# Patient Record
Sex: MALE | Race: WHITE | NOT HISPANIC OR LATINO | Employment: OTHER | URBAN - METROPOLITAN AREA
[De-identification: names, ages, dates, MRNs, and addresses within clinical notes are randomized per-mention and may not be internally consistent; named-entity substitution may affect disease eponyms.]

---

## 2017-03-13 ENCOUNTER — OFFICE VISIT - RIVER FALLS (OUTPATIENT)
Dept: FAMILY MEDICINE | Facility: CLINIC | Age: 67
End: 2017-03-13
Payer: COMMERCIAL

## 2017-03-13 ASSESSMENT — MIFFLIN-ST. JEOR: SCORE: 1628.51

## 2017-03-15 ENCOUNTER — OFFICE VISIT - RIVER FALLS (OUTPATIENT)
Dept: FAMILY MEDICINE | Facility: CLINIC | Age: 67
End: 2017-03-15
Payer: COMMERCIAL

## 2017-04-21 ENCOUNTER — OFFICE VISIT - RIVER FALLS (OUTPATIENT)
Dept: FAMILY MEDICINE | Facility: CLINIC | Age: 67
End: 2017-04-21
Payer: COMMERCIAL

## 2017-04-21 ASSESSMENT — MIFFLIN-ST. JEOR: SCORE: 1613.09

## 2017-04-27 ENCOUNTER — OFFICE VISIT - RIVER FALLS (OUTPATIENT)
Dept: FAMILY MEDICINE | Facility: CLINIC | Age: 67
End: 2017-04-27
Payer: COMMERCIAL

## 2017-04-27 ASSESSMENT — MIFFLIN-ST. JEOR: SCORE: 1627.6

## 2017-05-25 ENCOUNTER — COMMUNICATION - RIVER FALLS (OUTPATIENT)
Dept: FAMILY MEDICINE | Facility: CLINIC | Age: 67
End: 2017-05-25
Payer: COMMERCIAL

## 2017-05-25 ENCOUNTER — OFFICE VISIT - RIVER FALLS (OUTPATIENT)
Dept: FAMILY MEDICINE | Facility: CLINIC | Age: 67
End: 2017-05-25
Payer: COMMERCIAL

## 2017-05-26 LAB — PSA SERPL-MCNC: 2 NG/ML

## 2017-06-19 ENCOUNTER — OFFICE VISIT - RIVER FALLS (OUTPATIENT)
Dept: FAMILY MEDICINE | Facility: CLINIC | Age: 67
End: 2017-06-19
Payer: COMMERCIAL

## 2017-11-01 ENCOUNTER — OFFICE VISIT - RIVER FALLS (OUTPATIENT)
Dept: FAMILY MEDICINE | Facility: CLINIC | Age: 67
End: 2017-11-01
Payer: COMMERCIAL

## 2017-11-01 ASSESSMENT — MIFFLIN-ST. JEOR: SCORE: 1610.36

## 2017-11-06 ENCOUNTER — OFFICE VISIT - RIVER FALLS (OUTPATIENT)
Dept: FAMILY MEDICINE | Facility: CLINIC | Age: 67
End: 2017-11-06
Payer: COMMERCIAL

## 2017-12-22 ENCOUNTER — OFFICE VISIT - RIVER FALLS (OUTPATIENT)
Dept: FAMILY MEDICINE | Facility: CLINIC | Age: 67
End: 2017-12-22
Payer: COMMERCIAL

## 2017-12-22 ASSESSMENT — MIFFLIN-ST. JEOR: SCORE: 1626.69

## 2018-01-04 ENCOUNTER — OFFICE VISIT - RIVER FALLS (OUTPATIENT)
Dept: FAMILY MEDICINE | Facility: CLINIC | Age: 68
End: 2018-01-04
Payer: COMMERCIAL

## 2018-01-04 ASSESSMENT — MIFFLIN-ST. JEOR: SCORE: 1616.71

## 2018-02-05 ENCOUNTER — OFFICE VISIT - RIVER FALLS (OUTPATIENT)
Dept: FAMILY MEDICINE | Facility: CLINIC | Age: 68
End: 2018-02-05
Payer: COMMERCIAL

## 2018-02-05 ASSESSMENT — MIFFLIN-ST. JEOR: SCORE: 1640.3

## 2018-02-28 ENCOUNTER — OFFICE VISIT - RIVER FALLS (OUTPATIENT)
Dept: FAMILY MEDICINE | Facility: CLINIC | Age: 68
End: 2018-02-28
Payer: COMMERCIAL

## 2018-02-28 ASSESSMENT — MIFFLIN-ST. JEOR: SCORE: 1645.75

## 2018-03-13 ENCOUNTER — OFFICE VISIT - RIVER FALLS (OUTPATIENT)
Dept: FAMILY MEDICINE | Facility: CLINIC | Age: 68
End: 2018-03-13
Payer: COMMERCIAL

## 2018-03-13 ASSESSMENT — MIFFLIN-ST. JEOR: SCORE: 1642.12

## 2018-03-14 ENCOUNTER — OFFICE VISIT - RIVER FALLS (OUTPATIENT)
Dept: FAMILY MEDICINE | Facility: CLINIC | Age: 68
End: 2018-03-14
Payer: COMMERCIAL

## 2018-03-22 ENCOUNTER — AMBULATORY - RIVER FALLS (OUTPATIENT)
Dept: FAMILY MEDICINE | Facility: CLINIC | Age: 68
End: 2018-03-22
Payer: COMMERCIAL

## 2018-03-23 LAB
CHOLEST SERPL-MCNC: 132 MG/DL
CHOLEST/HDLC SERPL: 3.1 {RATIO}
CREAT SERPL-MCNC: 1.42 MG/DL (ref 0.7–1.25)
GLUCOSE BLD-MCNC: 83 MG/DL (ref 65–99)
HDLC SERPL-MCNC: 42 MG/DL
LDLC SERPL CALC-MCNC: 74 MG/DL
NONHDLC SERPL-MCNC: 90 MG/DL
TRIGL SERPL-MCNC: 84 MG/DL

## 2018-03-29 ENCOUNTER — OFFICE VISIT - RIVER FALLS (OUTPATIENT)
Dept: FAMILY MEDICINE | Facility: CLINIC | Age: 68
End: 2018-03-29
Payer: COMMERCIAL

## 2018-03-29 ASSESSMENT — MIFFLIN-ST. JEOR: SCORE: 1646.65

## 2018-04-30 ENCOUNTER — OFFICE VISIT - RIVER FALLS (OUTPATIENT)
Dept: FAMILY MEDICINE | Facility: CLINIC | Age: 68
End: 2018-04-30
Payer: COMMERCIAL

## 2018-05-01 ENCOUNTER — OFFICE VISIT - RIVER FALLS (OUTPATIENT)
Dept: FAMILY MEDICINE | Facility: CLINIC | Age: 68
End: 2018-05-01
Payer: COMMERCIAL

## 2018-05-01 ASSESSMENT — MIFFLIN-ST. JEOR: SCORE: 1667.52

## 2018-05-02 ENCOUNTER — OFFICE VISIT - RIVER FALLS (OUTPATIENT)
Dept: FAMILY MEDICINE | Facility: CLINIC | Age: 68
End: 2018-05-02
Payer: COMMERCIAL

## 2018-05-07 ENCOUNTER — COMMUNICATION - RIVER FALLS (OUTPATIENT)
Dept: FAMILY MEDICINE | Facility: CLINIC | Age: 68
End: 2018-05-07
Payer: COMMERCIAL

## 2018-05-07 ENCOUNTER — OFFICE VISIT - RIVER FALLS (OUTPATIENT)
Dept: FAMILY MEDICINE | Facility: CLINIC | Age: 68
End: 2018-05-07
Payer: COMMERCIAL

## 2018-05-15 ENCOUNTER — OFFICE VISIT - RIVER FALLS (OUTPATIENT)
Dept: FAMILY MEDICINE | Facility: CLINIC | Age: 68
End: 2018-05-15
Payer: COMMERCIAL

## 2018-05-15 ASSESSMENT — MIFFLIN-ST. JEOR: SCORE: 1636.67

## 2018-05-30 ENCOUNTER — OFFICE VISIT - RIVER FALLS (OUTPATIENT)
Dept: FAMILY MEDICINE | Facility: CLINIC | Age: 68
End: 2018-05-30
Payer: COMMERCIAL

## 2018-06-11 LAB
CREAT SERPL-MCNC: 1.82 MG/DL
GLUCOSE BLD-MCNC: 85 MG/DL

## 2018-07-05 ENCOUNTER — OFFICE VISIT - RIVER FALLS (OUTPATIENT)
Dept: FAMILY MEDICINE | Facility: CLINIC | Age: 68
End: 2018-07-05
Payer: COMMERCIAL

## 2018-07-05 ASSESSMENT — MIFFLIN-ST. JEOR: SCORE: 1597.66

## 2018-07-11 ENCOUNTER — OFFICE VISIT - RIVER FALLS (OUTPATIENT)
Dept: FAMILY MEDICINE | Facility: CLINIC | Age: 68
End: 2018-07-11
Payer: COMMERCIAL

## 2018-07-24 ENCOUNTER — OFFICE VISIT - RIVER FALLS (OUTPATIENT)
Dept: FAMILY MEDICINE | Facility: CLINIC | Age: 68
End: 2018-07-24
Payer: COMMERCIAL

## 2018-08-02 ENCOUNTER — OFFICE VISIT - RIVER FALLS (OUTPATIENT)
Dept: FAMILY MEDICINE | Facility: CLINIC | Age: 68
End: 2018-08-02
Payer: COMMERCIAL

## 2018-08-02 ASSESSMENT — MIFFLIN-ST. JEOR: SCORE: 1608.55

## 2018-08-14 ENCOUNTER — OFFICE VISIT - RIVER FALLS (OUTPATIENT)
Dept: FAMILY MEDICINE | Facility: CLINIC | Age: 68
End: 2018-08-14
Payer: COMMERCIAL

## 2018-10-03 ENCOUNTER — OFFICE VISIT - RIVER FALLS (OUTPATIENT)
Dept: FAMILY MEDICINE | Facility: CLINIC | Age: 68
End: 2018-10-03
Payer: COMMERCIAL

## 2019-01-11 ENCOUNTER — OFFICE VISIT - RIVER FALLS (OUTPATIENT)
Dept: FAMILY MEDICINE | Facility: CLINIC | Age: 69
End: 2019-01-11
Payer: COMMERCIAL

## 2019-01-11 ASSESSMENT — MIFFLIN-ST. JEOR: SCORE: 1640.3

## 2019-04-02 ENCOUNTER — OFFICE VISIT - RIVER FALLS (OUTPATIENT)
Dept: FAMILY MEDICINE | Facility: CLINIC | Age: 69
End: 2019-04-02
Payer: COMMERCIAL

## 2019-04-02 ASSESSMENT — MIFFLIN-ST. JEOR: SCORE: 1635.77

## 2019-04-03 ENCOUNTER — OFFICE VISIT - RIVER FALLS (OUTPATIENT)
Dept: FAMILY MEDICINE | Facility: CLINIC | Age: 69
End: 2019-04-03
Payer: COMMERCIAL

## 2019-04-03 LAB
BUN SERPL-MCNC: 30 MG/DL (ref 7–25)
BUN/CREAT RATIO - HISTORICAL: 16 (ref 6–22)
CALCIUM SERPL-MCNC: 9.5 MG/DL (ref 8.6–10.3)
CHLORIDE BLD-SCNC: 104 MMOL/L (ref 98–110)
CHOLEST SERPL-MCNC: 159 MG/DL
CHOLEST/HDLC SERPL: 4.3 {RATIO}
CO2 SERPL-SCNC: 26 MMOL/L (ref 20–32)
CREAT SERPL-MCNC: 1.92 MG/DL (ref 0.7–1.18)
EGFRCR SERPLBLD CKD-EPI 2021: 35 ML/MIN/1.73M2
ERYTHROCYTE [DISTWIDTH] IN BLOOD BY AUTOMATED COUNT: 13.3 % (ref 11–15)
GLUCOSE BLD-MCNC: 87 MG/DL (ref 65–99)
HCT VFR BLD AUTO: 36.5 % (ref 38.5–50)
HDLC SERPL-MCNC: 37 MG/DL
HGB BLD-MCNC: 12.8 GM/DL (ref 13.2–17.1)
LDLC SERPL CALC-MCNC: 93 MG/DL
MCH RBC QN AUTO: 33 PG (ref 27–33)
MCHC RBC AUTO-ENTMCNC: 35.1 GM/DL (ref 32–36)
MCV RBC AUTO: 94.1 FL (ref 80–100)
NONHDLC SERPL-MCNC: 122 MG/DL
PLATELET # BLD AUTO: 312 10*3/UL (ref 140–400)
PMV BLD: 10.9 FL (ref 7.5–12.5)
POTASSIUM BLD-SCNC: 4 MMOL/L (ref 3.5–5.3)
PSA SERPL-MCNC: 2.6 NG/ML
RBC # BLD AUTO: 3.88 10*6/UL (ref 4.2–5.8)
SODIUM SERPL-SCNC: 139 MMOL/L (ref 135–146)
TRIGL SERPL-MCNC: 193 MG/DL
WBC # BLD AUTO: 6 10*3/UL (ref 3.8–10.8)

## 2020-01-28 ENCOUNTER — OFFICE VISIT - RIVER FALLS (OUTPATIENT)
Dept: FAMILY MEDICINE | Facility: CLINIC | Age: 70
End: 2020-01-28
Payer: COMMERCIAL

## 2020-01-28 ASSESSMENT — MIFFLIN-ST. JEOR: SCORE: 1644.84

## 2020-02-03 ENCOUNTER — OFFICE VISIT - RIVER FALLS (OUTPATIENT)
Dept: FAMILY MEDICINE | Facility: CLINIC | Age: 70
End: 2020-02-03
Payer: COMMERCIAL

## 2020-02-03 ASSESSMENT — MIFFLIN-ST. JEOR: SCORE: 1653.91

## 2020-03-31 ENCOUNTER — COMMUNICATION - RIVER FALLS (OUTPATIENT)
Dept: FAMILY MEDICINE | Facility: CLINIC | Age: 70
End: 2020-03-31
Payer: COMMERCIAL

## 2020-05-11 ENCOUNTER — OFFICE VISIT - RIVER FALLS (OUTPATIENT)
Dept: FAMILY MEDICINE | Facility: CLINIC | Age: 70
End: 2020-05-11
Payer: COMMERCIAL

## 2020-05-18 ENCOUNTER — OFFICE VISIT - RIVER FALLS (OUTPATIENT)
Dept: FAMILY MEDICINE | Facility: CLINIC | Age: 70
End: 2020-05-18
Payer: COMMERCIAL

## 2020-06-04 ENCOUNTER — OFFICE VISIT - RIVER FALLS (OUTPATIENT)
Dept: FAMILY MEDICINE | Facility: CLINIC | Age: 70
End: 2020-06-04
Payer: COMMERCIAL

## 2020-06-04 ASSESSMENT — MIFFLIN-ST. JEOR: SCORE: 1631.23

## 2020-07-06 ENCOUNTER — AMBULATORY - RIVER FALLS (OUTPATIENT)
Dept: FAMILY MEDICINE | Facility: CLINIC | Age: 70
End: 2020-07-06
Payer: COMMERCIAL

## 2020-07-07 ENCOUNTER — COMMUNICATION - RIVER FALLS (OUTPATIENT)
Dept: FAMILY MEDICINE | Facility: CLINIC | Age: 70
End: 2020-07-07
Payer: COMMERCIAL

## 2020-07-07 LAB
BUN SERPL-MCNC: 28 MG/DL (ref 7–25)
BUN/CREAT RATIO - HISTORICAL: 16 (ref 6–22)
CALCIUM SERPL-MCNC: 9.2 MG/DL (ref 8.6–10.3)
CHLORIDE BLD-SCNC: 109 MMOL/L (ref 98–110)
CHOLEST SERPL-MCNC: 153 MG/DL
CHOLEST/HDLC SERPL: 3.8 {RATIO}
CO2 SERPL-SCNC: 25 MMOL/L (ref 20–32)
CREAT SERPL-MCNC: 1.78 MG/DL (ref 0.7–1.18)
EGFRCR SERPLBLD CKD-EPI 2021: 38 ML/MIN/1.73M2
GLUCOSE BLD-MCNC: 96 MG/DL (ref 65–99)
HDLC SERPL-MCNC: 40 MG/DL
LDLC SERPL CALC-MCNC: 92 MG/DL
NONHDLC SERPL-MCNC: 113 MG/DL
POTASSIUM BLD-SCNC: 4.1 MMOL/L (ref 3.5–5.3)
PSA SERPL-MCNC: 1.9 NG/ML
SODIUM SERPL-SCNC: 139 MMOL/L (ref 135–146)
TRIGL SERPL-MCNC: 118 MG/DL

## 2020-07-27 ENCOUNTER — TRANSFERRED RECORDS (OUTPATIENT)
Dept: MULTI SPECIALTY CLINIC | Facility: CLINIC | Age: 70
End: 2020-07-27
Payer: COMMERCIAL

## 2020-07-27 ENCOUNTER — OFFICE VISIT - RIVER FALLS (OUTPATIENT)
Dept: FAMILY MEDICINE | Facility: CLINIC | Age: 70
End: 2020-07-27
Payer: COMMERCIAL

## 2020-07-27 ASSESSMENT — MIFFLIN-ST. JEOR: SCORE: 1653

## 2020-12-10 ENCOUNTER — OFFICE VISIT - RIVER FALLS (OUTPATIENT)
Dept: FAMILY MEDICINE | Facility: CLINIC | Age: 70
End: 2020-12-10
Payer: COMMERCIAL

## 2020-12-10 ASSESSMENT — MIFFLIN-ST. JEOR: SCORE: 1656.63

## 2021-07-05 ENCOUNTER — COMMUNICATION - RIVER FALLS (OUTPATIENT)
Dept: FAMILY MEDICINE | Facility: CLINIC | Age: 71
End: 2021-07-05
Payer: COMMERCIAL

## 2021-07-08 ENCOUNTER — OFFICE VISIT - RIVER FALLS (OUTPATIENT)
Dept: FAMILY MEDICINE | Facility: CLINIC | Age: 71
End: 2021-07-08
Payer: COMMERCIAL

## 2021-07-08 ASSESSMENT — MIFFLIN-ST. JEOR: SCORE: 1640.3

## 2021-07-09 ENCOUNTER — COMMUNICATION - RIVER FALLS (OUTPATIENT)
Dept: FAMILY MEDICINE | Facility: CLINIC | Age: 71
End: 2021-07-09
Payer: COMMERCIAL

## 2021-07-09 LAB
BUN SERPL-MCNC: 22 MG/DL (ref 7–25)
BUN/CREAT RATIO - HISTORICAL: 12 (ref 6–22)
CALCIUM SERPL-MCNC: 9.4 MG/DL (ref 8.6–10.3)
CHLORIDE BLD-SCNC: 106 MMOL/L (ref 98–110)
CHOLEST SERPL-MCNC: 148 MG/DL
CHOLEST/HDLC SERPL: 3.7 {RATIO}
CO2 SERPL-SCNC: 24 MMOL/L (ref 20–32)
CREAT SERPL-MCNC: 1.78 MG/DL (ref 0.7–1.18)
EGFRCR SERPLBLD CKD-EPI 2021: 37 ML/MIN/1.73M2
GLUCOSE BLD-MCNC: 86 MG/DL (ref 65–99)
HDLC SERPL-MCNC: 40 MG/DL
LDLC SERPL CALC-MCNC: 86 MG/DL
NONHDLC SERPL-MCNC: 108 MG/DL
POTASSIUM BLD-SCNC: 4.4 MMOL/L (ref 3.5–5.3)
PSA SERPL-MCNC: 2.1 NG/ML
SODIUM SERPL-SCNC: 138 MMOL/L (ref 135–146)
TRIGL SERPL-MCNC: 119 MG/DL

## 2021-07-15 ENCOUNTER — AMBULATORY - RIVER FALLS (OUTPATIENT)
Dept: FAMILY MEDICINE | Facility: CLINIC | Age: 71
End: 2021-07-15
Payer: COMMERCIAL

## 2021-07-22 ENCOUNTER — OFFICE VISIT - RIVER FALLS (OUTPATIENT)
Dept: FAMILY MEDICINE | Facility: CLINIC | Age: 71
End: 2021-07-22
Payer: COMMERCIAL

## 2021-07-22 ASSESSMENT — MIFFLIN-ST. JEOR: SCORE: 1644.84

## 2021-12-19 ENCOUNTER — NURSE TRIAGE (OUTPATIENT)
Dept: NURSING | Facility: CLINIC | Age: 71
End: 2021-12-19
Payer: COMMERCIAL

## 2021-12-19 NOTE — TELEPHONE ENCOUNTER
Pt tested positive for COVID 12/18/21 with home test.  Pt states 99, congestion, pt feels a little sick.  Symptoms began on 12/15/21.  Pt wanting to know what he should do.  Care Advice given, pt verbalizes understanding and will follow Care Advice.  Sneha Carballo RN, [facility] Nurse Advisor      Reason for Disposition    [1] COVID-19 diagnosed by positive lab test AND [2] mild symptoms (e.g., cough, fever, others) AND [3] no complications or SOB    Additional Information    Negative: SEVERE difficulty breathing (e.g., struggling for each breath, speaks in single words)    Negative: Difficult to awaken or acting confused (e.g., disoriented, slurred speech)    Negative: Bluish (or gray) lips or face now    Negative: Shock suspected (e.g., cold/pale/clammy skin, too weak to stand, low BP, rapid pulse)    Negative: Sounds like a life-threatening emergency to the triager    Negative: [1] COVID-19 exposure AND [2] no symptoms    Negative: COVID-19 vaccine reaction suspected (e.g., fever, headache, muscle aches) occurring 1 to 3 days after getting vaccine    Negative: COVID-19 vaccine, questions about    Negative: [1] Lives with someone known to have influenza (flu test positive) AND [2] flu-like symptoms (e.g., cough, runny nose, sore throat, SOB; with or without fever)    Negative: [1] Adult with possible COVID-19 symptoms AND [2] triager concerned about severity of symptoms or other causes    Negative: COVID-19 and breastfeeding, questions about    Negative: SEVERE or constant chest pain or pressure (Exception: mild central chest pain, present only when coughing)    Negative: MODERATE difficulty breathing (e.g., speaks in phrases, SOB even at rest, pulse 100-120)    Negative: [1] Headache AND [2] stiff neck (can't touch chin to chest)    Negative: MILD difficulty breathing (e.g., minimal/no SOB at rest, SOB with walking, pulse <100)    Negative: Chest pain or pressure    Negative: Patient sounds very sick or weak  to the triager    Negative: Fever > 103 F (39.4 C)    Negative: [1] Fever > 101 F (38.3 C) AND [2] age > 60 years    Negative: [1] Fever > 100.0 F (37.8 C) AND [2] bedridden (e.g., nursing home patient, CVA, chronic illness, recovering from surgery)    Negative: HIGH RISK for severe COVID complications (e.g., age > 64 years, obesity with BMI > 25, pregnant, chronic lung disease or other chronic medical condition)  (Exception: Already seen by PCP and no new or worsening symptoms.)    Negative: [1] HIGH RISK patient AND [2] influenza is widespread in the community AND [3] ONE OR MORE respiratory symptoms: cough, sore throat, runny or stuffy nose    Negative: [1] HIGH RISK patient AND [2] influenza exposure within the last 7 days AND [3] ONE OR MORE respiratory symptoms: cough, sore throat, runny or stuffy nose    Negative: [1] COVID-19 infection suspected by caller or triager AND [2] mild symptoms (cough, fever, or others) AND [3] negative COVID-19 rapid test    Negative: Fever present > 3 days (72 hours)    Negative: [1] Fever returns after gone for over 24 hours AND [2] symptoms worse or not improved    Negative: [1] Continuous (nonstop) coughing interferes with work or school AND [2] no improvement using cough treatment per protocol    Negative: Cough present > 3 weeks    Negative: [1] COVID-19 diagnosed by positive lab test AND [2] NO symptoms (e.g., cough, fever, others)    [facility] Specific Disposition - [facility] Specific Patient Instructions  COVID 19 Nurse Triage Plan/Patient Instructions    Please be aware that novel coronavirus (COVID-19) may be circulating in the community. If you develop symptoms such as fever, cough, or SOB or if you have concerns about the presence of another infection including coronavirus (COVID-19), please contact your health care provider or visit [email or web address]      Home care recommended. Follow home care protocol based instructions.    Thank you for  taking steps to prevent the spread of this virus.  Limit your contact with others.  Wear a simple mask to cover your cough.  Wash your hands well and often.    Resources  [facility]: About COVID-19: [email or web address]  CDC: What to Do If You're Sick: www.cdc.gov/coronavirus/2019-ncov/about/steps-when-sick.html  CDC: Ending Home Isolation: www.cdc.gov/coronavirus/2019-ncov/hcp/disposition-in-home-patients.html   CDC: Caring for Someone: www.cdc.gov/coronavirus/2019-ncov/if-you-are-sick/care-for-someone.html   [facility]: Interim Guidance for Hospital Discharge to Home: www.[facility].mn./diseases/coronavirus/hcp/hospdischarge.pdf  [facility] clinical trials (COVID-19 research studies): clinicalaffairs.[facility]-clinical-trials   Below are the COVID-19 hotlines at the [place] Department [facility] ([facility]). Interpreters are available.   For health questions: Call 421-473-8702 or 1-145.337.3757 (7 a.m. to 7 p.m.)  For questions about schools and childcare: Call 366-753-1489 or 1-141.590.4504 (7 a.m. to 7 p.m.)    Protocols used: CORONAVIRUS (COVID-19) DIAGNOSED OR [id number] 8.25.2021

## 2022-01-10 ENCOUNTER — OFFICE VISIT - RIVER FALLS (OUTPATIENT)
Dept: FAMILY MEDICINE | Facility: CLINIC | Age: 72
End: 2022-01-10
Payer: COMMERCIAL

## 2022-01-11 ENCOUNTER — COMMUNICATION - RIVER FALLS (OUTPATIENT)
Dept: FAMILY MEDICINE | Facility: CLINIC | Age: 72
End: 2022-01-11
Payer: COMMERCIAL

## 2022-01-27 ENCOUNTER — OFFICE VISIT - RIVER FALLS (OUTPATIENT)
Dept: FAMILY MEDICINE | Facility: CLINIC | Age: 72
End: 2022-01-27
Payer: COMMERCIAL

## 2022-02-11 VITALS
DIASTOLIC BLOOD PRESSURE: 64 MMHG | HEIGHT: 68 IN | HEART RATE: 62 BPM | TEMPERATURE: 96.9 F | WEIGHT: 206.6 LBS | SYSTOLIC BLOOD PRESSURE: 122 MMHG | BODY MASS INDEX: 31.31 KG/M2

## 2022-02-11 VITALS
OXYGEN SATURATION: 97 % | DIASTOLIC BLOOD PRESSURE: 70 MMHG | HEART RATE: 64 BPM | TEMPERATURE: 97.2 F | SYSTOLIC BLOOD PRESSURE: 110 MMHG | SYSTOLIC BLOOD PRESSURE: 110 MMHG | HEIGHT: 68 IN | SYSTOLIC BLOOD PRESSURE: 122 MMHG | BODY MASS INDEX: 30.95 KG/M2 | SYSTOLIC BLOOD PRESSURE: 120 MMHG | DIASTOLIC BLOOD PRESSURE: 72 MMHG | BODY MASS INDEX: 29.98 KG/M2 | WEIGHT: 203 LBS | HEIGHT: 68 IN | WEIGHT: 200 LBS | WEIGHT: 204.2 LBS | HEIGHT: 68 IN | OXYGEN SATURATION: 96 % | DIASTOLIC BLOOD PRESSURE: 78 MMHG | BODY MASS INDEX: 30.31 KG/M2 | TEMPERATURE: 97.8 F | WEIGHT: 197.8 LBS | RESPIRATION RATE: 16 BRPM | HEART RATE: 80 BPM | HEART RATE: 64 BPM | HEIGHT: 68 IN | HEART RATE: 94 BPM | DIASTOLIC BLOOD PRESSURE: 70 MMHG | BODY MASS INDEX: 30.77 KG/M2

## 2022-02-11 VITALS
WEIGHT: 196.4 LBS | HEART RATE: 69 BPM | SYSTOLIC BLOOD PRESSURE: 112 MMHG | DIASTOLIC BLOOD PRESSURE: 72 MMHG | HEIGHT: 68 IN | BODY MASS INDEX: 29.77 KG/M2 | OXYGEN SATURATION: 97 %

## 2022-02-11 VITALS
DIASTOLIC BLOOD PRESSURE: 68 MMHG | DIASTOLIC BLOOD PRESSURE: 82 MMHG | OXYGEN SATURATION: 98 % | SYSTOLIC BLOOD PRESSURE: 132 MMHG | HEIGHT: 68 IN | WEIGHT: 200.4 LBS | BODY MASS INDEX: 29.86 KG/M2 | SYSTOLIC BLOOD PRESSURE: 110 MMHG | HEART RATE: 85 BPM | HEART RATE: 74 BPM | HEIGHT: 68 IN | HEIGHT: 68 IN | SYSTOLIC BLOOD PRESSURE: 122 MMHG | WEIGHT: 197 LBS | WEIGHT: 200.2 LBS | TEMPERATURE: 97.4 F | BODY MASS INDEX: 30.37 KG/M2 | DIASTOLIC BLOOD PRESSURE: 78 MMHG | OXYGEN SATURATION: 98 % | BODY MASS INDEX: 30.34 KG/M2 | HEART RATE: 78 BPM

## 2022-02-11 VITALS
BODY MASS INDEX: 31.19 KG/M2 | TEMPERATURE: 97.6 F | WEIGHT: 201 LBS | BODY MASS INDEX: 30.46 KG/M2 | HEART RATE: 64 BPM | WEIGHT: 205.8 LBS | SYSTOLIC BLOOD PRESSURE: 122 MMHG | HEIGHT: 68 IN | DIASTOLIC BLOOD PRESSURE: 78 MMHG | HEIGHT: 68 IN | DIASTOLIC BLOOD PRESSURE: 62 MMHG | HEART RATE: 72 BPM | SYSTOLIC BLOOD PRESSURE: 128 MMHG

## 2022-02-11 VITALS
HEART RATE: 78 BPM | HEART RATE: 64 BPM | BODY MASS INDEX: 31.67 KG/M2 | HEIGHT: 68 IN | SYSTOLIC BLOOD PRESSURE: 132 MMHG | OXYGEN SATURATION: 99 % | DIASTOLIC BLOOD PRESSURE: 74 MMHG | WEIGHT: 202.2 LBS | HEIGHT: 68 IN | SYSTOLIC BLOOD PRESSURE: 122 MMHG | SYSTOLIC BLOOD PRESSURE: 124 MMHG | HEART RATE: 103 BPM | WEIGHT: 209 LBS | SYSTOLIC BLOOD PRESSURE: 112 MMHG | HEIGHT: 68 IN | BODY MASS INDEX: 30.65 KG/M2 | DIASTOLIC BLOOD PRESSURE: 62 MMHG | DIASTOLIC BLOOD PRESSURE: 70 MMHG | BODY MASS INDEX: 30.98 KG/M2 | WEIGHT: 204.4 LBS | HEART RATE: 74 BPM | DIASTOLIC BLOOD PRESSURE: 72 MMHG | TEMPERATURE: 98.8 F | HEIGHT: 68 IN | BODY MASS INDEX: 30.83 KG/M2 | WEIGHT: 203.4 LBS

## 2022-02-11 VITALS
OXYGEN SATURATION: 96 % | RESPIRATION RATE: 16 BRPM | HEIGHT: 68 IN | DIASTOLIC BLOOD PRESSURE: 76 MMHG | WEIGHT: 203 LBS | TEMPERATURE: 97.5 F | SYSTOLIC BLOOD PRESSURE: 110 MMHG | BODY MASS INDEX: 30.77 KG/M2 | HEART RATE: 80 BPM

## 2022-02-11 VITALS
BODY MASS INDEX: 30.92 KG/M2 | BODY MASS INDEX: 31.22 KG/M2 | OXYGEN SATURATION: 97 % | DIASTOLIC BLOOD PRESSURE: 70 MMHG | HEART RATE: 64 BPM | SYSTOLIC BLOOD PRESSURE: 122 MMHG | HEIGHT: 68 IN | HEIGHT: 68 IN | TEMPERATURE: 97.1 F | WEIGHT: 204 LBS | HEART RATE: 75 BPM | SYSTOLIC BLOOD PRESSURE: 110 MMHG | DIASTOLIC BLOOD PRESSURE: 82 MMHG | TEMPERATURE: 97.9 F | WEIGHT: 206 LBS

## 2022-02-11 VITALS — HEIGHT: 68 IN | BODY MASS INDEX: 31.32 KG/M2 | HEIGHT: 68 IN | BODY MASS INDEX: 31.32 KG/M2

## 2022-02-11 VITALS
HEART RATE: 80 BPM | OXYGEN SATURATION: 96 % | HEART RATE: 81 BPM | BODY MASS INDEX: 29.34 KG/M2 | DIASTOLIC BLOOD PRESSURE: 80 MMHG | SYSTOLIC BLOOD PRESSURE: 122 MMHG | HEART RATE: 64 BPM | HEIGHT: 68 IN | BODY MASS INDEX: 29.95 KG/M2 | SYSTOLIC BLOOD PRESSURE: 122 MMHG | WEIGHT: 197 LBS | DIASTOLIC BLOOD PRESSURE: 68 MMHG | SYSTOLIC BLOOD PRESSURE: 142 MMHG | BODY MASS INDEX: 29.7 KG/M2 | TEMPERATURE: 98.8 F | WEIGHT: 196 LBS | DIASTOLIC BLOOD PRESSURE: 78 MMHG | SYSTOLIC BLOOD PRESSURE: 120 MMHG | DIASTOLIC BLOOD PRESSURE: 70 MMHG | HEIGHT: 68 IN | WEIGHT: 193.6 LBS | HEART RATE: 64 BPM | TEMPERATURE: 98 F

## 2022-02-11 VITALS
WEIGHT: 202 LBS | DIASTOLIC BLOOD PRESSURE: 80 MMHG | OXYGEN SATURATION: 94 % | HEART RATE: 66 BPM | SYSTOLIC BLOOD PRESSURE: 120 MMHG | BODY MASS INDEX: 30.62 KG/M2 | HEIGHT: 68 IN

## 2022-02-11 VITALS
WEIGHT: 204 LBS | BODY MASS INDEX: 30.77 KG/M2 | OXYGEN SATURATION: 98 % | DIASTOLIC BLOOD PRESSURE: 82 MMHG | HEIGHT: 68 IN | SYSTOLIC BLOOD PRESSURE: 125 MMHG | HEIGHT: 68 IN | HEART RATE: 64 BPM | TEMPERATURE: 96.8 F | WEIGHT: 203 LBS | RESPIRATION RATE: 20 BRPM | TEMPERATURE: 97.7 F | SYSTOLIC BLOOD PRESSURE: 114 MMHG | DIASTOLIC BLOOD PRESSURE: 78 MMHG | BODY MASS INDEX: 30.92 KG/M2 | HEART RATE: 68 BPM

## 2022-02-16 NOTE — TELEPHONE ENCOUNTER
Entered by Darrius Kirby on March 30, 2020 3:45:36 PM CDT  Med Refill    Gabapentin     Date of last office visit and reason:  02/03/2020; asthma flare up                                   Date of last Med Check / Px:   04/02/2019  Date of last labs pertaining to med:  04/02/2019    RTC order in chart:  Yes, due April 2020 for annual px/labs    For Protocol refill, has patient been contacted:  No, due to COVID-19 sent additional 3 month supply. Will need appt for further refills. Note sent to pharmacy.           ** Submitted: **  Order:gabapentin (gabapentin 300 mg oral capsule)  See Instructions  TAKE ONE CAPSULE BY MOUTH EVERY DAY  Qty:  90 cap(s)        Refills:  0          Substitutions Allowed     Route To Pharmacy - [facility]    Signed by Darrius Kirby  3/30/2020 8:43:00 PM    ** Submitted: **  Complete:gabapentin (gabapentin 300 mg oral capsule)   Signed by Darrius Kirby  3/30/2020 8:44:00 PM    ** Not Approved:  **  gabapentin (GABAPENTIN 300MG CAPS)  TAKE ONE CAPSULE BY MOUTH EVERY DAY  Qty:  90 unknown unit        Days Supply:  90        Refills:  3          Substitutions Allowed     Route To Pharmacy - [facility]   Signed by Darrius Kirby            ------------------------------------------  From: [facility]  To: Mark Chris MD  Sent: March 29, 2020 11:28:55 AM CDT  Subject: Medication Management  Due: March 30, 2020 11:28:55 AM CDT    ** On Hold Pending Signature **  Drug: gabapentin (gabapentin 300 mg oral capsule)  TAKE ONE CAPSULE BY MOUTH EVERY DAY  Quantity: 90 unknown unit  Days Supply: 90  Refills: 3  Substitutions Allowed  Notes from Pharmacy:     Dispensed Drug: gabapentin (gabapentin 300 mg oral capsule)  TAKE ONE CAPSULE BY MOUTH EVERY DAY  Quantity: 90 unknown unit  Days Supply: 90  Refills: 3  Substitutions Allowed  Notes from Pharmacy:     ** On Hold Pending Signature **  Drug: zolpidem  (zolpidem 10 mg oral tablet)  TAKE ONE TABLET BY MOUTH AT BEDTIME AS NEEDED FOR SLEEP  Quantity: 30 unknown unit  Days Supply: 30  Refills: 5  Substitutions Allowed  Notes from Pharmacy:     Dispensed Drug: zolpidem (zolpidem 10 mg oral tablet)  TAKE ONE TABLET BY MOUTH AT BEDTIME AS NEEDED FOR SLEEP  Quantity: 30 unknown unit  Days Supply: 30  Refills: 5  Substitutions Allowed  Notes from Pharmacy:   ---------------------------------------------------------------  From: Darrius Kirby (eRx Pool ([id number]_[facility]))   To: [facility] Message Pool ([id number]_[place]);     Sent: 3/30/2020 3:47:14 PM CDT  Subject: FW: Medication Management   Due Date/Time: 3/30/2020 11:28:00 AM CDT     Medication Refill needing approval    PCP:   SOPHIE    Medication:   Zolpidem  Last Filled:  10/03/2019              Quantity:  30                  Refills:  5  CSA on file?   n/a     Date of last office visit and reason:   02/03/2020; asthma flare  Date of last labs pertaining to condition:  n/a     Note:  Please advise on refill.    Return to Clinic order placed?  Yes, due April 2020    Resource:   eRx  Phone:   n/a---------------------  From: Norma Patel CMA ([email or web address] Message Pool ([id number]_[facility]))   To: Mark Chris MD;     Sent: 3/31/2020 8:03:23 AM CDT  Subject: FW: Medication Management   Due Date/Time: 3/30/2020 11:28:00 AM CDT---------------------  From: Mark Chris MD   To: [id number]    Sent: 3/31/2020 8:15:37 AM CDT  Subject: FW: Medication Management     ** Submitted: **  Complete:zolpidem (zolpidem 10 mg oral tablet)   Signed by Mark Chris MD  3/31/2020 1:15:00 PM    ** Approved **  zolpidem (ZOLPIDEM TARTRATE 10MG TABS)  TAKE ONE TABLET BY MOUTH AT BEDTIME AS NEEDED FOR SLEEP  Qty:  30 unknown unit        Days Supply:  30        Refills:  5          Substitutions Allowed     Route To Pharmacy - [id number]

## 2022-02-16 NOTE — NURSING NOTE
Asthma Control Test (ACT) Total Entered On:  4/2/2019 1:41 PM CDT    Performed On:  4/2/2019 1:41 PM CDT by Lesli Villegas MA               Asthma Control Test (ACT) Total   Asthma Control Test Total (Adult) :   24    Lesli Villegas MA - 4/2/2019 1:41 PM CDT

## 2022-02-16 NOTE — TELEPHONE ENCOUNTER
Entered by Mariam Johnson CMA on March 23, 2020 7:32:40 AM CDT  ---------------------  From: Mariam Johnson CMA   To: [place] [address]    Sent: 3/23/2020 7:32:40 AM CDT  Subject: Medication Management     ** Submitted: **  Order:atorvastatin (atorvastatin 20 mg oral tablet)  1 tab(s)  Oral  daily  Qty:  90 tab(s)        Refills:  0          Substitutions Allowed     Route To Pharmacy [place] [address]    Signed by Mariam Johnson CMA  3/23/2020 12:32:00 PM    ** Submitted: **  Complete:atorvastatin (atorvastatin 20 mg oral tablet)   Signed by Mariam Johnson CMA  3/23/2020 12:32:00 PM    ** Not Approved:  **  atorvastatin (ATORVASTATIN CALCIUM 20MG TABS)  TAKE ONE TABLET BY MOUTH EVERY DAY  Qty:  90 unknown unit        Days Supply:  90        Refills:  3          Substitutions Allowed     Route To [place] [address]   Signed by Mariam Johnson CMA            ------------------------------------------  From: [place] [address]  To: Mark Chris MD  Sent: March 22, 2020 8:17:21 PM CDT  Subject: Medication Management  Due: March 23, 2020 8:17:21 PM CDT    ** On Hold Pending Signature **  Drug: atorvastatin (atorvastatin 20 mg oral tablet)  TAKE ONE TABLET BY MOUTH EVERY DAY  Quantity: 90 unknown unit  Days Supply: 90  Refills: 3  Substitutions Allowed  Notes from Pharmacy:     Dispensed Drug: atorvastatin (atorvastatin 20 mg oral tablet)  TAKE ONE TABLET BY MOUTH EVERY DAY  Quantity: 90 unknown unit  Days Supply: 90  Refills: 3  Substitutions Allowed  Notes from Pharmacy:   ------------------------------------------Med Refill      Date of last office visit and reason:  2/3/20; asthma flare      Date of last Med Check / Px:   4/2/19; AWV  Date of last labs pertaining to med:  4/2/19    RTC order in chart:  yes; April    For Protocol refill, has patient been contacted:  90 day supply sent due to COVID-19

## 2022-02-16 NOTE — TELEPHONE ENCOUNTER
Order, demographics, and notes faxed to [facility] per request, they will contact patient to schedule.[facility] did not receive above order and it is re faxed.

## 2022-02-16 NOTE — TELEPHONE ENCOUNTER
Entered by Norma Patel CMA on June 09, 2020 3:54:48 PM CDT  ---------------------  From: Norma Patel CMA   To: [facility]    Sent: 6/9/2020 3:54:48 PM CDT  Subject: Medication Management     ** Submitted: **  Order:fluticasone-salmeterol (fluticasone-salmeterol 250 mcg-50 mcg inhalation powder)  1 puff(s)  NEB  bid  Qty:  60 unknown unit        Days Supply:  30        Refills:  0          Substitutions Allowed     Route To Pharmacy - [place] [address]    Signed by Norma Patel CMA  6/9/2020 8:54:00 PM    ** Submitted: **  Complete:fluticasone-salmeterol (fluticasone-salmeterol 250 mcg-50 mcg inhalation powder)   Signed by Norma Patel CMA  6/9/2020 8:54:00 PM    ** Not Approved:  **  fluticasone-salmeterol (FLUTICASONE-SALMETEROL 250-50 AEPB)  INHALE ONE PUFF BY MOUTH TWICE A DAY  Qty:  60 unknown unit        Refills:  1          Substitutions Allowed     Details:  60 unknown unit, INHALE ONE PUFF BY MOUTH TWICE A DAY, Route to Pharmacy Electronically [place] [address], 6/8/2020, 5/6/2020, 30, Mark Chris MD      Route To Pharmacy - [place] [address]   Signed by Nomra Patel CMA            ------------------------------------------  From: [place] [address]  To: Mark Chris MD  Sent: June 8, 2020 6:40:45 PM CDT  Subject: Medication Management  Due: June 5, 2020 4:01:42 PM CDT     ** On Hold Pending Signature **     Drug: fluticasone-salmeterol (fluticasone-salmeterol 250 mcg-50 mcg inhalation powder), INHALE ONE PUFF BY MOUTH TWICE A DAY  Quantity: 60 unknown unit  Days Supply: 30  Refills: 1  Substitutions Allowed  Notes from Pharmacy:     Dispensed Drug: fluticasone-salmeterol (fluticasone-salmeterol 250 mcg-50 mcg inhalation powder), INHALE ONE PUFF BY MOUTH TWICE A DAY  Quantity: 60 unknown unit  Days Supply: 30  Refills: 1  Substitutions Allowed  Notes from Pharmacy:  ------------------------------------------

## 2022-02-16 NOTE — TELEPHONE ENCOUNTER
---------------------  From: Kylie Goel CMA   To: Mark Chris MD;     Sent: 12/28/2020 10:40:32 AM CST  Subject: zolpidem refill     PCP:   sophie    Medication:   zolpidem  Last Filled:  11/5/20     Quantity:  30  Refills:  3      Date of last office visit and reason:   12/10 rash SOPHIE.....AWV 7/27/20 RTC 1 year    Note:  received electronic request for refill    Pharmacy: [facility]    Resource:   [employer]  Phone:   _---------------------  From: Mark Chris MD   To: [facility] Message Pool ([id number]_[place]);     Sent: 12/28/2020 11:00:59 AM CST  Subject: RE: zolpidem refill     sent in [facility] 11- and should be ok for 2 more monthsMedication sent to Sophie to sign off on.

## 2022-02-16 NOTE — TELEPHONE ENCOUNTER
Entered by Nicole Umanzor CMA on June 16, 2020 11:37:54 AM CDT  ---------------------  From: Nicole Umanzor CMA   To: [facility]    Sent: 6/16/2020 11:37:54 AM CDT  Subject: Medication Management     ** Submitted: **  Order:silodosin (silodosin 8 mg oral capsule)  1 cap(s)  Oral  daily  Qty:  30 cap(s)        Refills:  0          Substitutions Allowed     Route To Pharmacy - [place] [address]    Signed by Nicole Umanzor CMA  6/16/2020 4:36:00 PM    ** Submitted: **  Complete:Return to Clinic (Request)  Details:           Signed by Nicole Umanzor CMA  6/16/2020 4:36:00 PM    ** Submitted: **  Complete:silodosin (silodosin 8 mg oral capsule)   Signed by Nicole Umanzor CMA  6/16/2020 4:37:00 PM    ** Not Approved:  **  silodosin (SILODOSIN 8MG CAPS)  TAKE ONE CAPSULE BY MOUTH EVERY DAY  Qty:  90 unknown unit        Refills:  0          Substitutions Allowed     Details:  90 unknown unit, TAKE ONE CAPSULE BY MOUTH EVERY DAY, Route to Pharmacy Electronically [place] [address], 6/16/2020, 3/23/2020, 90, Mark Chris MD      Route To Pharmacy - [place] [address]   Signed by Nicole Umanzor CMA            ------------------------------------------  From: [place] [address]  To: Mark Chris MD  Sent: June 16, 2020 8:26:35 AM CDT  Subject: Medication Management  Due: June 17, 2020 2:39:37 AM CDT     ** On Hold Pending Signature **     Drug: silodosin (silodosin 8 mg oral capsule), TAKE ONE CAPSULE BY MOUTH EVERY DAY  Quantity: 90 unknown unit  Days Supply: 90  Refills: 0  Substitutions Allowed  Notes from Pharmacy:     Dispensed Drug: silodosin (silodosin 8 mg oral capsule), TAKE ONE CAPSULE BY MOUTH EVERY DAY  Quantity: 90 unknown unit  Days Supply: 90  Refills: 0  Substitutions Allowed  Notes from Pharmacy:  ------------------------------------------Med Refill      Date of last office visit and reason:  6/4/20 HTN      Date of last  Med Check / Px:   6/4/20 HTN  Date of last labs pertaining to med:  _    RTC order in chart:  yes. was due AWV in July. Is scheduled lab work on 7/6 and AW V on 7/3. refilled per protocol    For Protocol refill, has patient been contacted:  pam

## 2022-02-16 NOTE — PROGRESS NOTES
Patient:   MELISSA ROSAS            MRN: [id number]            FIN: [id number]               Age:   69 years     Sex:  Male     :  1950   Associated Diagnoses:   Asthma flare   Author:   Mark Chris MD      Visit Information      Date of Service: 2020 10:54 am  Performing Location: [facility]  Encounter#: [id number]      Primary Care Provider (PCP):  Mark Chris MD    NPI# [id number]      Referring Provider:  Mark Chris MD    NPI# [id number]      Chief Complaint   2020 11:10 AM CST   c/o low grade fever, lung irritation, coughing at night. Started a month ago and got better until the weather got colder        History of Present Illness   see chief complaint as noted above and confirmed with the patient  presently coughing and feels a little SOB  no fevers now  yellow sputum      Review of Systems   Constitutional:  Negative except as documented in history of present illness.    Respiratory:  Negative except as documented in history of present illness.    Gastrointestinal:  No nausea, No vomiting.    Genitourinary:  No dysuria.    Musculoskeletal:  No muscle pain.    Integumentary:  No rash.       Health Status   Allergies:    Allergic Reactions (Selected)  Severity Not Documented  Aspirin (Nasal polyps)  Doxycycline (Sun rash)  Shailesh (Hives)  Zymetrin shampo (Rash)   Medications:  (Selected)   Prescriptions  Prescribed  Advair Diskus 250 mcg-50 mcg inhalation powder: 1 puff(s), inh, bid, # 60 EA, 11 Refill(s), Type: Maintenance, Pharmacy: [facility], 1 puff(s) Inhale bid  Azithromycin 5 Day Dose Pack 250 mg oral tablet: See Instructions, Instructions: 2 tabs day 1 and then 1 tab days 2-5, # 6 tab(s), 0 Refill(s), Type: Acute, Pharmacy: [facility], 2 tabs day 1 and then 1 tab days 2-5  Patanol 0.1% ophthalmic solution: 2 gtts, Both eyes, BID, # 5 mL, 3 Refill(s), Type: Maintenance, Pharmacy: [facility], 2 gtts  both eyes bid  Rapaflo 8 mg oral capsule: = 1 cap(s) ( 8 mg ), Oral, daily, Instructions: Patient requests name brand Rapaflo, # 90 cap(s), 3 Refill(s), GREGORIO, Type: Maintenance, Pharmacy: [place] [address], 1 cap(s) Oral daily,x90 day(s),Instr:Patient requests name brand Rapaflo  Ventolin HFA 90 mcg/inh inhalation aerosol: 2 puff(s), inh, q4 hrs, PRN: for cough, # 1 EA, 11 Refill(s), Type: Maintenance, Pharmacy: [place] [address], 2 puff(s) Inhale q4 hrs,PRN:for cough  albuterol 2.5 mg/3 mL (0.083%) inhalation solution: = 3 mL ( 2.5 mg ), neb, q4-6 hrs, PRN: as needed for cough, # 1 box(es), 1 Refill(s), Type: Maintenance, Pharmacy: [place] [address], 3 mL NEB q4-6 hrs,PRN:as needed for cough  amLODIPine 10 mg oral tablet: = 1 tab(s) ( 10 mg ), po, daily, # 90 tab(s), 3 Refill(s), Type: Maintenance, Pharmacy: [place] [address], 1 tab(s) Oral daily,x90 day(s)  atorvastatin 20 mg oral tablet: = 1 tab(s) ( 20 mg ), po, daily, # 90 tab(s), 3 Refill(s), Type: Maintenance, Pharmacy: [place] [address], 1 tab(s) Oral daily,x90 day(s)  clopidogrel 75 mg oral tablet: = 1 tab(s) ( 75 mg ), po, daily, # 90 tab(s), 3 Refill(s), Type: Maintenance, Pharmacy: [place] [address], 1 tab(s) Oral daily,x90 day(s)  finasteride 5 mg oral tablet: = 1 tab(s), Oral, daily, # 90 tab(s), 3 Refill(s), Type: Maintenance, Pharmacy: [place] [address], 1 tab(s) Oral daily,x90 day(s)  gabapentin 300 mg oral capsule: = 1 cap(s) ( 300 mg ), po, daily, # 90 cap(s), 3 Refill(s), Type: Maintenance, Pharmacy: [facility], 1 cap(s) Oral daily,x90 day(s)  hydroCHLOROthiazide 25 mg oral tablet: = 1 tab(s), Oral, daily, # 90 tab(s), 3 Refill(s), Type: Maintenance, Pharmacy: [facility], 1 tab(s) Oral daily,x90 day(s)  losartan 100 mg oral tablet: 1 tab(s) ( 100 mg ), Oral, daily, # 90 tab(s), 0 Refill(s), Type: Maintenance, Pharmacy: [facility], alternative  for valsartan, 1 tab(s) Oral daily  losartan 100 mg oral tablet: = 1 tab(s) ( 100 mg ), Oral, daily, # 90 tab(s), 3 Refill(s), Type: Soft Stop, Pharmacy: [place] [address], 1 tab(s) Oral daily,x90 day(s)  nitroglycerin 0.4 mg sublingual tablet: See Instructions, Instructions: PLACE 1 TABLET UNDER TONGUE FOR CHEST PAIN TO A MAX OF 3 TABS-ONE EVERY 5 MINUTES - IF NO RELIEF CALL 911, # 25 tab(s), 3 Refill(s), Type: Maintenance, Pharmacy: [place] [address]  pantoprazole 40 mg oral delayed release tablet: = 1 tab(s) ( 40 mg ), po, daily, # 90 tab(s), 3 Refill(s), Type: Maintenance, Pharmacy: [place] [address], 1 tab(s) Oral daily,x90 day(s)  predniSONE 20 mg oral tablet: = 1 tab(s) ( 20 mg ), PO, Daily, # 5 tab(s), 0 Refill(s), Type: Maintenance, Pharmacy: [place] [address], 1 tab(s) Oral daily,x5 day(s)  triamcinolone 0.1% topical ointment: 1 ree, TOP, TID, # 240 gm, 1 Refill(s), Type: Maintenance, Pharmacy: [facility], 1 ree top tid  zolpidem 10 mg oral tablet: 1 tab(s), Oral, qhs, PRN: AS NEEDED FOR SLEEP, # 30 unknown unit, 5 Refill(s), Type: Soft Stop, Pharmacy: [place] [address]  Documented Medications  Documented  Vitamin D3: 0 Refill(s), Type: Maintenance  Zyrtec 10 mg oral tablet: 1 tab(s) ( 10 mg ), PO, Daily, 0   Problem list:    All Problems (Selected)  Eczematous dermatitis / 41992178 / Confirmed  DJD of shoulder / 421880084 / Confirmed  Asthma / 748823864 / Confirmed  Insomnia / 135346017 / Confirmed  CAD (coronary artery disease) / 3782045242 / Confirmed  dec 2009 angiogram with 50% blockage,, treated with meds  Urinary retention / 391374636 / Confirmed  Post Prostatic Surgery  Nasal polyps / 36821215 / Confirmed  Allergic rhinitis / 344730476 / Confirmed  Vocal cord dysfunction / 574270158 / Confirmed  Bronchiectasis / 81516014 / Confirmed  Obese / 5986381111 / Probable  AK (actinic keratosis) / 4884162713 / Confirmed  HLD (hyperlipidemia) /  20180233 / Confirmed  Obstructive sleep apnea syndrome in adult / 8984248413 / Confirmed  Tubulovillous adenoma of colon / 303809855 / Confirmed  Hip arthritis / 047208784 / Confirmed  Diverticulitis / 483115490 / Confirmed  Hx of malignant skin melanoma / 3411228259 / Confirmed  GERD (gastroesophageal reflux disease) / 424691096 / Confirmed  Prosthetic hip infection / 167998795 / Confirmed  Right hip  Benign prostatic hyperplasia / 079859866 / Confirmed  HTN, goal below 140/90 / 2925291179 / Confirmed      Histories   Past Medical History:    Active  Prosthetic hip infection (280387685): Onset on 4/22/2018 at 67 years.  Comments:  4/30/2018 CDT 7:20 AM CDT - Alisha George  Right hip  Tubulovillous adenoma of colon (699014358): Onset on 9/20/2012 at 61 years.  AK (actinic keratosis) (1499784441): Onset on 8/16/2011 at 60 years.  CAD (coronary artery disease) (0262437842): Onset in the month of 12/2009 at 58 years  Comments:  9/28/2011 CDT 2:57 PM CDT - Mark Chris MD  dec 2009 angiogram with 50% blockage,, treated with meds  Eczematous dermatitis (34198761)  DJD of shoulder (418817366)  Asthma (836170465)  Insomnia (216284506)  Urinary retention (587288601)  Comments:  1/25/2010 CST 6:12 PM CST - Berna Huynh CMA  Post Prostatic Surgery  Nasal polyps (76387393)  Allergic rhinitis (659977866)  Vocal cord dysfunction (561955305)  Bronchiectasis (70987792)  Obese (9437926611)  HLD (hyperlipidemia) (00244962)  Obstructive sleep apnea syndrome in adult (5632220218)  Hip arthritis (731899094)  GERD (gastroesophageal reflux disease) (717816135)  Benign prostatic hyperplasia (192332021)  Resolved  Inpatient stay (709595703): Onset on 4/22/2018 at 67 years.  Resolved on 4/26/2018 at 67 years.  Comments:  4/30/2018 CDT 7:17 AM CDT - Alisha George  @[facility], [place] - 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  Cellulitis of the right thigh.  Inpatient stay (160164196): Onset on 10/11/2011 at 60 years.   Resolved.  Comments:  2018 CDT 7:16 AM CDT - Comfort Georgeri  @[facility] [place], [place] - Chest pain, unspecified  Cancer of skin (1023867119):  Resolved.  Otitis media (092611056):  Resolved on 10/8/2010 at 59 years.  Otitis externa (9971058):  Resolved on 10/29/2010 at 59 years.   Family History:    Heart disease  Mother (Meron, )  High blood pressure  Mother (Meron, )  Arthritis  Father (Phuc, )  CHF - Congestive heart failure  Father (Phuc, )  Alzheimer's disease  Father (Phuc, )     Procedure history:    Revision of total hip replacement (SNOMED CT 953581501) on 2018 at 67 Years.  Comments:  2018 7:26 AM CDT - Alisha George  Right hip revision total hip arthroplasty and extensive irrigation and debridement of the right thigh including muscle, fascia, bone and exchange of the femoral head and polyethylene acetabular liner.  THR - Total hip replacement (SNOMED CT 595849403) on 2018 at 67 Years.  Comments:  2018 7:21 AM CDT - Alisha George  Right hip  Colonoscopy (SNOMED CT 159844399) performed by Herson Ocampo MD on 2016 at 65 Years.  Comments:  2016 11:48 AM CDT - Jeremiah CAREY, Lizbeth  Tubular adenoma x 3 no high grade dysplasia    2016 9:11 AM CDT - Kate Valdez  Indication: History of adenomatous polyps on last colonoscopy.  Sedation:  MAC.  Recommendation: Repeat in 5 years.  TURP - Transurethral resection of prostate (SNOMED CT 598948022) performed by Vinod Lipscomb MD on 10/3/2011 at 60 Years.  Colonoscopy (SNOMED CT 500291240) performed by Herson Ocampo MD on 2010 at 59 Years.  Comments:  2010 3:33 PM CST - Karlene Sylvester  4 mm polyp noted at 90 cm in the ascending colon; appearance adenomatous, removed   Recommend colonoscopic follow up in 5 years.   Conscious sedation/ MAC if his medical conditions dictate ASA III for level of sedation for future procedures  Coronary angiogram (SNOMED CT  14188597) in 2009 at 59 Years.  Green Light Laser - Prostate in the month of 3/2009 at 58 Years.  BL nasal Turbinoplasty in the month of 1/2008 at 57 Years.  Prostate TUNA surgery in the month of 2/2007 at 56 Years.  Right shoulder arthroscopy on 10/16/2006 at 55 Years.  Flexible sigmoidoscopy (SNOMED CT 92520087) on 7/8/2005 at 54 Years.  left shoulder arthroscopy in the month of 7/2005 at 54 Years.  excisional cervical schwannoma on 11/30/2001 at 50 Years.  Cholecystectomy (SNOMED CT 73631592) in the month of 2/1982 at 31 Years.  Vasectomy (SNOMED CT 38358483).   Social History:        Alcohol Assessment            Current, Beer (12 oz), 3-4 times per year                     Comments:                      09/26/2011 - Alisha George                     1/month.      Tobacco Assessment: Past            Cigarettes                     Comments:                      04/02/2019 - Charmaine Bernardo                     2-3 per day.  Quit 1980s.      Substance Abuse Assessment            Never      Employment and Education Assessment            Retired, Work/School description: farmer.                     Comments:                      09/26/2011 - Alisha George                     2005.      Home and Environment Assessment            Marital status: .  1 children.                     Comments:                      09/26/2011 - Alisha George                     1970. Wife - Pauline.      Exercise and Physical Activity Assessment: Regular exercise            Exercise frequency: 4-5 x per week..  Exercise type: Weight lifting, Cardio.                     Comments:                      09/26/2011 Alisha Pacheco                     2-3 times per week.      Sexual Assessment            Sexually active: Yes.  Sexual orientation: Heterosexual.        Physical Examination   Vital Signs   1/28/2020 11:10 AM CST Temperature Tympanic 97.1 DegF  LOW    Peripheral Pulse Rate 64 bpm    Systolic Blood Pressure 122 mmHg    Diastolic  Blood Pressure 70 mmHg    Mean Arterial Pressure 87 mmHg      Measurements from flowsheet : Measurements   1/28/2020 11:10 AM CST Height Measured - Standard 68 in    Weight Measured - Standard 204 lb    BSA 2.1 m2    Body Mass Index 31.01 kg/m2  HI      General:  Alert and oriented, No acute distress.    Eye:  Pupils are equal, round and reactive to light, Normal conjunctiva.    Neck:  Supple.    Respiratory:  Respirations are non-labored, occasional wheezes.    Integumentary:  Warm.    Psychiatric:  Cooperative, Appropriate mood & affect.       Impression and Plan   Diagnosis     Asthma flare ([id number] J45.901).     Course:  mild flare.  unsure if triggerred by infection or weather.    Orders     Orders (Selected)   Prescriptions  Prescribed  Azithromycin 5 Day Dose Pack 250 mg oral tablet: See Instructions, Instructions: 2 tabs day 1 and then 1 tab days 2-5, # 6 tab(s), 0 Refill(s), Type: Acute, Pharmacy: [facility], 2 tabs day 1 and then 1 tab days 2-5  predniSONE 20 mg oral tablet: = 1 tab(s) ( 20 mg ), PO, Daily, # 5 tab(s), 0 Refill(s), Type: Maintenance, Pharmacy: [facility], 1 tab(s) Oral daily,x5 day(s).

## 2022-02-16 NOTE — TELEPHONE ENCOUNTER
---------------------  From: Jeannette Yao RN (Phone Messages Pool ([id number]_[facility]))   To: Apoorva Figueroa CMA;     Sent: 3/31/2020 11:42:20 AM CDT  Subject: General Message     Pt returned call and states his asthma is doing well - no current concerns. He was unclear what he was supposed to do online.  He is home now for call back to discuss ACT.see other message

## 2022-02-16 NOTE — NURSING NOTE
Comprehensive Intake Entered On:  1/11/2019 10:10 AM CST    Performed On:  1/11/2019 10:03 AM CST by Jesus Ocampo CMA               Summary   Chief Complaint :   Pt here for right side rib pain from fall he had 6 days ago.   Advance Directive :   Yes   Weight Measured :   203 lb(Converted to: 203 lb 0 oz, 92.08 kg)    Height Measured :   68 in(Converted to: 5 ft 8 in, 172.72 cm)    Body Mass Index :   30.86 kg/m2 (HI)    Body Surface Area :   2.1 m2   Systolic Blood Pressure :   110 mmHg   Diastolic Blood Pressure :   76 mmHg   Mean Arterial Pressure :   87 mmHg   Peripheral Pulse Rate :   80 bpm   BP Site :   Right arm   Pulse Site :   Radial artery   Temperature Tympanic :   97.5 DegF(Converted to: 36.4 DegC)  (LOW)    Respiratory Rate :   16 br/min   Oxygen Saturation :   96 %   Race :      Languages :   English   Ethnicity :   Not  or    Jesus Ocampo CMA - 1/11/2019 10:03 AM CST   Health Status   Allergies Verified? :   Yes   Medication History Verified? :   Yes   Immunizations Current :   Yes   Medical History Verified? :   Yes   Pre-Visit Planning Status :   Not completed   Tobacco Use? :   Former smoker   Jesus Ocampo CMA - 1/11/2019 10:03 AM CST   Meds / Allergies   (As Of: 1/11/2019 10:10:05 AM CST)   Allergies (Active)   aspirin  Estimated Onset Date:   Unspecified ; Reactions:   Nasal Polyps ; Created By:   Vinod Santana MD; Reaction Status:   Active ; Category:   Drug ; Substance:   aspirin ; Type:   Allergy ; Updated By:   Vinod Santana MD; Reviewed Date:   1/11/2019 10:08 AM CST      doxycycline  Estimated Onset Date:   Unspecified ; Reactions:   sun rash ; Created By:   Berna Huynh; Reaction Status:   Active ; Category:   Drug ; Substance:   doxycycline ; Type:   Allergy ; Updated By:   Berna Huynh; Source:   Paper Chart ; Reviewed Date:   1/11/2019 10:08 AM CST      Shailesh  Estimated Onset Date:   Unspecified ; Reactions:   Hives ; Created By:   Olaf DOWELL  Bree ISSA; Reaction Status:   Active ; Category:   Drug ; Substance:   Shailesh ; Type:   Allergy ; Updated By:   Bree Babin CMA; Reviewed Date:   1/11/2019 10:08 AM CST      zymetrin shampo  Estimated Onset Date:   Unspecified ; Reactions:   Rash ; Created By:   Bree Babin CMA; Reaction Status:   Active ; Category:   Drug ; Substance:   zymetrin shampo ; Type:   Allergy ; Updated By:   Bree Babin CMA; Reviewed Date:   1/11/2019 10:08 AM [facility]        Medication List   (As Of: 1/11/2019 10:10:05 AM [facility])   Prescription/Discharge Order    losartan  :   losartan ; Status:   Prescribed ; Ordered As Mnemonic:   losartan 100 mg oral tablet ; Simple Display Line:   100 mg, 1 tab(s), Oral, daily, 90 tab(s), 0 Refill(s) ; Ordering Provider:   Mark Chris MD; Catalog Code:   losartan ; Order Dt/Tm:   7/17/2018 11:25:35 AM          zolpidem  :   zolpidem ; Status:   Prescribed ; Ordered As Mnemonic:   zolpidem 10 mg oral tablet ; Simple Display Line:   1 tab(s), PO, Once a day (at bedtime), PRN: for sleep, 30 tab(s), 5 Refill(s) ; Ordering Provider:   Mark Chris MD; Catalog Code:   zolpidem ; Order Dt/Tm:   10/9/2018 3:12:56 PM          losartan 100 mg oral tablet  :   losartan 100 mg oral tablet ; Status:   Prescribed ; Ordered As Mnemonic:   losartan 100 mg oral tablet ; Simple Display Line:   See Instructions, TAKE ONE TABLET BY MOUTH EVERY DAY [ALTERNATIVE FOR VALSARTAN], 90 unknown unit, 1 Refill(s) ; Ordering Provider:   Mark Chris MD; Catalog Code:   losartan ; Order Dt/Tm:   10/9/2018 2:32:36 PM          hydroCHLOROthiazide  :   hydroCHLOROthiazide ; Status:   Prescribed ; Ordered As Mnemonic:   hydroCHLOROthiazide 25 mg oral tablet ; Simple Display Line:   25 mg, 1 tab(s), PO, Daily, 90 tab(s), 1 Refill(s) ; Ordering Provider:   Mark Chris MD; Catalog Code:   hydroCHLOROthiazide ; Order Dt/Tm:   8/14/2018 12:34:30 PM          Nitrostat 0.4 mg sublingual tablet  :   Nitrostat  0.4 mg sublingual tablet ; Status:   Prescribed ; Ordered As Mnemonic:   Nitrostat 0.4 mg sublingual tablet ; Simple Display Line:   See Instructions, PLACE 1 TABLET UNDER TONGUE FOR CHEST PAIN TO A MAX OF 3 TABS-ONE EVERY 5 MINUTES - IF NO RELIEF CALL 911, 25 unknown unit, 3 Refill(s) ; Ordering Provider:   Mark Chris MD; Catalog Code:   nitroglycerin ; Order Dt/Tm:   6/5/2018 11:49:56 AM          finasteride  :   finasteride ; Status:   Prescribed ; Ordered As Mnemonic:   finasteride 5 mg oral tablet ; Simple Display Line:   5 mg, 1 tab(s), po, daily, for 90 day(s), 90 tab(s), 3 Refill(s) ; Ordering Provider:   Mark Chris MD; Catalog Code:   finasteride ; Order Dt/Tm:   3/29/2018 1:29:32 PM          amLODIPine  :   amLODIPine ; Status:   Prescribed ; Ordered As Mnemonic:   amLODIPine 10 mg oral tablet ; Simple Display Line:   10 mg, 1 tab(s), po, daily, for 90 day(s), 90 tab(s), 3 Refill(s) ; Ordering Provider:   Mark Chris MD; Catalog Code:   amLODIPine ; Order Dt/Tm:   3/13/2018 12:59:52 PM          atorvastatin  :   atorvastatin ; Status:   Prescribed ; Ordered As Mnemonic:   atorvastatin 20 mg oral tablet ; Simple Display Line:   20 mg, 1 tab(s), po, daily, for 90 day(s), 90 tab(s), 3 Refill(s) ; Ordering Provider:   Mark Chris MD; Catalog Code:   atorvastatin ; Order Dt/Tm:   3/13/2018 12:59:39 PM          clopidogrel  :   clopidogrel ; Status:   Prescribed ; Ordered As Mnemonic:   clopidogrel 75 mg oral tablet ; Simple Display Line:   75 mg, 1 tab(s), po, daily, for 90 day(s), 90 tab(s), 3 Refill(s) ; Ordering Provider:   Mark Chris MD; Catalog Code:   clopidogrel ; Order Dt/Tm:   3/13/2018 12:58:56 PM          fluticasone-salmeterol  :   fluticasone-salmeterol ; Status:   Prescribed ; Ordered As Mnemonic:   Advair Diskus 250 mcg-50 mcg inhalation powder ; Simple Display Line:   1 puff(s), inh, bid, 60 EA, 11 Refill(s) ; Ordering Provider:   Mark Chris MD; Catalog  Code:   fluticasone-salmeterol ; Order Dt/Tm:   3/13/2018 12:58:11 PM          pantoprazole  :   pantoprazole ; Status:   Prescribed ; Ordered As Mnemonic:   pantoprazole 40 mg oral delayed release tablet ; Simple Display Line:   40 mg, 1 tab(s), po, daily, for 90 day(s), 90 tab(s), 3 Refill(s) ; Ordering Provider:   Mark Chris MD; Catalog Code:   pantoprazole ; Order Dt/Tm:   3/13/2018 12:57:56 PM          gabapentin  :   gabapentin ; Status:   Prescribed ; Ordered As Mnemonic:   gabapentin 300 mg oral capsule ; Simple Display Line:   300 mg, 1 cap(s), po, daily, for 90 day(s), 90 cap(s), 3 Refill(s) ; Ordering Provider:   Mark Chris MD; Catalog Code:   gabapentin ; Order Dt/Tm:   3/13/2018 12:56:57 PM          albuterol  :   albuterol ; Status:   Prescribed ; Ordered As Mnemonic:   albuterol 2.5 mg/3 mL (0.083%) inhalation solution ; Simple Display Line:   2.5 mg, 3 mL, neb, q4-6 hrs, PRN: as needed for cough, 1 box(es), 1 Refill(s) ; Ordering Provider:   Mark Chris MD; Catalog Code:   albuterol ; Order Dt/Tm:   11/3/2017 2:27:38 PM          albuterol  :   albuterol ; Status:   Prescribed ; Ordered As Mnemonic:   Ventolin HFA 90 mcg/inh inhalation aerosol ; Simple Display Line:   2 puff(s), inh, q4 hrs, PRN: for cough, 1 EA, 11 Refill(s) ; Ordering Provider:   Mark Chris MD; Catalog Code:   albuterol ; Order Dt/Tm:   11/1/2017 1:47:47 PM          triamcinolone topical  :   triamcinolone topical ; Status:   Prescribed ; Ordered As Mnemonic:   triamcinolone 0.1% topical ointment ; Simple Display Line:   1 ree, TOP, TID, 240 gm ; Ordering Provider:   Mark Chris MD; Catalog Code:   triamcinolone topical ; Order Dt/Tm:   3/11/2015 2:29:32 PM          olopatadine ophthalmic  :   olopatadine ophthalmic ; Status:   Prescribed ; Ordered As Mnemonic:   Patanol 0.1% ophthalmic solution ; Simple Display Line:   2 gtts, Both eyes, BID, 5 mL ; Ordering Provider:   Mark Chris MD; Catalog  Code:   olopatadine ophthalmic ; Order Dt/Tm:   3/11/2015 2:28:49 PM            Home Meds    levoFLOXacin  :   levoFLOXacin ; Status:   Processing ; Ordered As Mnemonic:   Levaquin ; Action Display:   Complete ; Catalog Code:   levoFLOXacin ; Order Dt/Tm:   1/11/2019 10:07:58 AM          rifAMPin  :   rifAMPin ; Status:   Documented ; Ordered As Mnemonic:   rifAMPin 300 mg oral capsule ; Simple Display Line:   300 mg, 1 cap(s), po, bid, per infectious disease doctor, take 1 tab BID for the next 3 months, 0 Refill(s) ; Catalog Code:   rifAMPin ; Order Dt/Tm:   6/12/2018 1:47:56 PM          doxepin  :   doxepin ; Status:   Documented ; Ordered As Mnemonic:   doxepin 10 mg oral capsule ; Simple Display Line:   10 mg, 1 cap(s), po, hs, Hospital discharge, 0 Refill(s) ; Catalog Code:   doxepin ; Order Dt/Tm:   4/27/2018 4:30:52 PM          senna  :   senna ; Status:   Documented ; Ordered As Mnemonic:   senna 8.6 mg oral tablet ; Simple Display Line:   17.2 mg, 2 tab(s), PO, Once a day (at bedtime), PRN: for constipation, 20 tab(s), 0 Refill(s) ; Catalog Code:   senna ; Order Dt/Tm:   4/27/2018 4:29:51 PM          silodosin  :   silodosin ; Status:   Documented ; Ordered As Mnemonic:   Rapaflo 8 mg oral capsule ; Simple Display Line:   8 mg, 1 cap(s), po, daily, 0 Refill(s) ; Catalog Code:   silodosin ; Order Dt/Tm:   4/27/2018 4:29:30 PM          cholecalciferol  :   cholecalciferol ; Status:   Documented ; Ordered As Mnemonic:   Vitamin D3 ; Simple Display Line:   0 Refill(s) ; Catalog Code:   cholecalciferol ; Order Dt/Tm:   11/1/2017 1:13:39 PM          cetirizine  :   cetirizine ; Status:   Documented ; Ordered As Mnemonic:   Zyrtec 10 mg oral tablet ; Simple Display Line:   10 mg, 1 tab(s), PO, Daily ; Catalog Code:   cetirizine ; Order Dt/Tm:   1/25/2010 6:14:51 PM            Social History   Social History   (As Of: 1/11/2019 10:10:05 AM CST)   Alcohol:        Current, Beer (12 oz), 3-4 times per year   Comments:   9/26/2011 7:44 PM - Alisha George: 1/month.   (Last Updated: 3/17/2015 10:41:06 AM CDT by Apoorva Figueroa CMA)          Tobacco:        Past   (Last Updated: 3/17/2015 10:40:49 AM CDT by Apoorva Figueroa CMA)          Substance Abuse:        Never   (Last Updated: 3/17/2015 10:41:13 AM CDT by Apoorva Figueroa CMA)          Employment and Education:        Retired   Comments:  9/26/2011 7:43 PM - Alisha George: 2005.   (Last Updated: 9/26/2011 7:43:15 PM CDT by Alisha George)          Home and Environment:        Marital status: .  1 children.   Comments:  9/26/2011 7:42 PM Alisha Pacheco: 1970. Wife - Pauline.   (Last Updated: 3/17/2015 10:41:37 AM CDT by Apoorva Figueroa CMA)          Exercise and Physical Activity:  Regular exercise      Exercise frequency: 3-4 times/week.  Exercise type: Weight lifting, Cardio.   Comments:  9/26/2011 7:45 PM Alisha Pacheco: 2-3 times per week.   (Last Updated: 3/17/2015 10:41:31 AM CDT by Apoorva Figeuroa CMA)          Sexual:        Sexually active: Yes.  Sexual orientation: Heterosexual.   (Last Updated: 3/17/2015 10:40:27 AM CDT by Apoorva Figueroa CMA)

## 2022-02-16 NOTE — TELEPHONE ENCOUNTER
---------------------  From: Lizbeth Daniels RN (Phone Messages Pool ([id number]))   To: [facility] Message Pool ([address] [place] [address]);     Sent: 7/5/2021 10:07:30 AM CDT  Subject: Colonoscopy scheduled.     Time of Call:  0931  Return call at:_     Person Calling:  patient  Phone number:  [phone]    Note:   He has a colonoscopy scheduled for 7/12/21. He asks if he should be off his blood thinner prior to his colonoscopy. He is on Clopidogrel.     Last office visit and reason:  12/20 Rash---------------------  From: Nicole Umanzor CMA ([facility] Message Pool ([address] [place] [address]))   To: Mark Chris MD;     Sent: 7/5/2021 10:21:34 AM CDT  Subject: FW: Colonoscopy scheduled.Pt calling at 1:15pm stating he has not heard back. Pt thought he should have heard back within 1 hour. Told  patient that messages are answered by the end of the day and SOPHIE has been seeing patients and checking messages when he is able.    Pt asked that message be left on his voicemail if he does not answer.---------------------  From: Mark Chris MD   To: [facility] Message Pool ([id number]);     Sent: 7/5/2021 2:09:57 PM CDT  Subject: RE: Colonoscopy scheduled.     stop plavix 5 days priorpt called and informed at 2:48pm

## 2022-02-16 NOTE — PROGRESS NOTES
Patient:   MELISSA ROSAS            MRN: [id number]            FIN: [id number]               Age:   67 years     Sex:  Male     :  1950   Associated Diagnoses:   CAD (coronary artery disease); Status post hip replacement; Insomnia   Author:   Mark Chris MD      Chief Complaint   2018 12:47 PM CDT    f/u Hip replacement. Wondering why he is on plavix. Currently taking asprin. look at taking a new sleep aid.      History of Present Illness   see chief complaint as noted above and confirmed with the patient   67 year old male here to follow up His recent hip replacement. He had the replacement and it became infected and he needed to redue the surgery. He is currently doing IV therapy for Vancomycin and ceftriaxone. He does have a small blood clot that has not been growing.     His surgeon took him off of plavix and restarted him on asprin.Pt has a allergy to asprin. Pt was started on plavix by cardiology due to his allergy.  He was recommended to start doxepin to help him sleep at night because his current sleep medication caused him to fall this first night home from surgery.      Review of Systems   Constitutional:  No fever, No chills.    Ear/Nose/Mouth/Throat:  Negative.    Respiratory:  No shortness of breath.    Cardiovascular:  No chest pain.    Gastrointestinal:  No nausea, No vomiting.    Musculoskeletal:  Right Hip Pain.    Integumentary:  No rash.    Neurologic:  No headache.    Psychiatric:  Negative.              Health Status   Allergies:    Allergic Reactions (Selected)  Severity Not Documented  Aspirin (Nasal polyps)  Doxycycline (Sun rash)  Shailesh (Hives)  Zymetrin shampo (Rash)   Medications:  (Selected)   Prescriptions  Prescribed  Advair Diskus 250 mcg-50 mcg inhalation powder: 1 puff(s), inh, bid, # 60 EA, 11 Refill(s), Type: Maintenance, Pharmacy: [facility] [place], 1 puff(s) inh bid  Nitrostat 0.4 mg sublingual tablet: See Instructions, Instructions: 1 TABLET  UNDER TONGUE FOR CHEST PAIN TO A MAX OF 3 TABS-ONE EVERY 5 MINUTES - IF NO RELIEF CALL 911, # 25 unknown unit, 3 Refill(s), Type: Soft Stop, Pharmacy: [facility], 1 TABLET UNDER TONGUE FOR C...  Patanol 0.1% ophthalmic solution: 2 gtts, Both eyes, BID, # 5 mL, 3 Refill(s), Type: Maintenance, [id number], 2 gtts both eyes bid  Ventolin HFA 90 mcg/inh inhalation aerosol: 2 puff(s), inh, q4 hrs, PRN: for cough, # 1 EA, 11 Refill(s), Type: Maintenance, Pharmacy: [facility], 2 puff(s) inh q4 hrs,PRN:for cough  albuterol 2.5 mg/3 mL (0.083%) inhalation solution: 3 mL ( 2.5 mg ), neb, q4-6 hrs, PRN: as needed for cough, # 1 box(es), 1 Refill(s), Type: Maintenance, Pharmacy: [facility], 3 mL neb q4-6 hrs,PRN:as needed for cough  amLODIPine 10 mg oral tablet: 1 tab(s) ( 10 mg ), po, daily, # 90 tab(s), 3 Refill(s), Type: Maintenance, Pharmacy: [facility], 1 tab(s) po daily,x90 day(s)  atorvastatin 20 mg oral tablet: 1 tab(s) ( 20 mg ), po, daily, # 90 tab(s), 3 Refill(s), Type: Maintenance, Pharmacy: [facility], 1 tab(s) po daily,x90 day(s)  clopidogrel 75 mg oral tablet: 1 tab(s) ( 75 mg ), po, daily, # 90 tab(s), 3 Refill(s), Type: Maintenance, Pharmacy: [facility], 1 tab(s) po daily,x90 day(s)  finasteride 5 mg oral tablet: 1 tab(s) ( 5 mg ), po, daily, # 90 tab(s), 3 Refill(s), Type: Maintenance, Pharmacy: [facility], 1 tab(s) po daily,x90 day(s)  gabapentin 300 mg oral capsule: 1 cap(s) ( 300 mg ), po, daily, # 90 cap(s), 3 Refill(s), Type: Maintenance, Pharmacy: [facility], Pt is now due for annual med check per [facility] for further refills., 1 cap(s) po daily,x90 day(s)  pantoprazole 40 mg oral delayed release tablet: 1 tab(s) ( 40 mg ), po, daily, # 90 tab(s), 3 Refill(s), Type: Maintenance, Pharmacy: [facility], 1  tab(s) po daily,x90 day(s)  triamcinolone 0.1% topical ointment: 1 ree, TOP, TID, # 240 gm, 1 Refill(s), Type: Maintenance, [id number], 1 ree top tid  valsartan 320 mg oral tablet: 1 tab(s) ( 320 mg ), po, daily, # 90 tab(s), 3 Refill(s), Type: Maintenance, Pharmacy: [facility] 1 tab(s) po daily,x90 day(s)  Documented Medications  Documented  Rapaflo 8 mg oral capsule: 1 cap(s) ( 8 mg ), po, daily, 0 Refill(s), Type: Maintenance  Vitamin D3: 0 Refill(s), Type: Maintenance  Zyrtec 10 mg oral tablet: 1 tab(s) ( 10 mg ), PO, Daily, 0  acetaminophen-hydrocodone 325 mg-5 mg oral tablet: See Instructions, Instructions: 1-2  tab(s) po q4 hrs  Hospital discharge, 0 Refill(s), Type: Maintenance  aspirin: ( 325 mg ), bid, 0 Refill(s), Type: Maintenance  cefTRIAXone 2 g intravenous injection: ( 2 gm ), iv, daily, 0 Refill(s), Type: Maintenance  doxepin 10 mg oral capsule: 1 cap(s) ( 10 mg ), po, hs, Instructions: Hospital discharge, 0 Refill(s), Type: Maintenance  senna 8.6 mg oral tablet: 2 tab(s) ( 17.2 mg ), PO, Once a day (at bedtime), PRN: for constipation, # 20 tab(s), 0 Refill(s), Type: Maintenance  vancomycin 1 g intravenous injection: See Instructions, Instructions: iv q12 hrs Hospital discharge, 0 Refill(s), Type: Maintenance   Problem list:    All Problems  Vocal cord dysfunction / SNOMED CT 938216398 / Confirmed  Urinary retention / SNOMED CT 847914512 / Confirmed  Prosthetic hip infection / SNOMED CT 872426194 / Confirmed  Tubulovillous adenoma of colon / SNOMED CT 712807552 / Confirmed  Obstructive sleep apnea syndrome in adult / SNOMED CT 6556887063 / Confirmed  Obese / SNOMED CT 1322252948 / Probable  Nasal polyps / SNOMED CT 03398955 / Confirmed  Insomnia / SNOMED CT 469627740 / Confirmed  Hypertension / SNOMED CT 3875050288 / Confirmed  HLD (hyperlipidemia) / SNOMED CT 74058199 / Confirmed  Hx of malignant skin melanoma / SNOMED CT 0354706662 / Confirmed  GERD  (gastroesophageal reflux disease) / SNOMED CT 108743950 / Confirmed  Eczematous dermatitis / SNOMED CT 49719718 / Confirmed  Diverticulitis / SNOMED CT 716953518 / Confirmed  DJD of shoulder / SNOMED CT 841973698 / Confirmed  CAD (coronary artery disease) / SNOMED CT 5919298911 / Confirmed  Bronchiectasis / SNOMED CT 89992029 / Confirmed  Benign prostatic hyperplasia / SNOMED CT 218867896 / Confirmed  Asthma / SNOMED CT 747539236 / Confirmed  Hip arthritis / SNOMED CT 367492538 / Confirmed  Allergic rhinitis / SNOMED CT 801490705 / Confirmed  AK (actinic keratosis) / SNOMED CT 4843252319 / Confirmed  Inactive: Tobacco abuse / ICD-9-.1  Resolved: Otitis media / SNOMED CT 101817044  Resolved: Otitis externa / SNOMED CT 9256317  Resolved: Cancer of skin / SNOMED CT 2726197450  Resolved: Inpatient stay / SNOMED CT 461905319  Resolved: Inpatient stay / SNOMED CT 086277567      Histories   Past Medical History:    Active  Prosthetic hip infection (202448522): Onset on 4/22/2018 at 67 years.  Comments:  4/30/2018 CDT 7:20 AM CDT - Alisha George  Right hip  Tubulovillous adenoma of colon (261761475): Onset on 9/20/2012 at 61 years.  AK (actinic keratosis) (3586398054): Onset on 8/16/2011 at 60 years.  CAD (coronary artery disease) (8831199110): Onset in the month of 12/2009 at 58 years  Comments:  9/28/2011 CDT 2:57 PM CDT - Mark Chris MD  dec 2009 angiogram with 50% blockage,, treated with meds  Eczematous dermatitis (86198165)  DJD of shoulder (334681562)  Hypertension (9697543382)  Asthma (340544271)  Insomnia (136184207)  Urinary retention (878987798)  Comments:  1/25/2010 CST 6:12 PM CST - Berna Huynh CMA  Post Prostatic Surgery  Nasal polyps (53809962)  Allergic rhinitis (040256598)  Vocal cord dysfunction (423959380)  Bronchiectasis (66007398)  Obese (2384809770)  HLD (hyperlipidemia) (85581056)  Obstructive sleep apnea syndrome in adult (7728949287)  Hip arthritis (160684252)  GERD (gastroesophageal  reflux disease) (069916157)  Benign prostatic hyperplasia (078594657)  Resolved  Inpatient stay (996246997): Onset on 2018 at 67 years.  Resolved on 2018 at 67 years.  Comments:  2018 CDT 7:17 AM CDT - Ariel Comfortri  @[facility], [place] - 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  Cellulitis of the right thigh.  Inpatient stay (697741309): Onset on 10/11/2011 at 60 years.  Resolved.  Comments:  2018 CDT 7:16 AM CDT - Alisha George  @[facility], [place] - Chest pain, unspecified  Cancer of skin (4113907458):  Resolved.  Otitis media (879059450):  Resolved on 10/8/2010 at 59 years.  Otitis externa (4128917):  Resolved on 10/29/2010 at 59 years.   Family History:    CHF - Congestive heart failure  Father (Phuc, )  Alzheimer's disease  Father (Phuc, )     Procedure history:    Revision of total hip replacement (958168966) on 2018 at 67 Years.  Comments:  2018 7:26 AM - Alisha George  Right hip revision total hip arthroplasty and extensive irrigation and debridement of the right thigh including muscle, fascia, bone and exchange of the femoral head and polyethylene acetabular liner.  THR - Total hip replacement (117694629) on 2018 at 67 Years.  Comments:  2018 7:21 AM - Alisha George  Right hip  Colonoscopy (241582021) on 2016 at 65 Years.  Comments:  2016 11:48 AM - Lizbeth Daniels RN  Tubular adenoma x 3 no high grade dysplasia    2016 9:11 AM - Kate Valdez  Indication: History of adenomatous polyps on last colonoscopy.  Sedation:  MAC.  Recommendation: Repeat in 5 years.  TURP - Transurethral resection of prostate (873613953) on 10/3/2011 at 60 Years.  Colonoscopy (366815416) on 2010 at 59 Years.  Comments:  2010 3:33 PM - Karlene Sylvester  4 mm polyp noted at 90 cm in the ascending colon; appearance adenomatous, removed   Recommend colonoscopic follow up in 5 years.   Conscious sedation/ MAC if his  medical conditions dictate ASA III for level of sedation for future procedures  Coronary angiogram (10220976) in 2009 at 59 Years.  Green Light Laser - Prostate in the month of 3/2009 at 58 Years.  BL nasal Turbinoplasty in the month of 1/2008 at 57 Years.  Prostate TUNA surgery in the month of 2/2007 at 56 Years.  Right shoulder arthroscopy on 10/16/2006 at 55 Years.  Flexible sigmoidoscopy (51883112) on 7/8/2005 at 54 Years.  left shoulder arthroscopy in the month of 7/2005 at 54 Years.  excisional cervical schwannoma on 11/30/2001 at 50 Years.  Cholecystectomy (99453216) in the month of 2/1982 at 31 Years.  Vasectomy (54770732).   Social History:        Alcohol Assessment            Current, Beer (12 oz), 3-4 times per year                     Comments:                      09/26/2011 Alisha Pacheco                     1/month.      Tobacco Assessment            Past      Substance Abuse Assessment            Never      Employment and Education Assessment            Retired                     Comments:                      09/26/2011 - Alisha George                     2005.      Home and Environment Assessment            Marital status: .  1 children.                     Comments:                      09/26/2011 Alisha Pacheco                     1970. Wife - Pauline.      Exercise and Physical Activity Assessment: Regular exercise            Exercise frequency: 3-4 times/week.  Exercise type: Weight lifting, Cardio.                     Comments:                      09/26/2011 Alisha Pacheco                     2-3 times per week.      Sexual Assessment            Sexually active: Yes.  Sexual orientation: Heterosexual.        Physical Examination   Vital Signs   5/1/2018 12:47 PM CDT Peripheral Pulse Rate 78 bpm    Systolic Blood Pressure 124 mmHg    Diastolic Blood Pressure 72 mmHg    Mean Arterial Pressure 89 mmHg      Measurements from flowsheet : Measurements   5/1/2018 12:47 PM CDT Height  Measured - Standard 68 in    Weight Measured - Standard 209.0 lb    BSA 2.13 m2    Body Mass Index 31.77 kg/m2  HI      General:  Alert and oriented, No acute distress.    Eye:  Pupils are equal, round and reactive to light, Normal conjunctiva.    HENT:  Oral mucosa is moist.    Neck:  Supple.    Respiratory:  Respirations are non-labored.    Cardiovascular:  Normal rate, Regular rhythm, No edema.    Gastrointestinal:  Non-distended.    Musculoskeletal:  Normal gait.    Integumentary:  Warm, No rash.    Psychiatric:  Cooperative, Appropriate mood & affect, Normal judgment.       Review / Management   Results review      Impression and Plan   Diagnosis     CAD (coronary artery disease) (JHF60-ZB I25.10).     Status post hip replacement (UPF19-PS Z96.649).     Insomnia (KDE71-PV G47.00).     Course:  he has allergy to aspirin and in the past sufferred significant sinus disease and congestion  plavix has been used for ongoing anticoagulation beyond just his stent palcement.    Plan:  Will have him stop asprin and restart plavix. Discussed not having him take a sleep medication right now. He will follow up with surgeon tomorrow and ask about doing PT. Reviewed expected course, what to watch for, and when to return.   I, Makenzie Umanzor [facility], acted solely as a scribe for, and in the presence of Dr. Mark Chris who performed the service..

## 2022-02-16 NOTE — NURSING NOTE
Asthma Assessment Entered On:  4/2/2019 1:41 PM CDT    Performed On:  4/2/2019 1:41 PM CDT by Lesli Villegas MA               History   ED Visits Past Month Asthma :   0   ED Visits Past Year Asthma :   0   Hospitalizations Past Year Asthma :   0   Lesli Villegas MA - 4/2/2019 1:41 PM CDT

## 2022-02-16 NOTE — PROGRESS NOTES
Patient:   MELISSA ROSAS            MRN: [id number]            FIN: [id number]               Age:   67 years     Sex:  Male     :  1950   Associated Diagnoses:   Lipoma; HTN, goal below 140/90; Obese   Author:   Mark Chirs MD      Chief Complaint   2018 3:48 PM CDT     c/o high blood pressure since stopping the valsartin      History of Present Illness   see chief complaint as noted above and confirmed with the patient   67 year old male presenting with high blood pressure readings. His Valsartan was recalled and he had to stop taking it 10 days . He has been taking his blood pressure at home on a automatic machine. He's not sure if it's been right because his machine is 10 years old. He has a lump on his right arm . He noticed it a couple weeks ago. Denies any pain or irritiation.      Review of Systems   Constitutional:  No fever, No chills, No fatigue.    Respiratory:  No shortness of breath, No cough.    Cardiovascular:  No chest pain.    Gastrointestinal:  No nausea, No vomiting.    Musculoskeletal:  No back pain.    Integumentary:  Skin lesion, No rash.    Neurologic:  No headache.              Health Status   Allergies:    Allergic Reactions (Selected)  Severity Not Documented  Aspirin (Nasal polyps)  Doxycycline (Sun rash)  Shailesh (Hives)  Zymetrin shampo (Rash)   Medications:  (Selected)   Prescriptions  Prescribed  Advair Diskus 250 mcg-50 mcg inhalation powder: 1 puff(s), inh, bid, # 60 EA, 11 Refill(s), Type: Maintenance, Pharmacy: [facility] [place], 1 puff(s) inh bid  Nitrostat 0.4 mg sublingual tablet: See Instructions, Instructions: PLACE 1 TABLET UNDER TONGUE FOR CHEST PAIN TO A MAX OF 3 TABS-ONE EVERY 5 MINUTES - IF NO RELIEF CALL 911, # 25 unknown unit, 3 Refill(s), Type: Soft Stop, Pharmacy: [facility] [place]  Patanol 0.1% ophthalmic solution: 2 gtts, Both eyes, BID, # 5 mL, 3 Refill(s), Type: Maintenance, [facility], 2  gtts both eyes bid  Ventolin HFA 90 mcg/inh inhalation aerosol: 2 puff(s), inh, q4 hrs, PRN: for cough, # 1 EA, 11 Refill(s), Type: Maintenance, Pharmacy: [facility], 2 puff(s) inh q4 hrs,PRN:for cough  albuterol 2.5 mg/3 mL (0.083%) inhalation solution: 3 mL ( 2.5 mg ), neb, q4-6 hrs, PRN: as needed for cough, # 1 box(es), 1 Refill(s), Type: Maintenance, Pharmacy: [facility], 3 mL neb q4-6 hrs,PRN:as needed for cough  amLODIPine 10 mg oral tablet: 1 tab(s) ( 10 mg ), po, daily, # 90 tab(s), 3 Refill(s), Type: Maintenance, Pharmacy: [facility], 1 tab(s) po daily,x90 day(s)  atorvastatin 20 mg oral tablet: 1 tab(s) ( 20 mg ), po, daily, # 90 tab(s), 3 Refill(s), Type: Maintenance, Pharmacy: [facility], 1 tab(s) po daily,x90 day(s)  clopidogrel 75 mg oral tablet: 1 tab(s) ( 75 mg ), po, daily, # 90 tab(s), 3 Refill(s), Type: Maintenance, Pharmacy: [facility], 1 tab(s) po daily,x90 day(s)  eszopiclone 2 mg oral tablet: 1 tab(s) ( 2 mg ), po, hs, # 30 tab(s), 2 Refill(s), Type: Maintenance, Pharmacy: [facility], 1 tab(s) po hs  finasteride 5 mg oral tablet: 1 tab(s) ( 5 mg ), po, daily, # 90 tab(s), 3 Refill(s), Type: Maintenance, Pharmacy: [facility], 1 tab(s) po daily,x90 day(s)  gabapentin 300 mg oral capsule: 1 cap(s) ( 300 mg ), po, daily, # 90 cap(s), 3 Refill(s), Type: Maintenance, Pharmacy: [facility], Pt is now due for annual med check per [facility] for further refills., 1 cap(s) po daily,x90 day(s)  losartan 100 mg oral tablet: 1 tab(s) ( 100 mg ), Oral, daily, # 90 tab(s), 0 Refill(s), Type: Maintenance, Pharmacy: [facility], alternative for valsartan, 1 tab(s) Oral daily  pantoprazole 40 mg oral delayed release tablet: 1 tab(s) ( 40 mg ), po, daily, # 90 tab(s), 3 Refill(s), Type: Maintenance, Pharmacy: [facility], 1  tab(s) po daily,x90 day(s)  triamcinolone 0.1% topical ointment: 1 ree, TOP, TID, # 240 gm, 1 Refill(s), Type: Maintenance, [facility], 1 ree top tid  Suspended  valsartan 320 mg oral tablet: 1 tab(s) ( 320 mg ), po, daily, # 90 tab(s), 3 Refill(s), Type: Maintenance, Pharmacy: [facility] 1 tab(s) po daily,x90 day(s)  Documented Medications  Documented  Levaquin: q 24 hrs, 0 Refill(s), Type: Maintenance  Rapaflo 8 mg oral capsule: 1 cap(s) ( 8 mg ), po, daily, 0 Refill(s), Type: Maintenance  Vitamin D3: 0 Refill(s), Type: Maintenance  Zyrtec 10 mg oral tablet: 1 tab(s) ( 10 mg ), PO, Daily, 0  acetaminophen-hydrocodone 325 mg-5 mg oral tablet: See Instructions, Instructions: 1-2  tab(s) po q4 hrs  Hospital discharge, 0 Refill(s), Type: Maintenance  aspirin: ( 325 mg ), bid, 0 Refill(s), Type: Maintenance  doxepin 10 mg oral capsule: 1 cap(s) ( 10 mg ), po, hs, Instructions: Hospital discharge, 0 Refill(s), Type: Maintenance  levoFLOXacin 750 mg oral tablet: 1 tab(s) ( 750 mg ), po, daily, Instructions: per infectious disease dr, 1 tab daily for 3 months, 0 Refill(s), Type: Maintenance  rifAMPin 300 mg oral capsule: 1 cap(s) ( 300 mg ), po, bid, Instructions: per infectious disease doctor, take 1 tab BID for the next 3 months, 0 Refill(s), Type: Maintenance  senna 8.6 mg oral tablet: 2 tab(s) ( 17.2 mg ), PO, Once a day (at bedtime), PRN: for constipation, # 20 tab(s), 0 Refill(s), Type: Maintenance   Problem list:    All Problems  Vocal cord dysfunction / SNOMED CT 734389211 / Confirmed  Urinary retention / SNOMED CT 456975091 / Confirmed  Prosthetic hip infection / SNOMED CT 174451725 / Confirmed  Tubulovillous adenoma of colon / SNOMED CT 177464419 / Confirmed  Obstructive sleep apnea syndrome in adult / SNOMED CT 6433615721 / Confirmed  Obese / SNOMED CT 5606385438 / Probable  Nasal polyps / SNOMED CT 70876034 / Confirmed  Insomnia / SNOMED CT 341870884 /  Confirmed  HTN, goal below 140/90 / SNOMED CT 6135141884 / Confirmed  HLD (hyperlipidemia) / SNOMED CT 87273198 / Confirmed  Hx of malignant skin melanoma / SNOMED CT 0234739540 / Confirmed  GERD (gastroesophageal reflux disease) / SNOMED CT 494406339 / Confirmed  Eczematous dermatitis / SNOMED CT 49205776 / Confirmed  Diverticulitis / SNOMED CT 236162420 / Confirmed  DJD of shoulder / SNOMED CT 500795039 / Confirmed  CAD (coronary artery disease) / SNOMED CT 7421394348 / Confirmed  Bronchiectasis / SNOMED CT 98574413 / Confirmed  Benign prostatic hyperplasia / SNOMED CT 802946505 / Confirmed  Asthma / SNOMED CT 892765202 / Confirmed  Hip arthritis / SNOMED CT 991677878 / Confirmed  Allergic rhinitis / SNOMED CT 423078068 / Confirmed  AK (actinic keratosis) / SNOMED CT 7312873684 / Confirmed  Inactive: Tobacco abuse / ICD-9-.1  Resolved: Otitis media / SNOMED CT 301725302  Resolved: Otitis externa / SNOMED CT 2227207  Resolved: Cancer of skin / SNOMED CT 5888979465  Resolved: Inpatient stay / SNOMED CT 638908867  Resolved: Inpatient stay / SNOMED CT 916405762  Canceled: Hypertension / SNOMED CT 8191477601      Histories   Past Medical History:    Active  Prosthetic hip infection (927736542): Onset on 4/22/2018 at 67 years.  Comments:  4/30/2018 CDT 7:20 AM CDT - Alisha George  Right hip  Tubulovillous adenoma of colon (077589327): Onset on 9/20/2012 at 61 years.  AK (actinic keratosis) (8411251738): Onset on 8/16/2011 at 60 years.  CAD (coronary artery disease) (0168523609): Onset in the month of 12/2009 at 58 years  Comments:  9/28/2011 CDT 2:57 PM CDT - Mark Chris MD  dec 2009 angiogram with 50% blockage,, treated with meds  Eczematous dermatitis (96876907)  DJD of shoulder (237248379)  Asthma (631514586)  Insomnia (147905370)  Urinary retention (042278608)  Comments:  1/25/2010 CST 6:12 PM CST - Berna Huynh CMA  Post Prostatic Surgery  Nasal polyps (58094347)  Allergic rhinitis (769977820)  Vocal  cord dysfunction (764635081)  Bronchiectasis (34432882)  Obese (0435863229)  HLD (hyperlipidemia) (55071225)  Obstructive sleep apnea syndrome in adult (0756514958)  Hip arthritis (554232552)  GERD (gastroesophageal reflux disease) (202388985)  Benign prostatic hyperplasia (843362714)  Resolved  Inpatient stay (199602658): Onset on 2018 at 67 years.  Resolved on 2018 at 67 years.  Comments:  2018 CDT 7:17 AM CDT - Alisha George  @[facility], [place] - 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  Cellulitis of the right thigh.  Inpatient stay (138132520): Onset on 10/11/2011 at 60 years.  Resolved.  Comments:  2018 CDT 7:16 AM CDT - Alisha George  @[facility], [place] - Chest pain, unspecified  Cancer of skin (6485433418):  Resolved.  Otitis media (718217845):  Resolved on 10/8/2010 at 59 years.  Otitis externa (0983741):  Resolved on 10/29/2010 at 59 years.   Family History:    CHF - Congestive heart failure  Father (Phuc, )  Alzheimer's disease  Father (Phuc, )     Procedure history:    Revision of total hip replacement (259745987) on 2018 at 67 Years.  Comments:  2018 7:26 AM - Alisha George  Right hip revision total hip arthroplasty and extensive irrigation and debridement of the right thigh including muscle, fascia, bone and exchange of the femoral head and polyethylene acetabular liner.  THR - Total hip replacement (959241078) on 2018 at 67 Years.  Comments:  2018 7:21 AM - Alisha George  Right hip  Colonoscopy (138492188) on 2016 at 65 Years.  Comments:  2016 11:48 AM - Lizbeth Daniels RN  Tubular adenoma x 3 no high grade dysplasia    2016 9:11 AM - Kate Valdez  Indication: History of adenomatous polyps on last colonoscopy.  Sedation:  MAC.  Recommendation: Repeat in 5 years.  TURP - Transurethral resection of prostate (080391620) on 10/3/2011 at 60 Years.  Colonoscopy (485708242) on 2010 at 59  Years.  Comments:  12/20/2010 3:33 PM - Karlene Sylvester  4 mm polyp noted at 90 cm in the ascending colon; appearance adenomatous, removed   Recommend colonoscopic follow up in 5 years.   Conscious sedation/ MAC if his medical conditions dictate ASA III for level of sedation for future procedures  Coronary angiogram (00667058) in 2009 at 59 Years.  Green Light Laser - Prostate in the month of 3/2009 at 58 Years.  BL nasal Turbinoplasty in the month of 1/2008 at 57 Years.  Prostate TUNA surgery in the month of 2/2007 at 56 Years.  Right shoulder arthroscopy on 10/16/2006 at 55 Years.  Flexible sigmoidoscopy (79016248) on 7/8/2005 at 54 Years.  left shoulder arthroscopy in the month of 7/2005 at 54 Years.  excisional cervical schwannoma on 11/30/2001 at 50 Years.  Cholecystectomy (40242126) in the month of 2/1982 at 31 Years.  Vasectomy (85527833).   Social History:        Alcohol Assessment            Current, Beer (12 oz), 3-4 times per year                     Comments:                      09/26/2011 - Alisha George                     1/month.      Tobacco Assessment            Past      Substance Abuse Assessment            Never      Employment and Education Assessment            Retired                     Comments:                      09/26/2011 - Alisha George                     2005.      Home and Environment Assessment            Marital status: .  1 children.                     Comments:                      09/26/2011 - Alisha George                     1970. Wife - Pauline.      Exercise and Physical Activity Assessment: Regular exercise            Exercise frequency: 3-4 times/week.  Exercise type: Weight lifting, Cardio.                     Comments:                      09/26/2011 - Alisha George                     2-3 times per week.      Sexual Assessment            Sexually active: Yes.  Sexual orientation: Heterosexual.        Physical Examination   Vital Signs   8/2/2018 3:48 PM CDT  Peripheral Pulse Rate 64 bpm    Systolic Blood Pressure 124 mmHg    Diastolic Blood Pressure 62 mmHg    Mean Arterial Pressure 83 mmHg      Measurements from flowsheet : Measurements   8/2/2018 3:48 PM CDT Height Measured - Standard 68 in    Weight Measured - Standard 196.0 lb    BSA 2.06 m2    Body Mass Index 29.8 kg/m2  HI      General:  Alert and oriented, No acute distress.    Eye:  Pupils are equal, round and reactive to light, Normal conjunctiva.    HENT:  Oral mucosa is moist.    Neck:  Supple.    Respiratory:  Respirations are non-labored.    Cardiovascular:  Normal rate, Regular rhythm, No edema.    Gastrointestinal:  Non-distended.    Musculoskeletal:  Normal gait.    Integumentary:  Warm, No rash.    Psychiatric:  Cooperative, Appropriate mood & affect, Normal judgment.       Review / Management   Results review      Impression and Plan   Diagnosis     Lipoma (NHD68-KT D17.9).     HTN, goal below 140/90 (KOL12-DE I10).     Obese (JZC48-VH E66.9).     Plan:  Discussed that he should get a new blood pressure machine and have him check his blood pressures at home over the next week to see where they run. If they continue to be high we will have him start irbesartan 150mg daily. Reviewed expected course, what to watch for, and when to return.   I, Makenzie Umanzor [facility], acted solely as a scribe for, and in the presence of Dr. Mark Chris who performed the service..

## 2022-02-16 NOTE — PROGRESS NOTES
Patient:   MELISSA ROSAS            MRN: [id number]            FIN: [id number]               Age:   67 years     Sex:  Male     :  1950   Associated Diagnoses:   Right groin pain; Right hip pain   Author:   Mark Chris MD      Chief Complaint   2018 9:41 AM CST    c/o right hip pain      History of Present Illness   see chief complaint as noted above and confirmed with the patient   67 year old male with right groin and hip pain. Pain is worse if he turns his knee out to the side. He is limping when he walks and he is now needing to use a cane at times to walk.  He denes any lumps but the groin arer is swollen and tender to the touch.  Symptoms started 3-4 weeks ago after he fell.       Review of Systems   Constitutional:  No fever, No chills.    Ear/Nose/Mouth/Throat:  Negative.    Respiratory:  Negative.    Cardiovascular:  No chest pain.    Gastrointestinal:  No nausea, No vomiting.    Musculoskeletal:  right hip pain, right groin pain.    Integumentary:  right groin swelling., No rash.    Neurologic:  No numbness, No tingling.    Psychiatric:  Negative.              Health Status   Allergies:    Allergic Reactions (Selected)  Severity Not Documented  Aspirin (Nasal polyps)  Doxycycline (Sun rash)  Shailesh (Hives)  Zymetrin shampo (Rash)   Medications:  (Selected)   Prescriptions  Prescribed  Advair Diskus 250 mcg-50 mcg inhalation powder: 1 puff(s), inh, bid, # 60 EA, 11 Refill(s), Type: Maintenance, Pharmacy: [facility] [place], 1 puff(s) inh bid  Nitrostat 0.4 mg sublingual tablet: See Instructions, Instructions: 1 TABLET UNDER TONGUE FOR CHEST PAIN TO A MAX OF 3 TABS-ONE EVERY 5 MINUTES - IF NO RELIEF CALL 911, # 25 unknown unit, 3 Refill(s), Type: Soft Stop, Pharmacy: [facility] [place], 1 TABLET UNDER TONGUE FOR C...  Patanol 0.1% ophthalmic solution: 2 gtts, Both eyes, BID, # 5 mL, 3 Refill(s), Type: Maintenance, Pharmacy: [facility], 2 gtts  both eyes bid  Rapaflo 8 mg oral capsule: 1 cap(s) ( 8 mg ), po, daily, # 90 cap(s), 3 Refill(s), Type: Maintenance, Pharmacy: [facility], 1 cap(s) po daily,x90 day(s)  Ventolin HFA 90 mcg/inh inhalation aerosol: 2 puff(s), inh, q4 hrs, PRN: for cough, # 1 EA, 11 Refill(s), Type: Maintenance, Pharmacy: [facility], 2 puff(s) inh q4 hrs,PRN:for cough  albuterol 2.5 mg/3 mL (0.083%) inhalation solution: 3 mL ( 2.5 mg ), neb, q4-6 hrs, PRN: as needed for cough, # 1 box(es), 1 Refill(s), Type: Maintenance, Pharmacy: [facility], 3 mL neb q4-6 hrs,PRN:as needed for cough  amLODIPine 10 mg oral tablet: 1 tab(s) ( 10 mg ), po, daily, # 90 tab(s), 3 Refill(s), Type: Maintenance, Pharmacy: [facility], 1 tab(s) po daily,x90 day(s)  atorvastatin 20 mg oral tablet: 1 tab(s) ( 20 mg ), po, daily, # 90 tab(s), 3 Refill(s), Type: Maintenance, Pharmacy: [facility], 1 tab(s) po daily,x90 day(s)  clopidogrel 75 mg oral tablet: 1 tab(s) ( 75 mg ), po, daily, # 90 tab(s), 3 Refill(s), Type: Maintenance, Pharmacy: [facility], 1 tab(s) po daily,x90 day(s)  finasteride 5 mg oral tablet: 1 tab(s) ( 5 mg ), po, daily, # 90 tab(s), 3 Refill(s), Type: Maintenance, Pharmacy: [facility], 1 tab(s) po daily,x90 day(s)  gabapentin 300 mg oral capsule: 1 cap(s) ( 300 mg ), po, daily, # 90 cap(s), 3 Refill(s), Type: Maintenance, Pharmacy: [facility] [place], 1 cap(s) po daily,x90 day(s)  pantoprazole 40 mg oral delayed release tablet: 1 tab(s) ( 40 mg ), po, daily, # 90 tab(s), 3 Refill(s), Type: Maintenance, Pharmacy: [facility], 1 tab(s) po daily,x90 day(s)  triamcinolone 0.1% topical ointment: 1 ree, TOP, TID, # 240 gm, 1 Refill(s), Type: Maintenance, Pharmacy: [facility] [address], 1 ree top tid  valsartan 320 mg oral tablet: 1 tab(s) ( 320 mg ), po, daily, # 90 tab(s),  3 Refill(s), Type: Maintenance, Pharmacy: [facility] [place], 1 tab(s) po daily,x90 day(s)  zolpidem 10 mg oral tablet: 1 tab(s), PO, Once a day (at bedtime), PRN: for sleep, # 30 tab(s), 5 Refill(s), Type: Maintenance, Pharmacy: [facility] [place], 1 tab(s) po hs,PRN:for sleep  Documented Medications  Documented  Vitamin D3: 0 Refill(s), Type: Maintenance  Zyrtec 10 mg oral tablet: 1 tab(s) ( 10 mg ), PO, Daily, 0   Problem list:    All Problems  Vocal cord dysfunction / SNOMED CT 693674148 / Confirmed  Urinary retention / SNOMED CT 395450822 / Confirmed  Tubulovillous adenoma of colon / SNOMED CT 682255077 / Confirmed  Obstructive sleep apnea syndrome in adult / SNOMED CT 4896181118 / Confirmed  Obese / SNOMED CT 3019410201 / Probable  Nasal polyps / SNOMED CT 12683714 / Confirmed  Cancer of skin / SNOMED CT 3052037784 / Confirmed  Insomnia / SNOMED CT 231427898 / Confirmed  Hypertension / SNOMED CT 5377147158 / Confirmed  HLD (hyperlipidemia) / SNOMED CT 24034323 / Confirmed  Eczematous dermatitis / SNOMED CT 37936410 / Confirmed  Diverticulitis / SNOMED CT 567790603 / Confirmed  DJD of shoulder / SNOMED CT 606151939 / Confirmed  CAD (coronary artery disease) / SNOMED CT 1554640619 / Confirmed  Bronchiectasis / SNOMED CT 13817061 / Confirmed  Asthma / SNOMED CT 728727699 / Confirmed  Hip arthritis / SNOMED CT 422420631 / Confirmed  Allergic rhinitis / SNOMED CT 746781603 / Confirmed  AK (actinic keratosis) / SNOMED CT 8830330117 / Confirmed  Inactive: Tobacco abuse / ICD-9-.1  Resolved: Otitis media / SNOMED CT 116156117  Resolved: Otitis externa / SNOMED CT 4267219  Resolved: *[email or web address] - Chest pain, unspecified      Histories   Past Medical History:    Active  Tubulovillous adenoma of colon (278414508): Onset on 9/20/2012 at 61 years.  AK (actinic keratosis) (9657535805): Onset on 8/16/2011 at 60 years.  CAD (coronary artery disease) (3804805560): Onset in the month of  2009 at 58 years  Comments:  2011 CDT 2:57 PM CDT - Mark Chris MD  dec 2009 angiogram with 50% blockage,, treated with meds  Eczematous dermatitis (66945760)  DJD of shoulder (736786993)  Hypertension (2299685628)  Asthma (402960222)  Insomnia (808565044)  Urinary retention (827329387)  Comments:  2010 CST 6:12 PM CST - Berna Huynh CMA  Post Prostatic Surgery  Nasal polyps (66945352)  Allergic rhinitis (179684333)  Vocal cord dysfunction (891369923)  Bronchiectasis (98360652)  Cancer of skin (1036856398)  Obese (7819862541)  HLD (hyperlipidemia) (80979402)  Obstructive sleep apnea syndrome in adult (9202280026)  Hip arthritis (717720926)  Resolved  *[email or web address] - Chest pain, unspecified: Onset on 10/11/2011 at 60 years.  Resolved.  Otitis media (839047529):  Resolved on 10/8/2010 at 59 years.  Otitis externa (2298510):  Resolved on 10/29/2010 at 59 years.   Family History:    CHF - Congestive heart failure  Father (Phuc, )  Alzheimer's disease  Father (Phuc, )     Procedure history:    Colonoscopy (534424182) on 2016 at 65 Years.  Comments:  2016 11:48 AM - Jeremiah CAREY, Lizbeth  Tubular adenoma x 3 no high grade dysplasia    2016 9:11 AM - Kate Valdez  Indication: History of adenomatous polyps on last colonoscopy.  Sedation:  MAC.  Recommendation: Repeat in 5 years.  TURP - Transurethral resection of prostate (432657829) on 10/3/2011 at 60 Years.  Colonoscopy (015871335) on 2010 at 59 Years.  Comments:  2010 3:33 PM - Karlene Sylvester  4 mm polyp noted at 90 cm in the ascending colon; appearance adenomatous, removed   Recommend colonoscopic follow up in 5 years.   Conscious sedation/ MAC if his medical conditions dictate ASA III for level of sedation for future procedures  Coronary angiogram (27006138) in  at 59 Years.  Green Light Laser - Prostate in the month of 3/2009 at 58 Years.  BL nasal Turbinoplasty in the month of 2008 at  57 Years.  Prostate TUNA surgery in the month of 2/2007 at 56 Years.  Right shoulder arthroscopy on 10/16/2006 at 55 Years.  Flexible sigmoidoscopy (89090118) on 7/8/2005 at 54 Years.  left shoulder arthroscopy in the month of 7/2005 at 54 Years.  excisional cervical schwannoma on 11/30/2001 at 50 Years.  Cholecystectomy (10026046) in the month of 2/1982 at 31 Years.  Vasectomy (86331632).   Social History:        Alcohol Assessment            Current, Beer (12 oz), 3-4 times per year                     Comments:                      09/26/2011 Alisha Pacheco                     1/month.      Tobacco Assessment            Past      Substance Abuse Assessment            Never      Employment and Education Assessment            Retired                     Comments:                      09/26/2011 Alisha Pacheco                     2005.      Home and Environment Assessment            Marital status: .  1 children.                     Comments:                      09/26/2011 Alisha Pacheco                     1970. Wife - Pauline.      Exercise and Physical Activity Assessment: Regular exercise            Exercise frequency: 3-4 times/week.  Exercise type: Weight lifting, Cardio.                     Comments:                      09/26/2011 Alisha Pacheco                     2-3 times per week.      Sexual Assessment            Sexually active: Yes.  Sexual orientation: Heterosexual.        Physical Examination   Vital Signs   2/28/2018 9:41 AM CST Peripheral Pulse Rate 64 bpm    Systolic Blood Pressure 122 mmHg    Diastolic Blood Pressure 70 mmHg    Mean Arterial Pressure 87 mmHg      Measurements from flowsheet : Measurements   2/28/2018 9:41 AM CST Height Measured - Standard 68 in    Weight Measured - Standard 204.2 lb    BSA 2.11 m2    Body Mass Index 31.05 kg/m2  HI      General:  Alert and oriented, No acute distress.    Eye:  Pupils are equal, round and reactive to light, Normal conjunctiva.     HENT:  Oral mucosa is moist.    Neck:  Supple.    Respiratory:  Respirations are non-labored.    Cardiovascular:  Normal rate, Regular rhythm, No edema.    Gastrointestinal:  Non-distended.    Musculoskeletal:  Normal gait.    Integumentary:  Warm, No rash.    Psychiatric:  Cooperative, Appropriate mood & affect, Normal judgment.       Review / Management   Results review      Impression and Plan   Diagnosis     Right groin pain (IAD46-HR R10.31).     Right hip pain (AJS50-PH M25.551).     Plan:  Discussed symptoms are consistant with a pulled groin and can take 6-8 weeks to heal. He is conerned about how much pain he is in and it getting worse. Will do a MRI of his pelvis, thsi was ordered and he was brought to referrals to schedule.  Will have him follow up 2-3 days after the scan is done.   IMakenzie [facility], acted solely as a scribe for, and in the presence of Dr. Mark Chris who performed the service..
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
## 2022-02-16 NOTE — NURSING NOTE
Pt was scheduled for med check today. He is scheduled for AWV at the end of July but states he will run out of medication before that appt. He denies any other concerns.  I informed him that I sent in refills for all his meds and we canceled his appt for today.    Gee DOWELL

## 2022-02-16 NOTE — PROGRESS NOTES
Patient:   MELISSA ROSAS            MRN: [id number]            FIN: [id number]               Age:   67 years     Sex:  Male     :  1950   Associated Diagnoses:   Fever; Status post hip replacement   Author:   Mark Chris MD      Chief Complaint   5/15/2018 10:41 AM CDT   pt here for concerns with infection of incisions on right leg, he had hip replaced , has had fevers, had infusion this am      History of Present Illness   see chief complaint as noted above and confirmed with the patient   67 year old male presenting with infection concerns. He has been running a 100.5-100.8 for the last two days. HIp was replaced on , his incision was warm, and red last night. It looks better this morning. His Vancomycin dose was decreased and he hasn't been feeling very good since the dose change. He has been having a hard time sleeping. He was on Zolpidem but had some issues with falling on it.  hw ia not having hip pain,  tmax 100.8 last night      Review of Systems   Constitutional:  Fever, Fatigue, No chills, No sweats.    Respiratory:  No shortness of breath.    Cardiovascular:  No chest pain.    Gastrointestinal:  No nausea, No vomiting.    Musculoskeletal:  No joint pain.    Integumentary:  No rash.    Psychiatric:  Sleeping problems.              Health Status   Allergies:    Allergic Reactions (Selected)  Severity Not Documented  Aspirin (Nasal polyps)  Doxycycline (Sun rash)  Shailesh (Hives)  Zymetrin shampo (Rash)   Medications:  (Selected)   Prescriptions  Prescribed  Advair Diskus 250 mcg-50 mcg inhalation powder: 1 puff(s), inh, bid, # 60 EA, 11 Refill(s), Type: Maintenance, Pharmacy: [facility] [place], 1 puff(s) inh bid  Nitrostat 0.4 mg sublingual tablet: See Instructions, Instructions: 1 TABLET UNDER TONGUE FOR CHEST PAIN TO A MAX OF 3 TABS-ONE EVERY 5 MINUTES - IF NO RELIEF CALL 911, # 25 unknown unit, 3 Refill(s), Type: Soft Stop, Pharmacy: [facility] [place], 1  TABLET UNDER TONGUE FOR C...  Patanol 0.1% ophthalmic solution: 2 gtts, Both eyes, BID, # 5 mL, 3 Refill(s), Type: Maintenance, [id number], 2 gtts both eyes bid  Ventolin HFA 90 mcg/inh inhalation aerosol: 2 puff(s), inh, q4 hrs, PRN: for cough, # 1 EA, 11 Refill(s), Type: Maintenance, Pharmacy: [facility], 2 puff(s) inh q4 hrs,PRN:for cough  albuterol 2.5 mg/3 mL (0.083%) inhalation solution: 3 mL ( 2.5 mg ), neb, q4-6 hrs, PRN: as needed for cough, # 1 box(es), 1 Refill(s), Type: Maintenance, Pharmacy: [facility], 3 mL neb q4-6 hrs,PRN:as needed for cough  amLODIPine 10 mg oral tablet: 1 tab(s) ( 10 mg ), po, daily, # 90 tab(s), 3 Refill(s), Type: Maintenance, Pharmacy: [facility], 1 tab(s) po daily,x90 day(s)  atorvastatin 20 mg oral tablet: 1 tab(s) ( 20 mg ), po, daily, # 90 tab(s), 3 Refill(s), Type: Maintenance, Pharmacy: [facility], 1 tab(s) po daily,x90 day(s)  clopidogrel 75 mg oral tablet: 1 tab(s) ( 75 mg ), po, daily, # 90 tab(s), 3 Refill(s), Type: Maintenance, Pharmacy: [facility], 1 tab(s) po daily,x90 day(s)  finasteride 5 mg oral tablet: 1 tab(s) ( 5 mg ), po, daily, # 90 tab(s), 3 Refill(s), Type: Maintenance, Pharmacy: [facility], 1 tab(s) po daily,x90 day(s)  gabapentin 300 mg oral capsule: 1 cap(s) ( 300 mg ), po, daily, # 90 cap(s), 3 Refill(s), Type: Maintenance, Pharmacy: [facility], Pt is now due for annual med check per [facility] for further refills., 1 cap(s) po daily,x90 day(s)  pantoprazole 40 mg oral delayed release tablet: 1 tab(s) ( 40 mg ), po, daily, # 90 tab(s), 3 Refill(s), Type: Maintenance, Pharmacy: [facility], 1 tab(s) po daily,x90 day(s)  triamcinolone 0.1% topical ointment: 1 ree, TOP, TID, # 240 gm, 1 Refill(s), Type: Maintenance, [id number], 1 ree top  tid  valsartan 320 mg oral tablet: 1 tab(s) ( 320 mg ), po, daily, # 90 tab(s), 3 Refill(s), Type: Maintenance, Pharmacy: [facility] [place], 1 tab(s) po daily,x90 day(s)  Documented Medications  Documented  Rapaflo 8 mg oral capsule: 1 cap(s) ( 8 mg ), po, daily, 0 Refill(s), Type: Maintenance  Vitamin D3: 0 Refill(s), Type: Maintenance  Zyrtec 10 mg oral tablet: 1 tab(s) ( 10 mg ), PO, Daily, 0  acetaminophen-hydrocodone 325 mg-5 mg oral tablet: See Instructions, Instructions: 1-2  tab(s) po q4 hrs  Hospital discharge, 0 Refill(s), Type: Maintenance  aspirin: ( 325 mg ), bid, 0 Refill(s), Type: Maintenance  cefTRIAXone 2 g intravenous injection: ( 2 gm ), iv, daily, 0 Refill(s), Type: Maintenance  doxepin 10 mg oral capsule: 1 cap(s) ( 10 mg ), po, hs, Instructions: Hospital discharge, 0 Refill(s), Type: Maintenance  senna 8.6 mg oral tablet: 2 tab(s) ( 17.2 mg ), PO, Once a day (at bedtime), PRN: for constipation, # 20 tab(s), 0 Refill(s), Type: Maintenance  vancomycin 1 g intravenous injection: See Instructions, Instructions: iv q12 hrs Hospital discharge, 0 Refill(s), Type: Maintenance   Problem list:    All Problems  Vocal cord dysfunction / SNOMED CT 205972916 / Confirmed  Urinary retention / SNOMED CT 988385220 / Confirmed  Prosthetic hip infection / SNOMED CT 677780803 / Confirmed  Tubulovillous adenoma of colon / SNOMED CT 556421223 / Confirmed  Obstructive sleep apnea syndrome in adult / SNOMED CT 0549715279 / Confirmed  Obese / SNOMED CT 9031921541 / Probable  Nasal polyps / SNOMED CT 50378930 / Confirmed  Insomnia / SNOMED CT 146431748 / Confirmed  HTN, goal below 140/90 / SNOMED CT 5127802522 / Confirmed  HLD (hyperlipidemia) / SNOMED CT 35228994 / Confirmed  Hx of malignant skin melanoma / SNOMED CT 4956569545 / Confirmed  GERD (gastroesophageal reflux disease) / SNOMED CT 817767457 / Confirmed  Eczematous dermatitis / SNOMED CT 13833659 / Confirmed  Diverticulitis / SNOMED CT 388879706 /  Confirmed  DJD of shoulder / SNOMED CT 236544746 / Confirmed  CAD (coronary artery disease) / SNOMED CT 3333901752 / Confirmed  Bronchiectasis / SNOMED CT 56007961 / Confirmed  Benign prostatic hyperplasia / SNOMED CT 318112050 / Confirmed  Asthma / SNOMED CT 109230525 / Confirmed  Hip arthritis / SNOMED CT 481365678 / Confirmed  Allergic rhinitis / SNOMED CT 972082153 / Confirmed  AK (actinic keratosis) / SNOMED CT 8504196571 / Confirmed  Inactive: Tobacco abuse / ICD-9-.1  Resolved: Otitis media / SNOMED CT 652785487  Resolved: Otitis externa / SNOMED CT 3086901  Resolved: Cancer of skin / SNOMED CT 8566440862  Resolved: Inpatient stay / SNOMED CT 805043888  Resolved: Inpatient stay / SNOMED CT 010729919  Canceled: Hypertension / SNOMED CT 2624180881      Histories   Past Medical History:    Active  Prosthetic hip infection (975309996): Onset on 4/22/2018 at 67 years.  Comments:  4/30/2018 CDT 7:20 AM CDT - Alisha George  Right hip  Tubulovillous adenoma of colon (568813747): Onset on 9/20/2012 at 61 years.  AK (actinic keratosis) (8916117997): Onset on 8/16/2011 at 60 years.  CAD (coronary artery disease) (8996167140): Onset in the month of 12/2009 at 58 years  Comments:  9/28/2011 CDT 2:57 PM CDT - Mark Chris MD  dec 2009 angiogram with 50% blockage,, treated with meds  Eczematous dermatitis (46696252)  DJD of shoulder (202629506)  Asthma (011643005)  Insomnia (819039670)  Urinary retention (685561808)  Comments:  1/25/2010 CST 6:12 PM CST - Berna Huynh CMA  Post Prostatic Surgery  Nasal polyps (03972286)  Allergic rhinitis (788702198)  Vocal cord dysfunction (221167418)  Bronchiectasis (48400094)  Obese (0513045231)  HLD (hyperlipidemia) (68241873)  Obstructive sleep apnea syndrome in adult (2266559770)  Hip arthritis (233089444)  GERD (gastroesophageal reflux disease) (731347453)  Benign prostatic hyperplasia (352660426)  Resolved  Inpatient stay (385393936): Onset on 4/22/2018 at 67 years.   Resolved on 2018 at 67 years.  Comments:  2018 CDT 7:17 AM CDT - Ariel Comfortri  @[facility], [place] - 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  Cellulitis of the right thigh.  Inpatient stay (100880993): Onset on 10/11/2011 at 60 years.  Resolved.  Comments:  2018 CDT 7:16 AM CDT - Ariel  Alisha  @[facility], [place] - Chest pain, unspecified  Cancer of skin (0832273453):  Resolved.  Otitis media (616519018):  Resolved on 10/8/2010 at 59 years.  Otitis externa (2198912):  Resolved on 10/29/2010 at 59 years.   Family History:    CHF - Congestive heart failure  Father (Phuc, )  Alzheimer's disease  Father (Phuc, )     Procedure history:    Revision of total hip replacement (337833164) on 2018 at 67 Years.  Comments:  2018 7:26 AM - Alisha George  Right hip revision total hip arthroplasty and extensive irrigation and debridement of the right thigh including muscle, fascia, bone and exchange of the femoral head and polyethylene acetabular liner.  THR - Total hip replacement (139075782) on 2018 at 67 Years.  Comments:  2018 7:21 AM - Alisha George  Right hip  Colonoscopy (614261349) on 2016 at 65 Years.  Comments:  2016 11:48 AM - Lizbeth Daniels RN  Tubular adenoma x 3 no high grade dysplasia    2016 9:11 AM - Kate Valdez  Indication: History of adenomatous polyps on last colonoscopy.  Sedation:  MAC.  Recommendation: Repeat in 5 years.  TURP - Transurethral resection of prostate (103271029) on 10/3/2011 at 60 Years.  Colonoscopy (558176979) on 2010 at 59 Years.  Comments:  2010 3:33 PM - Karlene Sylvester  4 mm polyp noted at 90 cm in the ascending colon; appearance adenomatous, removed   Recommend colonoscopic follow up in 5 years.   Conscious sedation/ MAC if his medical conditions dictate ASA III for level of sedation for future procedures  Coronary angiogram (74297460) in  at 59 Years.  Green Light  Laser - Prostate in the month of 3/2009 at 58 Years.  BL nasal Turbinoplasty in the month of 1/2008 at 57 Years.  Prostate TUNA surgery in the month of 2/2007 at 56 Years.  Right shoulder arthroscopy on 10/16/2006 at 55 Years.  Flexible sigmoidoscopy (68053713) on 7/8/2005 at 54 Years.  left shoulder arthroscopy in the month of 7/2005 at 54 Years.  excisional cervical schwannoma on 11/30/2001 at 50 Years.  Cholecystectomy (79806532) in the month of 2/1982 at 31 Years.  Vasectomy (97316396).   Social History:        Alcohol Assessment            Current, Beer (12 oz), 3-4 times per year                     Comments:                      09/26/2011 - Alisha George                     1/month.      Tobacco Assessment            Past      Substance Abuse Assessment            Never      Employment and Education Assessment            Retired                     Comments:                      09/26/2011 - Alisha George                     2005.      Home and Environment Assessment            Marital status: .  1 children.                     Comments:                      09/26/2011 - Alisha George                     1970. Wife - Pauline.      Exercise and Physical Activity Assessment: Regular exercise            Exercise frequency: 3-4 times/week.  Exercise type: Weight lifting, Cardio.                     Comments:                      09/26/2011 - Alisha George                     2-3 times per week.      Sexual Assessment            Sexually active: Yes.  Sexual orientation: Heterosexual.        Physical Examination   Vital Signs   5/15/2018 10:41 AM CDT Temperature Tympanic 98.8 DegF    Peripheral Pulse Rate 103 bpm  HI    Pulse Site Radial artery    Systolic Blood Pressure 132 mmHg  HI    Diastolic Blood Pressure 70 mmHg    Mean Arterial Pressure 91 mmHg    BP Site Right arm    Oxygen Saturation 99 %      Measurements from flowsheet : Measurements   5/15/2018 10:41 AM CDT Height Measured - Standard 68 in     Weight Measured - Standard 202.2 lb    BSA 2.1 m2    Body Mass Index 30.74 kg/m2  HI      General:  Alert and oriented, No acute distress.    Eye:  Pupils are equal, round and reactive to light, Normal conjunctiva.    HENT:  Oral mucosa is moist.    Neck:  Supple.    Respiratory:  Respirations are non-labored.    Cardiovascular:  Normal rate, Regular rhythm, No edema.    Gastrointestinal:  Non-distended.    Musculoskeletal:  Normal gait.    Integumentary:  Warm, No rash, indurated pink skin around right hip incision,  wife confimrs looks better today.    Psychiatric:  Cooperative, Appropriate mood & affect, Normal judgment.       Review / Management   Results review:  Lab results   5/15/2018 11:22 AM CDT Sodium Level 138 mmol/L    Potassium Level 3.4 mmol/L    Chloride Level 106 mmol/L    CO2 Level 21 mmol/L    AGAP 11    Glucose Level 99 mg/dL    BUN 13 mg/dL    Creatinine Level 1.95 mg/dL    BUN/Creat Ratio 7    eGFR  42    eGFR Non-African American 34    Calcium Level 8.9 mg/dL    WBC 4.6    RBC 3.16    Hgb 10.1 g/dL    Hct 29.8 %    MCV 94 fL    MCH 32.0 pg    MCHC 33.9 g/dL    RDW 13.1 %    Platelet 256    MPV 10.2 fL    Sed Rate 65 mm/hr   .       Impression and Plan   Diagnosis     Fever (IUJ53-OD R50.9).     Status post hip replacement (STH01-TX Z96.649).     Course:  he was especially concerned about decreasing the dose,  i explained that it based on levels.  he is not having lower therapy  mild increase in ESR.  wbc reassuring, scheduled to see ortho tomorrow,  will return sooner if increasing pain or fever.    Plan:  Sent to lab to recheck labs. Reviewed expected course, what to watch for, and when to return.   I, Makenzie Umanzor [facility], acted solely as a scribe for, and in the presence of Dr. Mark Chris who performed the service..

## 2022-02-16 NOTE — TELEPHONE ENCOUNTER
Entered by Nicole Umanzor CMA on April 03, 2019 11:22:26 AM CDT  ---------------------  From: Nicole Umanzor CMA   To: [facility]    Sent: 4/3/2019 11:22:26 AM CDT  Subject: Medication Management     ** Not Approved: Patient has requested refill too soon, A prescription was sent in yesterday **  silodosin (SILODOSIN 8MG CAPS)  TAKE ONE CAPSULE BY MOUTH EVERY DAY  Qty:  90 unknown unit        Days Supply:  90        Refills:  3          Substitutions Allowed     Route To Pharmacy - [place] [address]   Signed by Nicole Umanzor CMA            ------------------------------------------  From: [place] [address]  To: Mark Chris MD  Sent: April 3, 2019 10:36:16 AM CDT  Subject: Medication Management  Due: April 4, 2019 10:36:16 AM CDT    ** On Hold Pending Signature **  Drug: silodosin (Rapaflo 8 mg oral capsule)  TAKE ONE CAPSULE BY MOUTH EVERY DAY  Quantity: 90 unknown unit  Days Supply: 90        Refills: 3  Substitutions Allowed  Notes from Pharmacy:     Dispensed Drug: silodosin (silodosin 8 mg oral capsule)  TAKE ONE CAPSULE BY MOUTH EVERY DAY  Quantity: 90 unknown unit  Days Supply: 90        Refills: 3  Substitutions Allowed  Notes from Pharmacy:   ------------------------------------------

## 2022-02-16 NOTE — TELEPHONE ENCOUNTER
Entered by Mariam Johnson CMA on October 20, 2020 7:02:05 AM CDT  ---------------------  From: Mariam Johnson CMA   To: [place] [address]    Sent: 10/20/2020 7:02:04 AM CDT  Subject: Medication Management     ** Submitted: **  Order:atorvastatin (atorvastatin 20 mg oral tablet)  1 tab(s)  Oral  daily  Qty:  90 tab(s)        Refills:  2          Substitutions Allowed     Route To [place] [address]    Signed by Mariam Johnson CMA  10/20/2020 12:01:00 PM [facility]    ** Submitted: **  Complete:atorvastatin (atorvastatin 20 mg oral tablet)   Signed by Mariam Johnson CMA  10/20/2020 12:02:00 PM [facility]    ** Not Approved:  **  atorvastatin (ATORVASTATIN CALCIUM 20MG TABS)  TAKE ONE TABLET BY MOUTH EVERY DAY  Qty:  90 unknown unit        Days Supply:  90        Refills:  0          Substitutions Allowed     Route To [place] [address]   Signed by Mariam Johnson CMA            ------------------------------------------  From: [place] [address]  To: Mark Chris MD  Sent: October 19, 2020 8:19:14 PM CDT  Subject: Medication Management  Due: October 8, 2020 2:04:31 PM CDT     ** On Hold Pending Signature **     Drug: atorvastatin (atorvastatin 20 mg oral tablet), TAKE ONE TABLET BY MOUTH EVERY DAY  Quantity: 90 unknown unit  Days Supply: 90  Refills: 0  Substitutions Allowed  Notes from Pharmacy:     Dispensed Drug: atorvastatin (atorvastatin 20 mg oral tablet), TAKE ONE TABLET BY MOUTH EVERY DAY  Quantity: 90 unknown unit  Days Supply: 90  Refills: 0  Substitutions Allowed  Notes from Pharmacy:  ------------------------------------------Med Refill      Date of last office visit and reason:  7/27/20;AWV      Date of last Med Check / Px:   7/27/20  Date of last labs pertaining to med:  7/6/20    RTC order in chart:  yes; July    For Protocol refill, has patient been contacted:  n/a

## 2022-02-16 NOTE — TELEPHONE ENCOUNTER
Entered by Mariam Johnson CMA on October 15, 2020 7:54:56 AM CDT  ---------------------  From: Mariam Johnson CMA   To: [place] [address]    Sent: 10/15/2020 7:54:56 AM CDT  Subject: Medication Management     ** Submitted: **  Order:finasteride (finasteride 5 mg oral tablet)  1 tab(s)  Oral  daily  Qty:  90 tab(s)        Refills:  2          Substitutions Allowed     Route To Pharmacy - [place] [address]    Signed by Mariam Johnson CMA  10/15/2020 12:54:00 PM [facility]    ** Submitted: **  Complete:finasteride (finasteride 5 mg oral tablet)   Signed by Mariam Johnson CMA  10/15/2020 12:54:00 PM [facility]    ** Not Approved:  **  finasteride (FINASTERIDE 5MG TABS)  TAKE ONE TABLET BY MOUTH EVERY DAY  Qty:  90 unknown unit        Days Supply:  90        Refills:  0          Substitutions Allowed     Route To Pharmacy - [place] [address]   Signed by Mariam Johnson CMA            ------------------------------------------  From: [place] [address]  To: Mark Chris MD  Sent: October 13, 2020 7:01:08 PM CDT  Subject: Medication Management  Due: October 8, 2020 2:04:31 PM CDT     ** On Hold Pending Signature **     Drug: finasteride (finasteride 5 mg oral tablet), TAKE ONE TABLET BY MOUTH EVERY DAY  Quantity: 90 unknown unit  Days Supply: 90  Refills: 0  Substitutions Allowed  Notes from Pharmacy:     Dispensed Drug: finasteride (finasteride 5 mg oral tablet), TAKE ONE TABLET BY MOUTH EVERY DAY  Quantity: 90 unknown unit  Days Supply: 90  Refills: 0  Substitutions Allowed  Notes from Pharmacy:  ------------------------------------------Med Refill      Date of last office visit and reason:  7/27/20; AWV      Date of last Med Check / Px:   7/27/20  Date of last labs pertaining to med:  7/6/20    RTC order in chart:  yes; July    For Protocol refill, has patient been contacted:  n/a

## 2022-02-16 NOTE — RESULTS
Patient:   MELISSA ROSAS            MRN: [id number]            FIN: [id number]               Age:   70 years     Sex:  Male     :  1950   Associated Diagnoses:   None   Author:   Joshua ANN, Quintin      Procedure   EKG procedure   Date:  2021.     EKG findings   Rhythm: heart rate  56  beats/min, sinus bradycardia, heart block intraventricular conduction defect.     Axis: normal axis, normal configuration.     Intervals: normal.     P waves: normal.     QRS complex: normal.     ST-T-U complex: depressed ST segment lead anterior.     Interpretation: borderline.

## 2022-02-16 NOTE — NURSING NOTE
Comprehensive Intake Entered On:  5/11/2020 10:32 AM CDT    Performed On:  5/11/2020 10:31 AM CDT by Nicole Umanzor CMA               Summary   Chief Complaint :   c/o reaction to hydrochlorizide - notices he turns red after walking, started 3-4 weeks ago. Noticed a round crusty lesion on his back last week. thinks it may have come from tick bite. denies fevers, body aches, fatigue. verbal consent given for televisi   Advance Directive :   Yes   Height Measured :   68 in(Converted to: 5 ft 8 in, 172.72 cm)    Race :      Languages :   English   Ethnicity :   Not  or    Nicole Umanzor CMA - 5/11/2020 10:31 AM CDT   Health Status   Allergies Verified? :   Yes   Medication History Verified? :   Yes   Immunizations Current :   Yes   Pre-Visit Planning Status :   Completed   Tobacco Use? :   Former smoker   Nicole Umanzor CMA - 5/11/2020 10:31 AM CDT   Consents   Consent for Immunization Exchange :   Consent Granted   Consent for Immunizations to Providers :   Consent Granted   Nicole Umanzor CMA - 5/11/2020 10:31 AM CDT   Meds / Allergies   (As Of: 5/11/2020 10:32:51 AM CDT)   Allergies (Active)   aspirin  Estimated Onset Date:   Unspecified ; Reactions:   Nasal Polyps ; Created By:   Vinod Santana MD; Reaction Status:   Active ; Category:   Drug ; Substance:   aspirin ; Type:   Allergy ; Updated By:   Vinod Santana MD; Reviewed Date:   2/3/2020 11:55 AM CST      doxycycline  Estimated Onset Date:   Unspecified ; Reactions:   sun rash ; Created By:   Berna Huynh; Reaction Status:   Active ; Category:   Drug ; Substance:   doxycycline ; Type:   Allergy ; Updated By:   Berna Huynh; Source:   Paper Chart ; Reviewed Date:   2/3/2020 11:55 AM CST      Shailesh  Estimated Onset Date:   Unspecified ; Reactions:   Hives ; Created By:   Bree Babin CMA; Reaction Status:   Active ; Category:   Drug ; Substance:   Shailesh ; Type:   Allergy ; Updated By:   Olaf DOWELL  Bree ISSA; Reviewed Date:   2/3/2020 11:55 AM CST      zymetrin shampo  Estimated Onset Date:   Unspecified ; Reactions:   Rash ; Created By:   Bree Babin CMA; Reaction Status:   Active ; Category:   Drug ; Substance:   zymetrin shampo ; Type:   Allergy ; Updated By:   Bree Babin CMA; Reviewed Date:   2/3/2020 11:55 AM CST        Medication List   (As Of: 5/11/2020 10:32:51 AM CDT)   Prescription/Discharge Order    albuterol  :   albuterol ; Status:   Prescribed ; Ordered As Mnemonic:   albuterol 2.5 mg/3 mL (0.083%) inhalation solution ; Simple Display Line:   2.5 mg, 3 mL, neb, q4-6 hrs, PRN: as needed for cough, 1 box(es), 1 Refill(s) ; Ordering Provider:   Mark Chris MD; Catalog Code:   albuterol ; Order Dt/Tm:   4/2/2019 1:04:20 PM CDT          albuterol  :   albuterol ; Status:   Prescribed ; Ordered As Mnemonic:   Ventolin HFA 90 mcg/inh inhalation aerosol ; Simple Display Line:   2 puff(s), inh, q4 hrs, PRN: for cough, 1 EA, 11 Refill(s) ; Ordering Provider:   Mark Chris MD; Catalog Code:   albuterol ; Order Dt/Tm:   4/2/2019 1:04:40 PM CDT          amLODIPine  :   amLODIPine ; Status:   Prescribed ; Ordered As Mnemonic:   amLODIPine 10 mg oral tablet ; Simple Display Line:   1 tab(s), Oral, daily, 90 tab(s), 0 Refill(s) ; Ordering Provider:   Mark Chris MD; Catalog Code:   amLODIPine ; Order Dt/Tm:   4/29/2020 4:14:10 PM CDT          atorvastatin  :   atorvastatin ; Status:   Prescribed ; Ordered As Mnemonic:   atorvastatin 20 mg oral tablet ; Simple Display Line:   1 tab(s), Oral, daily, 90 tab(s), 0 Refill(s) ; Ordering Provider:   Mark Chris MD; Catalog Code:   atorvastatin ; Order Dt/Tm:   3/23/2020 7:32:27 AM CDT          clopidogrel  :   clopidogrel ; Status:   Prescribed ; Ordered As Mnemonic:   clopidogrel 75 mg oral tablet ; Simple Display Line:   1 tab(s), Oral, daily, 90 unknown unit, 0 Refill(s) ; Ordering Provider:   Mark Chris MD; Catalog Code:    clopidogrel ; Order Dt/Tm:   4/13/2020 10:55:04 AM CDT          finasteride  :   finasteride ; Status:   Prescribed ; Ordered As Mnemonic:   finasteride 5 mg oral tablet ; Simple Display Line:   1 tab(s), Oral, daily, 90 unknown unit, 0 Refill(s) ; Ordering Provider:   Mark Chris MD; Catalog Code:   finasteride ; Order Dt/Tm:   4/13/2020 10:55:26 AM CDT          fluticasone-salmeterol  :   fluticasone-salmeterol ; Status:   Prescribed ; Ordered As Mnemonic:   fluticasone-salmeterol 250 mcg-50 mcg inhalation powder ; Simple Display Line:   1 puff(s), NEB, bid, 60 unknown unit, 1 Refill(s) ; Ordering Provider:   Mark Chris MD; Catalog Code:   fluticasone-salmeterol ; Order Dt/Tm:   4/1/2020 8:12:08 AM CDT          gabapentin  :   gabapentin ; Status:   Prescribed ; Ordered As Mnemonic:   gabapentin 300 mg oral capsule ; Simple Display Line:   See Instructions, TAKE ONE CAPSULE BY MOUTH EVERY DAY, 90 cap(s), 0 Refill(s) ; Ordering Provider:   Mark Chris MD; Catalog Code:   gabapentin ; Order Dt/Tm:   3/30/2020 3:44:15 PM CDT          hydroCHLOROthiazide  :   hydroCHLOROthiazide ; Status:   Prescribed ; Ordered As Mnemonic:   hydroCHLOROthiazide 25 mg oral tablet ; Simple Display Line:   1 tab(s), Oral, daily, 90 unknown unit, 0 Refill(s) ; Ordering Provider:   Mark Chris MD; Catalog Code:   hydroCHLOROthiazide ; Order Dt/Tm:   4/17/2020 11:56:27 AM CDT          losartan  :   losartan ; Status:   Prescribed ; Ordered As Mnemonic:   losartan 100 mg oral tablet ; Simple Display Line:   See Instructions, TAKE ONE TABLET BY MOUTH EVERY DAY, 90 tab(s), 0 Refill(s) ; Ordering Provider:   Mark Chris MD; Catalog Code:   losartan ; Order Dt/Tm:   3/31/2020 10:10:51 AM CDT          nitroglycerin  :   nitroglycerin ; Status:   Prescribed ; Ordered As Mnemonic:   nitroglycerin 0.4 mg sublingual tablet ; Simple Display Line:   See Instructions, PLACE 1 TABLET UNDER TONGUE FOR CHEST PAIN TO A MAX  OF 3 TABS-ONE EVERY 5 MINUTES - IF NO RELIEF CALL 911, 25 tab(s), 3 Refill(s) ; Ordering Provider:   Mark Chris MD; Catalog Code:   nitroglycerin ; Order Dt/Tm:   7/29/2019 7:47:42 PM CDT          olopatadine ophthalmic  :   olopatadine ophthalmic ; Status:   Prescribed ; Ordered As Mnemonic:   Patanol 0.1% ophthalmic solution ; Simple Display Line:   2 gtts, Both eyes, BID, 5 mL ; Ordering Provider:   Mark Chris MD; Catalog Code:   olopatadine ophthalmic ; Order Dt/Tm:   3/11/2015 2:28:49 PM CDT          pantoprazole  :   pantoprazole ; Status:   Prescribed ; Ordered As Mnemonic:   pantoprazole 40 mg oral delayed release tablet ; Simple Display Line:   1 tab(s), Oral, daily, 90 unknown unit, 0 Refill(s) ; Ordering Provider:   Mark Chris MD; Catalog Code:   pantoprazole ; Order Dt/Tm:   4/17/2020 11:56:45 AM CDT          predniSONE  :   predniSONE ; Status:   Prescribed ; Ordered As Mnemonic:   predniSONE 10 mg oral tablet ; Simple Display Line:   See Instructions, 3 tabs daily for 4 days, 2 tabs daily for 4 days, 1 tab daily for 4 days, 24 EA, 0 Refill(s) ; Ordering Provider:   Brian Husain PA-C; Catalog Code:   predniSONE ; Order Dt/Tm:   2/3/2020 12:00:28 PM CST          silodosin  :   silodosin ; Status:   Prescribed ; Ordered As Mnemonic:   silodosin 8 mg oral capsule ; Simple Display Line:   8 mg, 1 cap(s), Oral, daily, 90 cap(s), 0 Refill(s) ; Ordering Provider:   Mark Chris MD; Catalog Code:   silodosin ; Order Dt/Tm:   3/19/2020 5:48:16 PM CDT          triamcinolone topical  :   triamcinolone topical ; Status:   Prescribed ; Ordered As Mnemonic:   triamcinolone 0.1% topical ointment ; Simple Display Line:   1 ree, TOP, TID, 240 gm ; Ordering Provider:   Mark Chris MD; Catalog Code:   triamcinolone topical ; Order Dt/Tm:   3/11/2015 2:29:32 PM CDT          zolpidem 10 mg oral tablet  :   zolpidem 10 mg oral tablet ; Status:   Prescribed ; Ordered As Mnemonic:   zolpidem  10 mg oral tablet ; Simple Display Line:   1 tab(s), Oral, qhs, PRN: AS NEEDED FOR SLEEP, 30 unknown unit, 5 Refill(s) ; Ordering Provider:   Mark Chris MD; Catalog Code:   zolpidem ; Order Dt/Tm:   3/31/2020 8:15:35 AM CDT            Home Meds    cetirizine  :   cetirizine ; Status:   Documented ; Ordered As Mnemonic:   Zyrtec 10 mg oral tablet ; Simple Display Line:   10 mg, 1 tab(s), PO, Daily ; Catalog Code:   cetirizine ; Order Dt/Tm:   1/25/2010 6:14:51 PM CST          cholecalciferol  :   cholecalciferol ; Status:   Documented ; Ordered As Mnemonic:   Vitamin D3 ; Simple Display Line:   0 Refill(s) ; Catalog Code:   cholecalciferol ; Order Dt/Tm:   11/1/2017 1:13:39 PM CDT            ID Risk Screen   Recent Travel History :   No recent travel   Family Member Travel History :   No recent travel   Other Exposure to Infectious Disease :   Unknown   Nicole Umanzor CMA - 5/11/2020 10:31 AM CDT

## 2022-02-16 NOTE — TELEPHONE ENCOUNTER
---------------------  From: Darrius Kirby (eRx Pool ([id number]))   To: [facility] Message Pool ([id number]_[place] [facility] [place]);     Sent: 10/3/2019 9:07:42 AM CDT  Subject: FW: Medication Management   Due Date/Time: 10/3/2019 10:24:00 AM CDT     Medication Refill needing approval    PCP:   [facility]    Medication:   Zolpidem  Last Filled:  04/02/2019                  Quantity:  30                   Refills:  5  CSA on file?   _     Date of last office visit and reason:   04/02/2019; annual px.   Date of last labs pertaining to condition:  n/a     Note:  Please advise on refill.     Return to Clinic order placed?  yes, due 04/2020 for annual px.              ------------------------------------------  From: [facility]  To: Mark Chris MD  Sent: October 2, 2019 10:24:34 AM CDT  Subject: Medication Management  Due: October 3, 2019 10:24:34 AM CDT    ** On Hold Pending Signature **  Drug: zolpidem (zolpidem 10 mg oral tablet)  TAKE ONE TABLET BY MOUTH AT BEDTIME AS NEEDED FOR SLEEP  Quantity: 30 unknown unit  Days Supply: 30        Refills: 5  Substitutions Allowed  Notes from Pharmacy:     Dispensed Drug: zolpidem (zolpidem 10 mg oral tablet)  TAKE ONE TABLET BY MOUTH AT BEDTIME AS NEEDED FOR SLEEP  Quantity: 30 unknown unit  Days Supply: 30        Refills: 5  Substitutions Allowed  Notes from Pharmacy:   ---------------------------------------------------------------  From: Apoorva Figueroa CMA ([facility] Message Pool ([id number]))   To: Mark Chris MD;     Sent: 10/3/2019 2:33:15 PM CDT  Subject: FW: Medication Management   Due Date/Time: 10/3/2019 10:24:00 AM CDT---------------------  From: Mark Chris MD   To: [facility]    Sent: 10/3/2019 2:49:38 PM CDT  Subject: FW: Medication Management     ** Submitted: **  Complete:zolpidem (zolpidem 10 mg oral tablet)   Signed by Mark Chris MD  10/3/2019 2:49:00 PM    ** Approved **  zolpidem (ZOLPIDEM  TARTRATE 10MG TABS)  TAKE ONE TABLET BY MOUTH AT BEDTIME AS NEEDED FOR SLEEP  Qty:  30 unknown unit        Days Supply:  30        Refills:  5          Substitutions Allowed     Route To Pharmacy - [id number]

## 2022-02-16 NOTE — PROGRESS NOTES
Patient:   MELISSA ROSAS            MRN: [id number]            FIN: [id number]               Age:   69 years     Sex:  Male     :  1950   Associated Diagnoses:   Dermatitis   Author:   Mark Chris MD      Visit Information      Date of Service: 12/10/2020 11:40 am  Performing Location: [facility]  Encounter#: [id number]      Primary Care Provider (PCP):  Mark Chris MD    NPI# [id number]      Referring Provider:  Mark Chris MD    NPI# [id number]      Subjective   Chief complaint 12/10/2020 11:50 AM CST  c/o painful rash on forehead x 1 month  .  Additional information on right and left forehead where his cap band rests  no drainage, no fever or chills.        Health Status   Allergies:    Allergic Reactions (Selected)  Severity Not Documented  Aspirin (Nasal polyps)  Doxycycline (Sun rash)  Shailesh (Hives)  Zymetrin shampo (Rash)   Medications:  (Selected)   Prescriptions  Prescribed  Metoprolol Succinate ER 25 mg oral tablet, extended release: = 1 tab(s), Oral, daily, # 90 tab(s), 2 Refill(s), Type: Maintenance, Pharmacy: [facility], 1 tab(s) Oral daily, 68, in, 20 10:57:00 CDT, Height Measured, 205.8, lb, 20 10:57:00 CDT, Weight Measured  Patanol 0.1% ophthalmic solution: 2 gtts, Both eyes, BID, # 5 mL, 3 Refill(s), Type: Maintenance, Pharmacy: [id number], 2 gtts both eyes bid  Ventolin HFA 90 mcg/inh inhalation aerosol: 2 puff(s), inh, q4 hrs, PRN: for cough, # 1 EA, 2 Refill(s), Type: Maintenance, Pharmacy: [facility], 2 puff(s) Inhale q4 hrs,PRN:for cough, 68, in, 20 10:03:00 CDT, Height Measured, 201, lb, 20 10:03:00 CDT, Weight Measured...  amLODIPine 10 mg oral tablet: = 1 tab(s), Oral, daily, # 90 tab(s), 2 Refill(s), Type: Maintenance, Pharmacy: [facility], 1 tab(s) Oral daily, 68, in, 20 10:57:00 CDT, Height Measured, 205.8, lb, 20 10:57:00 CDT, Weight  Measured  atorvastatin 20 mg oral tablet: = 1 tab(s), Oral, daily, # 90 tab(s), 2 Refill(s), Type: Maintenance, Pharmacy: [place] [address], 1 tab(s) Oral daily, 68, in, 07/27/20 10:57:00 CDT, Height Measured, 205.8, lb, 07/27/20 10:57:00 CDT, Weight Measured  betamethasone valerate 0.1% topical cream: 1 ree, Topical, bid, Instructions: to affected area apply thin layer, # 45 gm, 0 Refill(s), Type: Acute, Pharmacy: [place] [address], 1 ree Topical bid,Instr:to affected area apply thin layer, 68, in, 12/10/20 11:50:00 CST, Height Measured, 20...  clopidogrel 75 mg oral tablet: = 1 tab(s), Oral, daily, # 90 tab(s), 2 Refill(s), Type: Maintenance, Pharmacy: [place] [address], 1 tab(s) Oral daily, 68, in, 07/27/20 10:57:00 CDT, Height Measured, 205.8, lb, 07/27/20 10:57:00 CDT, Weight Measured  finasteride 5 mg oral tablet: = 1 tab(s), Oral, daily, # 90 tab(s), 2 Refill(s), Type: Maintenance, Pharmacy: [place] [address], 1 tab(s) Oral daily, 68, in, 07/27/20 10:57:00 CDT, Height Measured, 205.8, lb, 07/27/20 10:57:00 CDT, Weight Measured  fluticasone-salmeterol 250 mcg-50 mcg inhalation powder: See Instructions, Instructions: INHALE ONE PUFF BY MOUTH TWICE A DAY, # 180 EA, 2 Refill(s), Type: Maintenance, Pharmacy: [place] [address], INHALE ONE PUFF BY MOUTH TWICE A DAY, 68, in, 07/27/20 10:57:00 CDT, Height Measured, 205.8, lb, 07/27...  gabapentin 300 mg oral capsule: = 1 cap(s), Oral, daily, # 90 unknown unit, 0 Refill(s), Type: Maintenance, Pharmacy: [facility], TAKE ONE CAPSULE BY MOUTH EVERY DAY, 68, in, 07/27/20 10:57:00 CDT, Height Measured, 205.8, lb, 07/27/20 10:57:00 CDT, Weight Measured  losartan 100 mg oral tablet: = 1 tab(s), Oral, daily, # 90 unknown unit, 2 Refill(s), Type: Maintenance, Pharmacy: [facility], 1 tab(s) Oral daily, 68, in, 07/27/20 10:57:00 CDT, Height Measured, 205.8, lb, 07/27/20 10:57:00 CDT, Weight  Measured  nitroglycerin 0.4 mg sublingual tablet: See Instructions, Instructions: PLACE 1 TABLET UNDER TONGUE FOR CHEST PAIN TO A MAX OF 3 TABS-ONE EVERY 5 MINUTES - IF NO RELIEF CALL 911, # 25 tab(s), 3 Refill(s), Type: Maintenance, Pharmacy: [place] [address]  pantoprazole 40 mg oral delayed release tablet: = 1 tab(s), Oral, daily, # 90 tab(s), 2 Refill(s), Type: Maintenance, Pharmacy: [place] [address], 1 tab(s) Oral daily, 68, in, 07/27/20 10:57:00 CDT, Height Measured, 205.8, lb, 07/27/20 10:57:00 CDT, Weight Measured  sildenafil 100 mg oral tablet: = 1 tab(s) ( 100 mg ), Oral, daily, # 30 tab(s), 0 Refill(s), Type: Maintenance, Pharmacy: [place] [address], 1 tab(s) Oral daily, 68, in, 06/04/20 10:03:00 CDT, Height Measured, 201, lb, 06/04/20 10:03:00 CDT, Weight Measured  silodosin 8 mg oral capsule: = 1 cap(s), Oral, daily, # 90 cap(s), 3 Refill(s), Type: Maintenance, Pharmacy: [place] [address], 1 cap(s) Oral daily, 68, in, 07/27/20 10:57:00 CDT, Height Measured, Weight Measured  triamcinolone 0.1% topical ointment: 1 ree, TOP, TID, # 240 gm, 1 Refill(s), Type: Maintenance, [facility], 1 ree top tid  zolpidem 5 mg oral tablet: = 1 tab(s) ( 5 mg ), Oral, hs, PRN: for sleep, # 30 tab(s), 3 Refill(s), Type: Maintenance, Pharmacy: [place] [address], 1 tab(s) Oral hs,PRN:for sleep, 68, in, 07/27/20 10:57:00 CDT, Height Measured, 205.8, lb, 07/27/20 10:57:00 CDT, Weight M...  Documented Medications  Documented  Vitamin D3: 0 Refill(s), Type: Maintenance  Zyrtec 10 mg oral tablet: 1 tab(s) ( 10 mg ), PO, Daily, 0,    Medications          *denotes recorded medication          Ventolin HFA 90 mcg/inh inhalation aerosol: 2 puff(s), inh, q4 hrs, PRN: for cough, 1 EA, 2 Refill(s).          amLODIPine 10 mg oral tablet: 1 tab(s), Oral, daily, 90 tab(s), 2 Refill(s).          atorvastatin 20 mg oral tablet: 1 tab(s), Oral, daily, 90 tab(s), 2  Refill(s).          betamethasone valerate 0.1% topical cream: 1 ree, Topical, bid, to affected area apply thin layer, 45 gm, 0 Refill(s).          *Zyrtec 10 mg oral tablet: 10 mg, 1 tab(s), PO, Daily.          *Vitamin D3: 0 Refill(s).          clopidogrel 75 mg oral tablet: 1 tab(s), Oral, daily, 90 tab(s), 2 Refill(s).          finasteride 5 mg oral tablet: 1 tab(s), Oral, daily, 90 tab(s), 2 Refill(s).          fluticasone-salmeterol 250 mcg-50 mcg inhalation powder: See Instructions, INHALE ONE PUFF BY MOUTH TWICE A DAY, 180 EA, 2 Refill(s).          gabapentin 300 mg oral capsule: 1 cap(s), Oral, daily, 90 unknown unit, 0 Refill(s).          losartan 100 mg oral tablet: 1 tab(s), Oral, daily, 90 unknown unit, 2 Refill(s).          Metoprolol Succinate ER 25 mg oral tablet, extended release: 1 tab(s), Oral, daily, 90 tab(s), 2 Refill(s).          nitroglycerin 0.4 mg sublingual tablet: See Instructions, PLACE 1 TABLET UNDER TONGUE FOR CHEST PAIN TO A MAX OF 3 TABS-ONE EVERY 5 MINUTES - IF NO RELIEF CALL 911, 25 tab(s), 3 Refill(s).          Patanol 0.1% ophthalmic solution: 2 gtts, Both eyes, BID, 5 mL.          pantoprazole 40 mg oral delayed release tablet: 1 tab(s), Oral, daily, 90 tab(s), 2 Refill(s).          sildenafil 100 mg oral tablet: 100 mg, 1 tab(s), Oral, daily, 30 tab(s), 0 Refill(s).          silodosin 8 mg oral capsule: 1 cap(s), Oral, daily, 90 cap(s), 3 Refill(s).          triamcinolone 0.1% topical ointment: 1 ree, TOP, TID, 240 gm.          zolpidem 5 mg oral tablet: 5 mg, 1 tab(s), Oral, hs, PRN: for sleep, 30 tab(s), 3 Refill(s).       Problem list:    All Problems (Selected)  Eczematous dermatitis / 87393724 / Confirmed  DJD of shoulder / 109213269 / Confirmed  Asthma / 866756468 / Confirmed  Insomnia / 168022305 / Confirmed  Urinary retention / 175817489 / Confirmed  Post Prostatic Surgery  Nasal polyps / 41370072 / Confirmed  Allergic rhinitis / 061657198 / Confirmed  Vocal cord  dysfunction / 290954025 / Confirmed  Bronchiectasis / 00595710 / Confirmed  Obese / 4881349608 / Probable  HLD (hyperlipidemia) / 64805429 / Confirmed  Obstructive sleep apnea syndrome in adult / 0323620787 / Confirmed  Hip arthritis / 383756420 / Confirmed  Diverticulitis / 568400981 / Confirmed  Hx of malignant skin melanoma / 2682063930 / Confirmed  GERD (gastroesophageal reflux disease) / 421181190 / Confirmed  Benign prostatic hyperplasia / 010945923 / Confirmed  HTN, goal below 140/90 / 9097031051 / Confirmed  CAD (coronary artery disease) / 0962902311 / Confirmed  dec 2009 angiogram with 50% blockage,, treated with meds  AK (actinic keratosis) / 4944656958 / Confirmed  Tubulovillous adenoma of colon / 044201850 / Confirmed  Prosthetic hip infection / 772090087 / Confirmed  Right hip      Objective   Vital Signs   12/10/2020 11:50 AM CST Temperature Tympanic 96.9 DegF  LOW    Peripheral Pulse Rate 62 bpm    Systolic Blood Pressure 122 mmHg    Diastolic Blood Pressure 64 mmHg    Mean Arterial Pressure 83 mmHg      Measurements from flowsheet : Measurements   12/10/2020 11:50 AM CST Height Measured 68 in    Weight Measured 206.6 lb    BSA 2.12 m2    Body Mass Index 31.41 kg/m2  HI      General:  Alert and oriented, No acute distress.    Integumentary:  small circular lesion of redness slightly shiny about 2cm.    Psychiatric:  Cooperative, Appropriate mood & affect.       Impression and Plan   Assessment and Plan:          Diagnosis: Dermatitis (JRC43-AM L30.9).         Course: likely dermatitis from sweat and hat band.    Orders      (Selected)   Prescriptions  Prescribed  betamethasone valerate 0.1% topical cream: 1 ree, Topical, bid, Instructions: to affected area apply thin layer, # 45 gm, 0 Refill(s), Type: Acute, Pharmacy: [facility], 1 ree Topical bid,Instr:to affected area apply thin layer, 68, in, 12/10/20 11:50:00 CST, Height Measured, 20....     .

## 2022-02-16 NOTE — TELEPHONE ENCOUNTER
Entered by Mariam Johnson CMA on July 13, 2021 6:20:04 AM CDT  ---------------------  From: Mariam Johnson CMA   To: [place] [address]    Sent: 7/13/2021 6:20:03 AM CDT  Subject: Medication Management     ** Submitted: **  Order:pantoprazole (pantoprazole 40 mg oral delayed release tablet)  1 tab(s)  Oral  daily  Qty:  30 tab(s)        Refills:  0          Substitutions Allowed     Route To [place] [address]    Signed by Mariam Johnson CMA  7/13/2021 11:19:00 AM [facility]    ** Submitted: **  Complete:pantoprazole (pantoprazole 40 mg oral delayed release tablet)   Signed by Mariam Johnson CMA  7/13/2021 11:20:00 AM [facility]    ** Not Approved:  **  pantoprazole (PANTOPRAZOLE SODIUM 40MG TBEC)  TAKE ONE TABLET BY MOUTH EVERY DAY  Qty:  90 unknown unit        Days Supply:  90        Refills:  2          Substitutions Allowed     Route To [place] [address]   Signed by Mariam Johnson CMA            ** Submitted: **  Order:finasteride (finasteride 5 mg oral tablet)  1 tab(s)  Oral  daily  Qty:  30 tab(s)        Refills:  0          Substitutions Allowed     Route To [place] [address]    Signed by Mariam Johnson CMA  7/13/2021 11:19:00 AM [facility]    ** Submitted: **  Complete:finasteride (finasteride 5 mg oral tablet)   Signed by Mariam Johnson CMA  7/13/2021 11:19:00 AM [facility]    ** Not Approved:  **  finasteride (FINASTERIDE 5MG TABS)  TAKE ONE TABLET BY MOUTH EVERY DAY  Qty:  90 unknown unit        Days Supply:  90        Refills:  2          Substitutions Allowed     Route To [place] [address]   Signed by Mariam Johnson CMA            ------------------------------------------  From: [place] [address]  To: Mark Chris MD  Sent: July 10, 2021 8:15:19 PM CDT  Subject: Medication Management  Due: June 4, 2021 8:25:53 PM CDT     ** On Hold Pending Signature **     Drug: pantoprazole (pantoprazole 40 mg oral delayed release tablet), TAKE ONE TABLET BY MOUTH  EVERY DAY  Quantity: 90 unknown unit  Days Supply: 90  Refills: 1  Substitutions Allowed  Notes from Pharmacy:     Dispensed Drug: pantoprazole (pantoprazole 40 mg oral delayed release tablet), TAKE ONE TABLET BY MOUTH EVERY DAY  Quantity: 90 unknown unit  Days Supply: 90  Refills: 2  Substitutions Allowed  Notes from Pharmacy:     ** On Hold Pending Signature **     Drug: finasteride (finasteride 5 mg oral tablet), TAKE ONE TABLET BY MOUTH EVERY DAY  Quantity: 90 unknown unit  Days Supply: 90  Refills: 1  Substitutions Allowed  Notes from Pharmacy:     Dispensed Drug: finasteride (finasteride 5 mg oral tablet), TAKE ONE TABLET BY MOUTH EVERY DAY  Quantity: 90 unknown unit  Days Supply: 90  Refills: 2  Substitutions Allowed  Notes from Pharmacy:  ------------------------------------------AWV scheduled 7/22/21

## 2022-02-16 NOTE — NURSING NOTE
Tanner Fall Risk Scale Score Entered On:  7/27/2020 11:02 AM CDT    Performed On:  7/27/2020 11:02 AM CDT by Nicole Umanzor CMA Fall Risk   History of Fall in Last 3 Months [place]anner :   No   Nicole Umanzor CMA - 7/27/2020 11:02 AM CDT

## 2022-02-16 NOTE — NURSING NOTE
Comprehensive Intake Entered On:  6/4/2020 10:10 AM CDT    Performed On:  6/4/2020 10:03 AM CDT by Selin Moses LPN               Summary   Chief Complaint :   concerns with blood pressures. At home numbers have been in the 140's    Advance Directive :   Yes   Weight Measured :   201 lb(Converted to: 201 lb 0 oz, 91.17 kg)    Height Measured :   68 in(Converted to: 5 ft 8 in, 172.72 cm)    Body Mass Index :   30.56 kg/m2 (HI)    Body Surface Area :   2.09 m2   Systolic Blood Pressure :   122 mmHg   Diastolic Blood Pressure :   78 mmHg   Mean Arterial Pressure :   93 mmHg   Peripheral Pulse Rate :   64 bpm   BP Site :   Right arm   Pulse Site :   Radial artery   BP Method :   Manual   HR Method :   Manual   Temperature Tympanic :   97.6 DegF(Converted to: 36.4 DegC)  (LOW)    Race :      Languages :   English   Ethnicity :   Not  or    Selin Moses LPN - 6/4/2020 10:03 AM CDT   Health Status   Allergies Verified? :   Yes   Medication History Verified? :   Yes   Immunizations Current :   Yes   Pre-Visit Planning Status :   Completed   Selin Moses LPN - 6/4/2020 10:03 AM CDT   Consents   Consent for Immunization Exchange :   Consent Granted   Consent for Immunizations to Providers :   Consent Granted   Selin Moses LPN - 6/4/2020 10:03 AM CDT   Meds / Allergies   (As Of: 6/4/2020 10:10:50 AM CDT)   Allergies (Active)   aspirin  Estimated Onset Date:   Unspecified ; Reactions:   Nasal Polyps ; Created By:   Vinod Santana MD; Reaction Status:   Active ; Category:   Drug ; Substance:   aspirin ; Type:   Allergy ; Updated By:   Vinod Santana MD; Reviewed Date:   6/4/2020 10:09 AM CDT      doxycycline  Estimated Onset Date:   Unspecified ; Reactions:   sun rash ; Created By:   Berna Huynh; Reaction Status:   Active ; Category:   Drug ; Substance:   doxycycline ; Type:   Allergy ; Updated By:   Berna Huynh; Source:   Paper Chart ; Reviewed Date:   6/4/2020 10:09 AM  CDT      Shailesh  Estimated Onset Date:   Unspecified ; Reactions:   Hives ; Created By:   Bree Babin CMA; Reaction Status:   Active ; Category:   Drug ; Substance:   Shailesh ; Type:   Allergy ; Updated By:   Bree Babin CMA; Reviewed Date:   6/4/2020 10:09 AM CDT      zymetrin shampo  Estimated Onset Date:   Unspecified ; Reactions:   Rash ; Created By:   Bree Babin CMA; Reaction Status:   Active ; Category:   Drug ; Substance:   zymetrin shampo ; Type:   Allergy ; Updated By:   Bree Babin CMA; Reviewed Date:   6/4/2020 10:09 AM CDT        Medication List   (As Of: 6/4/2020 10:10:50 AM CDT)   Prescription/Discharge Order    predniSONE  :   predniSONE ; Status:   Completed ; Ordered As Mnemonic:   predniSONE 10 mg oral tablet ; Simple Display Line:   See Instructions, 3 tabs daily for 4 days, 2 tabs daily for 4 days, 1 tab daily for 4 days, 24 EA, 0 Refill(s) ; Ordering Provider:   Brian Husain PA-C; Catalog Code:   predniSONE ; Order Dt/Tm:   2/3/2020 12:00:28 PM CST          metoprolol  :   metoprolol ; Status:   Prescribed ; Ordered As Mnemonic:   metoprolol succinate 25 mg oral tablet, extended release ; Simple Display Line:   25 mg, 1 tab(s), Oral, daily, 90 tab(s), 1 Refill(s) ; Ordering Provider:   Mark Chris MD; Catalog Code:   metoprolol ; Order Dt/Tm:   5/18/2020 11:45:16 AM CDT          amLODIPine  :   amLODIPine ; Status:   Prescribed ; Ordered As Mnemonic:   amLODIPine 10 mg oral tablet ; Simple Display Line:   1 tab(s), Oral, daily, 90 tab(s), 0 Refill(s) ; Ordering Provider:   Mark Chris MD; Catalog Code:   amLODIPine ; Order Dt/Tm:   4/29/2020 4:14:10 PM CDT          pantoprazole  :   pantoprazole ; Status:   Prescribed ; Ordered As Mnemonic:   pantoprazole 40 mg oral delayed release tablet ; Simple Display Line:   1 tab(s), Oral, daily, 90 unknown unit, 0 Refill(s) ; Ordering Provider:   Mark Chris MD; Catalog Code:   pantoprazole ; Order Dt/Tm:    4/17/2020 11:56:45 AM CDT          finasteride  :   finasteride ; Status:   Prescribed ; Ordered As Mnemonic:   finasteride 5 mg oral tablet ; Simple Display Line:   1 tab(s), Oral, daily, 90 unknown unit, 0 Refill(s) ; Ordering Provider:   Mark Chris MD; Catalog Code:   finasteride ; Order Dt/Tm:   4/13/2020 10:55:26 AM CDT          clopidogrel  :   clopidogrel ; Status:   Prescribed ; Ordered As Mnemonic:   clopidogrel 75 mg oral tablet ; Simple Display Line:   1 tab(s), Oral, daily, 90 unknown unit, 0 Refill(s) ; Ordering Provider:   Mark Chris MD; Catalog Code:   clopidogrel ; Order Dt/Tm:   4/13/2020 10:55:04 AM CDT          fluticasone-salmeterol  :   fluticasone-salmeterol ; Status:   Prescribed ; Ordered As Mnemonic:   fluticasone-salmeterol 250 mcg-50 mcg inhalation powder ; Simple Display Line:   1 puff(s), NEB, bid, 60 unknown unit, 1 Refill(s) ; Ordering Provider:   Mark Chris MD; Catalog Code:   fluticasone-salmeterol ; Order Dt/Tm:   4/1/2020 8:12:08 AM CDT          losartan  :   losartan ; Status:   Prescribed ; Ordered As Mnemonic:   losartan 100 mg oral tablet ; Simple Display Line:   See Instructions, TAKE ONE TABLET BY MOUTH EVERY DAY, 90 tab(s), 0 Refill(s) ; Ordering Provider:   Mark Chris MD; Catalog Code:   losartan ; Order Dt/Tm:   3/31/2020 10:10:51 AM CDT          zolpidem 10 mg oral tablet  :   zolpidem 10 mg oral tablet ; Status:   Prescribed ; Ordered As Mnemonic:   zolpidem 10 mg oral tablet ; Simple Display Line:   1 tab(s), Oral, qhs, PRN: AS NEEDED FOR SLEEP, 30 unknown unit, 5 Refill(s) ; Ordering Provider:   Mark Chris MD; Catalog Code:   zolpidem ; Order Dt/Tm:   3/31/2020 8:15:35 AM CDT          gabapentin  :   gabapentin ; Status:   Prescribed ; Ordered As Mnemonic:   gabapentin 300 mg oral capsule ; Simple Display Line:   See Instructions, TAKE ONE CAPSULE BY MOUTH EVERY DAY, 90 cap(s), 0 Refill(s) ; Ordering Provider:   Mark Chris MD;  Catalog Code:   gabapentin ; Order Dt/Tm:   3/30/2020 3:44:15 PM CDT          atorvastatin  :   atorvastatin ; Status:   Prescribed ; Ordered As Mnemonic:   atorvastatin 20 mg oral tablet ; Simple Display Line:   1 tab(s), Oral, daily, 90 tab(s), 0 Refill(s) ; Ordering Provider:   Mark Chris MD; Catalog Code:   atorvastatin ; Order Dt/Tm:   3/23/2020 7:32:27 AM CDT          silodosin  :   silodosin ; Status:   Prescribed ; Ordered As Mnemonic:   silodosin 8 mg oral capsule ; Simple Display Line:   8 mg, 1 cap(s), Oral, daily, 90 cap(s), 0 Refill(s) ; Ordering Provider:   Mark Chris MD; Catalog Code:   silodosin ; Order Dt/Tm:   3/19/2020 5:48:16 PM CDT          nitroglycerin  :   nitroglycerin ; Status:   Prescribed ; Ordered As Mnemonic:   nitroglycerin 0.4 mg sublingual tablet ; Simple Display Line:   See Instructions, PLACE 1 TABLET UNDER TONGUE FOR CHEST PAIN TO A MAX OF 3 TABS-ONE EVERY 5 MINUTES - IF NO RELIEF CALL 911, 25 tab(s), 3 Refill(s) ; Ordering Provider:   Mark Chris MD; Catalog Code:   nitroglycerin ; Order Dt/Tm:   7/29/2019 7:47:42 PM CDT          albuterol  :   albuterol ; Status:   Prescribed ; Ordered As Mnemonic:   Ventolin HFA 90 mcg/inh inhalation aerosol ; Simple Display Line:   2 puff(s), inh, q4 hrs, PRN: for cough, 1 EA, 11 Refill(s) ; Ordering Provider:   Mark Chris MD; Catalog Code:   albuterol ; Order Dt/Tm:   4/2/2019 1:04:40 PM CDT          albuterol  :   albuterol ; Status:   Prescribed ; Ordered As Mnemonic:   albuterol 2.5 mg/3 mL (0.083%) inhalation solution ; Simple Display Line:   2.5 mg, 3 mL, neb, q4-6 hrs, PRN: as needed for cough, 1 box(es), 1 Refill(s) ; Ordering Provider:   Mark Chris MD; Catalog Code:   albuterol ; Order Dt/Tm:   4/2/2019 1:04:20 PM CDT          triamcinolone topical  :   triamcinolone topical ; Status:   Prescribed ; Ordered As Mnemonic:   triamcinolone 0.1% topical ointment ; Simple Display Line:   1 ree, TOP, TID,  240 gm ; Ordering Provider:   Mark Chris MD; Catalog Code:   triamcinolone topical ; Order Dt/Tm:   3/11/2015 2:29:32 PM CDT          olopatadine ophthalmic  :   olopatadine ophthalmic ; Status:   Prescribed ; Ordered As Mnemonic:   Patanol 0.1% ophthalmic solution ; Simple Display Line:   2 gtts, Both eyes, BID, 5 mL ; Ordering Provider:   Mark Chris MD; Catalog Code:   olopatadine ophthalmic ; Order Dt/Tm:   3/11/2015 2:28:49 PM CDT            Home Meds    cholecalciferol  :   cholecalciferol ; Status:   Documented ; Ordered As Mnemonic:   Vitamin D3 ; Simple Display Line:   0 Refill(s) ; Catalog Code:   cholecalciferol ; Order Dt/Tm:   11/1/2017 1:13:39 PM CDT          cetirizine  :   cetirizine ; Status:   Documented ; Ordered As Mnemonic:   Zyrtec 10 mg oral tablet ; Simple Display Line:   10 mg, 1 tab(s), PO, Daily ; Catalog Code:   cetirizine ; Order Dt/Tm:   1/25/2010 6:14:51 PM CST            ID Risk Screen   Recent Travel History :   No recent travel   Family Member Travel History :   No recent travel   Other Exposure to Infectious Disease :   Unknown   Selin Moses LPN - 6/4/2020 10:03 AM CDT

## 2022-02-16 NOTE — TELEPHONE ENCOUNTER
Entered by Kylie Goel CMA on December 28, 2020 10:40:55 AM CST  ---------------------  From: Kylie Goel CMA   To: [facility]    Sent: 12/28/2020 10:40:55 AM CST  Subject: Medication Management     ** Submitted: **  Order:gabapentin (gabapentin 300 mg oral capsule)  1 cap(s)  Oral  daily  Qty:  90 unknown unit        Days Supply:  90        Refills:  2          Substitutions Allowed     Route To Pharmacy [facility]    Signed by Kylie Goel CMA  12/28/2020 4:40:00 PM [facility]    ** Submitted: **  Complete:gabapentin (gabapentin 300 mg oral capsule)   Signed by Kylie Goel CMA  12/28/2020 4:40:00 PM [facility]    ** Not Approved:  **  gabapentin (GABAPENTIN 300MG CAPS)  TAKE ONE CAPSULE BY MOUTH EVERY DAY  Qty:  90 unknown unit        Days Supply:  90        Refills:  0          Substitutions Allowed     Route To Pharmacy [place] [id number]   Signed by Kylie Goel CMA            ------------------------------------------  From: [place] [id number]  To: Mark Chris MD  Sent: December 26, 2020 7:18:32 PM CST  Subject: Medication Management  Due: December 24, 2020 1:58:33 PM CST     ** On Hold Pending Signature **     Drug: gabapentin (gabapentin 300 mg oral capsule), TAKE ONE CAPSULE BY MOUTH EVERY DAY  Quantity: 90 unknown unit  Days Supply: 90  Refills: 0  Substitutions Allowed  Notes from Pharmacy:     Dispensed Drug: gabapentin (gabapentin 300 mg oral capsule), TAKE ONE CAPSULE BY MOUTH EVERY DAY  Quantity: 90 unknown unit  Days Supply: 90  Refills: 0  Substitutions Allowed  Notes from Pharmacy:  ------------------------------------------

## 2022-02-16 NOTE — NURSING NOTE
Comprehensive Intake Entered On:  1/28/2020 11:14 AM CST    Performed On:  1/28/2020 11:10 AM CST by Nicole Umanzor CMA               Summary   Chief Complaint :   c/o low grade fever, lung irritation, coughing at night. Started a month ago and got better until the weather got colder   Advance Directive :   Yes   Weight Measured :   204 lb(Converted to: 204 lb 0 oz, 92.53 kg)    Height Measured :   68 in(Converted to: 5 ft 8 in, 172.72 cm)    Body Mass Index :   31.01 kg/m2 (HI)    Body Surface Area :   2.1 m2   Systolic Blood Pressure :   122 mmHg   Diastolic Blood Pressure :   70 mmHg   Mean Arterial Pressure :   87 mmHg   Peripheral Pulse Rate :   64 bpm   Temperature Tympanic :   97.1 DegF(Converted to: 36.2 DegC)  (LOW)    Race :      Languages :   English   Ethnicity :   Not  or    Nicole Umanzor CMA - 1/28/2020 11:10 AM CST   Health Status   Allergies Verified? :   Yes   Medication History Verified? :   Yes   Immunizations Current :   Yes   Pre-Visit Planning Status :   Completed   Tobacco Use? :   Former smoker   Nicole Umanzor CMA - 1/28/2020 11:10 AM CST   Consents   Consent for Immunization Exchange :   Consent Granted   Consent for Immunizations to Providers :   Consent Granted   Nicole Umanzor CMA - 1/28/2020 11:10 AM CST   Meds / Allergies   (As Of: 1/28/2020 11:14:29 AM CST)   Allergies (Active)   aspirin  Estimated Onset Date:   Unspecified ; Reactions:   Nasal Polyps ; Created By:   Vinod Santana MD; Reaction Status:   Active ; Category:   Drug ; Substance:   aspirin ; Type:   Allergy ; Updated By:   Vinod Santana MD; Reviewed Date:   1/11/2019 10:14 AM CST      doxycycline  Estimated Onset Date:   Unspecified ; Reactions:   sun rash ; Created By:   Berna Huynh; Reaction Status:   Active ; Category:   Drug ; Substance:   doxycycline ; Type:   Allergy ; Updated By:   Berna Huynh; Source:   Paper Chart ; Reviewed Date:   1/11/2019 10:14 AM CST       Shailesh  Estimated Onset Date:   Unspecified ; Reactions:   Hives ; Created By:   Bree Babin CMA; Reaction Status:   Active ; Category:   Drug ; Substance:   Shailesh ; Type:   Allergy ; Updated By:   Bree Babin CMA; Reviewed Date:   1/11/2019 10:14 AM CST      zymetrin shampo  Estimated Onset Date:   Unspecified ; Reactions:   Rash ; Created By:   Bree Babin CMA; Reaction Status:   Active ; Category:   Drug ; Substance:   zymetrin shampo ; Type:   Allergy ; Updated By:   Bree Babin CMA; Reviewed Date:   1/11/2019 10:14 AM CST        Medication List   (As Of: 1/28/2020 11:14:29 AM CST)   Prescription/Discharge Order    albuterol  :   albuterol ; Status:   Prescribed ; Ordered As Mnemonic:   albuterol 2.5 mg/3 mL (0.083%) inhalation solution ; Simple Display Line:   2.5 mg, 3 mL, neb, q4-6 hrs, PRN: as needed for cough, 1 box(es), 1 Refill(s) ; Ordering Provider:   Mark Chris MD; Catalog Code:   albuterol ; Order Dt/Tm:   4/2/2019 1:04:20 PM CDT          albuterol  :   albuterol ; Status:   Prescribed ; Ordered As Mnemonic:   Ventolin HFA 90 mcg/inh inhalation aerosol ; Simple Display Line:   2 puff(s), inh, q4 hrs, PRN: for cough, 1 EA, 11 Refill(s) ; Ordering Provider:   Mark Chris MD; Catalog Code:   albuterol ; Order Dt/Tm:   4/2/2019 1:04:40 PM CDT          amLODIPine  :   amLODIPine ; Status:   Prescribed ; Ordered As Mnemonic:   amLODIPine 10 mg oral tablet ; Simple Display Line:   10 mg, 1 tab(s), po, daily, for 90 day(s), 90 tab(s), 3 Refill(s) ; Ordering Provider:   Mark Chris MD; Catalog Code:   amLODIPine ; Order Dt/Tm:   4/2/2019 12:56:24 PM CDT          atorvastatin  :   atorvastatin ; Status:   Prescribed ; Ordered As Mnemonic:   atorvastatin 20 mg oral tablet ; Simple Display Line:   20 mg, 1 tab(s), po, daily, for 90 day(s), 90 tab(s), 3 Refill(s) ; Ordering Provider:   Mark Chris MD; Catalog Code:   atorvastatin ; Order Dt/Tm:   4/2/2019  12:56:39 PM CDT          clopidogrel  :   clopidogrel ; Status:   Prescribed ; Ordered As Mnemonic:   clopidogrel 75 mg oral tablet ; Simple Display Line:   75 mg, 1 tab(s), po, daily, for 90 day(s), 90 tab(s), 3 Refill(s) ; Ordering Provider:   Mark Chris MD; Catalog Code:   clopidogrel ; Order Dt/Tm:   4/2/2019 12:56:53 PM CDT          finasteride  :   finasteride ; Status:   Prescribed ; Ordered As Mnemonic:   finasteride 5 mg oral tablet ; Simple Display Line:   1 tab(s), Oral, daily, for 90 day(s), 90 tab(s), 3 Refill(s) ; Ordering Provider:   Mark Chris MD; Catalog Code:   finasteride ; Order Dt/Tm:   4/2/2019 12:54:58 PM CDT          fluticasone-salmeterol  :   fluticasone-salmeterol ; Status:   Prescribed ; Ordered As Mnemonic:   Advair Diskus 250 mcg-50 mcg inhalation powder ; Simple Display Line:   1 puff(s), inh, bid, 60 EA, 11 Refill(s) ; Ordering Provider:   Mark Chris MD; Catalog Code:   fluticasone-salmeterol ; Order Dt/Tm:   4/2/2019 12:57:08 PM CDT          gabapentin  :   gabapentin ; Status:   Prescribed ; Ordered As Mnemonic:   gabapentin 300 mg oral capsule ; Simple Display Line:   300 mg, 1 cap(s), po, daily, for 90 day(s), 90 cap(s), 3 Refill(s) ; Ordering Provider:   Mark Chris MD; Catalog Code:   gabapentin ; Order Dt/Tm:   4/2/2019 1:03:54 PM CDT          hydroCHLOROthiazide  :   hydroCHLOROthiazide ; Status:   Prescribed ; Ordered As Mnemonic:   hydroCHLOROthiazide 25 mg oral tablet ; Simple Display Line:   1 tab(s), Oral, daily, for 90 day(s), 90 tab(s), 3 Refill(s) ; Ordering Provider:   Mark Chris MD; Catalog Code:   hydroCHLOROthiazide ; Order Dt/Tm:   4/2/2019 12:55:24 PM CDT          losartan  :   losartan ; Status:   Prescribed ; Ordered As Mnemonic:   losartan 100 mg oral tablet ; Simple Display Line:   100 mg, 1 tab(s), Oral, daily, for 90 day(s), 90 tab(s), 3 Refill(s) ; Ordering Provider:   Mark Chris MD; Catalog Code:   losartan ; Order  Dt/Tm:   4/2/2019 12:55:47 PM CDT          losartan  :   losartan ; Status:   Prescribed ; Ordered As Mnemonic:   losartan 100 mg oral tablet ; Simple Display Line:   100 mg, 1 tab(s), Oral, daily, 90 tab(s), 0 Refill(s) ; Ordering Provider:   Mark Chris MD; Catalog Code:   losartan ; Order Dt/Tm:   7/17/2018 11:25:35 AM CDT          nitroglycerin  :   nitroglycerin ; Status:   Prescribed ; Ordered As Mnemonic:   nitroglycerin 0.4 mg sublingual tablet ; Simple Display Line:   See Instructions, PLACE 1 TABLET UNDER TONGUE FOR CHEST PAIN TO A MAX OF 3 TABS-ONE EVERY 5 MINUTES - IF NO RELIEF CALL 911, 25 tab(s), 3 Refill(s) ; Ordering Provider:   Mark Chris MD; Catalog Code:   nitroglycerin ; Order Dt/Tm:   7/29/2019 7:47:42 PM CDT          olopatadine ophthalmic  :   olopatadine ophthalmic ; Status:   Prescribed ; Ordered As Mnemonic:   Patanol 0.1% ophthalmic solution ; Simple Display Line:   2 gtts, Both eyes, BID, 5 mL ; Ordering Provider:   Mark Chris MD; Catalog Code:   olopatadine ophthalmic ; Order Dt/Tm:   3/11/2015 2:28:49 PM CDT          pantoprazole  :   pantoprazole ; Status:   Prescribed ; Ordered As Mnemonic:   pantoprazole 40 mg oral delayed release tablet ; Simple Display Line:   40 mg, 1 tab(s), po, daily, for 90 day(s), 90 tab(s), 3 Refill(s) ; Ordering Provider:   Mark Chris MD; Catalog Code:   pantoprazole ; Order Dt/Tm:   4/2/2019 1:03:38 PM CDT          silodosin  :   silodosin ; Status:   Prescribed ; Ordered As Mnemonic:   Rapaflo 8 mg oral capsule ; Simple Display Line:   8 mg, 1 cap(s), Oral, daily, for 90 day(s), Patient requests name brand Rapaflo, 90 cap(s), 3 Refill(s) ; Ordering Provider:   Mark Chris MD; Catalog Code:   silodosin ; Order Dt/Tm:   4/2/2019 1:05:19 PM CDT          triamcinolone topical  :   triamcinolone topical ; Status:   Prescribed ; Ordered As Mnemonic:   triamcinolone 0.1% topical ointment ; Simple Display Line:   1 ree, TOP, TID,  240 gm ; Ordering Provider:   Mark Chrsi MD; Catalog Code:   triamcinolone topical ; Order Dt/Tm:   3/11/2015 2:29:32 PM CDT          zolpidem 10 mg oral tablet  :   zolpidem 10 mg oral tablet ; Status:   Prescribed ; Ordered As Mnemonic:   zolpidem 10 mg oral tablet ; Simple Display Line:   1 tab(s), Oral, qhs, PRN: AS NEEDED FOR SLEEP, 30 unknown unit, 5 Refill(s) ; Ordering Provider:   Mark Chris MD; Catalog Code:   zolpidem ; Order Dt/Tm:   10/3/2019 2:49:36 PM CDT            Home Meds    cetirizine  :   cetirizine ; Status:   Documented ; Ordered As Mnemonic:   Zyrtec 10 mg oral tablet ; Simple Display Line:   10 mg, 1 tab(s), PO, Daily ; Catalog Code:   cetirizine ; Order Dt/Tm:   1/25/2010 6:14:51 PM CST          cholecalciferol  :   cholecalciferol ; Status:   Documented ; Ordered As Mnemonic:   Vitamin D3 ; Simple Display Line:   0 Refill(s) ; Catalog Code:   cholecalciferol ; Order Dt/Tm:   11/1/2017 1:13:39 PM CDT

## 2022-02-16 NOTE — PROGRESS NOTES
Patient:   MELISSA ROSAS            MRN: [id number]            FIN: [id number]               Age:   66 years     Sex:  Male     :  1950   Associated Diagnoses:   Diverticulitis   Author:   Mark Chris MD      Subjective   Chief complaint 2017 1:22 PM CDT    pt here for fu with diverticulitus was put on cipro and flagyl, had diarrhea, was told to do clear liquids and soft food such as soups and mashed potatoes, he says he is  yet diarrhea has improved, had low grade fevers, these have improved  .     see chief complaint as noted above and confirmed with the patient      Health Status   Allergies:    Allergic Reactions (Selected)  Severity Not Documented  Aspirin (Nasal polyps)  Doxycycline (Sun rash)  Shailesh (Hives)  Zymetrin shampo (Rash)   Medications:  (Selected)   Prescriptions  Prescribed  Advair Diskus 250 mcg-50 mcg inhalation powder: 1 puff(s), inh, bid, # 60 EA, 11 Refill(s), Type: Maintenance, Pharmacy: [facility] [place], 1 puff(s) inh bid  Cipro 500 mg oral tablet: 1 tab(s) ( 500 mg ), PO, q12hr, # 20 tab(s), 0 Refill(s), Type: Maintenance, Pharmacy: [facility] [place], 1 tab(s) po q12 hrs,x10 day(s)  Flagyl 500 mg oral tablet: 1 tab(s) ( 0.5 gm ), PO, q8hr, # 30 tab(s), 0 Refill(s), Type: Maintenance, Pharmacy: [facility]  [place], 1 tab(s) po q8 hrs,x10 day(s)  Nitrostat 0.4 mg sublingual tablet: See Instructions, Instructions: 1 TABLET UNDER TONGUE FOR CHEST PAIN TO A MAX OF 3 TABS-ONE EVERY 5 MINUTES - IF NO RELIEF CALL 911, # 25 unknown unit, 3 Refill(s), Type: Soft Stop, Pharmacy: [facility] [place], 1 TABLET UNDER TONGUE FOR C...  Patanol 0.1% ophthalmic solution: 2 gtts, Both eyes, BID, # 5 mL, 3 Refill(s), Type: Maintenance, [id number], 2 gtts both eyes bid  Rapaflo 8 mg oral capsule: 1 cap(s) ( 8 mg ), po, daily, # 90 cap(s), 3 Refill(s), Type: Maintenance, [facility]  [place], 1 cap(s) po daily,x90 day(s)  Ventolin HFA 90 mcg/inh inhalation aerosol: 2 puff(s), inh, q4 hrs, PRN: for cough, # 1 EA, 11 Refill(s), Type: Maintenance, [facility], 2 puff(s) inh q4 hrs,PRN:for cough  amLODIPine 10 mg oral tablet: 1 tab(s) ( 10 mg ), po, daily, # 90 tab(s), 3 Refill(s), Type: Maintenance, [facility]: [facility], 1 tab(s) po daily,x90 day(s)  atorvastatin 20 mg oral tablet: 1 tab(s) ( 20 mg ), po, daily, # 90 tab(s), 3 Refill(s), Type: Maintenance, [facility]: [facility], 1 tab(s) po daily,x90 day(s)  clopidogrel 75 mg oral tablet: 1 tab(s) ( 75 mg ), po, daily, # 90 tab(s), 3 Refill(s), Type: Maintenance, [facility]: [facility], 1 tab(s) po daily,x90 day(s)  finasteride 5 mg oral tablet: 1 tab(s) ( 5 mg ), po, daily, # 90 tab(s), 3 Refill(s), Type: Maintenance, [facility]: [facility], 1 tab(s) po daily,x90 day(s)  gabapentin 300 mg oral capsule: 1 cap(s) ( 300 mg ), po, daily, # 90 cap(s), 3 Refill(s), Type: Maintenance, [facility]: [facility], 1 cap(s) po daily,x90 day(s)  pantoprazole 40 mg oral delayed release tablet: 1 tab(s) ( 40 mg ), po, daily, # 90 tab(s), 3 Refill(s), Type: Maintenance, [facility]: [facility], 1 tab(s) po daily,x90 day(s)  triamcinolone 0.1% topical ointment: 1 ree, TOP, TID, # 240 gm, 1 Refill(s), Type: Maintenance, Pharmacy: [id number], 1 ree top tid  valsartan 320 mg oral tablet: 1 tab(s) ( 320 mg ), po, daily, # 90 tab(s), 3 Refill(s), Type: Maintenance, Pharmacy: [facility]  [place], 1 tab(s) po daily,x90 day(s)  zolpidem 10 mg oral tablet: 1 tab(s), PO, Once a day (at bedtime), PRN: for sleep, # 30 tab(s), 5 Refill(s), Type: Maintenance, [facility], 1 tab(s) po hs,PRN:for sleep  Documented Medications  Documented  Zyrtec 10 mg oral tablet: 1 tab(s) ( 10 mg ),  PO, Daily, 0   Problem list:    All Problems (Selected)  Eczematous dermatitis / 10517922 / Confirmed  DJD of shoulder / 498516584 / Confirmed  Hypertension / 5275034201 / Confirmed  Asthma / 171813973 / Confirmed  Insomnia / 283058778 / Confirmed  CAD (coronary artery disease) / 9654814922 / Confirmed  dec 2009 angiogram with 50% blockage,, treated with meds  Urinary retention / 983628094 / Confirmed  Post Prostatic Surgery  Nasal polyps / 52297728 / Confirmed  Allergic rhinitis / 948057524 / Confirmed  Vocal cord dysfunction / 930357509 / Confirmed  Bronchiectasis / 96791492 / Confirmed  Cancer of skin / 6687636283 / Confirmed  Otitis media / 831195221 / Confirmed  Otitis externa / 8306154 / Confirmed  Obese / 6775421102 / Probable  AK (actinic keratosis) / 1460941441 / Confirmed  HLD (hyperlipidemia) / 04165782 / Confirmed  Obstructive sleep apnea syndrome in adult / 3674987854 / Confirmed  Tubulovillous adenoma of colon / 194228141 / Confirmed  Hip arthritis / 847446083 / Confirmed  Diverticulitis / 713037777 / Confirmed      Objective   Vital Signs   4/27/2017 1:22 PM CDT Peripheral Pulse Rate 85 bpm    Pulse Site Radial artery    Systolic Blood Pressure 132 mmHg    Diastolic Blood Pressure 82 mmHg    Mean Arterial Pressure 99 mmHg    BP Site Right arm    Oxygen Saturation 98 %      Measurements from flowsheet : Measurements   4/27/2017 1:22 PM CDT Height Measured - Standard 68 in    Weight Measured - Standard 200.2 lb    BSA 2.09 m2    Body Mass Index 30.44 kg/m2      General:  Alert and oriented, No acute distress.    Cardiovascular:  Normal rate, Regular rhythm.    Gastrointestinal:  Soft, Non-tender, Non-distended.    Psychiatric:  Cooperative, Appropriate mood & affect, Normal judgment.       Impression and Plan   Assessment and Plan:          Diagnosis: Diverticulitis (VOP53-EU K57.92).         Course: on schedule with steady improvement  expect resolution over next week  discussed further evaluation if  any worse or not resolved.

## 2022-02-16 NOTE — PROGRESS NOTES
Chief Complaint    f/u BP: BP's have been running 140 or more/ 80-85. Has been feeling fine. verbal  consent given for telemed. visit.  History of Present Illness      69-year-old with telemedicine visit due to viral pandemic.  Visit occurred from 1140 12:00.       Patient had developed photodermatitis and his hydrochlorothiazide side was stopped.  Off the hydrochlorothiazide he has noticed improvement in photodermatitis and his skin is back to normal.  However his blood pressures have been running high almost all over 140 with high being 172 systolic.  He feels well  Review of Systems      See HPI.  All other review of systems negative.  Physical Exam   Vitals & Measurements    HT: 68 in   Assessment/Plan       1. HTN, goal below 140/90 (I10)         His blood pressure is elevated off his diuretic.  However this for the dermatitis is better.  After discussion with him we decided he will continue on his losartan.  We will continue on amlodipine.  And we can add metoprolol succinate 25 mg a day with continued monitoring of his blood pressure         Ordered:          86113 physician telephone evaluation 11-20 min (Charge), Quantity: 1, HTN, goal below 140/90  Photodermatitis                2. Photodermatitis (L56.8)         Improved off of hydrochlorothiazide         Ordered:          27694 physician telephone evaluation 11-20 min (Charge), Quantity: 1, HTN, goal below 140/90  Photodermatitis                Orders:         hydroCHLOROthiazide, = 1 tab(s), Oral, daily, # 90 unknown unit, 0 Refill(s), Type: Maintenance, Pharmacy: [id number], (Completed)         metoprolol, = 1 tab(s) ( 25 mg ), Oral, daily, # 90 tab(s), 1 Refill(s), Type: Maintenance, Pharmacy: [id number], 1 tab(s) Oral daily, (Ordered)         Return to Clinic (Request), RFV: f/u BP med changes, Return in 2-3 weeks. Ok virtual visit         SARS-CoV-2 RNA (COVID-19), Qualitative NAAT* (Workface), Specimen Type:  Nasopharyngeal Swab, Collection Date: 05/18/20 11:52:00 CDT  Patient Information     Name:MELISSA ROSAS      Address:      [address] [place] [id number]     Sex:Male     YOB: 1950     [phone]     Emergency Contact:NASIM ROSAS     MRN:[id number]     FIN:[id number]     Location:[facility]     Date of Service:05/18/2020      Primary Care Physician:       Mark Chris MD, [phone]      Attending Physician:       Mark Chris MD, [phone]  Problem List/Past Medical History    Ongoing     AK (actinic keratosis)     Allergic rhinitis     Asthma     Benign prostatic hyperplasia     Bronchiectasis     CAD (coronary artery disease)       Comments: dec 2009 angiogram with 50% blockage,, treated with meds     Diverticulitis     DJD of shoulder     Eczematous dermatitis     GERD (gastroesophageal reflux disease)     Hip arthritis     HLD (hyperlipidemia)     HTN, goal below 140/90     Hx of malignant skin melanoma     Insomnia     Nasal polyps     Obese     Obstructive sleep apnea syndrome in adult     Prosthetic hip infection       Comments: Right hip     Tobacco abuse     Tubulovillous adenoma of colon     Urinary retention       Comments: Post Prostatic Surgery     Vocal cord dysfunction    Historical     Cancer of skin     Inpatient stay       Comments: @[facility], [place] - Chest pain, unspecified     Inpatient stay       Comments: @[facility], [place] - 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 Cellulitis of the right thigh.     Otitis externa     Otitis media  Procedure/Surgical History     Revision of total hip replacement (04/24/2018)      Comments: Right hip revision total hip arthroplasty and extensive irrigation and debridement of the right thigh including muscle, fascia, bone and exchange of the femoral head and polyethylene acetabular liner..     THR - Total hip replacement (04/16/2018)      Comments:  Right hip.     Colonoscopy (04/12/2016)      Comments: Tubular adenoma x 3 no high grade dysplasia. Indication: History of adenomatous polyps on last colonoscopy.      Sedation:  MAC.      Recommendation: Repeat in 5 years..     TURP - Transurethral resection of prostate (10/03/2011)     Colonoscopy (09/20/2010)      Comments: 4 mm polyp noted at 90 cm in the ascending colon; appearance adenomatous, removed      Recommend colonoscopic follow up in 5 years.      Conscious sedation/ MAC if his medical conditions dictate ASA III for level of sedation for future procedures.     Green Light Laser - Prostate (03.2009)     Coronary angiogram (2009)     BL nasal Turbinoplasty (01.2008)     Prostate TUNA surgery (02.2007)     Right shoulder arthroscopy (10/16/2006)     Flexible sigmoidoscopy (07/08/2005)     left shoulder arthroscopy (07.2005)     excisional cervical schwannoma (11/30/2001)     Cholecystectomy (02.1982)     Vasectomy  Medications    albuterol 2.5 mg/3 mL (0.083%) inhalation solution, 2.5 mg= 3 mL, NEB, q4-6 hrs, PRN, 1 refills    amLODIPine 10 mg oral tablet, 1 tab(s), Oral, daily    atorvastatin 20 mg oral tablet, 1 tab(s), Oral, daily    clopidogrel 75 mg oral tablet, 1 tab(s), Oral, daily    finasteride 5 mg oral tablet, 1 tab(s), Oral, daily    fluticasone-salmeterol 250 mcg-50 mcg inhalation powder, 1 puff(s), NEB, bid, 1 refills    gabapentin 300 mg oral capsule, See Instructions    losartan 100 mg oral tablet, See Instructions    metoprolol succinate 25 mg oral tablet, extended release, 25 mg= 1 tab(s), Oral, daily, 1 refills    nitroglycerin 0.4 mg sublingual tablet, See Instructions, 3 refills    pantoprazole 40 mg oral delayed release tablet, 1 tab(s), Oral, daily    Patanol 0.1% ophthalmic solution, 2 gtts, Eye-Both, bid, 3 refills    predniSONE 10 mg oral tablet, See Instructions    silodosin 8 mg oral capsule, 8 mg= 1 cap(s), Oral, daily    triamcinolone 0.1% topical ointment, 1 ree, Topical,  tid, 1 refills    Ventolin HFA 90 mcg/inh inhalation aerosol, 2 puff(s), Inhale, q4 hrs, PRN, 11 refills    Vitamin D3    zolpidem 10 mg oral tablet, 1 tab(s), Oral, qhs, PRN, 5 refills    Zyrtec 10 mg oral tablet, 10 mg= 1 tab(s), Oral, daily  Allergies    Shailesh (Hives)    aspirin (Nasal Polyps)    doxycycline (sun rash)    zymetrin shampo (Rash)  Social History    Smoking Status - 05/18/2020     Former smoker     Alcohol      Current, Beer (12 oz), 3-4 times per year, 03/17/2015     Employment/School      Retired, Work/School description: farmer., 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 Exercise type: Weight lifting, Cardio., 04/02/2019     Home/Environment      Marital status: . 1 children., 03/17/2015     Sexual      Sexually active: Yes. Sexual orientation: Heterosexual., 03/17/2015     Substance Abuse      Never, 03/17/2015     Tobacco - Past, 04/02/2019      Cigarettes, 04/02/2019  Family History    Alzheimer's disease: Father.    Arthritis: Father.    CHF - Congestive heart failure: Father.    Heart disease: Mother.    High blood pressure: Mother.    Sister: History is negative    Sister: History is negative    Sister: History is negative    Son: History is negative  Immunizations      Vaccine Date Status          influenza virus vaccine, inactivated 09/18/2018 Recorded          influenza virus vaccine, inactivated 09/20/2017 Recorded          influenza virus vaccine, inactivated 09/22/2016 Given          pneumococcal (PCV13) 03/17/2016 Given          influenza virus vaccine, inactivated 10/02/2015 Recorded              Comments : [10/9/2015] Received at [place], [place]          influenza virus vaccine, inactivated 08/27/2014 Given          ZOS, shingles 05/27/2014 Given          pneumococcal (PPSV23) 05/27/2014 Given          influenza virus vaccine, inactivated 09/30/2013 Recorded          influenza virus vaccine,  inactivated 09/23/2012 Recorded              Comments : [facility]          influenza, H1N1, inactivated 09/15/2011 Recorded          tetanus/diphth/pertuss (Tdap) adult/adol 09/08/2011 Given          influenza virus vaccine, inactivated 09/08/2011 Given          influenza 10/08/2010 Given          influenza 09/18/2009 Recorded          pneumococcal (PPSV23) 11/13/2002 Recorded          Td 08/07/2001 Recorded

## 2022-02-16 NOTE — NURSING NOTE
Medicare Visit Entered On:  4/2/2019 12:26 PM CDT    Performed On:  4/2/2019 12:17 PM CDT by Lesli Villegas MA               Summary   Chief Complaint :   Patient presents for an annual wellness visit-he was fasting so lab work has been drawn today,    Advance Directive :   Yes   Weight Measured :   202 lb(Converted to: 202 lb 0 oz, 91.63 kg)    Height Measured :   68 in(Converted to: 5 ft 8 in, 172.72 cm)    Body Mass Index :   30.71 kg/m2 (HI)    Body Surface Area :   2.09 m2   Systolic Blood Pressure :   120 mmHg   Diastolic Blood Pressure :   80 mmHg   Mean Arterial Pressure :   93 mmHg   Peripheral Pulse Rate :   66 bpm   BP Site :   Right arm   Pulse Site :   Radial artery   Oxygen Saturation :   94 %   Race :      Languages :   English   Ethnicity :   Not  or    Lesli Villegas MA - 4/2/2019 12:17 PM CDT   Health Status   Allergies Verified? :   Yes   Medication History Verified? :   Yes   Immunizations Current :   Yes   Medical History Verified? :   No   Pre-Visit Planning Status :   Completed   Tobacco Use? :   Former smoker   Lesli Villegas MA - 4/2/2019 12:17 PM CDT   Consents   Consent for Immunization Exchange :   Consent Granted   Consent for Immunizations to Providers :   Consent Granted   Lesli Villegas MA - 4/2/2019 12:17 PM CDT   Meds / Allergies   (As Of: 4/2/2019 12:26:35 PM CDT)   Allergies (Active)   aspirin  Estimated Onset Date:   Unspecified ; Reactions:   Nasal Polyps ; Created By:   Vinod Santana MD; Reaction Status:   Active ; Category:   Drug ; Substance:   aspirin ; Type:   Allergy ; Updated By:   Vinod Santana MD; Reviewed Date:   1/11/2019 10:14 AM CST      doxycycline  Estimated Onset Date:   Unspecified ; Reactions:   sun rash ; Created By:   Berna Huynh; Reaction Status:   Active ; Category:   Drug ; Substance:   doxycycline ; Type:   Allergy ; Updated By:   Berna Huynh; Source:   Paper Chart ; Reviewed Date:   1/11/2019 10:14  AM CST      Shailesh  Estimated Onset Date:   Unspecified ; Reactions:   Hives ; Created By:   Bree Babin CMA; Reaction Status:   Active ; Category:   Drug ; Substance:   Shailesh ; Type:   Allergy ; Updated By:   Bree Babin CMA; Reviewed Date:   1/11/2019 10:14 AM CST      zymetrin shampo  Estimated Onset Date:   Unspecified ; Reactions:   Rash ; Created By:   Bree Babin CMA; Reaction Status:   Active ; Category:   Drug ; Substance:   zymetrin shampo ; Type:   Allergy ; Updated By:   Bree Babin CMA; Reviewed Date:   1/11/2019 10:14 AM CST        Medication List   (As Of: 4/2/2019 12:26:35 PM CDT)   Prescription/Discharge Order    finasteride  :   finasteride ; Status:   Prescribed ; Ordered As Mnemonic:   finasteride 5 mg oral tablet ; Simple Display Line:   1 tab(s), Oral, daily, 30 tab(s), 0 Refill(s) ; Ordering Provider:   Mark Chris MD; Catalog Code:   finasteride ; Order Dt/Tm:   3/28/2019 8:54:49 AM          hydroCHLOROthiazide  :   hydroCHLOROthiazide ; Status:   Prescribed ; Ordered As Mnemonic:   hydroCHLOROthiazide 25 mg oral tablet ; Simple Display Line:   1 tab(s), Oral, daily, for 90 day(s), 90 tab(s), 0 Refill(s) ; Ordering Provider:   Mark Chris MD; Catalog Code:   hydroCHLOROthiazide ; Order Dt/Tm:   1/30/2019 11:30:39 AM          zolpidem  :   zolpidem ; Status:   Prescribed ; Ordered As Mnemonic:   zolpidem 10 mg oral tablet ; Simple Display Line:   1 tab(s), PO, Once a day (at bedtime), PRN: for sleep, 30 tab(s), 5 Refill(s) ; Ordering Provider:   Mark Chris MD; Catalog Code:   zolpidem ; Order Dt/Tm:   10/9/2018 3:12:56 PM          losartan 100 mg oral tablet  :   losartan 100 mg oral tablet ; Status:   Prescribed ; Ordered As Mnemonic:   losartan 100 mg oral tablet ; Simple Display Line:   See Instructions, TAKE ONE TABLET BY MOUTH EVERY DAY [ALTERNATIVE FOR VALSARTAN], 90 unknown unit, 1 Refill(s) ; Ordering Provider:   Mark Chris MD; Catalog  Code:   losartan ; Order Dt/Tm:   10/9/2018 2:32:36 PM          losartan  :   losartan ; Status:   Prescribed ; Ordered As Mnemonic:   losartan 100 mg oral tablet ; Simple Display Line:   100 mg, 1 tab(s), Oral, daily, 90 tab(s), 0 Refill(s) ; Ordering Provider:   Mark Chris MD; Catalog Code:   losartan ; Order Dt/Tm:   7/17/2018 11:25:35 AM          Nitrostat 0.4 mg sublingual tablet  :   Nitrostat 0.4 mg sublingual tablet ; Status:   Prescribed ; Ordered As Mnemonic:   Nitrostat 0.4 mg sublingual tablet ; Simple Display Line:   See Instructions, PLACE 1 TABLET UNDER TONGUE FOR CHEST PAIN TO A MAX OF 3 TABS-ONE EVERY 5 MINUTES - IF NO RELIEF CALL 911, 25 unknown unit, 3 Refill(s) ; Ordering Provider:   Mark Chris MD; Catalog Code:   nitroglycerin ; Order Dt/Tm:   6/5/2018 11:49:56 AM          amLODIPine  :   amLODIPine ; Status:   Prescribed ; Ordered As Mnemonic:   amLODIPine 10 mg oral tablet ; Simple Display Line:   10 mg, 1 tab(s), po, daily, for 90 day(s), 90 tab(s), 3 Refill(s) ; Ordering Provider:   Mark Chris MD; Catalog Code:   amLODIPine ; Order Dt/Tm:   3/13/2018 12:59:52 PM          atorvastatin  :   atorvastatin ; Status:   Prescribed ; Ordered As Mnemonic:   atorvastatin 20 mg oral tablet ; Simple Display Line:   20 mg, 1 tab(s), po, daily, for 90 day(s), 90 tab(s), 3 Refill(s) ; Ordering Provider:   Mark Chris MD; Catalog Code:   atorvastatin ; Order Dt/Tm:   3/13/2018 12:59:39 PM          clopidogrel  :   clopidogrel ; Status:   Prescribed ; Ordered As Mnemonic:   clopidogrel 75 mg oral tablet ; Simple Display Line:   75 mg, 1 tab(s), po, daily, for 90 day(s), 90 tab(s), 3 Refill(s) ; Ordering Provider:   Mark Chris MD; Catalog Code:   clopidogrel ; Order Dt/Tm:   3/13/2018 12:58:56 PM          fluticasone-salmeterol  :   fluticasone-salmeterol ; Status:   Prescribed ; Ordered As Mnemonic:   Advair Diskus 250 mcg-50 mcg inhalation powder ; Simple Display Line:   1  puff(s), inh, bid, 60 EA, 11 Refill(s) ; Ordering Provider:   Mark Chris MD; Catalog Code:   fluticasone-salmeterol ; Order Dt/Tm:   3/13/2018 12:58:11 PM          pantoprazole  :   pantoprazole ; Status:   Prescribed ; Ordered As Mnemonic:   pantoprazole 40 mg oral delayed release tablet ; Simple Display Line:   40 mg, 1 tab(s), po, daily, for 90 day(s), 90 tab(s), 3 Refill(s) ; Ordering Provider:   Mark Chris MD; Catalog Code:   pantoprazole ; Order Dt/Tm:   3/13/2018 12:57:56 PM          gabapentin  :   gabapentin ; Status:   Prescribed ; Ordered As Mnemonic:   gabapentin 300 mg oral capsule ; Simple Display Line:   300 mg, 1 cap(s), po, daily, for 90 day(s), 90 cap(s), 3 Refill(s) ; Ordering Provider:   Mark Chris MD; Catalog Code:   gabapentin ; Order Dt/Tm:   3/13/2018 12:56:57 PM          albuterol  :   albuterol ; Status:   Prescribed ; Ordered As Mnemonic:   albuterol 2.5 mg/3 mL (0.083%) inhalation solution ; Simple Display Line:   2.5 mg, 3 mL, neb, q4-6 hrs, PRN: as needed for cough, 1 box(es), 1 Refill(s) ; Ordering Provider:   Mark Chris MD; Catalog Code:   albuterol ; Order Dt/Tm:   11/3/2017 2:27:38 PM          albuterol  :   albuterol ; Status:   Prescribed ; Ordered As Mnemonic:   Ventolin HFA 90 mcg/inh inhalation aerosol ; Simple Display Line:   2 puff(s), inh, q4 hrs, PRN: for cough, 1 EA, 11 Refill(s) ; Ordering Provider:   Mark Chris MD; Catalog Code:   albuterol ; Order Dt/Tm:   11/1/2017 1:47:47 PM          triamcinolone topical  :   triamcinolone topical ; Status:   Prescribed ; Ordered As Mnemonic:   triamcinolone 0.1% topical ointment ; Simple Display Line:   1 ree, TOP, TID, 240 gm ; Ordering Provider:   Mark Chris MD; Catalog Code:   triamcinolone topical ; Order Dt/Tm:   3/11/2015 2:29:32 PM          olopatadine ophthalmic  :   olopatadine ophthalmic ; Status:   Prescribed ; Ordered As Mnemonic:   Patanol 0.1% ophthalmic solution ; Simple  Display Line:   2 gtts, Both eyes, BID, 5 mL ; Ordering Provider:   Mark Chris MD; Catalog Code:   olopatadine ophthalmic ; Order Dt/Tm:   3/11/2015 2:28:49 PM            Home Meds    rifAMPin  :   rifAMPin ; Status:   Processing ; Ordered As Mnemonic:   rifAMPin 300 mg oral capsule ; Action Display:   Complete ; Catalog Code:   rifAMPin ; Order Dt/Tm:   4/2/2019 12:22:58 PM          doxepin  :   doxepin ; Status:   Processing ; Ordered As Mnemonic:   doxepin 10 mg oral capsule ; Action Display:   Complete ; Catalog Code:   doxepin ; Order Dt/Tm:   4/2/2019 12:24:17 PM          senna  :   senna ; Status:   Processing ; Ordered As Mnemonic:   senna 8.6 mg oral tablet ; Action Display:   Complete ; Catalog Code:   senna ; Order Dt/Tm:   4/2/2019 12:23:36 PM          silodosin  :   silodosin ; Status:   Documented ; Ordered As Mnemonic:   Rapaflo 8 mg oral capsule ; Simple Display Line:   8 mg, 1 cap(s), po, daily, 0 Refill(s) ; Catalog Code:   silodosin ; Order Dt/Tm:   4/27/2018 4:29:30 PM          cholecalciferol  :   cholecalciferol ; Status:   Documented ; Ordered As Mnemonic:   Vitamin D3 ; Simple Display Line:   0 Refill(s) ; Catalog Code:   cholecalciferol ; Order Dt/Tm:   11/1/2017 1:13:39 PM          cetirizine  :   cetirizine ; Status:   Documented ; Ordered As Mnemonic:   Zyrtec 10 mg oral tablet ; Simple Display Line:   10 mg, 1 tab(s), PO, Daily ; Catalog Code:   cetirizine ; Order Dt/Tm:   1/25/2010 6:14:51 PM            Geriatric Depression Screening   Geriatric Depression Satisfied Life :   Yes   Geriatric Depression Dropped Activities :   No   Geriatric Depression Life Empty :   No   Geriatric Depression Bored :   No   Geriatric Depression Good Spirits :   Yes   Geriatric Depression Afraid Bad Things :   No   Geriatric Depression Feel Happy :   Yes   Geriatric Depression Feel Helpless :   No   Geriatric Depression Prefer to Stay Home :   No   Geriatric Depression Memory Problems :   No    Geriatric Depression Wonderful Be Alive :   Yes   Geriatric Depression Feel Worthless :   No   Geriatric Depression Situation Hopeless :   No   Geriatric Depression Others Better Off :   No   Geriatric Depression Full of Energy :   Yes   Geriatric Depression Total Score :   0    Lesli Villegas MA M - 4/2/2019 12:17 PM CDT   Hearing and Vision Screening   Audiogram Result Right Ear :   Fail   Audiogram Result Left Ear :   Fail   Hearing Screen Comments :   has bilateral hearing aids    Lesli Villegas MA - 4/2/2019 12:17 PM CDT   Home Safety Screen   Emergency Numbers Kept/Updated :   Yes   Aware of Smoking Dangers :   Yes   Smoke Alarms/Fire Extinguisher Available :   Yes   Household Members Fire Safety Knowledge :   Yes   Firearms Unloaded and Secure :   Yes   Floor Rugs Removed or Fastened :   Yes   Mats in Bathtub/Shower :   Yes   Stairway Rails or Banisters :   Yes   Outdoor Clutter Safety :   Yes   Indoor Clutter Safety :   Yes   Electrical Cord Safety :   Yes   Lesli Villegas MA - 4/2/2019 12:17 PM CDT   Hart Fall Risk   History of Fall in Last 3 Months Hart :   No   Lesli Villegas MA M - 4/2/2019 12:17 PM CDT   Functional Assessment   Focused Functional Assessment Grid   Bathing :   Independent (2)   Dressing :   Independent (2)   Toileting :   Independent (2)   Transferring Bed or Chair :   Independent (2)   Continence :   Independent (2)   Feeding :   Independent (2)   Lesli Villegas MA M - 4/2/2019 12:17 PM CDT   ADL Index Score :   12    Capable of Shopping :   Yes   Capable of Walking :   Yes   Capable of Housekeeping :   Yes   Capable of Managing Medications :   Yes   Capable of Handling Finances :   Yes   Lesli Villegas MA - 4/2/2019 12:17 PM CDT

## 2022-02-16 NOTE — TELEPHONE ENCOUNTER
Entered by Norma Patel CMA on July 01, 2020 3:07:32 PM CDT  ---------------------  From: Norma Patel CMA   To: [facility]    Sent: 7/1/2020 3:07:32 PM CDT  Subject: Medication Management     ** Not Approved: RESPONDED BY OTHER MEANS **  gabapentin (GABAPENTIN 300MG CAPS)  TAKE ONE CAPSULE BY MOUTH EVERY DAY  Qty:  90 unknown unit        Days Supply:  90        Refills:  0          Substitutions Allowed     Route To Pharmacy - [place] [address]   Signed by Norma Patel CMA            ** Not Approved: RESPONDED BY OTHER MEANS **  losartan (LOSARTAN POTASSIUM 100MG TABS)  TAKE ONE TABLET BY MOUTH EVERY DAY  Qty:  90 unknown unit        Days Supply:  90        Refills:  0          Substitutions Allowed     Route To Pharmacy [place] [id number]   Signed by Norma Patel CMA            ------------------------------------------  From: [place] [id number]  To: Mark Chris MD  Sent: June 29, 2020 6:34:13 PM CDT  Subject: Medication Management  Due: June 24, 2020 10:12:23 PM CDT     ** On Hold Pending Signature **     Drug: losartan (losartan 100 mg oral tablet), TAKE ONE TABLET BY MOUTH EVERY DAY  Quantity: 90 unknown unit  Days Supply: 90  Refills: 0  Substitutions Allowed  Notes from Pharmacy:     Dispensed Drug: losartan (losartan 100 mg oral tablet), TAKE ONE TABLET BY MOUTH EVERY DAY  Quantity: 90 unknown unit  Days Supply: 90  Refills: 0  Substitutions Allowed  Notes from Pharmacy:     ** On Hold Pending Signature **     Drug: gabapentin (gabapentin 300 mg oral capsule), TAKE ONE CAPSULE BY MOUTH EVERY DAY  Quantity: 90 unknown unit  Days Supply: 90  Refills: 0  Substitutions Allowed  Notes from Pharmacy:     Dispensed Drug: gabapentin (gabapentin 300 mg oral capsule), TAKE ONE CAPSULE BY MOUTH EVERY DAY  Quantity: 90 unknown unit  Days Supply: 90  Refills: 0  Substitutions Allowed  Notes from Pharmacy:  ------------------------------------------refills sent  6/29/2020

## 2022-02-16 NOTE — TELEPHONE ENCOUNTER
Entered by Chloe Walker CMA on March 19, 2020 1:22:11 PM CDT  PCP:   [facility]    Medication:   Rapaflo 8mg 1 cap po daily  Last Filled:  04/02/19     Quantity:  90  Refills:  3    Date of last office visit and reason:   02/03/20 asthma flare DWG.  Date of last labs pertaining to condition:  04/02/19 PSA 2.6* WNL.    Note:  Please advise, if protocol 3 month supply okay?    Return to Clinic order placed?  04/02/19 AWV in 1 year.    Resource:   eRx pool.      ------------------------------------------  From: [facility]  To: Mark Chris MD  Sent: March 18, 2020 8:54:45 PM CDT  Subject: Medication Management  Due: March 19, 2020 8:54:45 PM CDT    ** On Hold Pending Signature **  Drug: silodosin (Rapaflo 8 mg oral capsule)  TAKE ONE CAPSULE BY MOUTH EVERY DAY  Quantity: 90 unknown unit  Days Supply: 90  Refills: 3  [facility]  Notes from Pharmacy:     Dispensed Drug: silodosin (Rapaflo 8 mg oral capsule)  TAKE ONE CAPSULE BY MOUTH EVERY DAY  Quantity: 90 unknown unit  Days Supply: 90  Refills: 3  [facility]IO  Notes from Pharmacy:   ---------------------------------------------------------------  From: Chloe Walker CMA (eRx Pool ([id number] [facility]))   To: [facility] ([id number] [facility]);     Sent: 3/19/2020 1:22:25 PM CDT  Subject: FW: Medication Management   Due Date/Time: 3/19/2020 8:54:00 PM CDT---------------------  From: Nicole Umanzor CMA   To: [id number]    Sent: 3/19/2020 1:29:05 PM CDT  Subject: FW: Medication Management     ** Submitted: **  Order:silodosin (Rapaflo 8 mg oral capsule)  1 cap(s)  Oral  daily  Qty:  90 unknown unit        Days Supply:  90        Refills:  0          GREGORIO     Route To Pharmacy - [id number]    Signed by Nicole Umanzor CMA  3/19/2020 6:28:00 PM    ** Submitted: **  Complete:silodosin (Rapaflo 8 mg oral capsule)   Signed by Nicole Umanzor CMA  3/19/2020 6:28:00 PM    ** Not Approved:  **  silodosin (RAPAFLO 8MG  CAPS)  TAKE ONE CAPSULE BY MOUTH EVERY DAY  Qty:  90 unknown unit        Days Supply:  90        Refills:  3          GREGORIO     Route To Pharmacy - [id number]   Signed by Noé DOWELL, NicoleReceived fax from [facility] pharmacy.  Per pharmacy, Rapaflo too expensive and pt would like to try generic.  Generic sent, same sig.

## 2022-02-16 NOTE — LETTER
(Inserted Image. Unable to display)   February 14, 2019      MELISSA ROSAS  [address], [place] [id number]        Dear MELISSA,      Thank you for selecting [facility] ([facility]viously [facility]) for your healthcare needs.      Our records indicate you are due for the following services:     Hypertension check ~ please remember to bring your at-home blood pressure readings with you to your appointment.       To schedule an appointment or if you have further questions, please contact your primary clinic:   [facility]       [phone]   [facility]       [phone]              [facility]     [phone]      Powered by [employer]    Sincerely,    Mark Chris MD

## 2022-02-16 NOTE — TELEPHONE ENCOUNTER
---------------------  From: Yanci Patten RN (Phone Messages Pool ([id number]))   To: [facility] Message Pool ([id number]);     Sent: 5/11/2021 1:32:49 PM CDT  Subject: Colonoscopy     Phone message    PCP:   [facility]      Time of Call:  1329       Person Calling:  Pt  Phone number:  [phone], [place] to [facility]    Returned call at: _    Note:   Pt would like to have colonoscopy done before he sees [facility] in July 2021. He would prefer to have it done at [facility].    Last colonoscopy: 3/2016    Please advise.---------------------  From: Nicole Umanzor CMA ([facility] Message Pool ([id number]))   To: Mark Chris MD;     Sent: 5/12/2021 7:27:22 AM CDT  Subject: FW: Colonoscopy     are you ok with ordering?---------------------  From: Mark Chris MD   To: [facility] Message Pool ([id number]);     Sent: 5/12/2021 7:46:29 AM CDT  Subject: RE: Colonoscopy     okCalled pt and informed him that order was sent to referrals and he should expect a call from [facility] about scheduling.

## 2022-02-16 NOTE — PROGRESS NOTES
Patient:   SURENDRA ROSAS            MRN: [id number]            FIN: [id number]               Age:   70 years     Sex:  Male     :  1950   Associated Diagnoses:   HTN, goal below 140/90; HLD (hyperlipidemia); Asthma; CAD (coronary artery disease); Bronchiectasis; Insomnia; GERD (gastroesophageal reflux disease); Prosthetic hip infection; Obese; Hernia, umbilical; Medicare annual wellness visit, initial   Author:   Mark Chris MD      Visit Information      Care Providers  Not recorded for selected visit.  Ancillary Services  Not recorded for selected visit.          Current Visit Date:  2021  Last AWV Date:  2020          Chief Complaint      History of Present Illness             The patient presents for Medicare annual wellness exam.  The patient's general health status is described as unchanged and stable.  The patient's diet is described as balanced.  Exercise occasional.  Associated symptoms consist of denies shortness of breath and denies weight loss.       Surendra is a 70 year old male who presents today for an annual wellness visit, overall he is doing well.             Colon Cancer Screen: Last Colonoscopy  and polyps removed X 3    Vaccines: He has had both Pneumonia vaccines, last Tdap was in , and he had a Shingles shot in 2016,he has had covi    CAD/HTN: He is on Plavix 75mg daily, he has some mild bruising on his legs and forearms. He does not tolerate Aspirin or non steroidal medications, he has nasal polyps and since stopping the aspirin his Asthma has improved. He is on Amlodipine 10mg daily for treatment of Hypertension, he also is on Losartan 100mg tablet daily,. Blood pressure in clinic is very good, he checks his blood pressure at home and it is generally running 120's over high 70's/low 80's. He denies chest pains or pressures, denies nausea, denies vomiting, denies palpitations.     BPH: He follows urologist Dr. Garcia for this diagnosis, he has been  prescribed Rapaflo, the last time he got it filled he was given the generic brand, he has found his erections have decreased and he was not having issues with erections when he was on the name brand Rapaflo. He would like us to send in the name brand Rapaflo. He has had multiple TURP procedures in the past.       Hearing: He has bilateral hearing aids, he purchased them online, they are current and work well for him.     Asthma/ Bronchiectasis He has an Advair Diskus inhaler that he uses daily, he has Albuterol solution for a nebulizer along with an Albuterol inhaler.  this is requiring use 1 time per month. He has bronchiectasis due to multiple lung infections. he feels his asthma is well controlled with the Advair Diskus inhaler.     History of hip replacement with following hip infection: He had right hip replacement on 4-, following this he had an infection in his hip, he was on IV antibiotics and he saw an infectious disease doctor who placed him on oral antibiotics. He feels it took quite some time for his hip to improve, he does say he has no pain in his right hip, he is able to bend, squat, and climb without pain. He has been able to climb up a ladder and clean air vents without any issues. no present hip problems    Insomnia: He takes Ambien to help with sleep, it works well for him, and he denies concerns with the medication.      sees dermatology regularly.   will be seen today.  has been undergong UV therapy      Review of Systems   Constitutional:  No fever, No chills, No sweats, No weakness, No fatigue, No decreased activity.    Ear/Nose/Mouth/Throat:  Hearing is evaluated-patient has bilateral hearing aids. , No ear pain, No nasal congestion, No sore throat.    See completed Health History for Review of Systems   Respiratory:  No shortness of breath, No cough, No sputum production, No wheezing.    Cardiovascular:  No chest pain, No palpitations, No syncope.    Gastrointestinal:  No nausea, No  vomiting, No diarrhea, No abdominal pain.    Genitourinary:  No dysuria, No hematuria.    Hematology/Lymphatics:  Bruising tendency, No swollen lymph glands.    Musculoskeletal:  No back pain.    Integumentary:  No rash.    Neurologic:  Alert and oriented X4, No headache.    Psychiatric:  GDS noted.       Health Status   Allergies:    Allergic Reactions (Selected)  Severity Not Documented  Aspirin (Nasal polyps)  Doxycycline (Sun rash)  Shailesh (Hives)  Zymetrin shampo (Rash)   Medications:  (Selected)   Prescriptions  Prescribed  Metoprolol Succinate ER 25 mg oral tablet, extended release: = 1 tab(s), Oral, daily, # 90 tab(s), 3 Refill(s), Type: Maintenance, Pharmacy: [facility] [id number], 1 tab(s) Oral daily, 68, in, 07/22/21 13:22:00 CDT, Height Measured, 204, lb, 07/22/21 13:22:00 CDT, Weight Measured  Patanol 0.1% ophthalmic solution: 2 gtts, Both eyes, BID, # 5 mL, 3 Refill(s), Type: Maintenance, Pharmacy: [facility] [address], 2 gtts Eye-Both bid, 68, in, 07/22/21 13:22:00 CDT, Height Measured, 204, lb, 07/22/21 13:22:00 CDT, Weight Measured  Ventolin HFA 90 mcg/inh inhalation aerosol: 2 puff(s), inh, q4 hrs, PRN: for cough, # 1 EA, 2 Refill(s), Type: Maintenance, Pharmacy: [facility] [address], 2 puff(s) Inhale q4 hrs,PRN:for cough, 68, in, 07/22/21 13:22:00 CDT, Height Measured, 204, lb, 07/22/21 13:22:00 CDT, Weight Measured...  amLODIPine 10 mg oral tablet: = 1 tab(s), Oral, daily, # 90 tab(s), 3 Refill(s), Type: Maintenance, Pharmacy: [facility] [address], 1 tab(s) Oral daily, 68, in, 07/22/21 13:22:00 CDT, Height Measured, 204, lb, 07/22/21 13:22:00 CDT, Weight Measured  amoxicillin 500 mg oral tablet: = 4 tab(s) ( 2,000 mg ), Oral, once, # 4 tab(s), 3 Refill(s), Type: Soft Stop, Pharmacy: [id number], 4 tab(s) Oral once, 68, in, 12/10/20 11:50:00 CST, Height Measured, 206.6, lb, 12/10/20 11:50:00 CST, Weight Measured  atorvastatin 20 mg oral tablet: = 1 tab(s), Oral,  daily, # 90 tab(s), 3 Refill(s), Type: Maintenance, Pharmacy: [facility] [address], 1 tab(s) Oral daily, 68, in, 07/22/21 13:22:00 CDT, Height Measured, 204, lb, 07/22/21 13:22:00 CDT, Weight Measured  clopidogrel 75 mg oral tablet: = 1 tab(s), Oral, daily, # 90 tab(s), 3 Refill(s), Type: Maintenance, Pharmacy: [facility] [address], 1 tab(s) Oral daily, 68, in, 07/22/21 13:22:00 CDT, Height Measured, 204, lb, 07/22/21 13:22:00 CDT, Weight Measured  finasteride 5 mg oral tablet: = 1 tab(s), Oral, daily, # 90 tab(s), 3 Refill(s), Type: Maintenance, Pharmacy: [facility] [address], 1 tab(s) Oral daily,x90 day(s), 68, in, 07/22/21 13:22:00 CDT, Height Measured, 204, lb, 07/22/21 13:22:00 CDT, Weight Measured  fluticasone-salmeterol 250 mcg-50 mcg inhalation powder: See Instructions, Instructions: INHALE ONE PUFF BY MOUTH TWICE A DAY, # 180 unknown unit, 3 Refill(s), Type: Maintenance, Pharmacy: [facility] [address], due visit in Aug., INHALE ONE PUFF BY MOUTH TWICE A DAY, 68, in, 07/22/21 13:22:00 CDT, Luis...  gabapentin 300 mg oral capsule: = 1 cap(s), Oral, daily, # 90 unknown unit, 3 Refill(s), Type: Maintenance, Pharmacy: [facility] [address], 1 cap(s) Oral daily, 68, in, 07/22/21 13:22:00 CDT, Height Measured, 204, lb, 07/22/21 13:22:00 CDT, Weight Measured  losartan 100 mg oral tablet: = 1 tab(s), Oral, daily, # 90 tab(s), 3 Refill(s), Type: Maintenance, Pharmacy: [facility] [address], 1 tab(s) Oral daily, 68, in, 07/22/21 13:22:00 CDT, Height Measured, 204, lb, 07/22/21 13:22:00 CDT, Weight Measured  nitroglycerin 0.4 mg sublingual tablet: See Instructions, Instructions: PLACE 1 TABLET UNDER TONGUE FOR CHEST PAIN TO A MAX OF 3 TABS-ONE EVERY 5 MINUTES - IF NO RELIEF CALL 911, # 25 tab(s), 3 Refill(s), Type: Maintenance, Pharmacy: [facility], PLACE 1 TABLET UNDER TONGUE FO...  pantoprazole 40 mg oral delayed release tablet: = 1 tab(s), Oral, daily, # 90 tab(s), 3  Refill(s), Type: Maintenance, Pharmacy: [facility] [address], 1 tab(s) Oral daily,x90 day(s), 68, in, 07/22/21 13:22:00 CDT, Height Measured, 204, lb, 07/22/21 13:22:00 CDT, Weight Measured  sildenafil 100 mg oral tablet: = 1 tab(s) ( 100 mg ), Oral, daily, # 30 tab(s), 0 Refill(s), Type: Maintenance, Pharmacy: [facility] [address], 1 tab(s) Oral daily, 68, in, 06/04/20 10:03:00 CDT, Height Measured, 201, lb, 06/04/20 10:03:00 CDT, Weight Measured  silodosin 8 mg oral capsule: = 1 cap(s), Oral, daily, # 90 cap(s), 3 Refill(s), Type: Maintenance, Pharmacy: [facility] [address], 1 cap(s) Oral daily, 68, in, 07/22/21 13:22:00 CDT, Height Measured, 204, lb, 07/22/21 13:22:00 CDT, Weight Measured  triamcinolone 0.1% topical ointment: 1 ree, TOP, TID, # 240 gm, 1 Refill(s), Type: Maintenance, Pharmacy: [facility], 1 ree top tid  zolpidem 5 mg oral tablet: = 1 tab(s) ( 5 mg ), Oral, hs, PRN: for sleep, # 30 tab(s), 3 Refill(s), Type: Maintenance, Pharmacy: [facility] [address], 1 tab(s) Oral hs,PRN:for sleep, 68, in, 07/22/21 13:22:00 CDT, Height Measured, 204, lb, 07/22/21 13:22:00 CDT, Weight Farrah...  Documented Medications  Documented  Vitamin D3: 0 Refill(s), Type: Maintenance  Zyrtec 10 mg oral tablet: 1 tab(s) ( 10 mg ), PO, Daily, 0,    Medications          *denotes recorded medication          Ventolin HFA 90 mcg/inh inhalation aerosol: 2 puff(s), inh, q4 hrs, PRN: for cough, 1 EA, 2 Refill(s).          amLODIPine 10 mg oral tablet: 1 tab(s), Oral, daily, 90 tab(s), 3 Refill(s).          amoxicillin 500 mg oral tablet: 2,000 mg, 4 tab(s), Oral, once, 4 tab(s), 3 Refill(s).          atorvastatin 20 mg oral tablet: 1 tab(s), Oral, daily, 90 tab(s), 3 Refill(s).          *Zyrtec 10 mg oral tablet: 10 mg, 1 tab(s), PO, Daily.          *Vitamin D3: 0 Refill(s).          clopidogrel 75 mg oral tablet: 1 tab(s), Oral, daily, 90 tab(s), 3 Refill(s).          finasteride 5 mg oral  tablet: 1 tab(s), Oral, daily, for 90 day(s), 90 tab(s), 3 Refill(s).          fluticasone-salmeterol 250 mcg-50 mcg inhalation powder: See Instructions, INHALE ONE PUFF BY MOUTH TWICE A DAY, 180 unknown unit, 3 Refill(s).          gabapentin 300 mg oral capsule: 1 cap(s), Oral, daily, 90 unknown unit, 3 Refill(s).          losartan 100 mg oral tablet: 1 tab(s), Oral, daily, 90 tab(s), 3 Refill(s).          Metoprolol Succinate ER 25 mg oral tablet, extended release: 1 tab(s), Oral, daily, 90 tab(s), 3 Refill(s).          nitroglycerin 0.4 mg sublingual tablet: See Instructions, PLACE 1 TABLET UNDER TONGUE FOR CHEST PAIN TO A MAX OF 3 TABS-ONE EVERY 5 MINUTES - IF NO RELIEF CALL 911, 25 tab(s), 3 Refill(s).          Patanol 0.1% ophthalmic solution: 2 gtts, Both eyes, BID, 5 mL, 3 Refill(s).          pantoprazole 40 mg oral delayed release tablet: 1 tab(s), Oral, daily, for 90 day(s), 90 tab(s), 3 Refill(s).          sildenafil 100 mg oral tablet: 100 mg, 1 tab(s), Oral, daily, 30 tab(s), 0 Refill(s).          silodosin 8 mg oral capsule: 1 cap(s), Oral, daily, 90 cap(s), 3 Refill(s).          triamcinolone 0.1% topical ointment: 1 ree, TOP, TID, 240 gm.          zolpidem 5 mg oral tablet: 5 mg, 1 tab(s), Oral, hs, PRN: for sleep, 30 tab(s), 3 Refill(s).       Problem list:    All Problems  Eczematous dermatitis / 64040430 / Confirmed  DJD of shoulder / 125645458 / Confirmed  Asthma / 432054090 / Confirmed  Insomnia / 852264857 / Confirmed  Urinary retention / 132056636 / Confirmed  Post Prostatic Surgery  Nasal polyps / 16357042 / Confirmed  Allergic rhinitis / 574873947 / Confirmed  Vocal cord dysfunction / 766520971 / Confirmed  Bronchiectasis / 30518509 / Confirmed  Obese / 5557674982 / Probable  HLD (hyperlipidemia) / 28574314 / Confirmed  Hip arthritis / 659896580 / Confirmed  Diverticulitis / 208986620 / Confirmed  Hx of malignant skin melanoma / 0015948685 / Confirmed  GERD (gastroesophageal reflux disease)  / 893510003 / Confirmed  Benign prostatic hyperplasia / 916883898 / Confirmed  HTN, goal below 140/90 / 8403909396 / Confirmed  CAD (coronary artery disease) / 7866576286 / Confirmed  dec 2009 angiogram with 50% blockage,, treated with meds  AK (actinic keratosis) / 9319106625 / Confirmed  Tubulovillous adenoma of colon / 485630111 / Confirmed   Medicare Assessments      Hart Fall Risk  (07/22/2021 13:27 pm)       History of Fall in Last 3 Months Hart:  No     Functional Assessment  (07/22/2021 13:27 pm)       Bathing ADL Index:  Independent (2)       Dressing ADL Index:  Independent (2)       Toileting ADL Index:  Independent (2)       Transferring Bed or Chair ADL Index:  Independent (2)       Continence ADL Index:  Independent (2)       Feeding ADL Index:  Independent (2)       ADL Index Score:  12     Depression Screening  Not recorded for selected visit.    Detailed Depression Screening  Not recorded for selected visit.    Geriatric Depression Screen  (07/22/2021 13:27 pm)       Geriatric Depression Satisfied Life:  Yes       Geriatric Depression Dropped Activities:  No       Geriatric Depression Life Empty:  No       Geriatric Depression Bored:  No       Geriatric Depression Good Spirits:  Yes       Geriatric Depression Afraid Bad Things:  No       Geriatric Depression Feel Happy:  Yes       Geriatric Depression Feel Helpless:  No       Geriatric Depression Prefer to Stay Home:  No       Geriatric Depression Memory Problems:  No       Geriatric Depression Wonderful Be Alive:  Yes       Geriatric Depression Feel Worthless:  No       Geriatric Depression Situation Hopeless:  No       Geriatric Depression Others Better Off:  No       Geriatric Depression Full of Energy:  Yes       Geriatric Depression Total Score:  0     Hearing Screen  Not recorded for selected visit.     Home Safety Screen  (07/22/2021 13:31 pm)       Emergency Numbers Kept/Updated:  Yes       Aware of Smoking Dangers:  Yes       Smoke  Alarms/Fire Extinguisher Available:  Yes       Household Members Fire Safety Knowledge:  Yes       Firearms Unloaded and Secure:  Yes       Floor Rugs Removed or Fastened:  Yes       Mats in Bathtub/Shower:  Yes       Stairway Rails or Banisters:  Yes       Outdoor Clutter Safety:  Yes       Indoor Clutter Safety:  Yes       Electrical Cord Safety:  Yes     Vision Screen  Not recorded for selected visit.     ADL's and Safety reviewed with patient.      Histories   Past Medical History:    Active  Tubulovillous adenoma of colon (911996517): Onset on 9/20/2012 at 61 years.  AK (actinic keratosis) (5622044108): Onset on 8/16/2011 at 60 years.  CAD (coronary artery disease) (0007721440): Onset in the month of 12/2009 at 58 years  Comments:  9/28/2011 CDT 2:57 PM CDT - Mark Chris MD  dec 2009 angiogram with 50% blockage,, treated with meds  Eczematous dermatitis (51725714)  DJD of shoulder (732211051)  Asthma (985294033)  Insomnia (797009543)  Urinary retention (035310861)  Comments:  1/25/2010 CST 6:12 PM CST - Berna Huynh CMA  Post Prostatic Surgery  Nasal polyps (36102971)  Allergic rhinitis (108519108)  Vocal cord dysfunction (828304504)  Bronchiectasis (47915366)  Obese (8301652218)  HLD (hyperlipidemia) (32771060)  Hip arthritis (694630256)  GERD (gastroesophageal reflux disease) (097399299)  Benign prostatic hyperplasia (598061848)  Resolved  Inpatient stay (740866939): Onset on 4/22/2018 at 67 years.  Resolved on 4/26/2018 at 67 years.  Comments:  4/30/2018 CDT 7:17 AM CDT - Alisha George  @[facility], [place] - 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  Cellulitis of the right thigh.  Prosthetic hip infection (599134032): Onset on 4/22/2018 at 67 years.  Resolved.  Comments:  4/30/2018 CDT 7:20 AM CDT - Alisha George  Right hip  Inpatient stay (517665595): Onset on 10/11/2011 at 60 years.  Resolved.  Comments:  4/30/2018 CDT 7:16 AM CDT - Alisha George  @[facility], [place] -  Chest pain, unspecified  Cancer of skin (1616396120):  Resolved.  Otitis media (422976949):  Resolved on 10/8/2010 at 59 years.  Otitis externa (1449121):  Resolved on 10/29/2010 at 59 years.  Obstructive sleep apnea syndrome in adult (0584099386):  Resolved.   Family History:    Heart disease  Mother (Meron, )  High blood pressure  Mother (Meron, )  Arthritis  Father (Phuc, )  CHF - Congestive heart failure  Father (Phuc, )  Alzheimer's disease  Father (Phuc, )     Procedure history:    Colonoscopy (SNOMED CT 292918083) performed by Reynaldo Lamas MD on 2021 at 70 Years.  Comments:  2021 12:44 PM CDT - Kate Valdez  Indication: Surveillance.  Sedation: Propofol.  Impression: One 7 mm polyp in cecum.  One 4 mm polyp proximal transverse colon.  One 6 mm polyp proximal sigmoid.  Revision of total hip replacement (SNOMED CT 987385761) on 2018 at 67 Years.  Comments:  2018 7:26 AM CDT - Alisha George  Right hip revision total hip arthroplasty and extensive irrigation and debridement of the right thigh including muscle, fascia, bone and exchange of the femoral head and polyethylene acetabular liner.  THR - Total hip replacement (SNOMED CT 561821370) on 2018 at 67 Years.  Comments:  2018 7:21 AM CDT - Alisha George  Right hip  Colonoscopy (SNOMED CT 168664716) performed by Herson Ocampo MD on 2016 at 65 Years.  Comments:  2016 11:48 AM CDT - Lizbeth Daniels RN  Tubular adenoma x 3 no high grade dysplasia    2016 9:11 AM CDT - Kate Valdez  Indication: History of adenomatous polyps on last colonoscopy.  Sedation:  MAC.  Recommendation: Repeat in 5 years.  TURP - Transurethral resection of prostate (SNOMED CT 618021599) performed by Vinod Lipscomb MD on 10/3/2011 at 60 Years.  Colonoscopy (SNOMED CT 144931369) performed by Herson Ocampo MD on 2010 at 59 Years.  Comments:  2010 3:33 PM TILA - Nga  Kalrene  4 mm polyp noted at 90 cm in the ascending colon; appearance adenomatous, removed   Recommend colonoscopic follow up in 5 years.   Conscious sedation/ MAC if his medical conditions dictate ASA III for level of sedation for future procedures  Coronary angiogram (SNOMED CT 29840236) in 2009 at 59 Years.  Green Light Laser - Prostate in the month of 3/2009 at 58 Years.  BL nasal Turbinoplasty in the month of 1/2008 at 57 Years.  Prostate TUNA surgery in the month of 2/2007 at 56 Years.  Right shoulder arthroscopy on 10/16/2006 at 55 Years.  Flexible sigmoidoscopy (SNOMED CT 05492884) on 7/8/2005 at 54 Years.  left shoulder arthroscopy in the month of 7/2005 at 54 Years.  excisional cervical schwannoma on 11/30/2001 at 50 Years.  Cholecystectomy (SNOMED CT 94812166) in the month of 2/1982 at 31 Years.  Vasectomy (SNOMED CT 93104630).   Social History:        Electronic Cigarette/Vaping Assessment            Electronic Cigarette Use: Never.      Alcohol Assessment            Current, Beer (12 oz), 3-4 times per year                     Comments:                      09/26/2011 Alisha Pacheco                     1/month.      Tobacco Assessment: Past            Former smoker, quit more than 30 days ago      Substance Abuse Assessment            Never      Employment and Education Assessment            Retired, Work/School description: farmer.                     Comments:                      09/26/2011 Alisha Pacheco                     2005.      Home and Environment Assessment            Marital status: .  1 children.                     Comments:                      09/26/2011 Alisha Pacheco                     1970. Wife - Pauline.      Exercise and Physical Activity Assessment: Regular exercise            Exercise frequency: 4-5 x per week..  Exercise type: Weight lifting, Cardio.                     Comments:                      09/26/2011 Alisha Pacheco                     2-3 times per week.       Sexual Assessment            Sexually active: Yes.  Sexual orientation: Heterosexual.        Physical Examination   Vital Signs   7/22/2021 1:22 PM CDT Temperature Tympanic 97.7 DegF  LOW    Peripheral Pulse Rate 68 bpm    Pulse Site Radial artery    HR Method Electronic    Systolic Blood Pressure 125 mmHg    Diastolic Blood Pressure 82 mmHg  HI    Mean Arterial Pressure 96 mmHg    BP Site Right arm    BP Method Electronic    Oxygen Saturation 98 %      Measurements from flowsheet : Measurements   7/22/2021 1:22 PM CDT Height Measured 68 in    Weight Measured 204 lb    BSA 2.1 m2    Body Mass Index 31.01 kg/m2  HI      General:  Alert and oriented, No acute distress.    Eye:  Pupils are equal, round and reactive to light, Normal conjunctiva.    HENT:  Normocephalic, Tympanic membranes are clear, Oral mucosa is moist, No pharyngeal erythema.    Neck:  Supple, Non-tender, No carotid bruit, No lymphadenopathy.    Respiratory:  Lungs are clear to auscultation, Respirations are non-labored.    Cardiovascular:  Normal rate, Regular rhythm, No edema.    Gastrointestinal:  Soft, Non-tender, Non-distended, No organomegaly, Small 2cm umbilical hernia-causing no pain or issues for patient. .         Rectum/ anus: Normal tone, Stool ( Within normal limits ).    Genitourinary:  No scrotal tenderness, No inguinal tenderness.         Testes: Bilateral, Within normal limits, Symmetric.         Prostate: Within normal limits, Symmetric.    Musculoskeletal:  Normal range of motion, Normal strength, No swelling, Normal gait.    Integumentary:  Warm, No rash.    Neurologic:  Alert, Oriented, No focal deficits.    Cognition and Speech:  Oriented, Speech clear and coherent, Functional cognition intact.    Psychiatric:  Cooperative, Appropriate mood & affect, Normal judgment, Patient's GDS and CAGE questionnaire reviewed and discussed with patient. .       Impression and Plan   Diagnosis     HTN, goal below 140/90 (IUP74-DI I10).    "  HLD (hyperlipidemia) (EVH67-JI E78.5).     Asthma (RKF98-JG J45.909).     CAD (coronary artery disease) (EDS34-HW I25.10).     Bronchiectasis (HRM59-LD J47.9).     Insomnia (TMR12-MI G47.00).     GERD (gastroesophageal reflux disease) (KWP49-EL K21.9).     Prosthetic hip infection (UIM63-ZJ T84.59XA).     Obese (BMP45-EA E66.9).     Hernia, umbilical (RQG95-FQ K42.9).     Course:  Progressing as expected.    Plan:  Discussed  preventative services.  Appropriate weight and diet discussed.  Discussed [employer] Directives.    Preventative services needed::  Preventative services checklist reviewed, updated and copy given to patient.  Please see scanned document.         Aortic ultrasound: No.         Bone mass measurements: No.         Cardiovascular screening: Yes.         Colorectal cancer screening: No, UTD .         HIV risk screening: No.         Immunizations: No, UTD.         Mammography: No.         PAP: No.         Prostate screening: Yes.         Smoking/tobacco use cessation counseling: No.    Patient Instructions:          Limitations: Limit caffeine intake, Limit alcohol consumption.         Counseled: Patient, Regarding treatment, Regarding medications, Diet, Activity, Verbalized understanding, Will refill medications, will send in Rapaflo with request for it to be name brand if covered by insurance due to decreased erections.     UTD on Colonoscopy-    UTD on vaccines,      ACT score today is \"24\" with \"0\" ER visits this year and \"0\" hospitilzations this year. Asthma seems to be well controlled with Advair Diskus inhalers. Refilled the Albuterol inhaler for prn sob and cough.       Encouraged healthy eating habits and a good exercise routine.     Return in one year for next annual wellness visit. .    Diagnosis     Medicare annual wellness visit, initial (RKC10-SI Z00.00).     Total time spent with patient and coordination of care:  _  minutes  "

## 2022-02-16 NOTE — NURSING NOTE
Comprehensive Intake Entered On:  7/8/2021 11:57 AM CDT    Performed On:  7/8/2021 11:50 AM CDT by Jesus Ocampo CMA               Summary   Chief Complaint :   Pt here for a Preop Px for a Colonoscopy at [facility].  DOS 7/12/21   Advance Directive :   Yes   Weight Measured :   203 lb(Converted to: 203 lb 0 oz, 92.079 kg)    Height Measured :   68 in(Converted to: 5 ft 8 in, 172.72 cm)    Body Mass Index :   30.86 kg/m2 (HI)    Body Surface Area :   2.1 m2   Systolic Blood Pressure :   114 mmHg   Diastolic Blood Pressure :   78 mmHg   Mean Arterial Pressure :   90 mmHg   Peripheral Pulse Rate :   64 bpm   BP Site :   Right arm   Pulse Site :   Radial artery   Temperature Tympanic :   96.8 DegF(Converted to: 36.0 DegC)  (LOW)    Respiratory Rate :   20 br/min   Race :      Languages :   English   Ethnicity :   Not  or    Jesus Ocampo CMA - 7/8/2021 11:50 AM CDT   Health Status   Allergies Verified? :   Yes   Medication History Verified? :   No   Immunizations Current :   Yes   Medical History Verified? :   Yes   Pre-Visit Planning Status :   Not completed   Tobacco Use? :   Former smoker   Jesus Ocampo CMA - 7/8/2021 11:50 AM CDT   Meds / Allergies   (As Of: 7/8/2021 11:57:02 AM CDT)   Allergies (Active)   aspirin  Estimated Onset Date:   Unspecified ; Reactions:   Nasal Polyps ; Created By:   Vinod Santana MD; Reaction Status:   Active ; Category:   Drug ; Substance:   aspirin ; Type:   Allergy ; Updated By:   Vinod Santana MD; Reviewed Date:   7/8/2021 11:55 AM CDT      doxycycline  Estimated Onset Date:   Unspecified ; Reactions:   sun rash ; Created By:   Berna Huynh; Reaction Status:   Active ; Category:   Drug ; Substance:   doxycycline ; Type:   Allergy ; Updated By:   Berna Huynh; Source:   Paper Chart ; Reviewed Date:   7/8/2021 11:55 AM CDT      Shailesh  Estimated Onset Date:   Unspecified ; Reactions:   Hives ; Created By:   Bree Babin CMA; Reaction Status:   Active ;  Category:   Drug ; Substance:   Shailesh ; Type:   Allergy ; Updated By:   Bree Babin CMA; Reviewed Date:   7/8/2021 11:55 AM CDT      zymetrin shampo  Estimated Onset Date:   Unspecified ; Reactions:   Rash ; Created By:   Bree Babin CMA; Reaction Status:   Active ; Category:   Drug ; Substance:   zymetrin shampo ; Type:   Allergy ; Updated By:   Bree Babin CMA; Reviewed Date:   7/8/2021 11:55 AM CDT        Medication List   (As Of: 7/8/2021 11:57:02 AM CDT)   Prescription/Discharge Order    fluticasone-salmeterol  :   fluticasone-salmeterol ; Status:   Prescribed ; Ordered As Mnemonic:   fluticasone-salmeterol 250 mcg-50 mcg inhalation powder ; Simple Display Line:   See Instructions, INHALE ONE PUFF BY MOUTH TWICE A DAY, 180 unknown unit, 0 Refill(s) ; Ordering Provider:   Mark Chris MD; Catalog Code:   fluticasone-salmeterol ; Order Dt/Tm:   7/7/2021 9:25:36 AM CDT          losartan  :   losartan ; Status:   Prescribed ; Ordered As Mnemonic:   losartan 100 mg oral tablet ; Simple Display Line:   1 tab(s), Oral, daily, 90 tab(s), 0 Refill(s) ; Ordering Provider:   Mark Chris MD; Catalog Code:   losartan ; Order Dt/Tm:   7/1/2021 3:45:01 PM CDT          zolpidem  :   zolpidem ; Status:   Prescribed ; Ordered As Mnemonic:   zolpidem 5 mg oral tablet ; Simple Display Line:   5 mg, 1 tab(s), Oral, hs, PRN: for sleep, 30 tab(s), 1 Refill(s) ; Ordering Provider:   Mark Chris MD; Catalog Code:   zolpidem ; Order Dt/Tm:   5/17/2021 11:49:21 AM CDT          amoxicillin  :   amoxicillin ; Status:   Prescribed ; Ordered As Mnemonic:   amoxicillin 500 mg oral tablet ; Simple Display Line:   2,000 mg, 4 tab(s), Oral, once, 4 tab(s), 3 Refill(s) ; Ordering Provider:   Mark Chris MD; Catalog Code:   amoxicillin ; Order Dt/Tm:   3/4/2021 2:34:14 PM CST          gabapentin  :   gabapentin ; Status:   Prescribed ; Ordered As Mnemonic:   gabapentin 300 mg oral capsule ; Simple Display  Line:   1 cap(s), Oral, daily, 90 unknown unit, 2 Refill(s) ; Ordering Provider:   Mark Chris MD; Catalog Code:   gabapentin ; Order Dt/Tm:   12/28/2020 10:40:40 AM CST          metoprolol  :   metoprolol ; Status:   Prescribed ; Ordered As Mnemonic:   Metoprolol Succinate ER 25 mg oral tablet, extended release ; Simple Display Line:   1 tab(s), Oral, daily, 90 tab(s), 2 Refill(s) ; Ordering Provider:   Mark Chris MD; Catalog Code:   metoprolol ; Order Dt/Tm:   11/5/2020 7:12:01 AM CST          amLODIPine  :   amLODIPine ; Status:   Prescribed ; Ordered As Mnemonic:   amLODIPine 10 mg oral tablet ; Simple Display Line:   1 tab(s), Oral, daily, 90 tab(s), 2 Refill(s) ; Ordering Provider:   Mark Chris MD; Catalog Code:   amLODIPine ; Order Dt/Tm:   11/1/2020 11:26:36 AM CST          atorvastatin  :   atorvastatin ; Status:   Prescribed ; Ordered As Mnemonic:   atorvastatin 20 mg oral tablet ; Simple Display Line:   1 tab(s), Oral, daily, 90 tab(s), 2 Refill(s) ; Ordering Provider:   Mark Chris MD; Catalog Code:   atorvastatin ; Order Dt/Tm:   10/20/2020 7:01:45 AM CDT          pantoprazole  :   pantoprazole ; Status:   Prescribed ; Ordered As Mnemonic:   pantoprazole 40 mg oral delayed release tablet ; Simple Display Line:   1 tab(s), Oral, daily, 90 tab(s), 2 Refill(s) ; Ordering Provider:   Mark Chris MD; Catalog Code:   pantoprazole ; Order Dt/Tm:   10/15/2020 12:01:19 PM CDT          finasteride  :   finasteride ; Status:   Prescribed ; Ordered As Mnemonic:   finasteride 5 mg oral tablet ; Simple Display Line:   1 tab(s), Oral, daily, 90 tab(s), 2 Refill(s) ; Ordering Provider:   Mark Chris MD; Catalog Code:   finasteride ; Order Dt/Tm:   10/15/2020 7:54:42 AM CDT          clopidogrel  :   clopidogrel ; Status:   Prescribed ; Ordered As Mnemonic:   clopidogrel 75 mg oral tablet ; Simple Display Line:   1 tab(s), Oral, daily, 90 tab(s), 2 Refill(s) ; Ordering Provider:    Mark Chris MD; Catalog Code:   clopidogrel ; Order Dt/Tm:   10/12/2020 8:01:59 AM CDT          silodosin  :   silodosin ; Status:   Prescribed ; Ordered As Mnemonic:   silodosin 8 mg oral capsule ; Simple Display Line:   1 cap(s), Oral, daily, 90 cap(s), 3 Refill(s) ; Ordering Provider:   Mark Chris MD; Catalog Code:   silodosin ; Order Dt/Tm:   8/24/2020 11:27:08 AM CDT          albuterol  :   albuterol ; Status:   Prescribed ; Ordered As Mnemonic:   Ventolin HFA 90 mcg/inh inhalation aerosol ; Simple Display Line:   2 puff(s), inh, q4 hrs, PRN: for cough, 1 EA, 2 Refill(s) ; Ordering Provider:   Mark Chris MD; Catalog Code:   albuterol ; Order Dt/Tm:   6/29/2020 8:30:04 AM CDT          sildenafil  :   sildenafil ; Status:   Prescribed ; Ordered As Mnemonic:   sildenafil 100 mg oral tablet ; Simple Display Line:   100 mg, 1 tab(s), Oral, daily, 30 tab(s), 0 Refill(s) ; Ordering Provider:   Mark Chris MD; Catalog Code:   sildenafil ; Order Dt/Tm:   6/4/2020 10:26:42 AM CDT          nitroglycerin  :   nitroglycerin ; Status:   Prescribed ; Ordered As Mnemonic:   nitroglycerin 0.4 mg sublingual tablet ; Simple Display Line:   See Instructions, PLACE 1 TABLET UNDER TONGUE FOR CHEST PAIN TO A MAX OF 3 TABS-ONE EVERY 5 MINUTES - IF NO RELIEF CALL 911, 25 tab(s), 3 Refill(s) ; Ordering Provider:   Mark Chris MD; Catalog Code:   nitroglycerin ; Order Dt/Tm:   7/29/2019 7:47:42 PM CDT          triamcinolone topical  :   triamcinolone topical ; Status:   Prescribed ; Ordered As Mnemonic:   triamcinolone 0.1% topical ointment ; Simple Display Line:   1 ree, TOP, TID, 240 gm ; Ordering Provider:   Mark Chris MD; Catalog Code:   triamcinolone topical ; Order Dt/Tm:   3/11/2015 2:29:32 PM CDT          olopatadine ophthalmic  :   olopatadine ophthalmic ; Status:   Prescribed ; Ordered As Mnemonic:   Patanol 0.1% ophthalmic solution ; Simple Display Line:   2 gtts, Both eyes, BID, 5 mL ;  Ordering Provider:   Mark Chris MD; Catalog Code:   olopatadine ophthalmic ; Order Dt/Tm:   3/11/2015 2:28:49 PM CDT            Home Meds    cholecalciferol  :   cholecalciferol ; Status:   Documented ; Ordered As Mnemonic:   Vitamin D3 ; Simple Display Line:   0 Refill(s) ; Catalog Code:   cholecalciferol ; Order Dt/Tm:   11/1/2017 1:13:39 PM CDT          cetirizine  :   cetirizine ; Status:   Documented ; Ordered As Mnemonic:   Zyrtec 10 mg oral tablet ; Simple Display Line:   10 mg, 1 tab(s), PO, Daily ; Catalog Code:   cetirizine ; Order Dt/Tm:   1/25/2010 6:14:51 PM CST            Social History   Social History   (As Of: 7/8/2021 11:57:02 AM CDT)   Alcohol:        Current, Beer (12 oz), 3-4 times per year   Comments:  9/26/2011 7:44 PM Alisha Pacheco: 1/month.   (Last Updated: 3/17/2015 10:41:06 AM CDT by Apoorva Figueroa CMA)          Tobacco:  Past      Former smoker, quit more than 30 days ago   (Last Updated: 12/10/2020 11:52:44 AM CST by Nicole Umanzor CMA)          Electronic Cigarette/Vaping:        Electronic Cigarette Use: Never.   (Last Updated: 12/10/2020 11:52:47 AM CST by Nicole Umanzor CMA)          Substance Abuse:        Never   (Last Updated: 3/17/2015 10:41:13 AM CDT by Apoorva Figueroa CMA)          Employment/School:        Retired, Work/School description: farmer.   Comments:  9/26/2011 7:43 PM - Alisha George: 2005.   (Last Updated: 4/2/2019 3:11:38 PM CDT by Charmaine Bernardo)          Home/Environment:        Marital status: .  1 children.   Comments:  9/26/2011 7:42 PM Alisha Pacheco: 1972. Wife - Pauline.   (Last Updated: 3/17/2015 10:41:37 AM CDT by Apoorva Figueroa CMA)          Exercise:  Regular exercise      Exercise frequency: 4-5 x per week..  Exercise type: Weight lifting, Cardio.   Comments:  9/26/2011 7:45 PM - Alisha George: 2-3 times per week.   (Last Updated: 4/2/2019 3:11:22 PM CDT by Charmaine Bernardo)          Sexual:        Sexually active: Yes.   Sexual orientation: Heterosexual.   (Last Updated: 3/17/2015 10:40:27 AM CDT by Apoorva Figueroa CMA)

## 2022-02-16 NOTE — TELEPHONE ENCOUNTER
Entered by Jannet Avendaño on April 01, 2020 8:13:18 AM CDT  Losartan filled 3/31/20.        ** Submitted: **  Order:fluticasone-salmeterol (fluticasone-salmeterol 250 mcg-50 mcg inhalation powder)  1 puff(s)  NEB  bid  Qty:  60 unknown unit        Days Supply:  30        Refills:  1          Substitutions Allowed     Route To Pharmacy - [facility]    Signed by Jannet Avendaño  4/1/2020 1:12:00 PM    ** Submitted: **  Complete:fluticasone-salmeterol (Advair Diskus 250 mcg-50 mcg inhalation powder)   Signed by Jannet Avendaño  4/1/2020 1:12:00 PM    ** Not Approved:  **  fluticasone-salmeterol (FLUTICASONE-SALMETEROL 250-50 AEPB)  INHALE ONE PUFF BY MOUTH TWICE A DAY  Qty:  60 unknown unit        Days Supply:  30        Refills:  11          Substitutions Allowed     Route To Pharmacy [facility]   Signed by Jannet Avendaño          Date of last office visit and reason:  2/3/20 Asthma Flare      Date of last Med Check / Px:   2/3/20    RTC order in chart:  Placed 4/2/19; Return in 1 year. Patient has appointment scheduled for 7/13/20 as appointments are being pushed back for COVID-19 pandemic.     For Protocol refill, has patient been contacted:  Yes.               ------------------------------------------  From: [facility]  To: Mark Chris MD  Sent: March 31, 2020 6:15:02 PM CDT  Subject: Medication Management  Due: April 1, 2020 6:15:02 PM CDT    ** On Hold Pending Signature **  Drug: losartan (losartan 100 mg oral tablet)  TAKE ONE TABLET BY MOUTH EVERY DAY (APPT NEEDED FOR FURTHER REFILLS)  Quantity: 90 unknown unit  Days Supply: 90  Refills: 0  Substitutions Allowed  Notes from Pharmacy:     Dispensed Drug: losartan (losartan 100 mg oral tablet)  TAKE ONE TABLET BY MOUTH EVERY DAY (APPT NEEDED FOR FURTHER REFILLS)  Quantity: 90 unknown unit  Days Supply: 90  Refills: 0  Substitutions Allowed  Notes from Pharmacy:     ** On Hold Pending Signature  **  Drug: fluticasone-salmeterol (Advair Diskus 250 mcg-50 mcg inhalation powder)  INHALE ONE PUFF BY MOUTH TWICE A DAY  Quantity: 60 unknown unit  Days Supply: 30  Refills: 11  Substitutions Allowed  Notes from Pharmacy:     Dispensed Drug: fluticasone-salmeterol (fluticasone-salmeterol 250 mcg-50 mcg inhalation powder)  INHALE ONE PUFF BY MOUTH TWICE A DAY  Quantity: 60 unknown unit  Days Supply: 30  Refills: 11  Substitutions Allowed  Notes from Pharmacy:   ---------------------------------------------------------------  From: Jannet Avendaño   To: [id number]    Sent: 4/1/2020 8:13:39 AM CDT  Subject: Medication Management     ** Not Approved: Losartan filled 3/31/20 **  losartan (LOSARTAN POTASSIUM 100MG TABS)  TAKE ONE TABLET BY MOUTH EVERY DAY (APPT NEEDED FOR FURTHER REFILLS)  Qty:  90 unknown unit        Days Supply:  90        Refills:  0          Substitutions Allowed     Route To Pharmacy - [id number]   Signed by Jannet Avendaño

## 2022-02-16 NOTE — TELEPHONE ENCOUNTER
Entered by Zulay Scott MA on November 01, 2020 11:26:51 AM CST  ---------------------  From: Zulay Scott MA   To: [place] [id number]    Sent: 11/1/2020 11:26:51 AM CST  Subject: Medication Management     ** Submitted: **  Order:amLODIPine (amLODIPine 10 mg oral tablet)  1 tab(s)  Oral  daily  Qty:  90 tab(s)        Refills:  2          Substitutions Allowed     Route To Pharmacy - [place] [id number]    Signed by Zulay Scott MA  11/1/2020 5:26:00 PM [facility]    ** Submitted: **  Complete:amLODIPine (amLODIPine 10 mg oral tablet)   Signed by Zulay Scott MA  11/1/2020 5:26:00 PM [facility]    ** Not Approved:  **  amLODIPine (AMLODIPINE BESYLATE 10MG TABS)  TAKE ONE TABLET BY MOUTH EVERY DAY  Qty:  90 unknown unit        Days Supply:  90        Refills:  0          Substitutions Allowed     Route To Pharmacy - [place] [address]   Signed by Zulay Scott MA            ------------------------------------------  From: [place] [id number]  To: Mark Chris MD  Sent: October 30, 2020 6:14:12 PM CDT  Subject: Medication Management  Due: October 31, 2020 3:13:34 PM CDT     ** On Hold Pending Signature **     Drug: amLODIPine (amLODIPine 10 mg oral tablet), TAKE ONE TABLET BY MOUTH EVERY DAY  Quantity: 90 unknown unit  Days Supply: 90  Refills: 0  Substitutions Allowed  Notes from Pharmacy:     Dispensed Drug: amLODIPine (amLODIPine 10 mg oral tablet), TAKE ONE TABLET BY MOUTH EVERY DAY  Quantity: 90 unknown unit  Days Supply: 90  Refills: 0  Substitutions Allowed  Notes from Pharmacy:  ------------------------------------------Med Refill      Date of last office visit and reason:  7/27/2020 samra BARRIOS for AWV      Date of last Med Check / Px:   _  Date of last labs pertaining to med:  _    Note:  _    RTC order in chart:  RTC due 7/2021    For Protocol refill, has patient been contacted:  _

## 2022-02-16 NOTE — PROGRESS NOTES
Patient:   MELISSA ROSAS            MRN: [id number]            FIN: [id number]               Age:   66 years     Sex:  Male     :  1950   Associated Diagnoses:   Actinic keratoses; Left shoulder pain   Author:   Mark Chris MD      Subjective   Chief complaint 2017 1:05 PM CDT    pt here for two spots on his left ear, also would like refill of Albuterol for neb filled, had l shoulder surgery years ago and has pain going up his neck.     This 66 year old is here with two issues.  1. The skin on his left ear feels dry and patchy.  We have frozen those before and it went away.  He is worried about skin cancer.  He has had some excisions of early squamous cell.  He also has some dermatitis.  2. Left shoulder pain.  The patient had left shoulder arthroscopy in .  He was told he needed followup.  He wonders about having a shoulder replacement.  He describes his shoulder feeling loose when he moves it and unstable.  It limits his activities.           Health Status   Allergies:    Allergic Reactions (Selected)  Severity Not Documented  Aspirin (Nasal polyps)  Doxycycline (Sun rash)  Shailesh (Hives)  Zymetrin shampo (Rash)   Medications:  (Selected)   Prescriptions  Prescribed  Advair Diskus 250 mcg-50 mcg inhalation powder: 1 puff(s), inh, bid, # 60 EA, 11 Refill(s), Type: Maintenance, Pharmacy: [facility] [place], 1 puff(s) inh bid  Nitrostat 0.4 mg sublingual tablet: See Instructions, Instructions: 1 TABLET UNDER TONGUE FOR CHEST PAIN TO A MAX OF 3 TABS-ONE EVERY 5 MINUTES - IF NO RELIEF CALL 911, # 25 unknown unit, 3 Refill(s), Type: Soft Stop, Pharmacy: [facility] [place], 1 TABLET UNDER TONGUE FOR C...  Patanol 0.1% ophthalmic solution: 2 gtts, Both eyes, BID, # 5 mL, 3 Refill(s), Type: Maintenance, Pharmacy: [id number], 2 gtts both eyes bid  Rapaflo 8 mg oral capsule: 1 cap(s) ( 8 mg ), po, daily, # 90 cap(s), 3 Refill(s), Type: Maintenance, Pharmacy:  [facility], 1 cap(s) po daily,x90 day(s)  Ventolin HFA 90 mcg/inh inhalation aerosol: 2 puff(s), inh, q4 hrs, PRN: for cough, # 1 EA, 11 Refill(s), Type: Maintenance, Pharmacy: [facility], 2 puff(s) inh q4 hrs,PRN:for cough  amLODIPine 10 mg oral tablet: 1 tab(s) ( 10 mg ), po, daily, # 90 tab(s), 3 Refill(s), Type: Maintenance, Pharmacy: [facility], 1 tab(s) po daily,x90 day(s)  atorvastatin 20 mg oral tablet: 1 tab(s) ( 20 mg ), po, daily, # 90 tab(s), 3 Refill(s), Type: Maintenance, Pharmacy: [facility], 1 tab(s) po daily,x90 day(s)  clopidogrel 75 mg oral tablet: 1 tab(s) ( 75 mg ), po, daily, # 90 tab(s), 3 Refill(s), Type: Maintenance, Pharmacy: [facility], 1 tab(s) po daily,x90 day(s)  finasteride 5 mg oral tablet: 1 tab(s) ( 5 mg ), po, daily, # 90 tab(s), 3 Refill(s), Type: Maintenance, Pharmacy: [facility], 1 tab(s) po daily,x90 day(s)  gabapentin 300 mg oral capsule: 1 cap(s) ( 300 mg ), po, daily, # 90 cap(s), 3 Refill(s), Type: Maintenance, Pharmacy: [facility], 1 cap(s) po daily,x90 day(s)  pantoprazole 40 mg oral delayed release tablet: 1 tab(s) ( 40 mg ), po, daily, # 90 tab(s), 3 Refill(s), Type: Maintenance, Pharmacy: [facility], 1 tab(s) po daily,x90 day(s)  triamcinolone 0.1% topical ointment: 1 ree, TOP, TID, # 240 gm, 1 Refill(s), Type: Maintenance, Pharmacy: [id number], 1 ree top tid  valsartan 320 mg oral tablet: 1 tab(s) ( 320 mg ), po, daily, # 90 tab(s), 3 Refill(s), Type: Maintenance, [facility]  [place], 1 tab(s) po daily,x90 day(s)  zolpidem 10 mg oral tablet: 1 tab(s), PO, Once a day (at bedtime), PRN: for sleep, # 30 tab(s), 5 Refill(s), Type: Maintenance, [facility], 1 tab(s) po hs,PRN:for sleep  Documented Medications  Documented  Vitamin D3: 0 Refill(s),  Type: Maintenance  Zyrtec 10 mg oral tablet: 1 tab(s) ( 10 mg ), PO, Daily, 0   Problem list:    All Problems (Selected)  Eczematous dermatitis / 57557409 / Confirmed  DJD of shoulder / 193124538 / Confirmed  Hypertension / 5641075344 / Confirmed  Asthma / 451695039 / Confirmed  Insomnia / 537852617 / Confirmed  CAD (coronary artery disease) / 4926619777 / Confirmed  dec 2009 angiogram with 50% blockage,, treated with meds  Urinary retention / 907033620 / Confirmed  Post Prostatic Surgery  Nasal polyps / 88155601 / Confirmed  Allergic rhinitis / 228601492 / Confirmed  Vocal cord dysfunction / 285110493 / Confirmed  Bronchiectasis / 09443027 / Confirmed  Cancer of skin / 9868265416 / Confirmed  Otitis media / 923930023 / Confirmed  Otitis externa / 6041073 / Confirmed  Obese / 9120175536 / Probable  AK (actinic keratosis) / 8514699786 / Confirmed  HLD (hyperlipidemia) / 74683559 / Confirmed  Obstructive sleep apnea syndrome in adult / 3304072640 / Confirmed  Tubulovillous adenoma of colon / 799190561 / Confirmed  Hip arthritis / 778942410 / Confirmed  Diverticulitis / 304370953 / Confirmed      Objective   Vital Signs   11/1/2017 1:05 PM CDT Peripheral Pulse Rate 69 bpm    Pulse Site Radial artery    Systolic Blood Pressure 112 mmHg    Diastolic Blood Pressure 72 mmHg    Mean Arterial Pressure 85 mmHg    BP Site Right arm    Oxygen Saturation 97 %      Measurements from flowsheet : Measurements   11/1/2017 1:05 PM CDT Height Measured - Standard 68 in    Weight Measured - Standard 196.4 lb    BSA 2.07 m2    Body Mass Index 29.86 kg/m2      Integumentary:  On the inner ridge of the ear there is a small, 3 mm, area of slight redness and dry skin.  He has a small mole in the hairline that is slightly raised.  I see no weeping lesions, red lesions or ulcers..       Impression and Plan   Assessment and Plan:          Diagnosis: Actinic keratoses ([id number] L57.0), Left shoulder pain ([id number]-KI M25.512).         Course:   1.  Skin lesion on his ear.  This is probably an actinic keratosis.  I froze this with a 30 second thaw time with liquid nitrogen.  The other lesion appeared to be a benign mole.  It has not changed and I would leave it alone for now.  2. We discussed his shoulder pains.  I certainly think rechecking with his surgeon is a good idea but the surgeon is going to want to know about his function; what can he do and what can't he do on a day to day basis, his goals, and if he had a need for surgery what he is hoping it would accomplish and improve..

## 2022-02-16 NOTE — PROGRESS NOTES
Patient:   MELISSA ROSAS            MRN: [id number]            FIN: [id number]               Age:   67 years     Sex:  Male     :  1950   Associated Diagnoses:   Acute back pain; Diastasis of rectus abdominis; Fall on snow   Author:   Mark Chris MD      Chief Complaint   2018 4:17 PM CST     c/o right lower back pain that radiates into hip and groin area.  Pain started after a fall about 3 weeks ago.  Last week was walking with obvious limp - was about to get cane.  Also gets lump on chest/abdomen area when exercising        History of Present Illness   see chief complaint as noted above and confirmed with the patient  67 year old male here due to pain after a fall.   Patient slipped and fell about 3 weeks ago when shoveling some snow.  Landed on right side of back and buttock.  Does report some radiation into the right hip and right groin area.    Was given some exercises a few weeks ago for a neck strain and has noticed a bulge in his chest/abdomen area when he does some of them.  No pain      Review of Systems   Constitutional:  No fever.    Eye:  No recent visual problem.    Respiratory:  No shortness of breath.    Gastrointestinal:  No nausea.    Immunologic:  No recurrent fevers, No recurrent infections.    Musculoskeletal:       Back pain: On the right side, In the lower region.    Integumentary:  No rash.    Neurologic:  Alert and oriented X4, No headache.    Psychiatric:  No anxiety, No depression.             Health Status   Allergies:    Allergic Reactions (Selected)  Severity Not Documented  Aspirin (Nasal polyps)  Doxycycline (Sun rash)  Shailesh (Hives)  Zymetrin shampo (Rash)   Medications:  (Selected)   Prescriptions  Prescribed  Advair Diskus 250 mcg-50 mcg inhalation powder: 1 puff(s), inh, bid, # 60 EA, 11 Refill(s), Type: Maintenance, Pharmacy: [facility] [place], 1 puff(s) inh bid  Nitrostat 0.4 mg sublingual tablet: See Instructions, Instructions: 1 TABLET UNDER  TONGUE FOR CHEST PAIN TO A MAX OF 3 TABS-ONE EVERY 5 MINUTES - IF NO RELIEF CALL 911, # 25 unknown unit, 3 Refill(s), Type: Soft Stop, Pharmacy: [facility], 1 TABLET UNDER TONGUE FOR C...  Patanol 0.1% ophthalmic solution: 2 gtts, Both eyes, BID, # 5 mL, 3 Refill(s), Type: Maintenance, Pharm[id number], 2 gtts both eyes bid  Rapaflo 8 mg oral capsule: 1 cap(s) ( 8 mg ), po, daily, # 90 cap(s), 3 Refill(s), Type: Maintenance, [facility], 1 cap(s) po daily,x90 day(s)  Ventolin HFA 90 mcg/inh inhalation aerosol: 2 puff(s), inh, q4 hrs, PRN: for cough, # 1 EA, 11 Refill(s), Type: Maintenance, [facility], 2 puff(s) inh q4 hrs,PRN:for cough  albuterol 2.5 mg/3 mL (0.083%) inhalation solution: 3 mL ( 2.5 mg ), neb, q4-6 hrs, PRN: as needed for cough, # 1 box(es), 1 Refill(s), Type: Maintenance, Pharmacy: [facility], 3 mL neb q4-6 hrs,PRN:as needed for cough  amLODIPine 10 mg oral tablet: 1 tab(s) ( 10 mg ), po, daily, # 90 tab(s), 3 Refill(s), Type: Maintenance, [facility], 1 tab(s) po daily,x90 day(s)  atorvastatin 20 mg oral tablet: 1 tab(s) ( 20 mg ), po, daily, # 90 tab(s), 3 Refill(s), Type: Maintenance, [facility], 1 tab(s) po daily,x90 day(s)  clopidogrel 75 mg oral tablet: 1 tab(s) ( 75 mg ), po, daily, # 90 tab(s), 3 Refill(s), Type: Maintenance, Pharmacy: [facility], 1 tab(s) po daily,x90 day(s)  finasteride 5 mg oral tablet: 1 tab(s) ( 5 mg ), po, daily, # 90 tab(s), 3 Refill(s), Type: Maintenance, Pharmacy: [facility], 1 tab(s) po daily,x90 day(s)  gabapentin 300 mg oral capsule: 1 cap(s) ( 300 mg ), po, daily, # 90 cap(s), 3 Refill(s), Type: Maintenance, Pharmacy: [facility], 1 cap(s) po daily,x90 day(s)  pantoprazole 40 mg oral delayed release tablet: 1 tab(s) ( 40 mg ),  po, daily, # 90 tab(s), 3 Refill(s), Type: Maintenance, Pharmacy: [facility], 1 tab(s) po daily,x90 day(s)  triamcinolone 0.1% topical ointment: 1 ree, TOP, TID, # 240 gm, 1 Refill(s), Type: Maintenance, Pharmacy: [id number], 1 ree top tid  valsartan 320 mg oral tablet: 1 tab(s) ( 320 mg ), po, daily, # 90 tab(s), 3 Refill(s), Type: Maintenance, Pharmacy: [facility], 1 tab(s) po daily,x90 day(s)  zolpidem 10 mg oral tablet: 1 tab(s), PO, Once a day (at bedtime), PRN: for sleep, # 30 tab(s), 5 Refill(s), Type: Maintenance, Pharmacy: [facility], 1 tab(s) po hs,PRN:for sleep  Documented Medications  Documented  Vitamin D3: 0 Refill(s), Type: Maintenance  Zyrtec 10 mg oral tablet: 1 tab(s) ( 10 mg ), PO, Daily, 0   Problem list:    All Problems  AK (actinic keratosis) / 1140651283 / Confirmed  Allergic rhinitis / 373815872 / Confirmed  Hip arthritis / 281003471 / Confirmed  Asthma / 565946410 / Confirmed  Bronchiectasis / 23814108 / Confirmed  CAD (coronary artery disease) / 6292820888 / Confirmed  DJD of shoulder / 458574080 / Confirmed  Diverticulitis / 569176319 / Confirmed  Eczematous dermatitis / 87529845 / Confirmed  HLD (hyperlipidemia) / 47726034 / Confirmed  Hypertension / 9733404991 / Confirmed  Insomnia / 511854521 / Confirmed  Cancer of skin / 3412064808 / Confirmed  Nasal polyps / 12037192 / Confirmed  Obese / 0227392211 / Probable  Obstructive sleep apnea syndrome in adult / 9418615128 / Confirmed  Tubulovillous adenoma of colon / 362288216 / Confirmed  Urinary retention / 994725895 / Confirmed  Vocal cord dysfunction / 592362156 / Confirmed  Inactive: Tobacco abuse / 305.1  Resolved: *Hospitalized@[facility] - Chest pain, unspecified  Resolved: Otitis externa / 0172764  Resolved: Otitis media / 227174859      Histories   Past Medical History:    Active  Tubulovillous adenoma of colon (525642107): Onset on 9/20/2012 at 61 years.  AK  (actinic keratosis) (2389106463): Onset on 2011 at 60 years.  CAD (coronary artery disease) (5032355730): Onset in the month of 2009 at 58 years  Comments:  2011 CDT 2:57 PM CDT - Mark Chris MD  dec 2009 angiogram with 50% blockage,, treated with meds  Eczematous dermatitis (20776284)  DJD of shoulder (740214261)  Hypertension (6255717095)  Asthma (446089501)  Insomnia (325463439)  Urinary retention (409191404)  Comments:  2010 CST 6:12 PM CST - Berna Huynh CMA  Post Prostatic Surgery  Nasal polyps (60514387)  Allergic rhinitis (180916845)  Vocal cord dysfunction (780826849)  Bronchiectasis (64381503)  Cancer of skin (8651004567)  Obese (7378688968)  HLD (hyperlipidemia) (35884133)  Obstructive sleep apnea syndrome in adult (5061887281)  Hip arthritis (696119946)  Resolved  *Hospitalized@[facility] - Chest pain, unspecified: Onset on 10/11/2011 at 60 years.  Resolved.  Otitis media (306463149):  Resolved on 10/8/2010 at 59 years.  Otitis externa (3868354):  Resolved on 10/29/2010 at 59 years.   Family History:    CHF - Congestive heart failure  Father (Phuc, )  Alzheimer's disease  Father (Phuc, )     Procedure history:    Colonoscopy (SNOMED CT 315719460) performed by Herson Ocampo MD on 2016 at 65 Years.  Comments:  2016 11:48 AM - Lizbeth Daniels RN  Tubular adenoma x 3 no high grade dysplasia    2016 9:11 AM - Kate Valdez  Indication: History of adenomatous polyps on last colonoscopy.  Sedation:  MAC.  Recommendation: Repeat in 5 years.  TURP - Transurethral resection of prostate (SNOMED CT 321585516) performed by Vinod Lipscomb MD on 10/3/2011 at 60 Years.  Colonoscopy (SNOMED CT 229535634) performed by Herson Ocampo MD on 2010 at 59 Years.  Comments:  2010 3:33 PM - Karlene Sylvester  4 mm polyp noted at 90 cm in the ascending colon; appearance adenomatous, removed   Recommend colonoscopic follow up in 5 years.   Conscious  sedation/ MAC if his medical conditions dictate ASA III for level of sedation for future procedures  Coronary angiogram (SNOMED CT 50418690) in 2009 at 59 Years.  Green Light Laser - Prostate in the month of 3/2009 at 58 Years.  BL nasal Turbinoplasty in the month of 1/2008 at 57 Years.  Prostate TUNA surgery in the month of 2/2007 at 56 Years.  Right shoulder arthroscopy on 10/16/2006 at 55 Years.  Flexible sigmoidoscopy (SNOMED CT 18358617) on 7/8/2005 at 54 Years.  left shoulder arthroscopy in the month of 7/2005 at 54 Years.  excisional cervical schwannoma on 11/30/2001 at 50 Years.  Cholecystectomy (SNOMED CT 44499837) in the month of 2/1982 at 31 Years.  Vasectomy (SNOMED CT 84814147).   Social History:        Alcohol Assessment            Current, Beer (12 oz), 3-4 times per year                     Comments:                      09/26/2011 - Alisha George                     1/month.      Tobacco Assessment            Past      Substance Abuse Assessment            Never      Employment and Education Assessment            Retired                     Comments:                      09/26/2011 - Alisha George                     2005.      Home and Environment Assessment            Marital status: .  1 children.                     Comments:                      09/26/2011 - Alisha George                     1970. Wife - Pauline.      Exercise and Physical Activity Assessment: Regular exercise            Exercise frequency: 3-4 times/week.  Exercise type: Weight lifting, Cardio.                     Comments:                      09/26/2011 Alisha Pacheco                     2-3 times per week.      Sexual Assessment            Sexually active: Yes.  Sexual orientation: Heterosexual.        Physical Examination   Vital Signs   2/5/2018 4:17 PM CST Peripheral Pulse Rate 80 bpm    Systolic Blood Pressure 120 mmHg    Diastolic Blood Pressure 78 mmHg    Mean Arterial Pressure 92 mmHg      Measurements from  flowsheet : Measurements   2/5/2018 4:17 PM CST Height Measured - Standard 68 in    Weight Measured - Standard 203 lb    BSA 2.1 m2    Body Mass Index 30.86 kg/m2  HI      General:  Alert and oriented, No acute distress.    Eye:  Pupils are equal, round and reactive to light, Normal conjunctiva.    HENT:  Oral mucosa is moist.    Neck:  Supple.    Respiratory:  Respirations are non-labored.    Cardiovascular:  Normal rate, Regular rhythm, No edema.    Gastrointestinal:  Non-distended.    Musculoskeletal:  Normal gait.    Integumentary:  Warm, No rash.    Psychiatric:  Cooperative, Appropriate mood & affect, Normal judgment.       Review / Management   Results review      Impression and Plan   Diagnosis     Acute back pain (TEM24-LC M54.9).     Diastasis of rectus abdominis (OFW84-KO M62.08).     Fall on snow (XJK73-GY W00.0XXA).     Course:          xray shows some calcification, mild DJD in lumbar spine, no acute fractures, radiology to over read,  discussed with patient, reviewed appropriate activity  discussed stretching and strengthening  gave information about icing and heat  reviewed medication such as NSAIDs.    Orders     Orders (Selected)   Outpatient Orders  Completed  22783 radex spine lumbosacral 2/3 views (Task/Charge): Quantity: 1, Acute back pain  Fall on snow.

## 2022-02-16 NOTE — TELEPHONE ENCOUNTER
Entered by Lesli Villegas MA on January 30, 2019 11:31:20 AM CST  ---------------------  From: Lesli Villegas MA   To: [place] [address]    Sent: 1/30/2019 11:31:20 AM CST  Subject: Medication Management     ** Submitted: **  Order:hydroCHLOROthiazide (hydroCHLOROthiazide 25 mg oral tablet)  1 tab(s)  Oral  daily  Qty:  90 tab(s)        Duration:  90 day(s)        Refills:  0          Substitutions Allowed     Route To Pharmacy - [place] [address]    Signed by Lesli Villegas MA  1/30/2019 11:30:00 AM    ** Submitted: **  Complete:hydroCHLOROthiazide (hydroCHLOROthiazide 25 mg oral tablet)   Signed by Lesli Villegas MA  1/30/2019 11:31:00 AM    ** Not Approved:  **  hydroCHLOROthiazide (HYDROCHLOROTHIAZIDE 25MG TABS)  TAKE ONE TABLET BY MOUTH EVERY DAY  Qty:  90 unknown unit        Days Supply:  90        Refills:  1          G[facility]IO     Route To Pharmacy - [place] [address]   Signed by Lesli Villegas MA            ------------------------------------------  From: [place] [address]  To: Mark Chris MD  Sent: January 29, 2019 7:13:21 PM CST  Subject: Medication Management  Due: January 30, 2019 7:13:21 PM CST    ** On Hold Pending Signature **  Drug: hydroCHLOROthiazide (hydroCHLOROthiazide 25 mg oral tablet)  TAKE ONE TABLET BY MOUTH EVERY DAY  Quantity: 90 unknown unit  Days Supply: 90        Refills: 1  Substitutions Allowed  Notes from Pharmacy:     Dispensed Drug: hydroCHLOROthiazide (hydroCHLOROthiazide 25 mg oral tablet)  TAKE ONE TABLET BY MOUTH EVERY DAY  Quantity: 90 unknown unit  Days Supply: 90        Refills: 1  Substitutions Allowed  Notes from Pharmacy:   ------------------------------------------LV: 1-11-19 Rib Pain with DWG     LF: 8-14-18 with 90 tabs and 1 refill     He is due for Chronic Disease follow up in February 2019, I placed RTC for him to return then.

## 2022-02-16 NOTE — NURSING NOTE
Comprehensive Intake Entered On:  7/22/2021 1:27 PM CDT    Performed On:  7/22/2021 1:22 PM CDT by Ivelisse Hernandez CMA               Summary   Chief Complaint :   Annual medicare visit and refills of atorvastatin, clopidogrel.   Advance Directive :   Yes   Weight Measured :   204 lb(Converted to: 204 lb 0 oz, 92.533 kg)    Height Measured :   68 in(Converted to: 5 ft 8 in, 172.72 cm)    Body Mass Index :   31.01 kg/m2 (HI)    Body Surface Area :   2.1 m2   Systolic Blood Pressure :   125 mmHg   Diastolic Blood Pressure :   82 mmHg (HI)    Mean Arterial Pressure :   96 mmHg   Peripheral Pulse Rate :   68 bpm   BP Site :   Right arm   Pulse Site :   Radial artery   BP Method :   Electronic   HR Method :   Electronic   Temperature Tympanic :   97.7 DegF(Converted to: 36.5 DegC)  (LOW)    Oxygen Saturation :   98 %   Race :      Languages :   English   Ethnicity :   Not  or    Ivelisse Hernandez CMA - 7/22/2021 1:22 PM CDT   Health Status   Allergies Verified? :   Yes   Medication History Verified? :   Yes   Immunizations Current :   Yes   Medical History Verified? :   Yes   Ivelisse Hernandez CMA - 7/22/2021 1:22 PM CDT   Consents   Consent for Immunization Exchange :   Consent Granted   Consent for Immunizations to Providers :   Consent Granted   Ivelisse Hernandez CMA - 7/22/2021 1:22 PM CDT   Meds / Allergies   (As Of: 7/22/2021 1:27:11 PM CDT)   Allergies (Active)   aspirin  Estimated Onset Date:   Unspecified ; Reactions:   Nasal Polyps ; Created By:   Vinod Santana MD; Reaction Status:   Active ; Category:   Drug ; Substance:   aspirin ; Type:   Allergy ; Updated By:   Vinod Santana MD; Reviewed Date:   7/22/2021 1:23 PM CDT      doxycycline  Estimated Onset Date:   Unspecified ; Reactions:   sun rash ; Created By:   Berna Huynh; Reaction Status:   Active ; Category:   Drug ; Substance:   doxycycline ; Type:   Allergy ; Updated By:   Berna Huynh; Source:   Paper Chart ; Reviewed Date:    7/22/2021 1:23 PM CDT      Shailesh  Estimated Onset Date:   Unspecified ; Reactions:   Hives ; Created By:   Bree Babin CMA; Reaction Status:   Active ; Category:   Drug ; Substance:   Shailesh ; Type:   Allergy ; Updated By:   Bree Babin CMA; Reviewed Date:   7/22/2021 1:23 PM CDT      zymetrin shampo  Estimated Onset Date:   Unspecified ; Reactions:   Rash ; Created By:   Bree Babin CMA; Reaction Status:   Active ; Category:   Drug ; Substance:   zymetrin shampo ; Type:   Allergy ; Updated By:   Bree Bbain CMA; Reviewed Date:   7/22/2021 1:23 PM CDT        Medication List   (As Of: 7/22/2021 1:27:11 PM CDT)   Prescription/Discharge Order    albuterol  :   albuterol ; Status:   Prescribed ; Ordered As Mnemonic:   Ventolin HFA 90 mcg/inh inhalation aerosol ; Simple Display Line:   2 puff(s), inh, q4 hrs, PRN: for cough, 1 EA, 2 Refill(s) ; Ordering Provider:   Mark Chris MD; Catalog Code:   albuterol ; Order Dt/Tm:   6/29/2020 8:30:04 AM CDT          amLODIPine  :   amLODIPine ; Status:   Prescribed ; Ordered As Mnemonic:   amLODIPine 10 mg oral tablet ; Simple Display Line:   1 tab(s), Oral, daily, 90 tab(s), 2 Refill(s) ; Ordering Provider:   Mark Chris MD; Catalog Code:   amLODIPine ; Order Dt/Tm:   11/1/2020 11:26:36 AM CST          amoxicillin  :   amoxicillin ; Status:   Prescribed ; Ordered As Mnemonic:   amoxicillin 500 mg oral tablet ; Simple Display Line:   2,000 mg, 4 tab(s), Oral, once, 4 tab(s), 3 Refill(s) ; Ordering Provider:   Mark Chris MD; Catalog Code:   amoxicillin ; Order Dt/Tm:   3/4/2021 2:34:14 PM CST          atorvastatin  :   atorvastatin ; Status:   Prescribed ; Ordered As Mnemonic:   atorvastatin 20 mg oral tablet ; Simple Display Line:   1 tab(s), Oral, daily, 90 tab(s), 2 Refill(s) ; Ordering Provider:   Mark Chris MD; Catalog Code:   atorvastatin ; Order Dt/Tm:   10/20/2020 7:01:45 AM CDT          clopidogrel  :   clopidogrel ;  Status:   Prescribed ; Ordered As Mnemonic:   clopidogrel 75 mg oral tablet ; Simple Display Line:   1 tab(s), Oral, daily, 90 tab(s), 2 Refill(s) ; Ordering Provider:   Mark Chris MD; Catalog Code:   clopidogrel ; Order Dt/Tm:   10/12/2020 8:01:59 AM CDT          finasteride  :   finasteride ; Status:   Prescribed ; Ordered As Mnemonic:   finasteride 5 mg oral tablet ; Simple Display Line:   1 tab(s), Oral, daily, 30 tab(s), 0 Refill(s) ; Ordering Provider:   Mark Chris MD; Catalog Code:   finasteride ; Order Dt/Tm:   7/13/2021 6:19:22 AM CDT          fluticasone-salmeterol  :   fluticasone-salmeterol ; Status:   Prescribed ; Ordered As Mnemonic:   fluticasone-salmeterol 250 mcg-50 mcg inhalation powder ; Simple Display Line:   See Instructions, INHALE ONE PUFF BY MOUTH TWICE A DAY, 180 unknown unit, 0 Refill(s) ; Ordering Provider:   Mark Chris MD; Catalog Code:   fluticasone-salmeterol ; Order Dt/Tm:   7/7/2021 9:25:36 AM CDT          gabapentin  :   gabapentin ; Status:   Prescribed ; Ordered As Mnemonic:   gabapentin 300 mg oral capsule ; Simple Display Line:   1 cap(s), Oral, daily, 90 unknown unit, 2 Refill(s) ; Ordering Provider:   Mark Chris MD; Catalog Code:   gabapentin ; Order Dt/Tm:   12/28/2020 10:40:40 AM CST          losartan  :   losartan ; Status:   Prescribed ; Ordered As Mnemonic:   losartan 100 mg oral tablet ; Simple Display Line:   1 tab(s), Oral, daily, 90 tab(s), 0 Refill(s) ; Ordering Provider:   Mark Chris MD; Catalog Code:   losartan ; Order Dt/Tm:   7/1/2021 3:45:01 PM CDT          metoprolol  :   metoprolol ; Status:   Prescribed ; Ordered As Mnemonic:   Metoprolol Succinate ER 25 mg oral tablet, extended release ; Simple Display Line:   1 tab(s), Oral, daily, 90 tab(s), 2 Refill(s) ; Ordering Provider:   Mark Chris MD; Catalog Code:   metoprolol ; Order Dt/Tm:   11/5/2020 7:12:01 AM CST          nitroglycerin  :   nitroglycerin ; Status:    Prescribed ; Ordered As Mnemonic:   nitroglycerin 0.4 mg sublingual tablet ; Simple Display Line:   See Instructions, PLACE 1 TABLET UNDER TONGUE FOR CHEST PAIN TO A MAX OF 3 TABS-ONE EVERY 5 MINUTES - IF NO RELIEF CALL 911, 25 tab(s), 3 Refill(s) ; Ordering Provider:   Mark Chris MD; Catalog Code:   nitroglycerin ; Order Dt/Tm:   7/29/2019 7:47:42 PM CDT          olopatadine ophthalmic  :   olopatadine ophthalmic ; Status:   Prescribed ; Ordered As Mnemonic:   Patanol 0.1% ophthalmic solution ; Simple Display Line:   2 gtts, Both eyes, BID, 5 mL ; Ordering Provider:   Mark Chris MD; Catalog Code:   olopatadine ophthalmic ; Order Dt/Tm:   3/11/2015 2:28:49 PM CDT          pantoprazole  :   pantoprazole ; Status:   Prescribed ; Ordered As Mnemonic:   pantoprazole 40 mg oral delayed release tablet ; Simple Display Line:   1 tab(s), Oral, daily, 30 tab(s), 0 Refill(s) ; Ordering Provider:   Mark Chris MD; Catalog Code:   pantoprazole ; Order Dt/Tm:   7/13/2021 6:19:46 AM CDT          sildenafil  :   sildenafil ; Status:   Prescribed ; Ordered As Mnemonic:   sildenafil 100 mg oral tablet ; Simple Display Line:   100 mg, 1 tab(s), Oral, daily, 30 tab(s), 0 Refill(s) ; Ordering Provider:   Mark Chris MD; Catalog Code:   sildenafil ; Order Dt/Tm:   6/4/2020 10:26:42 AM CDT          silodosin  :   silodosin ; Status:   Prescribed ; Ordered As Mnemonic:   silodosin 8 mg oral capsule ; Simple Display Line:   1 cap(s), Oral, daily, 90 cap(s), 3 Refill(s) ; Ordering Provider:   Mark Chris MD; Catalog Code:   silodosin ; Order Dt/Tm:   8/24/2020 11:27:08 AM CDT          triamcinolone topical  :   triamcinolone topical ; Status:   Prescribed ; Ordered As Mnemonic:   triamcinolone 0.1% topical ointment ; Simple Display Line:   1 ree, TOP, TID, 240 gm ; Ordering Provider:   Mark Chris MD; Catalog Code:   triamcinolone topical ; Order Dt/Tm:   3/11/2015 2:29:32 PM CDT          zolpidem  :    zolpidem ; Status:   Prescribed ; Ordered As Mnemonic:   zolpidem 5 mg oral tablet ; Simple Display Line:   5 mg, 1 tab(s), Oral, hs, PRN: for sleep, 30 tab(s), 1 Refill(s) ; Ordering Provider:   Brian Husain PA-C; Catalog Code:   zolpidem ; Order Dt/Tm:   7/8/2021 12:18:19 PM CDT            Home Meds    cetirizine  :   cetirizine ; Status:   Documented ; Ordered As Mnemonic:   Zyrtec 10 mg oral tablet ; Simple Display Line:   10 mg, 1 tab(s), PO, Daily ; Catalog Code:   cetirizine ; Order Dt/Tm:   1/25/2010 6:14:51 PM CST          cholecalciferol  :   cholecalciferol ; Status:   Documented ; Ordered As Mnemonic:   Vitamin D3 ; Simple Display Line:   0 Refill(s) ; Catalog Code:   cholecalciferol ; Order Dt/Tm:   11/1/2017 1:13:39 PM CDT            ID Risk Screen   Recent Travel History :   No recent travel   Family Member Travel History :   No recent travel   Other Exposure to Infectious Disease :   Unknown   COVID-19 Testing Status :   No positive COVID-19 test   Ivelisse Hernandez CMA - 7/22/2021 1:22 PM CDT   Social History   Social History   (As Of: 7/22/2021 1:27:11 PM CDT)   Alcohol:        Current, Beer (12 oz), 3-4 times per year   Comments:  9/26/2011 7:44 PM Alisha Pacheco: 1/month.   (Last Updated: 3/17/2015 10:41:06 AM CDT by Apoorva Figueroa CMA)          Tobacco:  Past      Former smoker, quit more than 30 days ago   (Last Updated: 12/10/2020 11:52:44 AM CST by Nicole Umanzor CMA)          Electronic Cigarette/Vaping:        Electronic Cigarette Use: Never.   (Last Updated: 12/10/2020 11:52:47 AM CST by Nicole Umanzor CMA)          Substance Abuse:        Never   (Last Updated: 3/17/2015 10:41:13 AM CDT by Apoorva Figueroa CMA)          Employment/School:        Retired, Work/School description: farmer.   Comments:  9/26/2011 7:43 PM - Alisha George: 2005.   (Last Updated: 4/2/2019 3:11:38 PM CDT by Charmaine Bernardo)          Home/Environment:        Marital status: .  1 children.    Comments:  9/26/2011 7:42 PM - Alisha George: 1970. Wife - Pauline.   (Last Updated: 3/17/2015 10:41:37 AM CDT by Apoorva Figueroa CMA)          Exercise:  Regular exercise      Exercise frequency: 4-5 x per week..  Exercise type: Weight lifting, Cardio.   Comments:  9/26/2011 7:45 PM - Alisha George: 2-3 times per week.   (Last Updated: 4/2/2019 3:11:22 PM CDT by Charmaine Bernardo)          Sexual:        Sexually active: Yes.  Sexual orientation: Heterosexual.   (Last Updated: 3/17/2015 10:40:27 AM CDT by Apoorva Figueroa CMA)

## 2022-02-16 NOTE — PROGRESS NOTES
Patient:   MELISSA ROSAS            MRN: [id number]            FIN: [id number]               Age:   67 years     Sex:  Male     :  1950   Associated Diagnoses:   Asthma flare   Author:   Mark Chris MD      Chief Complaint   2018 9:22 AM CST     pt here for fu with cough, he was put on z-rivera and prednisone, he continues to feel bad, he has a tight cough and he is not producing any phlegm, cough is worse at night, has had low grade fevers      History of Present Illness   see chief complaint as noted above and confirmed with the patient     67 year old male here today with complaint of not feeling well. He has asthma and he has had episodes of asthma flare ups and sinus infections. On 2017 he was treated with a week of Prednisone and a Z-rivera. He felt the medication did give him some relief yet after finishing the antibiotics he continues to cough, his chest feel's tight, he finds he is coughing more at night and he is struggling to get any phlegm up. He has had low grade fever's, he has been winded with excercise, he has been using his Albuterol inhaler and nebulizer machine.       Review of Systems   Constitutional:  Fever.    Ear/Nose/Mouth/Throat:  Nasal congestion, No sore throat.    Respiratory:  Cough, Wheezing, chest congestion , No shortness of breath, No sputum production.    Cardiovascular:  No chest pain.    Gastrointestinal:  No nausea, No vomiting, No diarrhea, No abdominal pain.    Integumentary:  No rash.    Neurologic:  Alert and oriented X4, No headache.             Health Status   Allergies:    Allergic Reactions (Selected)  Severity Not Documented  Aspirin (Nasal polyps)  Doxycycline (Sun rash)  Shailesh (Hives)  Zymetrin shampo (Rash)   Medications:  (Selected)   Prescriptions  Prescribed  Advair Diskus 250 mcg-50 mcg inhalation powder: 1 puff(s), inh, bid, # 60 EA, 11 Refill(s), Type: Maintenance, Pharmacy: [facility] [place], 1 puff(s) inh  bid  Nitrostat 0.4 mg sublingual tablet: See Instructions, Instructions: 1 TABLET UNDER TONGUE FOR CHEST PAIN TO A MAX OF 3 TABS-ONE EVERY 5 MINUTES - IF NO RELIEF CALL 911, # 25 unknown unit, 3 Refill(s), Type: Soft Stop, Pharmacy: [facility], 1 TABLET UNDER TONGUE FOR C...  Patanol 0.1% ophthalmic solution: 2 gtts, Both eyes, BID, # 5 mL, 3 Refill(s), Type: Maintenance, Pharmacy: [id number], 2 gtts both eyes bid  Rapaflo 8 mg oral capsule: 1 cap(s) ( 8 mg ), po, daily, # 90 cap(s), 3 Refill(s), Type: Maintenance, [facility], 1 cap(s) po daily,x90 day(s)  Ventolin HFA 90 mcg/inh inhalation aerosol: 2 puff(s), inh, q4 hrs, PRN: for cough, # 1 EA, 11 Refill(s), Type: Maintenance, Pharmacy: [facility], 2 puff(s) inh q4 hrs,PRN:for cough  albuterol 2.5 mg/3 mL (0.083%) inhalation solution: 3 mL ( 2.5 mg ), neb, q4-6 hrs, PRN: as needed for cough, # 1 box(es), 1 Refill(s), Type: Maintenance, [facility], 3 mL neb q4-6 hrs,PRN:as needed for cough  amLODIPine 10 mg oral tablet: 1 tab(s) ( 10 mg ), po, daily, # 90 tab(s), 3 Refill(s), Type: Maintenance, [facility], 1 tab(s) po daily,x90 day(s)  atorvastatin 20 mg oral tablet: 1 tab(s) ( 20 mg ), po, daily, # 90 tab(s), 3 Refill(s), Type: Maintenance, [facility], 1 tab(s) po daily,x90 day(s)  clopidogrel 75 mg oral tablet: 1 tab(s) ( 75 mg ), po, daily, # 90 tab(s), 3 Refill(s), Type: Maintenance, Pharmacy: [facility] [place], 1 tab(s) po daily,x90 day(s)  finasteride 5 mg oral tablet: 1 tab(s) ( 5 mg ), po, daily, # 90 tab(s), 3 Refill(s), Type: Maintenance, Pharmacy: [facility], 1 tab(s) po daily,x90 day(s)  gabapentin 300 mg oral capsule: 1 cap(s) ( 300 mg ), po, daily, # 90 cap(s), 3 Refill(s), Type: Maintenance, Pharmacy: [facility], 1 cap(s)  po daily,x90 day(s)  pantoprazole 40 mg oral delayed release tablet: 1 tab(s) ( 40 mg ), po, daily, # 90 tab(s), 3 Refill(s), Type: Maintenance, Pharmacy: [facility], 1 tab(s) po daily,x90 day(s)  triamcinolone 0.1% topical ointment: 1 ree, TOP, TID, # 240 gm, 1 Refill(s), Type: Maintenance, Pharmacy: [id number], 1 ree top tid  valsartan 320 mg oral tablet: 1 tab(s) ( 320 mg ), po, daily, # 90 tab(s), 3 Refill(s), Type: Maintenance, Pharmacy: [facility], 1 tab(s) po daily,x90 day(s)  zolpidem 10 mg oral tablet: 1 tab(s), PO, Once a day (at bedtime), PRN: for sleep, # 30 tab(s), 5 Refill(s), Type: Maintenance, Pharmacy: [facility], 1 tab(s) po hs,PRN:for sleep  Documented Medications  Documented  Vitamin D3: 0 Refill(s), Type: Maintenance  Zyrtec 10 mg oral tablet: 1 tab(s) ( 10 mg ), PO, Daily, 0   Problem list:    All Problems  Vocal cord dysfunction / SNOMED CT 348628972 / Confirmed  Urinary retention / SNOMED CT 114396256 / Confirmed  Tubulovillous adenoma of colon / SNOMED CT 907571622 / Confirmed  Otitis media / SNOMED CT 844442208 / Confirmed  Otitis externa / SNOMED CT 6293459 / Confirmed  Obstructive sleep apnea syndrome in adult / SNOMED CT 7595669072 / Confirmed  Obese / SNOMED CT 1208781670 / Probable  Nasal polyps / SNOMED CT 97697965 / Confirmed  Cancer of skin / SNOMED CT 7615742719 / Confirmed  Insomnia / SNOMED CT 744699722 / Confirmed  Hypertension / SNOMED CT 4143790378 / Confirmed  HLD (hyperlipidemia) / SNOMED CT 60926016 / Confirmed  Eczematous dermatitis / SNOMED CT 14534619 / Confirmed  Diverticulitis / SNOMED CT 443999072 / Confirmed  DJD of shoulder / SNOMED CT 654872828 / Confirmed  CAD (coronary artery disease) / SNOMED CT 9316636429 / Confirmed  Bronchiectasis / SNOMED CT 59489218 / Confirmed  Asthma / SNOMED CT 589103400 / Confirmed  Hip arthritis / SNOMED CT 757356819 / Confirmed  Allergic rhinitis / SNOMED  CT 547531603 / Confirmed  AK (actinic keratosis) / SNOMED CT 4821250385 / Confirmed  Inactive: Tobacco abuse / ICD-9-.1  Resolved: *[email or web address] - Chest pain, unspecified      Histories   Past Medical History:    Active  Tubulovillous adenoma of colon (384505961): Onset on 2012 at 61 years.  AK (actinic keratosis) (9034679318): Onset on 2011 at 60 years.  CAD (coronary artery disease) (9951461562): Onset in the month of 2009 at 58 years  Comments:  2011 CDT 2:57 PM CDT - Mark Chris MD  dec 2009 angiogram with 50% blockage,, treated with meds  Eczematous dermatitis (99032689)  DJD of shoulder (713311601)  Hypertension (5260182489)  Asthma (985063884)  Insomnia (136766706)  Urinary retention (075080831)  Comments:  2010 CST 6:12 PM CST - Berna Huynh CMA  Post Prostatic Surgery  Nasal polyps (01781218)  Allergic rhinitis (365307806)  Vocal cord dysfunction (272970975)  Bronchiectasis (05500210)  Cancer of skin (6455106442)  Otitis media (142230252)  Otitis externa (9687469)  Obese (7143528951)  HLD (hyperlipidemia) (08948058)  Obstructive sleep apnea syndrome in adult (2862290792)  Hip arthritis (181452229)  Resolved  *[email or web address] - Chest pain, unspecified: Onset on 10/11/2011 at 60 years.  Resolved.   Family History:    CHF - Congestive heart failure  Father (Phuc, )  Alzheimer's disease  Father (Phuc, )     Procedure history:    Colonoscopy (498431672) on 2016 at 65 Years.  Comments:  2016 11:48 AM - Lizbeth Daniels RN  Tubular adenoma x 3 no high grade dysplasia    2016 9:11 AM - Kate Valdez  Indication: History of adenomatous polyps on last colonoscopy.  Sedation:  MAC.  Recommendation: Repeat in 5 years.  TURP - Transurethral resection of prostate (165409371) on 10/3/2011 at 60 Years.  Colonoscopy (558561799) on 2010 at 59 Years.  Comments:  2010 3:33 PM - Karlene Slyvester  4 mm polyp noted at 90 cm in the  ascending colon; appearance adenomatous, removed   Recommend colonoscopic follow up in 5 years.   Conscious sedation/ MAC if his medical conditions dictate ASA III for level of sedation for future procedures  Coronary angiogram (05913136) in 2009 at 59 Years.  Green Light Laser - Prostate in the month of 3/2009 at 58 Years.  BL nasal Turbinoplasty in the month of 1/2008 at 57 Years.  Prostate TUNA surgery in the month of 2/2007 at 56 Years.  Right shoulder arthroscopy on 10/16/2006 at 55 Years.  Flexible sigmoidoscopy (21792264) on 7/8/2005 at 54 Years.  left shoulder arthroscopy in the month of 7/2005 at 54 Years.  excisional cervical schwannoma on 11/30/2001 at 50 Years.  Cholecystectomy (91512603) in the month of 2/1982 at 31 Years.  Vasectomy (97851875).   Social History:        Alcohol Assessment            Current, Beer (12 oz), 3-4 times per year                     Comments:                      09/26/2011 Alisha Pacheco                     1/month.      Tobacco Assessment            Past      Substance Abuse Assessment            Never      Employment and Education Assessment            Retired                     Comments:                      09/26/2011 Alisha Pacheco                     2005.      Home and Environment Assessment            Marital status: .  1 children.                     Comments:                      09/26/2011 Alisha Pacheco                     1970. Wife - Pauline.      Exercise and Physical Activity Assessment: Regular exercise            Exercise frequency: 3-4 times/week.  Exercise type: Weight lifting, Cardio.                     Comments:                      09/26/2011 Alisha Pacheco                     2-3 times per week.      Sexual Assessment            Sexually active: Yes.  Sexual orientation: Heterosexual.        Physical Examination   Vital Signs   1/4/2018 9:22 AM CST Temperature Tympanic 97.8 DegF  LOW    Peripheral Pulse Rate 94 bpm    Pulse Site Radial  artery    Systolic Blood Pressure 110 mmHg    Diastolic Blood Pressure 70 mmHg    Mean Arterial Pressure 83 mmHg    BP Site Right arm    Oxygen Saturation 96 %      Measurements from flowsheet : Measurements   1/4/2018 9:22 AM CST Height Measured - Standard 68 in    Weight Measured - Standard 197.8 lb    BSA 2.07 m2    Body Mass Index 30.07 kg/m2  HI      General:  Alert and oriented, No acute distress.    Eye:  Pupils are equal, round and reactive to light, Normal conjunctiva.    HENT:  Oral mucosa is moist.    Neck:  Supple.    Respiratory:  Respirations are non-labored.    Cardiovascular:  Normal rate, Regular rhythm, No edema.    Gastrointestinal:  Non-distended.    Musculoskeletal:  Normal gait.    Integumentary:  Warm, No rash.    Psychiatric:  Cooperative, Appropriate mood & affect, Normal judgment.       Review / Management   Results review      Impression and Plan       Diagnosis     Asthma flare ([id number] J45.901).     Plan:  Will treat with Levaquin. And another burst of Prednisone     Return if symptoms persist or worsen.  .    Summary:  Discussed what to watch for and when to return. .    ILesli Medical Assistant acted solely as a scribe for, and in presence of Dr. Mark Chris who performed the services.

## 2022-02-16 NOTE — PROGRESS NOTES
Patient:   MELISSA ROSAS            MRN: [id number]            FIN: [id number]               Age:   68 years     Sex:  Male     :  1950   Associated Diagnoses:   Contusion of rib on right side   Author:   Brian Husain PA-C.      Chief Complaint   2019 10:03 AM CST   Pt here for right side rib pain from fall he had 6 days ago.      History of Present Illness   Chief complaint and symptoms noted above and confirmed with patient   as above, has pain when sleeping or lifting, no SOB      Review of Systems   Respiratory:  No shortness of breath.       Health Status   Allergies:    Allergic Reactions (Selected)  Severity Not Documented  Aspirin (Nasal polyps)  Doxycycline (Sun rash)  Shailesh (Hives)  Zymetrin shampo (Rash)   Medications:  (Selected)   Prescriptions  Prescribed  Advair Diskus 250 mcg-50 mcg inhalation powder: 1 puff(s), inh, bid, # 60 EA, 11 Refill(s), Type: Maintenance, Pharmacy: [facility] [place], 1 puff(s) inh bid  Nitrostat 0.4 mg sublingual tablet: See Instructions, Instructions: PLACE 1 TABLET UNDER TONGUE FOR CHEST PAIN TO A MAX OF 3 TABS-ONE EVERY 5 MINUTES - IF NO RELIEF CALL 911, # 25 unknown unit, 3 Refill(s), Type: Soft Stop, Pharmacy: [facility] [place]  Patanol 0.1% ophthalmic solution: 2 gtts, Both eyes, BID, # 5 mL, 3 Refill(s), Type: Maintenance, Pharmacy: [id number], 2 gtts both eyes bid  Ventolin HFA 90 mcg/inh inhalation aerosol: 2 puff(s), inh, q4 hrs, PRN: for cough, # 1 EA, 11 Refill(s), Type: Maintenance, Pharmacy: [facility] [place], 2 puff(s) inh q4 hrs,PRN:for cough  albuterol 2.5 mg/3 mL (0.083%) inhalation solution: 3 mL ( 2.5 mg ), neb, q4-6 hrs, PRN: as needed for cough, # 1 box(es), 1 Refill(s), Type: Maintenance, Pharmacy: [facility] [place], 3 mL neb q4-6 hrs,PRN:as needed for cough  amLODIPine 10 mg oral tablet: 1 tab(s) ( 10 mg ), po, daily, # 90 tab(s), 3 Refill(s), Type: Maintenance,  Pharmacy: [facility], 1 tab(s) po daily,x90 day(s)  atorvastatin 20 mg oral tablet: 1 tab(s) ( 20 mg ), po, daily, # 90 tab(s), 3 Refill(s), Type: Maintenance, Pharmacy: [facility], 1 tab(s) po daily,x90 day(s)  clopidogrel 75 mg oral tablet: 1 tab(s) ( 75 mg ), po, daily, # 90 tab(s), 3 Refill(s), Type: Maintenance, Pharmacy: [facility], 1 tab(s) po daily,x90 day(s)  finasteride 5 mg oral tablet: 1 tab(s) ( 5 mg ), po, daily, # 90 tab(s), 3 Refill(s), Type: Maintenance, Pharmacy: [facility], 1 tab(s) po daily,x90 day(s)  gabapentin 300 mg oral capsule: 1 cap(s) ( 300 mg ), po, daily, # 90 cap(s), 3 Refill(s), Type: Maintenance, Pharmacy: [facility], Pt is now due for annual med check per [facility] for further refills., 1 cap(s) po daily,x90 day(s)  hydroCHLOROthiazide 25 mg oral tablet: 1 tab(s) ( 25 mg ), PO, Daily, # 90 tab(s), 1 Refill(s), Type: Maintenance, Pharmacy: [facility], 1 tab(s) Oral daily  losartan 100 mg oral tablet: 1 tab(s) ( 100 mg ), Oral, daily, # 90 tab(s), 0 Refill(s), Type: Maintenance, Pharmacy: [facility], alternative for valsartan, 1 tab(s) Oral daily  losartan 100 mg oral tablet: See Instructions, Instructions: TAKE ONE TABLET BY MOUTH EVERY DAY [ALTERNATIVE FOR VALSARTAN], # 90 unknown unit, 1 Refill(s), Type: Soft Stop, Pharmacy: [facility], TAKE ONE TABLET BY MOUTH EVERY DAY [ALTERNATIVE FOR VALSARTAN]  pantoprazole 40 mg oral delayed release tablet: 1 tab(s) ( 40 mg ), po, daily, # 90 tab(s), 3 Refill(s), Type: Maintenance, Pharmacy: [facility] [place], 1 tab(s) po daily,x90 day(s)  triamcinolone 0.1% topical ointment: 1 ree, TOP, TID, # 240 gm, 1 Refill(s), Type: Maintenance, Pharmacy: [facility], 1 ree top tid  zolpidem 10 mg oral tablet: = 1 tab(s), PO, Once a day (at bedtime), PRN: for sleep, # 30  tab(s), 5 Refill(s), Type: Hard Stop, Pharmacy: [id number], 1 tab(s) Oral hs,PRN:for sleep  Documented Medications  Documented  Rapaflo 8 mg oral capsule: 1 cap(s) ( 8 mg ), po, daily, 0 Refill(s), Type: Maintenance  Vitamin D3: 0 Refill(s), Type: Maintenance  Zyrtec 10 mg oral tablet: 1 tab(s) ( 10 mg ), PO, Daily, 0  doxepin 10 mg oral capsule: 1 cap(s) ( 10 mg ), po, hs, Instructions: Hospital discharge, 0 Refill(s), Type: Maintenance  rifAMPin 300 mg oral capsule: 1 cap(s) ( 300 mg ), po, bid, Instructions: per infectious disease doctor, take 1 tab BID for the next 3 months, 0 Refill(s), Type: Maintenance  senna 8.6 mg oral tablet: 2 tab(s) ( 17.2 mg ), PO, Once a day (at bedtime), PRN: for constipation, # 20 tab(s), 0 Refill(s), Type: Maintenance   Problem list:    All Problems (Selected)  Allergic rhinitis / SNOMED CT 244723677 / Confirmed  DJD of shoulder / SNOMED CT 794521648 / Confirmed  Tubulovillous adenoma of colon / SNOMED CT 463672692 / Confirmed  AK (actinic keratosis) / SNOMED CT 3927593379 / Confirmed  HTN, goal below 140/90 / SNOMED CT 3120786770 / Confirmed  Hip arthritis / SNOMED CT 062015965 / Confirmed  Bronchiectasis / SNOMED CT 08299211 / Confirmed  Vocal cord dysfunction / SNOMED CT 047161854 / Confirmed  Obese / SNOMED CT 8405350886 / Probable  CAD (coronary artery disease) / SNOMED CT 2280818605 / Confirmed  Obstructive sleep apnea syndrome in adult / SNOMED CT 9221478461 / Confirmed  Hx of malignant skin melanoma / SNOMED CT 7033460797 / Confirmed  Insomnia / SNOMED CT 829159452 / Confirmed  Asthma / SNOMED CT 024533168 / Confirmed  Prosthetic hip infection / SNOMED CT 228417814 / Confirmed  GERD (gastroesophageal reflux disease) / SNOMED CT 706213998 / Confirmed  Benign prostatic hyperplasia / SNOMED CT 687154853 / Confirmed  Urinary retention / SNOMED CT 475690791 / Confirmed  Diverticulitis / SNOMED CT 767853865 / Confirmed  Eczematous dermatitis / SNOMED CT 41062638 /  Confirmed  Nasal polyps / SNOMED CT 28224379 / Confirmed  HLD (hyperlipidemia) / SNOMED CT 65868676 / Confirmed      Histories   Past Medical History:    Active  Prosthetic hip infection (455321050): Onset on 2018 at 67 years.  Comments:  2018 CDT 7:20 AM CDT - Alisha George  Right hip  Tubulovillous adenoma of colon (912552232): Onset on 2012 at 61 years.  AK (actinic keratosis) (5291583399): Onset on 2011 at 60 years.  CAD (coronary artery disease) (4405627156): Onset in the month of 2009 at 58 years  Comments:  2011 CDT 2:57 PM CDT - Mark Chris MD  dec 2009 angiogram with 50% blockage,, treated with meds  Eczematous dermatitis (58603108)  DJD of shoulder (608240356)  Asthma (822443709)  Insomnia (083331847)  Urinary retention (070384505)  Comments:  2010 CST 6:12 PM CST - Berna Huynh CMA  Post Prostatic Surgery  Nasal polyps (54433181)  Allergic rhinitis (178518848)  Vocal cord dysfunction (296666333)  Bronchiectasis (18364031)  Obese (8666235225)  HLD (hyperlipidemia) (13296172)  Obstructive sleep apnea syndrome in adult (5133729074)  Hip arthritis (859863681)  GERD (gastroesophageal reflux disease) (865238486)  Benign prostatic hyperplasia (980473569)  Resolved  Inpatient stay (259852503): Onset on 2018 at 67 years.  Resolved on 2018 at 67 years.  Comments:  2018 CDT 7:17 AM CDT Alisha Pacheco  @[facility] [place] [place] - 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  Cellulitis of the right thigh.  Inpatient stay (896525568): Onset on 10/11/2011 at 60 years.  Resolved.  Comments:  2018 CDT 7:16 AM TRUONGT Alisha Pacheco  @[facility] [place] - Chest pain, unspecified  Cancer of skin (8719916322):  Resolved.  Otitis media (714497104):  Resolved on 10/8/2010 at 59 years.  Otitis externa (8085860):  Resolved on 10/29/2010 at 59 years.   Family History:    CHF - Congestive heart failure  Father (Phuc, )  Alzheimer's  disease  Father (Phuc, )     Procedure history:    Revision of total hip replacement (506222747) on 2018 at 67 Years.  Comments:  2018 7:26 AM CDT - Alisha George  Right hip revision total hip arthroplasty and extensive irrigation and debridement of the right thigh including muscle, fascia, bone and exchange of the femoral head and polyethylene acetabular liner.  THR - Total hip replacement (883099264) on 2018 at 67 Years.  Comments:  2018 7:21 AM CDT - Alisha George  Right hip  Colonoscopy (984282273) on 2016 at 65 Years.  Comments:  2016 11:48 AM CDT - Jeremiah CAREY, Lizbeth  Tubular adenoma x 3 no high grade dysplasia    2016 9:11 AM CDT - Kate Valdez  Indication: History of adenomatous polyps on last colonoscopy.  Sedation:  MAC.  Recommendation: Repeat in 5 years.  TURP - Transurethral resection of prostate (002132072) on 10/3/2011 at 60 Years.  Colonoscopy (830694808) on 2010 at 59 Years.  Comments:  2010 3:33 PM CST - Karlene Sylvester  4 mm polyp noted at 90 cm in the ascending colon; appearance adenomatous, removed   Recommend colonoscopic follow up in 5 years.   Conscious sedation/ MAC if his medical conditions dictate ASA III for level of sedation for future procedures  Coronary angiogram (86715444) in  at 59 Years.  Green Light Laser - Prostate in the month of 3/2009 at 58 Years.  BL nasal Turbinoplasty in the month of 2008 at 57 Years.  Prostate TUNA surgery in the month of 2007 at 56 Years.  Right shoulder arthroscopy on 10/16/2006 at 55 Years.  Flexible sigmoidoscopy (87772579) on 2005 at 54 Years.  left shoulder arthroscopy in the month of 2005 at 54 Years.  excisional cervical schwannoma on 2001 at 50 Years.  Cholecystectomy (09981566) in the month of 1982 at 31 Years.  Vasectomy (10845016).   Social History:        Alcohol Assessment            Current, Beer (12 oz), 3-4 times per year                     Comments:                       09/26/2011 - Alisha George                     1/month.      Tobacco Assessment            Past      Substance Abuse Assessment            Never      Employment and Education Assessment            Retired                     Comments:                      09/26/2011 - Alisha Georeg                     2005.      Home and Environment Assessment            Marital status: .  1 children.                     Comments:                      09/26/2011 - Alisha George                     1970. Wife - Pauline.      Exercise and Physical Activity Assessment: Regular exercise            Exercise frequency: 3-4 times/week.  Exercise type: Weight lifting, Cardio.                     Comments:                      09/26/2011 Alisha Pacheco                     2-3 times per week.      Sexual Assessment            Sexually active: Yes.  Sexual orientation: Heterosexual.      Physical Examination   Vital Signs   1/11/2019 10:03 AM CST Temperature Tympanic 97.5 DegF  LOW    Peripheral Pulse Rate 80 bpm    Pulse Site Radial artery    Respiratory Rate 16 br/min    Systolic Blood Pressure 110 mmHg    Diastolic Blood Pressure 76 mmHg    Mean Arterial Pressure 87 mmHg    BP Site Right arm    Oxygen Saturation 96 %      Measurements from flowsheet : Measurements   1/11/2019 10:03 AM CST Height Measured - Standard 68 in    Weight Measured - Standard 203 lb    BSA 2.1 m2    Body Mass Index 30.86 kg/m2  HI      General:  No acute distress.    Respiratory:  Lungs are clear to auscultation, pain with palpation along right sternal border and with anterior/posterior rib compression, just slight pain with right lateral rib compression.    Cardiovascular:  Normal rate, Regular rhythm, No murmur.    Psychiatric:  Appropriate mood & affect.       Review / Management   Radiology results   Results reviewed with patient.  Xray shows no fractures or dislocations per over-read by radiologist.       Impression and Plan   Diagnosis      Contusion of rib on right side ([id number] S20.211A).     Summary:  given a comfort rib belt to wear as needed, especially at night  expect this to take 6 weeks to heal  follow up if not improving or worsening.    Orders     Orders   Requests (Radiology):  XR Ribs Unilateral w/ PA chest (Request) (Order): Instructions: right sided rib pain, Contusion of rib on right side.     Orders   Charges (Evaluation and Management):  06236 office outpatient visit 15 minutes (Charge) (Order): Quantity: 1, Contusion of rib on right side.

## 2022-02-16 NOTE — LETTER
(Inserted Image. Unable to display)   [address] [place] [address]  July 09, 2021      MELISSA ROSAS      [address] [place] [address]        Dear MELISSA,    Thank you for selecting [facility] for your healthcare needs.  Below you will find the results of you recent tests done at our clinic.     All of these labs are acceptable and stable. Continue on current medications.      Result Name Current Result Previous Result Reference Range   Sodium Level (mmol/L)  138 7/8/2021  139 7/6/2020 135 - 146   Potassium Level (mmol/L)  4.4 7/8/2021  4.1 7/6/2020 3.5 - 5.3   Chloride Level (mmol/L)  106 7/8/2021  109 7/6/2020 98 - 110   CO2 Level (mmol/L)  24 7/8/2021  25 7/6/2020 20 - 32   Glucose Level (mg/dL)  86 7/8/2021  96 7/6/2020 65 - 99   BUN (mg/dL)  22 7/8/2021 ((H)) 28 7/6/2020 7 - 25   Creatinine Level (mg/dL) ((H)) 1.78 7/8/2021 ((H)) 1.78 7/6/2020 0.70 - 1.18   BUN/Creat Ratio  12 7/8/2021  16 7/6/2020 6 - 22   eGFR (mL/min/1.73m2) ((L)) 37 7/8/2021 ((L)) 38 7/6/2020 > OR = 60 -    Calcium Level (mg/dL)  9.4 7/8/2021  9.2 7/6/2020 8.6 - 10.3   Cholesterol (mg/dL)  148 7/8/2021  153 7/6/2020  - <200   Non-HDL Cholesterol  108 7/8/2021  113 7/6/2020  - <130   HDL (mg/dL)  40 7/8/2021  40 7/6/2020 > OR = 40 -    Cholesterol/HDL Ratio  3.7 7/8/2021  3.8 7/6/2020  - <5.0   LDL  86 7/8/2021  92 7/6/2020    Triglyceride (mg/dL)  119 7/8/2021  118 7/6/2020  - <150   PSA (ng/mL)  2.1 7/8/2021  1.9 7/6/2020  - < OR = 4.0       Please contact me or my assistant at [phone] if you have any questions.     Sincerely,        Brian Husain PA-C    What do your labs mean?  Below is a glossary of commonly ordered labs:  LDL   Bad Cholesterol   HDL   Good Cholesterol  AST/ALT   Liver Function   Cr/Creatinine   Kidney Function  Microalbumin   Kidney Function  BUN   Kidney Function  PSA   Prostate    TSH   Thyroid Hormone  HgbA1c   Diabetes Test   Hgb (Hemoglobin)   Red  Blood Cells

## 2022-02-16 NOTE — TELEPHONE ENCOUNTER
---------------------  From: Mariela Beckford CMA (eRx Pool ([id number]))   To: [facility] Message Pool ([address]_[place] - [place]);     Sent: 3/16/2021 11:01:36 AM CDT  Subject: Zolpidem refill request   Due Date/Time: 3/17/2021 10:12:00 AM CDT     Provider approval needed    PCP: SOPHIE         Medication: zolpidem 5 mg oral tablet  Sig: TAKE ONE TABLET BY MOUTH AT BEDTIME AS NEEDED FOR SLEEP    Last filled:  12/28/2020           Quantity:   30        Refills: 1    Date of last visit: 12/10/2020 rash with [facility]OP[facility]  Controlled Substance Agreement on file and date/HCP: DELANO  Patient due for follow up:     Note: Please advise on refill and if pt needs apt   before July    (Return to clinic order placed?) yes due in July 2021    Please advise if refill appropriate and any additional refills        ------------------------------------------  From: [id number]  To: Mark Chris MD  Sent: March 16, 2021 10:12:56 AM CDT  Subject: Medication Management  Due: March 16, 2021 4:14:43 PM CDT     ** On Hold Pending Signature **     Drug: zolpidem (zolpidem 5 mg oral tablet), TAKE ONE TABLET BY MOUTH AT BEDTIME AS NEEDED FOR SLEEP  Quantity: 30 unknown unit  Days Supply: 30  Refills: 0  Substitutions Allowed  Notes from Pharmacy:     Dispensed Drug: zolpidem (zolpidem 5 mg oral tablet), TAKE ONE TABLET BY MOUTH AT BEDTIME AS NEEDED FOR SLEEP  Quantity: 30 unknown unit  Days Supply: 30  Refills: 1  Substitutions Allowed  Notes from Pharmacy:  ---------------------------------------------------------------  From: Nicole Umanzor CMA ([facility] Message Pool ([facility]))   To: Mark Chris MD;     Sent: 3/16/2021 11:06:27 AM CDT  Subject: FW: Zolpidem refill request   Due Date/Time: 3/17/2021 10:12:00 AM CDT  ** Submitted: **  Order:zolpidem (zolpidem 5 mg oral tablet)  1 tab(s)  Oral  hs  Qty:  30 tab(s)        Refills:  1          Substitutions Allowed     PRN  for sleep      Route To Pharmacy - Family  [id number]    Signed by Mark Chris MD  3/16/2021 4:30:00 PM [facility]---------------------  From: Mark Chris MD   To: [facility] Message Pool ([address]_[place]);     Sent: 3/16/2021 11:31:07 AM CDT  Subject: RE: Zolpidem refill request     filled

## 2022-02-16 NOTE — TELEPHONE ENCOUNTER
Entered by Mariam Johnson CMA on October 12, 2020 8:02:38 AM CDT  ---------------------  From: Mariam Johnson CMA   To: [place] [address]    Sent: 10/12/2020 8:02:38 AM CDT  Subject: Medication Management     ** Submitted: **  Order:clopidogrel (clopidogrel 75 mg oral tablet)  1 tab(s)  Oral  daily  Qty:  90 tab(s)        Refills:  2          Substitutions Allowed     Route To Pharmacy [place] [address]    Signed by Mariam Johnson CMA  10/12/2020 1:01:00 PM UT    ** Submitted: **  Complete:clopidogrel (clopidogrel 75 mg oral tablet)   Signed by Mariam Johnson CMA  10/12/2020 1:02:00 PM UT    ** Not Approved:  **  clopidogrel (CLOPIDOGREL BISULFATE 75MG TABS)  TAKE ONE TABLET BY MOUTH EVERY DAY  Qty:  90 unknown unit        Days Supply:  90        Refills:  0          Substitutions Allowed     Route To [place] [address]   Signed by Mariam Johnson CMA            ------------------------------------------  From: [place] [address]  To: Mark Chris MD  Sent: October 9, 2020 6:47:57 PM CDT  Subject: Medication Management  Due: October 8, 2020 2:04:31 PM CDT     ** On Hold Pending Signature **     Drug: clopidogrel (clopidogrel 75 mg oral tablet), TAKE ONE TABLET BY MOUTH EVERY DAY  Quantity: 90 unknown unit  Days Supply: 90  Refills: 0  Substitutions Allowed  Notes from Pharmacy:     Dispensed Drug: clopidogrel (clopidogrel 75 mg oral tablet), TAKE ONE TABLET BY MOUTH EVERY DAY  Quantity: 90 unknown unit  Days Supply: 90  Refills: 0  Substitutions Allowed  Notes from Pharmacy:  ------------------------------------------Med Refill      Date of last office visit and reason:  7/27/20; AWV      Date of last Med Check / Px:   7/27/20  Date of last labs pertaining to med:  7/6/20    RTC order in chart:  yes; July    For Protocol refill, has patient been contacted:  n/a

## 2022-02-16 NOTE — TELEPHONE ENCOUNTER
Entered by Zulay Scott MA on April 29, 2020 4:14:36 PM CDT  ---------------------  From: Zulay Scott MA   To: [place] [address]    Sent: 4/29/2020 4:14:36 PM CDT  Subject: Medication Management     ** Submitted: **  Order:amLODIPine (amLODIPine 10 mg oral tablet)  1 tab(s)  Oral  daily  Qty:  90 tab(s)        Refills:  0          Substitutions Allowed     Route To Pharmacy - [place] [address]    Signed by Zulay Scott MA  4/29/2020 9:14:00 PM    ** Submitted: **  Complete:amLODIPine (amLODIPine 10 mg oral tablet)   Signed by Zulay Scott MA  4/29/2020 9:14:00 PM    ** Not Approved:  **  amLODIPine (AMLODIPINE BESYLATE 10MG TABS)  TAKE ONE TABLET BY MOUTH EVERY DAY  Qty:  90 unknown unit        Days Supply:  90        Refills:  3          Substitutions Allowed     Route To Pharmacy [place] [address]   Signed by Zulay Scott MA            ------------------------------------------  From: [place] [address]  To: Mark Chris MD  Sent: April 28, 2020 8:27:53 PM CDT  Subject: Medication Management  Due: April 29, 2020 8:27:53 PM CDT    ** On Hold Pending Signature **  Drug: amLODIPine (amLODIPine 10 mg oral tablet)  TAKE ONE TABLET BY MOUTH EVERY DAY  Quantity: 90 unknown unit  Days Supply: 90  Refills: 3  Substitutions Allowed  Notes from Pharmacy:     Dispensed Drug: amLODIPine (amLODIPine 10 mg oral tablet)  TAKE ONE TABLET BY MOUTH EVERY DAY  Quantity: 90 unknown unit  Days Supply: 90  Refills: 3  Substitutions Allowed  Notes from Pharmacy:   ------------------------------------------Hussain BARRIOS 7/13/2020 for DAILY.

## 2022-02-16 NOTE — NURSING NOTE
Home Safety Screen Entered On:  7/22/2021 1:31 PM CDT    Performed On:  7/22/2021 1:31 PM CDT by Ivelisse Hernandez CMA               Home Safety Screen   Emergency Numbers Kept/Updated :   Yes   Aware of Smoking Dangers :   Yes   Smoke Alarms/Fire Extinguisher Available :   Yes   Household Members Fire Safety Knowledge :   Yes   Firearms Unloaded and Secure :   Yes   Floor Rugs Removed or Fastened :   Yes   Mats in Bathtub/Shower :   Yes   Stairway Rails or Banisters :   Yes   Outdoor Clutter Safety :   Yes   Indoor Clutter Safety :   Yes   Electrical Cord Safety :   Yes   Ivelisse Hernandez CMA - 7/22/2021 1:31 PM CDT

## 2022-02-16 NOTE — NURSING NOTE
Geriatric Depression Screening Entered On:  7/22/2021 1:28 PM CDT    Performed On:  7/22/2021 1:27 PM CDT by Ivelisse Hernandez CMA               Geriatric Depression Screening   Geriatric Depression Satisfied Life :   Yes   Geriatric Depression Dropped Activities :   No   Geriatric Depression Life Empty :   No   Geriatric Depression Bored :   No   Geriatric Depression Good Spirits :   Yes   Geriatric Depression Afraid Bad Things :   No   Geriatric Depression Feel Happy :   Yes   Geriatric Depression Feel Helpless :   No   Geriatric Depression Prefer to Stay Home :   No   Geriatric Depression Memory Problems :   No   Geriatric Depression Wonderful Be Alive :   Yes   Geriatric Depression Feel Worthless :   No   Geriatric Depression Situation Hopeless :   No   Geriatric Depression Others Better Off :   No   Geriatric Depression Full of Energy :   Yes   Geriatric Depression Total Score :   0    Ivelisse Hernandez CMA - 7/22/2021 1:27 PM CDT

## 2022-02-16 NOTE — PROGRESS NOTES
Patient:   MELISSA ROSAS            MRN: [id number]            FIN: [id number]               Age:   67 years     Sex:  Male     :  1950   Associated Diagnoses:   HTN, goal below 140/90; Obese   Author:   Mark Chris MD      Chief Complaint   2018 12:12 PM CDT   BP still high after switching med        History of Present Illness   see chief complaint as noted above and confirmed with the patient   67 year old male presenting with high blood pressure readings. His Valsartan was recalled and he had to stop taking it 10 days . He has been taking his blood pressure at home on a automatic machine. He's not sure if it's been right because his machine is 10 years old. He has a lump on his right arm . He noticed it a couple weeks ago. Denies any pain or irritiation.  see chief complaint as noted above and confirmed with the patient      Review of Systems   Constitutional:  No fever, No chills, No fatigue.    Respiratory:  No shortness of breath, No cough.    Cardiovascular:  No chest pain.    Gastrointestinal:  No nausea, No vomiting.    Musculoskeletal:  No back pain.    Integumentary:  Skin lesion, No rash.    Neurologic:  No headache.              Health Status   Allergies:    Allergic Reactions (Selected)  Severity Not Documented  Aspirin (Nasal polyps)  Doxycycline (Sun rash)  Shailesh (Hives)  Zymetrin shampo (Rash)   Medications:  (Selected)   Prescriptions  Prescribed  Advair Diskus 250 mcg-50 mcg inhalation powder: 1 puff(s), inh, bid, # 60 EA, 11 Refill(s), Type: Maintenance, Pharmacy: [facility] [place], 1 puff(s) inh bid  Nitrostat 0.4 mg sublingual tablet: See Instructions, Instructions: PLACE 1 TABLET UNDER TONGUE FOR CHEST PAIN TO A MAX OF 3 TABS-ONE EVERY 5 MINUTES - IF NO RELIEF CALL 911, # 25 unknown unit, 3 Refill(s), Type: Soft Stop, Pharmacy: [facility] [place]  Patanol 0.1% ophthalmic solution: 2 gtts, Both eyes, BID, # 5 mL, 3 Refill(s), Type: Maintenance,  Pharmacy: [id number], 2 gtts both eyes bid  Ventolin HFA 90 mcg/inh inhalation aerosol: 2 puff(s), inh, q4 hrs, PRN: for cough, # 1 EA, 11 Refill(s), Type: Maintenance, Pharmacy: [facility], 2 puff(s) inh q4 hrs,PRN:for cough  albuterol 2.5 mg/3 mL (0.083%) inhalation solution: 3 mL ( 2.5 mg ), neb, q4-6 hrs, PRN: as needed for cough, # 1 box(es), 1 Refill(s), Type: Maintenance, Pharmacy: [facility], 3 mL neb q4-6 hrs,PRN:as needed for cough  amLODIPine 10 mg oral tablet: 1 tab(s) ( 10 mg ), po, daily, # 90 tab(s), 3 Refill(s), Type: Maintenance, Pharmacy: [facility], 1 tab(s) po daily,x90 day(s)  atorvastatin 20 mg oral tablet: 1 tab(s) ( 20 mg ), po, daily, # 90 tab(s), 3 Refill(s), Type: Maintenance, Pharmacy: [facility], 1 tab(s) po daily,x90 day(s)  clopidogrel 75 mg oral tablet: 1 tab(s) ( 75 mg ), po, daily, # 90 tab(s), 3 Refill(s), Type: Maintenance, Pharmacy: [facility], 1 tab(s) po daily,x90 day(s)  finasteride 5 mg oral tablet: 1 tab(s) ( 5 mg ), po, daily, # 90 tab(s), 3 Refill(s), Type: Maintenance, Pharmacy: [facility], 1 tab(s) po daily,x90 day(s)  gabapentin 300 mg oral capsule: 1 cap(s) ( 300 mg ), po, daily, # 90 cap(s), 3 Refill(s), Type: Maintenance, Pharmacy: [facility], Pt is now due for annual med check per [facility] for further refills., 1 cap(s) po daily,x90 day(s)  hydroCHLOROthiazide 25 mg oral tablet: 1 tab(s) ( 25 mg ), PO, Daily, # 90 tab(s), 1 Refill(s), Type: Maintenance, Pharmacy: [facility], 1 tab(s) Oral daily  losartan 100 mg oral tablet: 1 tab(s) ( 100 mg ), Oral, daily, # 90 tab(s), 0 Refill(s), Type: Maintenance, Pharmacy: [facility], alternative for valsartan, 1 tab(s) Oral daily  pantoprazole 40 mg oral delayed release tablet: 1 tab(s) ( 40 mg ), po, daily, # 90 tab(s), 3 Refill(s),  Type: Maintenance, Pharmacy: [facility] [place], 1 tab(s) po daily,x90 day(s)  triamcinolone 0.1% topical ointment: 1 ree, TOP, TID, # 240 gm, 1 Refill(s), Type: Maintenance, Pharmacy: [id number], 1 ree top tid  Documented Medications  Documented  Levaquin: q 24 hrs, 0 Refill(s), Type: Maintenance  Rapaflo 8 mg oral capsule: 1 cap(s) ( 8 mg ), po, daily, 0 Refill(s), Type: Maintenance  Vitamin D3: 0 Refill(s), Type: Maintenance  Zyrtec 10 mg oral tablet: 1 tab(s) ( 10 mg ), PO, Daily, 0  doxepin 10 mg oral capsule: 1 cap(s) ( 10 mg ), po, hs, Instructions: Hospital discharge, 0 Refill(s), Type: Maintenance  rifAMPin 300 mg oral capsule: 1 cap(s) ( 300 mg ), po, bid, Instructions: per infectious disease doctor, take 1 tab BID for the next 3 months, 0 Refill(s), Type: Maintenance  senna 8.6 mg oral tablet: 2 tab(s) ( 17.2 mg ), PO, Once a day (at bedtime), PRN: for constipation, # 20 tab(s), 0 Refill(s), Type: Maintenance,    Medications          *denotes recorded medication          Ventolin HFA 90 mcg/inh inhalation aerosol: 2 puff(s), inh, q4 hrs, PRN: for cough, 1 EA, 11 Refill(s).          albuterol 2.5 mg/3 mL (0.083%) inhalation solution: 2.5 mg, 3 mL, neb, q4-6 hrs, PRN: as needed for cough, 1 box(es), 1 Refill(s).          amLODIPine 10 mg oral tablet: 10 mg, 1 tab(s), po, daily, for 90 day(s), 90 tab(s), 3 Refill(s).          atorvastatin 20 mg oral tablet: 20 mg, 1 tab(s), po, daily, for 90 day(s), 90 tab(s), 3 Refill(s).          *Zyrtec 10 mg oral tablet: 10 mg, 1 tab(s), PO, Daily.          *Vitamin D3: 0 Refill(s).          clopidogrel 75 mg oral tablet: 75 mg, 1 tab(s), po, daily, for 90 day(s), 90 tab(s), 3 Refill(s).          *doxepin 10 mg oral capsule: 10 mg, 1 cap(s), po, hs, Hospital discharge, 0 Refill(s).          finasteride 5 mg oral tablet: 5 mg, 1 tab(s), po, daily, for 90 day(s), 90 tab(s), 3 Refill(s).          Advair Diskus 250 mcg-50 mcg inhalation powder:  1 puff(s), inh, bid, 60 EA, 11 Refill(s).          gabapentin 300 mg oral capsule: 300 mg, 1 cap(s), po, daily, for 90 day(s), 90 cap(s), 3 Refill(s).          hydroCHLOROthiazide 25 mg oral tablet: 25 mg, 1 tab(s), PO, Daily, 90 tab(s), 1 Refill(s).          *Levaquin: q 24 hrs, 0 Refill(s).          losartan 100 mg oral tablet: 100 mg, 1 tab(s), Oral, daily, 90 tab(s), 0 Refill(s).          Nitrostat 0.4 mg sublingual tablet: See Instructions, PLACE 1 TABLET UNDER TONGUE FOR CHEST PAIN TO A MAX OF 3 TABS-ONE EVERY 5 MINUTES - IF NO RELIEF CALL 911, 25 unknown unit, 3 Refill(s).          Patanol 0.1% ophthalmic solution: 2 gtts, Both eyes, BID, 5 mL.          pantoprazole 40 mg oral delayed release tablet: 40 mg, 1 tab(s), po, daily, for 90 day(s), 90 tab(s), 3 Refill(s).          *rifAMPin 300 mg oral capsule: 300 mg, 1 cap(s), po, bid, per infectious disease doctor, take 1 tab BID for the next 3 months, 0 Refill(s).          *senna 8.6 mg oral tablet: 17.2 mg, 2 tab(s), PO, Once a day (at bedtime), PRN: for constipation, 20 tab(s), 0 Refill(s).          *Rapaflo 8 mg oral capsule: 8 mg, 1 cap(s), po, daily, 0 Refill(s).          triamcinolone 0.1% topical ointment: 1 ree, TOP, TID, 240 gm.     Problem list:    All Problems  Eczematous dermatitis / 83866132 / Confirmed  DJD of shoulder / 104550797 / Confirmed  Asthma / 554387149 / Confirmed  Insomnia / 148813348 / Confirmed  CAD (coronary artery disease) / 0667012427 / Confirmed  dec 2009 angiogram with 50% blockage,, treated with meds  Urinary retention / 087268299 / Confirmed  Post Prostatic Surgery  Nasal polyps / 95675592 / Confirmed  Allergic rhinitis / 571323168 / Confirmed  Vocal cord dysfunction / 489575898 / Confirmed  Bronchiectasis / 18987695 / Confirmed  Obese / 7627094985 / Probable  AK (actinic keratosis) / 6419843443 / Confirmed  HLD (hyperlipidemia) / 17999309 / Confirmed  Obstructive sleep apnea syndrome in adult / 2338744342 /  Confirmed  Tubulovillous adenoma of colon / 960925032 / Confirmed  Hip arthritis / 076043854 / Confirmed  Diverticulitis / 444894125 / Confirmed  Hx of malignant skin melanoma / 6417587977 / Confirmed  GERD (gastroesophageal reflux disease) / 238913409 / Confirmed  Prosthetic hip infection / 365852006 / Confirmed  Right hip  Benign prostatic hyperplasia / 751051500 / Confirmed  HTN, goal below 140/90 / 6812564680 / Confirmed  Inactive: Tobacco abuse / 305.1  Resolved: Cancer of skin / 7734130877  Resolved: Otitis media / 112693727  Resolved: Otitis externa / 8748466  Resolved: Inpatient stay / 374196188  @[facility], [place] - Chest pain, unspecified  Resolved: Inpatient stay / 699383330  @[facility], [place] - 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  Cellulitis of the right thigh.  Canceled: Hypertension / 5563414419      Histories   Past Medical History:    Active  Prosthetic hip infection (537704013): Onset on 4/22/2018 at 67 years.  Comments:  4/30/2018 CDT 7:20 AM CDT - Alisha George  Right hip  Tubulovillous adenoma of colon (042928980): Onset on 9/20/2012 at 61 years.  AK (actinic keratosis) (7475370980): Onset on 8/16/2011 at 60 years.  CAD (coronary artery disease) (6839598281): Onset in the month of 12/2009 at 58 years  Comments:  9/28/2011 CDT 2:57 PM CDT - Mark Chris MD  dec 2009 angiogram with 50% blockage,, treated with meds  Eczematous dermatitis (87295039)  DJD of shoulder (140077517)  Asthma (916422899)  Insomnia (108133814)  Urinary retention (940758823)  Comments:  1/25/2010 CST 6:12 PM CST - Berna Huynh CMA  Post Prostatic Surgery  Nasal polyps (08136231)  Allergic rhinitis (814060493)  Vocal cord dysfunction (634762003)  Bronchiectasis (35610029)  Obese (6920947748)  HLD (hyperlipidemia) (52943723)  Obstructive sleep apnea syndrome in adult (0134635620)  Hip arthritis (185179793)  GERD (gastroesophageal reflux disease) (886871574)  Benign prostatic hyperplasia  (976408727)  Resolved  Inpatient stay (237574964): Onset on 2018 at 67 years.  Resolved on 2018 at 67 years.  Comments:  2018 CDT 7:17 AM CDT - Alisha George  @[facility], [place] - 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  Cellulitis of the right thigh.  Inpatient stay (198842902): Onset on 10/11/2011 at 60 years.  Resolved.  Comments:  2018 CDT 7:16 AM CDT - Alisha George  @[facility], [place] - Chest pain, unspecified  Cancer of skin (7443731823):  Resolved.  Otitis media (604396001):  Resolved on 10/8/2010 at 59 years.  Otitis externa (3909970):  Resolved on 10/29/2010 at 59 years.   Family History:    CHF - Congestive heart failure  Father (Phuc, )  Alzheimer's disease  Father (Phuc, )     Procedure history:    Revision of total hip replacement (SNOMED CT 235497039) on 2018 at 67 Years.  Comments:  2018 7:26 AM - Comfort Georgeri  Right hip revision total hip arthroplasty and extensive irrigation and debridement of the right thigh including muscle, fascia, bone and exchange of the femoral head and polyethylene acetabular liner.  THR - Total hip replacement (SNOMED CT 493938675) on 2018 at 67 Years.  Comments:  2018 7:21 AM - Alisha George  Right hip  Colonoscopy (SNOMED CT 793578661) performed by Herson Ocampo MD on 2016 at 65 Years.  Comments:  2016 11:48 AM - Lizbeth Daniels RN  Tubular adenoma x 3 no high grade dysplasia    2016 9:11 AM - Kate Valdez  Indication: History of adenomatous polyps on last colonoscopy.  Sedation:  MAC.  Recommendation: Repeat in 5 years.  TURP - Transurethral resection of prostate (SNOMED CT 110081590) performed by Vinod Lipscomb MD on 10/3/2011 at 60 Years.  Colonoscopy (SNOMED CT 199531204) performed by Herson Ocampo MD on 2010 at 59 Years.  Comments:  2010 3:33 PM - Karlene Sylvester  4 mm polyp noted at 90 cm in the ascending colon; appearance adenomatous,  removed   Recommend colonoscopic follow up in 5 years.   Conscious sedation/ MAC if his medical conditions dictate ASA III for level of sedation for future procedures  Coronary angiogram (SNOMED CT 00260093) in 2009 at 59 Years.  Green Light Laser - Prostate in the month of 3/2009 at 58 Years.  BL nasal Turbinoplasty in the month of 1/2008 at 57 Years.  Prostate TUNA surgery in the month of 2/2007 at 56 Years.  Right shoulder arthroscopy on 10/16/2006 at 55 Years.  Flexible sigmoidoscopy (SNOMED CT 37540310) on 7/8/2005 at 54 Years.  left shoulder arthroscopy in the month of 7/2005 at 54 Years.  excisional cervical schwannoma on 11/30/2001 at 50 Years.  Cholecystectomy (SNOMED CT 64971094) in the month of 2/1982 at 31 Years.  Vasectomy (SNOMED CT 02226578).   Social History:        Alcohol Assessment            Current, Beer (12 oz), 3-4 times per year                     Comments:                      09/26/2011 - Alisha George                     1/month.      Tobacco Assessment            Past      Substance Abuse Assessment            Never      Employment and Education Assessment            Retired                     Comments:                      09/26/2011 Alisha Pacheco                     2005.      Home and Environment Assessment            Marital status: .  1 children.                     Comments:                      09/26/2011 Alisha Pacheco                     1970. Wife - Pauline.      Exercise and Physical Activity Assessment: Regular exercise            Exercise frequency: 3-4 times/week.  Exercise type: Weight lifting, Cardio.                     Comments:                      09/26/2011 Alisha Pacheco                     2-3 times per week.      Sexual Assessment            Sexually active: Yes.  Sexual orientation: Heterosexual.        Physical Examination   Vital Signs   8/14/2018 12:12 PM CDT Temperature Tympanic 98.8 DegF    Peripheral Pulse Rate 80 bpm    HR Method Manual     Systolic Blood Pressure 120 mmHg    Diastolic Blood Pressure 78 mmHg    Mean Arterial Pressure 92 mmHg    BP Site Right arm    BP Method Manual      Measurements from flowsheet : Measurements   8/14/2018 12:12 PM CDT   Weight Measured - Standard                197 lb     General:  Alert and oriented, No acute distress.    Eye:  Pupils are equal, round and reactive to light, Normal conjunctiva.    HENT:  Oral mucosa is moist.    Neck:  Supple.    Respiratory:  Respirations are non-labored.    Cardiovascular:  Normal rate, Regular rhythm, No edema.    Gastrointestinal:  Non-distended.    Musculoskeletal:  Normal gait.    Integumentary:  Warm, No rash.    Psychiatric:  Cooperative, Appropriate mood & affect, Normal judgment.       Review / Management   Results review      Impression and Plan   Diagnosis     HTN, goal below 140/90 (GLZ15-UZ I10).     Obese (NAS07-JX E66.9).     Plan:  will add hctz due to several reading at home of 160 systolic  continue to monitor.

## 2022-02-16 NOTE — TELEPHONE ENCOUNTER
---------------------  From: Selin Lockwood CMA (Corazon Pool ([id number]_[facility]))   To: ZIM Message Pool ([id number]_[place]);     Sent: 7/20/2020 1:15:22 PM CDT  Subject: FW: Medication Management   Due Date/Time: 7/18/2020 5:50:00 PM CDT           Entered by Selin Lockwood CMA on July 20, 2020 1:15:02 PM CDT  appt scheduled 7/27/2020- AWV      ------------------------------------------  From: [facility]  To: Mark Chris MD  Sent: July 17, 2020 5:50:57 PM CDT  Subject: Medication Management  Due: June 24, 2020 10:12:23 PM CDT     ** On Hold Pending Signature **     Drug: silodosin (silodosin 8 mg oral capsule), TAKE ONE CAPSULE BY MOUTH EVERY DAY  Quantity: 30 unknown unit  Days Supply: 30  Refills: 0  Substitutions Allowed  Notes from Pharmacy:     Dispensed Drug: silodosin (silodosin 8 mg oral capsule), TAKE ONE CAPSULE BY MOUTH EVERY DAY  Quantity: 30 unknown unit  Days Supply: 30  Refills: 0  Substitutions Allowed  Notes from Pharmacy:  ---------------------------------------------------------------  From: Nicole Umanzor CMA   To: [id number]    Sent: 7/20/2020 2:15:21 PM CDT  Subject: FW: Medication Management     ** Submitted: **  Order:silodosin (silodosin 8 mg oral capsule)  1 cap(s)  Oral  daily  Qty:  30 unknown unit        Days Supply:  30        Refills:  0          Substitutions Allowed     Route To Pharmacy - [id number]    Signed by Nicole Umanzor CMA  7/20/2020 7:15:00 PM [facility]    ** Submitted: **  Complete:silodosin (silodosin 8 mg oral capsule)   Signed by Nicole Umanzor CMA  7/20/2020 7:15:00 PM [facility]    ** Not Approved:  **  silodosin (SILODOSIN 8MG CAPS)  TAKE ONE CAPSULE BY MOUTH EVERY DAY  Qty:  30 unknown unit        Days Supply:  30        Refills:  0          Substitutions Allowed     Route To Pharmacy - [facility]   Signed by Nicole Umanzor CMA

## 2022-02-16 NOTE — LETTER
(Inserted Image. Unable to display)   April 03, 2019        MELISSA ROSAS      [address] [place], [place] [id number]        Dear MELISSA,    Thank you for choosing [facility] for your healthcare needs. Below you will find the results of your recent test(s) done at our clinic.      Your hemoglobin is improved but not quite back to normal yet.  Your cholesterol results are good.   The kidneys are showing some stress but are stable.      Result Name Current Result Previous Result Reference Range   Sodium Level (mmol/L)  139 4/2/2019  138 5/15/2018 135 - 146   Potassium Level (mmol/L)  4.0 4/2/2019 ((L)) 3.4 5/15/2018 3.5 - 5.3   Chloride Level (mmol/L)  104 4/2/2019  106 5/15/2018 98 - 110   CO2 Level (mmol/L)  26 4/2/2019  21 5/15/2018 20 - 32   Glucose Level (mg/dL)  87 4/2/2019  99 5/15/2018 65 - 99   BUN (mg/dL) ((H)) 30 4/2/2019  13 5/15/2018 7 - 25   Creatinine Level (mg/dL) ((H)) 1.92 4/2/2019 ((H)) 1.95 5/15/2018 0.70 - 1.18   BUN/Creat Ratio  16 4/2/2019 ((L)) 7 5/15/2018 6 - 22   eGFR (mL/min/1.73m2) ((L)) 35 4/2/2019 ((L)) 50 3/22/2018 > OR = 60 -    Calcium Level (mg/dL)  9.5 4/2/2019  8.9 5/15/2018 8.6 - 10.3   Cholesterol (mg/dL)  159 4/2/2019  132 3/22/2018  - <200   Non-HDL Cholesterol  122 4/2/2019  90 3/22/2018  - <130   HDL (mg/dL) ((L)) 37 4/2/2019  42 3/22/2018 >40 -    Cholesterol/HDL Ratio  4.3 4/2/2019  3.1 3/22/2018  - <5.0   LDL  93 4/2/2019  74 3/22/2018    Triglyceride (mg/dL) ((H)) 193 4/2/2019  84 3/22/2018  - <150   PSA (ng/mL)  2.6 4/2/2019  2.0 5/25/2017  - < OR = 4.0   WBC  6.0 4/2/2019  4.6 5/15/2018 3.8 - 10.8   RBC ((L)) 3.88 4/2/2019 ((L)) 3.16 5/15/2018 4.20 - 5.80   Hgb (gm/dL) ((L)) 12.8 4/2/2019 ((L)) 10.1 5/15/2018 13.2 - 17.1   Hct (%) ((L)) 36.5 4/2/2019 ((L)) 29.8 5/15/2018 38.5 - 50.0   MCV (fL)  94.1 4/2/2019  94 5/15/2018 80.0 - 100.0   MCH (pg)  33.0 4/2/2019  32.0 5/15/2018 27.0 - 33.0   MCHC (gm/dL)  35.1 4/2/2019  33.9 5/15/2018 32.0 - 36.0    RDW (%)  13.3 4/2/2019  13.1 5/15/2018 11.0 - 15.0   Platelet  312 4/2/2019  256 5/15/2018 140 - 400   MPV (fL)  10.9 4/2/2019  10.2 5/15/2018 7.5 - 12.5       Please contact me or my assistant at [phone] if you have any questions or concerns.     Sincerely,        Mark Chris MD        What do your labs mean?  Below is a glossary of commonly ordered labs:  LDL   Bad Cholesterol   HDL   Good Cholesterol  AST/ALT   Liver Function   Cr/Creatinine   Kidney Function  Microalbumin   Kidney Function  BUN   Kidney Function  PSA   Prostate    TSH   Thyroid Hormone  HgbA1c   Diabetes Test   Hgb (Hemoglobin)   Red Blood Cells

## 2022-02-16 NOTE — TELEPHONE ENCOUNTER
Entered by Darrius Kirby on March 31, 2020 10:10:39 AM CDT  ---------------------  From: Darrius Kirby   To: [place] [id number]    Sent: 3/31/2020 10:10:38 AM CDT  Subject: Medication Management     ** Submitted: **  Order:losartan (losartan 100 mg oral tablet)  See Instructions  TAKE ONE TABLET BY MOUTH EVERY DAY  Qty:  90 tab(s)        Refills:  0          Substitutions Allowed     Route To [place] [address]    Signed by Darrius Kirby  3/31/2020 3:10:00 PM    ** Submitted: **  Complete:losartan (losartan 100 mg oral tablet)   Signed by Darrius Kirby  3/31/2020 3:10:00 PM    ** Submitted: **  Complete:losartan (losartan 100 mg oral tablet)   Signed by Darrius Kirby  3/31/2020 3:10:00 PM    ** Not Approved:  **  losartan (LOSARTAN POTASSIUM 100MG TABS)  TAKE ONE TABLET BY MOUTH EVERY DAY  Qty:  90 unknown unit        Days Supply:  90        Refills:  3          Substitutions Allowed     Route To [place] [address]   Signed by Darrius Kirby            ------------------------------------------  From: [place] [address]  To: Mark Chris MD  Sent: March 30, 2020 7:28:26 PM CDT  Subject: Medication Management  Due: March 31, 2020 7:28:26 PM CDT    ** On Hold Pending Signature **  Drug: losartan (losartan 100 mg oral tablet)  TAKE ONE TABLET BY MOUTH EVERY DAY  Quantity: 90 unknown unit  Days Supply: 90  Refills: 3  Substitutions Allowed  Notes from Pharmacy:     Dispensed Drug: losartan (losartan 100 mg oral tablet)  TAKE ONE TABLET BY MOUTH EVERY DAY  Quantity: 90 unknown unit  Days Supply: 90  Refills: 3  Substitutions Allowed  Notes from Pharmacy:   ------------------------------------------Med Refill      Date of last office visit and reason:  02/03/2020; asthma flare                                  Date of last Med Check / Px:   08/14/2019; Med check   Date of last labs pertaining  to med:  04/02/2019    RTC order in chart:  Yes, due April 2020.    For Protocol refill, has patient been contacted:  No, due to COVID-19 prevention sent 3 month supply to pharmacy. Will need f/up appt for further refills. Note sent to pharmacy.

## 2022-02-16 NOTE — PROGRESS NOTES
Patient:   MELISSA ROSAS            MRN: [id number]            FIN: [id number]               Age:   69 years     Sex:  Male     :  1950   Associated Diagnoses:   Asthma flare   Author:   Brian Husain PA-C.      Chief Complaint   2/3/2020 11:47 AM CST    Pt is here c/o continued URI. Was seen last week and prescribed Prednisone, Atnibiotic. Has gotten worse. C/o wheezing, coughing, ear pressure. Has hx of pneumonia. Has had symptoms x2 weeks.        History of Present Illness   Chief complaint and symptoms noted above and confirmed with patient   was seen on  and prescribed zpak and 5 days of prednisone  he feels worse,  worried about pneumonia  has hx of asthma, is using his albuterol MDI         Review of Systems   Constitutional:  Chills, Sweats.    Ear/Nose/Mouth/Throat:  Nasal congestion.    Respiratory:  Cough, Wheezing.       Health Status   Allergies:    Allergic Reactions (Selected)  Severity Not Documented  Aspirin (Nasal polyps)  Doxycycline (Sun rash)  Shailesh (Hives)  Zymetrin shampo (Rash)   Medications:  (Selected)   Prescriptions  Prescribed  Advair Diskus 250 mcg-50 mcg inhalation powder: 1 puff(s), inh, bid, # 60 EA, 11 Refill(s), Type: Maintenance, Pharmacy: [email or web address], 1 puff(s) Inhale bid  Patanol 0.1% ophthalmic solution: 2 gtts, Both eyes, BID, # 5 mL, 3 Refill(s), Type: Maintenance, Pharmacy: [facility] [employer] [id number], 2 gtts both eyes bid  Rapaflo 8 mg oral capsule: = 1 cap(s) ( 8 mg ), Oral, daily, Instructions: Patient requests name brand Rapaflo, # 90 cap(s), 3 Refill(s), GREGORIO, Type: Maintenance, Pharmacy: [email or web address], 1 cap(s) Oral daily,x90 day(s),Instr:Patient requests name brand Rapaflo  Ventolin HFA 90 mcg/inh inhalation aerosol: 2 puff(s), inh, q4 hrs, PRN: for cough, # 1 EA, 11 Refill(s), Type: Maintenance, Pharmacy: [id number], 2 puff(s) Inhale q4 hrs,PRN:for cough  albuterol 2.5 mg/3 mL (0.083%) inhalation solution: = 3  mL ( 2.5 mg ), neb, q4-6 hrs, PRN: as needed for cough, # 1 box(es), 1 Refill(s), Type: Maintenance, Pharmacy: [place] [address], 3 mL NEB q4-6 hrs,PRN:as needed for cough  amLODIPine 10 mg oral tablet: = 1 tab(s) ( 10 mg ), po, daily, # 90 tab(s), 3 Refill(s), Type: Maintenance, Pharmacy: [place] [address], 1 tab(s) Oral daily,x90 day(s)  atorvastatin 20 mg oral tablet: = 1 tab(s) ( 20 mg ), po, daily, # 90 tab(s), 3 Refill(s), Type: Maintenance, Pharmacy: [place] [address], 1 tab(s) Oral daily,x90 day(s)  clopidogrel 75 mg oral tablet: = 1 tab(s) ( 75 mg ), po, daily, # 90 tab(s), 3 Refill(s), Type: Maintenance, Pharmacy: [place] [address], 1 tab(s) Oral daily,x90 day(s)  finasteride 5 mg oral tablet: = 1 tab(s), Oral, daily, # 90 tab(s), 3 Refill(s), Type: Maintenance, Pharmacy: [place] [address], 1 tab(s) Oral daily,x90 day(s)  gabapentin 300 mg oral capsule: = 1 cap(s) ( 300 mg ), po, daily, # 90 cap(s), 3 Refill(s), Type: Maintenance, Pharmacy: [place] [address], 1 cap(s) Oral daily,x90 day(s)  hydroCHLOROthiazide 25 mg oral tablet: = 1 tab(s), Oral, daily, # 90 tab(s), 3 Refill(s), Type: Maintenance, Pharmacy: [place] [address], 1 tab(s) Oral daily,x90 day(s)  losartan 100 mg oral tablet: 1 tab(s) ( 100 mg ), Oral, daily, # 90 tab(s), 0 Refill(s), Type: Maintenance, [facility], alternative for valsartan, 1 tab(s) Oral daily  losartan 100 mg oral tablet: = 1 tab(s) ( 100 mg ), Oral, daily, # 90 tab(s), 3 Refill(s), Type: Soft Stop, Pharmacy: [facility], 1 tab(s) Oral daily,x90 day(s)  nitroglycerin 0.4 mg sublingual tablet: See Instructions, Instructions: PLACE 1 TABLET UNDER TONGUE FOR CHEST PAIN TO A MAX OF 3 TABS-ONE EVERY 5 MINUTES - IF NO RELIEF CALL 911, # 25 tab(s), 3 Refill(s), Type: Maintenance, Pharmacy: [facility]  pantoprazole 40 mg oral delayed release tablet: = 1 tab(s) ( 40 mg ),  po, daily, # 90 tab(s), 3 Refill(s), Type: Maintenance, Pharmacy: [facility], 1 tab(s) Oral daily,x90 day(s)  triamcinolone 0.1% topical ointment: 1 ree, TOP, TID, # 240 gm, 1 Refill(s), Type: Maintenance, Pharmacy: [id number], 1 ree top tid  zolpidem 10 mg oral tablet: 1 tab(s), Oral, qhs, PRN: AS NEEDED FOR SLEEP, # 30 unknown unit, 5 Refill(s), Type: Soft Stop, Pharmacy: [facility]  Documented Medications  Documented  Vitamin D3: 0 Refill(s), Type: Maintenance  Zyrtec 10 mg oral tablet: 1 tab(s) ( 10 mg ), PO, Daily, 0   Problem list:    All Problems (Selected)  Allergic rhinitis / SNOMED CT 491932406 / Confirmed  DJD of shoulder / SNOMED CT 510207307 / Confirmed  Tubulovillous adenoma of colon / SNOMED CT 895932280 / Confirmed  AK (actinic keratosis) / SNOMED CT 2889214794 / Confirmed  HTN, goal below 140/90 / SNOMED CT 6474235495 / Confirmed  Hip arthritis / SNOMED CT 414698282 / Confirmed  Bronchiectasis / SNOMED CT 01221396 / Confirmed  Vocal cord dysfunction / SNOMED CT 302891479 / Confirmed  Obese / SNOMED CT 2066798134 / Probable  CAD (coronary artery disease) / SNOMED CT 6627447083 / Confirmed  Obstructive sleep apnea syndrome in adult / SNOMED CT 7161884624 / Confirmed  Hx of malignant skin melanoma / SNOMED CT 5140990463 / Confirmed  Insomnia / SNOMED CT 371804636 / Confirmed  Asthma / SNOMED CT 627181873 / Confirmed  Prosthetic hip infection / SNOMED CT 229944028 / Confirmed  GERD (gastroesophageal reflux disease) / SNOMED CT 409083962 / Confirmed  Benign prostatic hyperplasia / SNOMED CT 565605476 / Confirmed  Urinary retention / SNOMED CT 907344012 / Confirmed  Diverticulitis / SNOMED CT 057132756 / Confirmed  Eczematous dermatitis / SNOMED CT 80618458 / Confirmed  Nasal polyps / SNOMED CT 99418498 / Confirmed  HLD (hyperlipidemia) / SNOMED CT 30452125 / Confirmed      Histories   Past Medical History:    Active  Prosthetic hip infection (593969411):  Onset on 2018 at 67 years.  Comments:  2018 CDT 7:20 AM CDT Alisha Pacheco  Right hip  Tubulovillous adenoma of colon (557256111): Onset on 2012 at 61 years.  AK (actinic keratosis) (1798123561): Onset on 2011 at 60 years.  CAD (coronary artery disease) (2714563216): Onset in the month of 2009 at 58 years  Comments:  2011 CDT 2:57 PM CDT - Mark Chris MD  dec 2009 angiogram with 50% blockage,, treated with meds  Eczematous dermatitis (34642268)  DJD of shoulder (608278469)  Asthma (063240720)  Insomnia (251332396)  Urinary retention (765451526)  Comments:  2010 CST 6:12 PM CST - Berna Huynh CMA  Post Prostatic Surgery  Nasal polyps (15228200)  Allergic rhinitis (600570536)  Vocal cord dysfunction (472874142)  Bronchiectasis (58436590)  Obese (2747470280)  HLD (hyperlipidemia) (61026961)  Obstructive sleep apnea syndrome in adult (5636847585)  Hip arthritis (555311121)  GERD (gastroesophageal reflux disease) (498231090)  Benign prostatic hyperplasia (604621254)  Resolved  Inpatient stay (579306262): Onset on 2018 at 67 years.  Resolved on 2018 at 67 years.  Comments:  2018 CDT 7:17 AM Alisha Renteria  @[place], [place] - 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  Cellulitis of the right thigh.  Inpatient stay (844313162): Onset on 10/11/2011 at 60 years.  Resolved.  Comments:  2018 CDT 7:16 AM Alisha Renteria  @[place], [place] - Chest pain, unspecified  Cancer of skin (9585467735):  Resolved.  Otitis media (362700064):  Resolved on 10/8/2010 at 59 years.  Otitis externa (8660533):  Resolved on 10/29/2010 at 59 years.   Family History:    Heart disease  Mother (Meron, )  High blood pressure  Mother (Meron, )  Arthritis  Father (Phuc, )  CHF - Congestive heart failure  Father (Phuc, )  Alzheimer's disease  Father (Phuc, )     Procedure history:    Revision of total hip  replacement (561851352) on 4/24/2018 at 67 Years.  Comments:  4/30/2018 7:26 AM CDT - Alisha George  Right hip revision total hip arthroplasty and extensive irrigation and debridement of the right thigh including muscle, fascia, bone and exchange of the femoral head and polyethylene acetabular liner.  THR - Total hip replacement (202261831) on 4/16/2018 at 67 Years.  Comments:  4/30/2018 7:21 AM CDT - Alisha George  Right hip  Colonoscopy (080646338) on 4/12/2016 at 65 Years.  Comments:  4/29/2016 11:48 AM CDT - Jeremiah CAREY, Lizbeth  Tubular adenoma x 3 no high grade dysplasia    4/21/2016 9:11 AM CDT - Kate Valdez  Indication: History of adenomatous polyps on last colonoscopy.  Sedation:  MAC.  Recommendation: Repeat in 5 years.  TURP - Transurethral resection of prostate (743074083) on 10/3/2011 at 60 Years.  Colonoscopy (302246131) on 9/20/2010 at 59 Years.  Comments:  12/20/2010 3:33 PM CST - Karlene Sylvester  4 mm polyp noted at 90 cm in the ascending colon; appearance adenomatous, removed   Recommend colonoscopic follow up in 5 years.   Conscious sedation/ MAC if his medical conditions dictate ASA III for level of sedation for future procedures  Coronary angiogram (80996285) in 2009 at 59 Years.  Green Light Laser - Prostate in the month of 3/2009 at 58 Years.  BL nasal Turbinoplasty in the month of 1/2008 at 57 Years.  Prostate TUNA surgery in the month of 2/2007 at 56 Years.  Right shoulder arthroscopy on 10/16/2006 at 55 Years.  Flexible sigmoidoscopy (92895380) on 7/8/2005 at 54 Years.  left shoulder arthroscopy in the month of 7/2005 at 54 Years.  excisional cervical schwannoma on 11/30/2001 at 50 Years.  Cholecystectomy (29170254) in the month of 2/1982 at 31 Years.  Vasectomy (63986765).      Physical Examination   Vital Signs   2/3/2020 11:47 AM CST Temperature Tympanic 97.9 DegF    Peripheral Pulse Rate 75 bpm    HR Method Electronic    Systolic Blood Pressure 110 mmHg    Diastolic Blood Pressure 82  mmHg  HI    Mean Arterial Pressure 91 mmHg    BP Site Right arm    BP Method Manual    Oxygen Saturation 97 %      Measurements from flowsheet : Measurements   2/3/2020 11:47 AM CST Height Measured - Standard 68 in    Weight Measured - Standard 206 lb    BSA 2.12 m2    Body Mass Index 31.32 kg/m2  HI      General:  No acute distress.    HENT:  Tympanic membranes are clear, No pharyngeal erythema, No sinus tenderness, nares are patent.    Neck:  Supple, No lymphadenopathy, slight tenderness.    Respiratory:  lungs have coarse ronchi bilaterally, no wheezes, no rales.       Impression and Plan   Diagnosis     Asthma flare ([id number] J45.901).     Course:  Incomplete response to treatment.    Summary:  will extend the course of prednisone and will add 7 day course of augmentin, continue with expectorants and decongestants, follow up if not improving.    Orders     Orders   Pharmacy:  Augmentin 875 mg-125 mg oral tablet (Prescribe): = 1 tab(s), Oral, q12 hrs, x 7 day(s), # 14 tab(s), 0 Refill(s), Type: Acute, Pharmacy: [facility], 1 tab(s) Oral q12 hrs,x7 day(s)  predniSONE 10 mg oral tablet (Prescribe): See Instructions, Instructions: 3 tabs daily for 4 days, 2 tabs daily for 4 days, 1 tab daily for 4 days, # 24 EA, 0 Refill(s), Type: Maintenance, Pharmacy: [facility], 3 tabs daily for 4 days, 2 tabs daily for 4 days, 1 tab daily for 4 days.     Orders   Charges (Evaluation and Management):  [id number] office outpatient visit 15 minutes (Charge) (Order): Quantity: 1, Asthma flare.

## 2022-02-16 NOTE — NURSING NOTE
Comprehensive Intake Entered On:  12/10/2020 11:54 AM CST    Performed On:  12/10/2020 11:50 AM CST by Nicole Umanzor CMA               Summary   Chief Complaint :   c/o painful rash on forehead x 1 month   Advance Directive :   Yes   Weight Measured :   206.6 lb(Converted to: 206 lb 10 oz, 93.712 kg)    Height Measured :   68 in(Converted to: 5 ft 8 in, 172.72 cm)    Body Mass Index :   31.41 kg/m2 (HI)    Body Surface Area :   2.12 m2   Systolic Blood Pressure :   122 mmHg   Diastolic Blood Pressure :   64 mmHg   Mean Arterial Pressure :   83 mmHg   Peripheral Pulse Rate :   62 bpm   Temperature Tympanic :   96.9 DegF(Converted to: 36.1 DegC)  (LOW)    Race :      Languages :   English   Ethnicity :   Not  or    Nicole Umanzor CMA - 12/10/2020 11:50 AM CST   Health Status   Allergies Verified? :   Yes   Medication History Verified? :   Yes   Immunizations Current :   Yes   Pre-Visit Planning Status :   Completed   Tobacco Use? :   Former smoker   Nicole Umanzor CMA - 12/10/2020 11:50 AM CST   Consents   Consent for Immunization Exchange :   Consent Granted   Consent for Immunizations to Providers :   Consent Granted   Nicole Umanzor CMA - 12/10/2020 11:50 AM CST   Meds / Allergies   (As Of: 12/10/2020 11:54:16 AM CST)   Allergies (Active)   aspirin  Estimated Onset Date:   Unspecified ; Reactions:   Nasal Polyps ; Created By:   Vinod Santana MD; Reaction Status:   Active ; Category:   Drug ; Substance:   aspirin ; Type:   Allergy ; Updated By:   Vinod Santana MD; Reviewed Date:   7/27/2020 10:57 AM CDT      doxycycline  Estimated Onset Date:   Unspecified ; Reactions:   sun rash ; Created By:   Berna Huynh CMA; Reaction Status:   Active ; Category:   Drug ; Substance:   doxycycline ; Type:   Allergy ; Updated By:   Berna Huynh CMA; Source:   Paper Chart ; Reviewed Date:   7/27/2020 10:57 AM CDT      Shailesh  Estimated Onset Date:   Unspecified ; Reactions:    Hives ; Created By:   Bree Babin CMA; Reaction Status:   Active ; Category:   Drug ; Substance:   Shailesh ; Type:   Allergy ; Updated By:   Bree Babin CMA; Reviewed Date:   7/27/2020 10:57 AM CDT      zymetrin shampo  Estimated Onset Date:   Unspecified ; Reactions:   Rash ; Created By:   Bree Babin CMA; Reaction Status:   Active ; Category:   Drug ; Substance:   zymetrin shampo ; Type:   Allergy ; Updated By:   Bree Babin CMA; Reviewed Date:   7/27/2020 10:57 AM CDT        Medication List   (As Of: 12/10/2020 11:54:16 AM CST)   Prescription/Discharge Order    albuterol  :   albuterol ; Status:   Prescribed ; Ordered As Mnemonic:   Ventolin HFA 90 mcg/inh inhalation aerosol ; Simple Display Line:   2 puff(s), inh, q4 hrs, PRN: for cough, 1 EA, 2 Refill(s) ; Ordering Provider:   Mark Chris MD; Catalog Code:   albuterol ; Order Dt/Tm:   6/29/2020 8:30:04 AM CDT          amLODIPine  :   amLODIPine ; Status:   Prescribed ; Ordered As Mnemonic:   amLODIPine 10 mg oral tablet ; Simple Display Line:   1 tab(s), Oral, daily, 90 tab(s), 2 Refill(s) ; Ordering Provider:   Mark Chris MD; Catalog Code:   amLODIPine ; Order Dt/Tm:   11/1/2020 11:26:36 AM CST          atorvastatin  :   atorvastatin ; Status:   Prescribed ; Ordered As Mnemonic:   atorvastatin 20 mg oral tablet ; Simple Display Line:   1 tab(s), Oral, daily, 90 tab(s), 2 Refill(s) ; Ordering Provider:   Mark Chris MD; Catalog Code:   atorvastatin ; Order Dt/Tm:   10/20/2020 7:01:45 AM CDT          clopidogrel  :   clopidogrel ; Status:   Prescribed ; Ordered As Mnemonic:   clopidogrel 75 mg oral tablet ; Simple Display Line:   1 tab(s), Oral, daily, 90 tab(s), 2 Refill(s) ; Ordering Provider:   Mark Chris MD; Catalog Code:   clopidogrel ; Order Dt/Tm:   10/12/2020 8:01:59 AM CDT          finasteride  :   finasteride ; Status:   Prescribed ; Ordered As Mnemonic:   finasteride 5 mg oral tablet ; Simple Display  Line:   1 tab(s), Oral, daily, 90 tab(s), 2 Refill(s) ; Ordering Provider:   Mark Chris MD; Catalog Code:   finasteride ; Order Dt/Tm:   10/15/2020 7:54:42 AM CDT          fluticasone-salmeterol  :   fluticasone-salmeterol ; Status:   Prescribed ; Ordered As Mnemonic:   fluticasone-salmeterol 250 mcg-50 mcg inhalation powder ; Simple Display Line:   See Instructions, INHALE ONE PUFF BY MOUTH TWICE A DAY, 180 EA, 2 Refill(s) ; Ordering Provider:   Mark Chris MD; Catalog Code:   fluticasone-salmeterol ; Order Dt/Tm:   10/2/2020 4:42:59 PM CDT          gabapentin  :   gabapentin ; Status:   Prescribed ; Ordered As Mnemonic:   gabapentin 300 mg oral capsule ; Simple Display Line:   1 cap(s), Oral, daily, 90 unknown unit, 0 Refill(s) ; Ordering Provider:   Mark Chris MD; Catalog Code:   gabapentin ; Order Dt/Tm:   9/29/2020 12:12:52 PM CDT          losartan  :   losartan ; Status:   Prescribed ; Ordered As Mnemonic:   losartan 100 mg oral tablet ; Simple Display Line:   1 tab(s), Oral, daily, 90 unknown unit, 2 Refill(s) ; Ordering Provider:   Mark Chris MD; Catalog Code:   losartan ; Order Dt/Tm:   10/2/2020 4:43:24 PM CDT          metoprolol  :   metoprolol ; Status:   Prescribed ; Ordered As Mnemonic:   Metoprolol Succinate ER 25 mg oral tablet, extended release ; Simple Display Line:   1 tab(s), Oral, daily, 90 tab(s), 2 Refill(s) ; Ordering Provider:   Mark Chris MD; Catalog Code:   metoprolol ; Order Dt/Tm:   11/5/2020 7:12:01 AM CST          nitroglycerin  :   nitroglycerin ; Status:   Prescribed ; Ordered As Mnemonic:   nitroglycerin 0.4 mg sublingual tablet ; Simple Display Line:   See Instructions, PLACE 1 TABLET UNDER TONGUE FOR CHEST PAIN TO A MAX OF 3 TABS-ONE EVERY 5 MINUTES - IF NO RELIEF CALL 911, 25 tab(s), 3 Refill(s) ; Ordering Provider:   Mark Chris MD; Catalog Code:   nitroglycerin ; Order Dt/Tm:   7/29/2019 7:47:42 PM CDT          olopatadine ophthalmic  :    olopatadine ophthalmic ; Status:   Prescribed ; Ordered As Mnemonic:   Patanol 0.1% ophthalmic solution ; Simple Display Line:   2 gtts, Both eyes, BID, 5 mL ; Ordering Provider:   Mark Chris MD; Catalog Code:   olopatadine ophthalmic ; Order Dt/Tm:   3/11/2015 2:28:49 PM CDT          pantoprazole  :   pantoprazole ; Status:   Prescribed ; Ordered As Mnemonic:   pantoprazole 40 mg oral delayed release tablet ; Simple Display Line:   1 tab(s), Oral, daily, 90 tab(s), 2 Refill(s) ; Ordering Provider:   Mark Chris MD; Catalog Code:   pantoprazole ; Order Dt/Tm:   10/15/2020 12:01:19 PM CDT          sildenafil  :   sildenafil ; Status:   Prescribed ; Ordered As Mnemonic:   sildenafil 100 mg oral tablet ; Simple Display Line:   100 mg, 1 tab(s), Oral, daily, 30 tab(s), 0 Refill(s) ; Ordering Provider:   Mark Chris MD; Catalog Code:   sildenafil ; Order Dt/Tm:   6/4/2020 10:26:42 AM CDT          silodosin  :   silodosin ; Status:   Prescribed ; Ordered As Mnemonic:   silodosin 8 mg oral capsule ; Simple Display Line:   1 cap(s), Oral, daily, 90 cap(s), 3 Refill(s) ; Ordering Provider:   Mark Chris MD; Catalog Code:   silodosin ; Order Dt/Tm:   8/24/2020 11:27:08 AM CDT          triamcinolone topical  :   triamcinolone topical ; Status:   Prescribed ; Ordered As Mnemonic:   triamcinolone 0.1% topical ointment ; Simple Display Line:   1 ree, TOP, TID, 240 gm ; Ordering Provider:   Mark Chris MD; Catalog Code:   triamcinolone topical ; Order Dt/Tm:   3/11/2015 2:29:32 PM CDT          zolpidem  :   zolpidem ; Status:   Prescribed ; Ordered As Mnemonic:   zolpidem 5 mg oral tablet ; Simple Display Line:   5 mg, 1 tab(s), Oral, hs, PRN: for sleep, 30 tab(s), 3 Refill(s) ; Ordering Provider:   Mark Chris MD; Catalog Code:   zolpidem ; Order Dt/Tm:   11/5/2020 1:47:38 PM CST            Home Meds    cetirizine  :   cetirizine ; Status:   Documented ; Ordered As Mnemonic:   Zyrtec 10 mg  oral tablet ; Simple Display Line:   10 mg, 1 tab(s), PO, Daily ; Catalog Code:   cetirizine ; Order Dt/Tm:   1/25/2010 6:14:51 PM CST          cholecalciferol  :   cholecalciferol ; Status:   Documented ; Ordered As Mnemonic:   Vitamin D3 ; Simple Display Line:   0 Refill(s) ; Catalog Code:   cholecalciferol ; Order Dt/Tm:   11/1/2017 1:13:39 PM CDT            ID Risk Screen   Recent Travel History :   No recent travel   Family Member Travel History :   No recent travel   Other Exposure to Infectious Disease :   Unknown   Nicole Umanzor CMA - 12/10/2020 11:50 AM CST   Social History   Social History   (As Of: 12/10/2020 11:54:16 AM CST)   Alcohol:        Current, Beer (12 oz), 3-4 times per year   Comments:  9/26/2011 7:44 PM Alisha Pacheco: 1/month.   (Last Updated: 3/17/2015 10:41:06 AM CDT by Apoorva Figueroa CMA)          Tobacco:  Past      Former smoker, quit more than 30 days ago   (Last Updated: 12/10/2020 11:52:44 AM CST by Nicole Umanzor CMA)          Electronic Cigarette/Vaping:        Electronic Cigarette Use: Never.   (Last Updated: 12/10/2020 11:52:47 AM CST by Nicole Umanzor CMA)          Substance Abuse:        Never   (Last Updated: 3/17/2015 10:41:13 AM CDT by Apoorva Figueroa CMA)          Employment/School:        Retired, Work/School description: farmer.   Comments:  9/26/2011 7:43 PM - Alisha George: 2005.   (Last Updated: 4/2/2019 3:11:38 PM CDT by Charmaine Bernardo)          Home/Environment:        Marital status: .  1 children.   Comments:  9/26/2011 7:42 PM Alisha Pacheco: 1972. Wife - Pauline.   (Last Updated: 3/17/2015 10:41:37 AM CDT by Apoorva Figueroa CMA)          Exercise:  Regular exercise      Exercise frequency: 4-5 x per week..  Exercise type: Weight lifting, Cardio.   Comments:  9/26/2011 7:45 PM Alisha Pacheco: 2-3 times per week.   (Last Updated: 4/2/2019 3:11:22 PM CDT by Charmaine Bernardo)          Sexual:        Sexually active: Yes.  Sexual  orientation: Heterosexual.   (Last Updated: 3/17/2015 10:40:27 AM CDT by Apoorva Figueroa CMA)

## 2022-02-16 NOTE — PROGRESS NOTES
Patient:   MELISSA ROSAS            MRN: [id number]            FIN: [id number]               Age:   67 years     Sex:  Male     :  1950   Associated Diagnoses:   Acute asthma flare   Author:   Brian Husain PA-C.      Chief Complaint   2017 9:56 AM CST   Pt here for productive cough,fever and sinus pressure x week.        History of Present Illness   Chief complaint and symptoms noted above and confirmed with patient   as above  lungs feels congested and tight  using sudafed  has hx of asthma, has been using advair and albuterol      Review of Systems   Constitutional:  Fever.    Ear/Nose/Mouth/Throat:  sinus pressure.    Respiratory:  Cough, Sputum production, Wheezing.       Health Status   Allergies:    Allergic Reactions (Selected)  Severity Not Documented  Aspirin (Nasal polyps)  Doxycycline (Sun rash)  Shailesh (Hives)  Zymetrin shampo (Rash)   Medications:  (Selected)   Prescriptions  Prescribed  Advair Diskus 250 mcg-50 mcg inhalation powder: 1 puff(s), inh, bid, # 60 EA, 11 Refill(s), Type: Maintenance, Pharmacy: [facility] [place], 1 puff(s) inh bid  Nitrostat 0.4 mg sublingual tablet: See Instructions, Instructions: 1 TABLET UNDER TONGUE FOR CHEST PAIN TO A MAX OF 3 TABS-ONE EVERY 5 MINUTES - IF NO RELIEF CALL 911, # 25 unknown unit, 3 Refill(s), Type: Soft Stop, Pharmacy: [facility] [place], 1 TABLET UNDER TONGUE FOR C...  Patanol 0.1% ophthalmic solution: 2 gtts, Both eyes, BID, # 5 mL, 3 Refill(s), Type: Maintenance, Pharmacy: [id number], 2 gtts both eyes bid  Rapaflo 8 mg oral capsule: 1 cap(s) ( 8 mg ), po, daily, # 90 cap(s), 3 Refill(s), Type: Maintenance, Pharmacy: [facility] [place], 1 cap(s) po daily,x90 day(s)  Ventolin HFA 90 mcg/inh inhalation aerosol: 2 puff(s), inh, q4 hrs, PRN: for cough, # 1 EA, 11 Refill(s), Type: Maintenance, Pharmacy: [facility] [place], 2 puff(s) inh q4 hrs,PRN:for cough  albuterol 2.5  mg/3 mL (0.083%) inhalation solution: 3 mL ( 2.5 mg ), neb, q4-6 hrs, PRN: as needed for cough, # 1 box(es), 1 Refill(s), Type: Maintenance, Pharmacy: [facility], 3 mL neb q4-6 hrs,PRN:as needed for cough  amLODIPine 10 mg oral tablet: 1 tab(s) ( 10 mg ), po, daily, # 90 tab(s), 3 Refill(s), Type: Maintenance, Pharmacy: [facility], 1 tab(s) po daily,x90 day(s)  atorvastatin 20 mg oral tablet: 1 tab(s) ( 20 mg ), po, daily, # 90 tab(s), 3 Refill(s), Type: Maintenance, Pharmacy: [facility], 1 tab(s) po daily,x90 day(s)  clopidogrel 75 mg oral tablet: 1 tab(s) ( 75 mg ), po, daily, # 90 tab(s), 3 Refill(s), Type: Maintenance, Pharmacy: [facility], 1 tab(s) po daily,x90 day(s)  finasteride 5 mg oral tablet: 1 tab(s) ( 5 mg ), po, daily, # 90 tab(s), 3 Refill(s), Type: Maintenance, Pharmacy: [facility], 1 tab(s) po daily,x90 day(s)  gabapentin 300 mg oral capsule: 1 cap(s) ( 300 mg ), po, daily, # 90 cap(s), 3 Refill(s), Type: Maintenance, Pharmacy: [facility], 1 cap(s) po daily,x90 day(s)  pantoprazole 40 mg oral delayed release tablet: 1 tab(s) ( 40 mg ), po, daily, # 90 tab(s), 3 Refill(s), Type: Maintenance, Pharmacy: [facility], 1 tab(s) po daily,x90 day(s)  triamcinolone 0.1% topical ointment: 1 ree, TOP, TID, # 240 gm, 1 Refill(s), Type: Maintenance, Pharmacy: [facility] [id number], 1 ree top tid  valsartan 320 mg oral tablet: 1 tab(s) ( 320 mg ), po, daily, # 90 tab(s), 3 Refill(s), Type: Maintenance, Pharmacy: [facility] [place], 1 tab(s) po daily,x90 day(s)  zolpidem 10 mg oral tablet: 1 tab(s), PO, Once a day (at bedtime), PRN: for sleep, # 30 tab(s), 5 Refill(s), Type: Maintenance, Pharmacy: [facility], 1 tab(s) po hs,PRN:for sleep  Documented Medications  Documented  Vitamin D3: 0 Refill(s), Type: Maintenance  Zyrtec 10 mg oral  tablet: 1 tab(s) ( 10 mg ), PO, Daily, 0   Problem list:    All Problems  Allergic rhinitis / SNOMED CT 223204417 / Confirmed  Otitis media / SNOMED CT 935439597 / Confirmed  DJD of shoulder / SNOMED CT 461709366 / Confirmed  Tubulovillous adenoma of colon / SNOMED CT 525640031 / Confirmed  AK (actinic keratosis) / SNOMED CT 3469660376 / Confirmed  Cancer of skin / SNOMED CT 4193177967 / Confirmed  Hypertension / SNOMED CT 4246271044 / Confirmed  Hip arthritis / SNOMED CT 631145200 / Confirmed  Bronchiectasis / SNOMED CT 73795139 / Confirmed  Vocal cord dysfunction / SNOMED CT 525959670 / Confirmed  Obese / SNOMED CT 1756601174 / Probable  CAD (coronary artery disease) / SNOMED CT 1173343113 / Confirmed  Obstructive sleep apnea syndrome in adult / SNOMED CT 1257268116 / Confirmed  Insomnia / SNOMED CT 282294398 / Confirmed  Asthma / SNOMED CT 750342255 / Confirmed  Urinary retention / SNOMED CT 606484927 / Confirmed  Diverticulitis / SNOMED CT 395454719 / Confirmed  Otitis externa / SNOMED CT 0482062 / Confirmed  Eczematous dermatitis / SNOMED CT 69876425 / Confirmed  Nasal polyps / SNOMED CT 55839370 / Confirmed  HLD (hyperlipidemia) / SNOMED CT 89730139 / Confirmed  Inactive: Tobacco abuse / ICD-9-.1  Resolved: *[facility]ospitalized@[facility] - Chest pain, unspecified      Histories   Past Medical History:    Active  Tubulovillous adenoma of colon (159651536): Onset on 9/20/2012 at 61 years.  AK (actinic keratosis) (6824679288): Onset on 8/16/2011 at 60 years.  CAD (coronary artery disease) (4595198633): Onset in the month of 12/2009 at 58 years  Comments:  9/28/2011 CDT 2:57 PM CDT - Mark Chris MD  dec 2009 angiogram with 50% blockage,, treated with meds  Eczematous dermatitis (81940554)  DJD of shoulder (584888912)  Hypertension (4957331529)  Asthma (743488931)  Insomnia (173315195)  Urinary retention (242228389)  Comments:  1/25/2010 CST 6:12 PM CST - Berna Huynh CMA  Post Prostatic Surgery  Nasal polyps  (84683248)  Allergic rhinitis (502920043)  Vocal cord dysfunction (956283523)  Bronchiectasis (88195995)  Cancer of skin (3606785326)  Otitis media (303663753)  Otitis externa (5020115)  Obese (9523149639)  HLD (hyperlipidemia) (29332983)  Obstructive sleep apnea syndrome in adult (3132755327)  Hip arthritis (782833257)  Resolved  *Hospitalized@[facility] - Chest pain, unspecified: Onset on 10/11/2011 at 60 years.  Resolved.   Family History:    CHF - Congestive heart failure  Father (Phuc, )  Alzheimer's disease  Father (Phuc, )     Procedure history:    Colonoscopy (126817475) on 2016 at 65 Years.  Comments:  2016 11:48 AM - Jeremiah CAREY, Lizbeth  Tubular adenoma x 3 no high grade dysplasia    2016 9:11 AM - Kate Valdez  Indication: History of adenomatous polyps on last colonoscopy.  Sedation:  MAC.  Recommendation: Repeat in 5 years.  TURP - Transurethral resection of prostate (840097621) on 10/3/2011 at 60 Years.  Colonoscopy (865732166) on 2010 at 59 Years.  Comments:  2010 3:33 PM - Karlene Sylvester  4 mm polyp noted at 90 cm in the ascending colon; appearance adenomatous, removed   Recommend colonoscopic follow up in 5 years.   Conscious sedation/ MAC if his medical conditions dictate ASA III for level of sedation for future procedures  Coronary angiogram (65077983) in  at 59 Years.  Green Light Laser - Prostate in the month of 3/2009 at 58 Years.  BL nasal Turbinoplasty in the month of 2008 at 57 Years.  Prostate TUNA surgery in the month of 2007 at 56 Years.  Right shoulder arthroscopy on 10/16/2006 at 55 Years.  Flexible sigmoidoscopy (54289356) on 2005 at 54 Years.  left shoulder arthroscopy in the month of 2005 at 54 Years.  excisional cervical schwannoma on 2001 at 50 Years.  Cholecystectomy (18294567) in the month of 1982 at 31 Years.  Vasectomy (60398019).   Social History:        Alcohol Assessment            Current, Beer (12 oz),  3-4 times per year                     Comments:                      09/26/2011 - Alisha George                     1/month.      Tobacco Assessment            Past      Substance Abuse Assessment            Never      Employment and Education Assessment            Retired                     Comments:                      09/26/2011 - Alisha George                     2005.      Home and Environment Assessment            Marital status: .  1 children.                     Comments:                      09/26/2011 Alisha Pacheco                     1970. Wife - Pauline.      Exercise and Physical Activity Assessment: Regular exercise            Exercise frequency: 3-4 times/week.  Exercise type: Weight lifting, Cardio.                     Comments:                      09/26/2011 Alisha Pacheco                     2-3 times per week.      Sexual Assessment            Sexually active: Yes.  Sexual orientation: Heterosexual.        Physical Examination   Vital Signs   12/22/2017 9:56 AM CST Temperature Tympanic 97.2 DegF  LOW    Peripheral Pulse Rate 64 bpm    Pulse Site Radial artery    Respiratory Rate 16 br/min    Systolic Blood Pressure 110 mmHg    Diastolic Blood Pressure 72 mmHg    Mean Arterial Pressure 85 mmHg    BP Site Right arm    Oxygen Saturation 97 %      Measurements from flowsheet : Measurements   12/22/2017 9:56 AM CST Height Measured - Standard 68 in    Weight Measured - Standard 200 lb    BSA 2.08 m2    Body Mass Index 30.41 kg/m2      General:  No acute distress.    HENT:  Tympanic membranes are clear, No pharyngeal erythema, No sinus tenderness, nares are patent.    Neck:  Supple, Non-tender, No lymphadenopathy.    Respiratory:  lungs have coarse ronchi bilaterally, diffuse inspiratory wheezes, no rales, airflow is mildly restricted.    Cardiovascular:  Normal rate, Regular rhythm, No murmur.       Impression and Plan   Diagnosis     Acute asthma flare (HWF15-NX J45.901).     Patient  Instructions:   Encouraged to use heat over the sinuses, salt water nasal spray, decongestants and expectorants, NSAIDs.  Follow up if not improving.    Summary:  will treat with burst of prednisone and zpak.    Orders     Orders   Pharmacy:  predniSONE 20 mg oral tablet (Prescribe): 2 tab(s) ( 40 mg ), PO, Daily, x 5 day(s), Instructions: with food or milk, # 10 tab(s), 0 Refill(s), Type: Maintenance, [facility], 2 tab(s) po daily,x5 day(s),Instr:with food or milk  Zithromax Z-Mier 250 mg oral tablet (Prescribe): Take 2 tablets on Day 1 and then 1 tablet, PO, Daily, Instructions: until finished, # 6 tab(s), 0 Refill(s), Type: Maintenance, Pharmacy: [facility] [place], Take 2 tablets on Day 1 and then 1 tablet po daily,Instr:until finished.     Orders   Charges (Evaluation and Management):  [id number] office outpatient visit 15 minutes (Charge) (Order): Quantity: 1, Acute asthma flare.

## 2022-02-16 NOTE — TELEPHONE ENCOUNTER
---------------------  From: Taniya Wade LPN (Phone Messages Pool ([address] [place] [address]))   To: Advanced Practice Provider Pool ([address] [place] [address]);     Sent: 8/21/2020 10:16:44 AM CDT  Subject: silodosin     Phone Message    PCP:   SOPHIE      Time of Call:  10:00am       Person Calling:  pt  Phone number:  [phone]    Returned call at: _    Note:   Pt LM stating he is having trouble getting his silodosin 8mg filled. Pt says he requested it on the 16th and pharmacy has sent faxes to [facility] several times. Pt says he only has 5 pills left.    No requests for refill in chart.    Called pt and let him know SOPHIE will be back in on Monday. Pt wanting Rx filled today since it takes 2-3 days to receive the order once he places it. Pt asking if someone else can fill Rx today.    Please advise- pt was just seen for AWV on 7/27    Last office visit and reason:  7/27/20 Medicare AWV---------------------  From: Giana Rodriguez (Advanced Practice Provider Pool ([address] [place] [address]))   To: Phone Messages Pool ([address] [place] [address]);     Sent: 8/21/2020 4:04:00 PM CDT  Subject: RE: silodosin     please fill for 90 days and send message to [facility] for more---------------------  From: Taniya Wade LPN (Phone Messages Pool ([address] [place] [address]))   To: [facility] Message Pool ([address] [place] [address]);     Sent: 8/21/2020 4:15:37 PM CDT  Subject: FW: silodosin     Pt informed. Rx sent for #90---------------------  From: Nicole Umanzor CMA ([facility] Message Pool ([address] [place] [address]))   To: Mark Chris MD;     Sent: 8/24/2020 10:27:30 AM CDT  Subject: FW: silodosin     are you ok with sending any refills of this?---------------------  From: Mark Chris MD   To: [facility] Message Pool ([id number] [facility] [place]);     Sent: 8/24/2020 11:24:04 AM CDT  Subject: RE: silodosin     ok for 3 refillsRefills sent to pharmacy

## 2022-02-16 NOTE — TELEPHONE ENCOUNTER
------------------------------------------  From: [place] [id number]  To: Mark Chris MD  Sent: July 14, 2021 6:51:37 PM CDT  Subject: Medication Management  Due: June 4, 2021 8:26:15 PM CDT     ** On Hold Pending Signature **     Drug: clopidogrel (clopidogrel 75 mg oral tablet), TAKE ONE TABLET BY MOUTH EVERY DAY  Quantity: 90 unknown unit  Days Supply: 90  Refills: 1  Substitutions Allowed  Notes from Pharmacy:     Dispensed Drug: clopidogrel (clopidogrel 75 mg oral tablet), TAKE ONE TABLET BY MOUTH EVERY DAY  Quantity: 90 unknown unit  Days Supply: 90  Refills: 2  Substitutions Allowed  Notes from Pharmacy:  ---------------------------------------------------------------  From: Apoorva Figueroa CMA   To: [place] [id number]    Sent: 7/16/2021 1:13:03 PM CDT  Subject: Medication Management     ** Not Approved: Refill not appropriate, has appt next week - asked to call us if needs refill prior **  clopidogrel (CLOPIDOGREL BISULFATE 75MG TABS)  TAKE ONE TABLET BY MOUTH EVERY DAY  Qty:  90 unknown unit        Days Supply:  90        Refills:  2          Substitutions Allowed     Route To Pharmacy - [place] [id number]   Signed by Apoorva Figueroa CMA

## 2022-02-16 NOTE — TELEPHONE ENCOUNTER
Entered by Monika Dia CMA on April 17, 2020 11:57:00 AM CDT  ---------------------  From: Monika Dia CMA   To: [place] [address]    Sent: 4/17/2020 11:57:00 AM CDT  Subject: Medication Management     ** Submitted: **  Order:pantoprazole (pantoprazole 40 mg oral delayed release tablet)  1 tab(s)  Oral  daily  Qty:  90 unknown unit        Days Supply:  90        Refills:  0          Substitutions Allowed     Route To [place] [address]    Signed by Monika Dia CMA  4/17/2020 4:56:00 PM    ** Submitted: **  Complete:pantoprazole (pantoprazole 40 mg oral delayed release tablet)   Signed by Monika Dia CMA  4/17/2020 4:56:00 PM    ** Not Approved:  **  pantoprazole (PANTOPRAZOLE SODIUM 40MG TBEC)  TAKE ONE TABLET BY MOUTH EVERY DAY  Qty:  90 unknown unit        Days Supply:  90        Refills:  3          Substitutions Allowed     Route To [place] [address]   Signed by Monika Dia CMA            ** Submitted: **  Order:hydroCHLOROthiazide (hydroCHLOROthiazide 25 mg oral tablet)  1 tab(s)  Oral  daily  Qty:  90 unknown unit        Days Supply:  90        Refills:  0          Substitutions Allowed     Route To [place] [address]    Signed by Monika Dia CMA  4/17/2020 4:56:00 PM    ** Submitted: **  Complete:hydroCHLOROthiazide (hydroCHLOROthiazide 25 mg oral tablet)   Signed by Monika Dia CMA  4/17/2020 4:56:00 PM    ** Not Approved:  **  hydroCHLOROthiazide (HYDROCHLOROTHIAZIDE 25MG TABS)  TAKE ONE TABLET BY MOUTH EVERY DAY  Qty:  90 unknown unit        Days Supply:  90        Refills:  3          Substitutions Allowed     Route To [place] [address]   Signed by Monika Dia CMA          seen 2/3 asthma flare  RTC placed for July for AWV  last [facility] 4/2019      ------------------------------------------  From: [place] [address]  To: Mark Chris MD  Sent: April 16, 2020 6:59:50 PM CDT  Subject: Medication  Management  Due: April 17, 2020 6:59:50 PM CDT    ** On Hold Pending Signature **  Drug: hydroCHLOROthiazide (hydroCHLOROthiazide 25 mg oral tablet)  TAKE ONE TABLET BY MOUTH EVERY DAY  Quantity: 90 unknown unit  Days Supply: 90  Refills: 3  Substitutions Allowed  Notes from Pharmacy:     Dispensed Drug: hydroCHLOROthiazide (hydroCHLOROthiazide 25 mg oral tablet)  TAKE ONE TABLET BY MOUTH EVERY DAY  Quantity: 90 unknown unit  Days Supply: 90  Refills: 3  Substitutions Allowed  Notes from Pharmacy:     ** On Hold Pending Signature **  Drug: pantoprazole (pantoprazole 40 mg oral delayed release tablet)  TAKE ONE TABLET BY MOUTH EVERY DAY  Quantity: 90 unknown unit  Days Supply: 90  Refills: 3  Substitutions Allowed  Notes from Pharmacy:     Dispensed Drug: pantoprazole (pantoprazole 40 mg oral delayed release tablet)  TAKE ONE TABLET BY MOUTH EVERY DAY  Quantity: 90 unknown unit  Days Supply: 90  Refills: 3  Substitutions Allowed  Notes from Pharmacy:   ------------------------------------------

## 2022-02-16 NOTE — TELEPHONE ENCOUNTER
Entered by Nicole Umanzor CMA on June 17, 2020 9:58:45 AM CDT  ---------------------  From: Nicole Umanzor CMA   To: [place] [address]    Sent: 6/17/2020 9:58:44 AM CDT  Subject: Medication Management     ** Submitted: **  Order:atorvastatin (atorvastatin 20 mg oral tablet)  1 tab(s)  Oral  daily  Qty:  30 tab(s)        Refills:  0          Substitutions Allowed     Route To Pharmacy - [place] [address]    Signed by Nicole Umanzor CMA  6/17/2020 2:58:00 PM    ** Submitted: **  Complete:atorvastatin (atorvastatin 20 mg oral tablet)   Signed by Nicole Umanzor CMA  6/17/2020 2:58:00 PM    ** Not Approved:  **  atorvastatin (ATORVASTATIN CALCIUM 20MG TABS)  TAKE ONE TABLET BY MOUTH EVERY DAY  Qty:  90 unknown unit        Refills:  0          Substitutions Allowed     Details:  90 unknown unit, TAKE ONE TABLET BY MOUTH EVERY DAY, Route to Pharmacy Electronically [place] [address], 6/16/2020, 3/23/2020, 90, Mark Chris MD      Route To Pharmacy [place] [address]   Signed by Nicole Umanzor CMA            ------------------------------------------  From: [place] [address]  To: Mark Chris MD  Sent: June 16, 2020 7:26:20 PM CDT  Subject: Medication Management  Due: June 17, 2020 4:18:41 PM CDT     ** On Hold Pending Signature **     Drug: atorvastatin (atorvastatin 20 mg oral tablet), TAKE ONE TABLET BY MOUTH EVERY DAY  Quantity: 90 unknown unit  Days Supply: 90  Refills: 0  Substitutions Allowed  Notes from Pharmacy:     Dispensed Drug: atorvastatin (atorvastatin 20 mg oral tablet), TAKE ONE TABLET BY MOUTH EVERY DAY  Quantity: 90 unknown unit  Days Supply: 90  Refills: 0  Substitutions Allowed  Notes from Pharmacy:  ------------------------------------------Pt scheduled visit 7/13. Refilled to get pt to his appt.

## 2022-02-16 NOTE — NURSING NOTE
Comprehensive Intake Entered On:  2/3/2020 11:53 AM CST    Performed On:  2/3/2020 11:47 AM CST by Jeannette Yao RN               Summary   Chief Complaint :   Pt is here c/o continued URI. Was seen last week and prescribed Prednisone, Atnibiotic. Has gotten worse. C/o wheezing, coughing, ear pressure. Has hx of pneumonia. Has had symptoms x2 weeks.    Advance Directive :   Yes   Weight Measured :   206 lb(Converted to: 206 lb 0 oz, 93.44 kg)    Height Measured :   68 in(Converted to: 5 ft 8 in, 172.72 cm)    Body Mass Index :   31.32 kg/m2 (HI)    Body Surface Area :   2.12 m2   Systolic Blood Pressure :   110 mmHg   Diastolic Blood Pressure :   82 mmHg (HI)    Mean Arterial Pressure :   91 mmHg   Peripheral Pulse Rate :   75 bpm   BP Site :   Right arm   BP Method :   Manual   HR Method :   Electronic   Temperature Tympanic :   97.9 DegF(Converted to: 36.6 DegC)    Oxygen Saturation :   97 %   Race :      Languages :   English   Ethnicity :   Not  or    Jeannette Yao RN - 2/3/2020 11:47 AM CST   Health Status   Allergies Verified? :   Yes   Medication History Verified? :   Yes   Immunizations Current :   Yes   Pre-Visit Planning Status :   Completed   Tobacco Use? :   Former smoker   Jeannette Yao RN - 2/3/2020 11:47 AM CST   Consents   Consent for Immunization Exchange :   Consent Granted   Consent for Immunizations to Providers :   Consent Granted   Jeannette Yao RN - 2/3/2020 11:47 AM CST   Meds / Allergies   (As Of: 2/3/2020 11:53:09 AM CST)   Allergies (Active)   aspirin  Estimated Onset Date:   Unspecified ; Reactions:   Nasal Polyps ; Created By:   Vinod Santana MD; Reaction Status:   Active ; Category:   Drug ; Substance:   aspirin ; Type:   Allergy ; Updated By:   Vinod Santana MD; Reviewed Date:   2/3/2020 11:50 AM CST      doxycycline  Estimated Onset Date:   Unspecified ; Reactions:   sun rash ; Created By:   Berna Huynh CMA; Reaction Status:   Active ; Category:   Drug ; Substance:    doxycycline ; Type:   Allergy ; Updated By:   Berna Huynh CMA; Source:   Paper Chart ; Reviewed Date:   2/3/2020 11:50 AM CST      Shailesh  Estimated Onset Date:   Unspecified ; Reactions:   Hives ; Created By:   Bree Babin CMA; Reaction Status:   Active ; Category:   Drug ; Substance:   Shailesh ; Type:   Allergy ; Updated By:   Bree Babin CMA; Reviewed Date:   2/3/2020 11:50 AM CST      zymetrin shampo  Estimated Onset Date:   Unspecified ; Reactions:   Rash ; Created By:   Bree Babin CMA; Reaction Status:   Active ; Category:   Drug ; Substance:   zymetrin shampo ; Type:   Allergy ; Updated By:   Bree Babin CMA; Reviewed Date:   2/3/2020 11:50 AM CST        Medication List   (As Of: 2/3/2020 11:53:09 AM CST)   Prescription/Discharge Order    zolpidem 10 mg oral tablet  :   zolpidem 10 mg oral tablet ; Status:   Prescribed ; Ordered As Mnemonic:   zolpidem 10 mg oral tablet ; Simple Display Line:   1 tab(s), Oral, qhs, PRN: AS NEEDED FOR SLEEP, 30 unknown unit, 5 Refill(s) ; Ordering Provider:   Mark Chris MD; Catalog Code:   zolpidem ; Order Dt/Tm:   10/3/2019 2:49:36 PM CDT          triamcinolone topical  :   triamcinolone topical ; Status:   Prescribed ; Ordered As Mnemonic:   triamcinolone 0.1% topical ointment ; Simple Display Line:   1 ree, TOP, TID, 240 gm ; Ordering Provider:   Mark Chris MD; Catalog Code:   triamcinolone topical ; Order Dt/Tm:   3/11/2015 2:29:32 PM CDT          silodosin  :   silodosin ; Status:   Prescribed ; Ordered As Mnemonic:   Rapaflo 8 mg oral capsule ; Simple Display Line:   8 mg, 1 cap(s), Oral, daily, for 90 day(s), Patient requests name brand Rapaflo, 90 cap(s), 3 Refill(s) ; Ordering Provider:   Mark Chris MD; Catalog Code:   silodosin ; Order Dt/Tm:   4/2/2019 1:05:19 PM CDT          predniSONE  :   predniSONE ; Status:   Processing ; Ordered As Mnemonic:   predniSONE 20 mg oral tablet ; Ordering Provider:   Aries ANN,  Mark; Action Display:   Complete ; Catalog Code:   predniSONE ; Order Dt/Tm:   2/3/2020 11:50:49 AM CST          pantoprazole  :   pantoprazole ; Status:   Prescribed ; Ordered As Mnemonic:   pantoprazole 40 mg oral delayed release tablet ; Simple Display Line:   40 mg, 1 tab(s), po, daily, for 90 day(s), 90 tab(s), 3 Refill(s) ; Ordering Provider:   Mark Chris MD; Catalog Code:   pantoprazole ; Order Dt/Tm:   4/2/2019 1:03:38 PM CDT          olopatadine ophthalmic  :   olopatadine ophthalmic ; Status:   Prescribed ; Ordered As Mnemonic:   Patanol 0.1% ophthalmic solution ; Simple Display Line:   2 gtts, Both eyes, BID, 5 mL ; Ordering Provider:   Mark Chris MD; Catalog Code:   olopatadine ophthalmic ; Order Dt/Tm:   3/11/2015 2:28:49 PM CDT          nitroglycerin  :   nitroglycerin ; Status:   Prescribed ; Ordered As Mnemonic:   nitroglycerin 0.4 mg sublingual tablet ; Simple Display Line:   See Instructions, PLACE 1 TABLET UNDER TONGUE FOR CHEST PAIN TO A MAX OF 3 TABS-ONE EVERY 5 MINUTES - IF NO RELIEF CALL 911, 25 tab(s), 3 Refill(s) ; Ordering Provider:   Mark Chris MD; Catalog Code:   nitroglycerin ; Order Dt/Tm:   7/29/2019 7:47:42 PM CDT          losartan  :   losartan ; Status:   Prescribed ; Ordered As Mnemonic:   losartan 100 mg oral tablet ; Simple Display Line:   100 mg, 1 tab(s), Oral, daily, for 90 day(s), 90 tab(s), 3 Refill(s) ; Ordering Provider:   Mark Chris MD; Catalog Code:   losartan ; Order Dt/Tm:   4/2/2019 12:55:47 PM CDT          losartan  :   losartan ; Status:   Prescribed ; Ordered As Mnemonic:   losartan 100 mg oral tablet ; Simple Display Line:   100 mg, 1 tab(s), Oral, daily, 90 tab(s), 0 Refill(s) ; Ordering Provider:   Mark Chris MD; Catalog Code:   losartan ; Order Dt/Tm:   7/17/2018 11:25:35 AM CDT          hydroCHLOROthiazide  :   hydroCHLOROthiazide ; Status:   Prescribed ; Ordered As Mnemonic:   hydroCHLOROthiazide 25 mg oral tablet ; Simple  Display Line:   1 tab(s), Oral, daily, for 90 day(s), 90 tab(s), 3 Refill(s) ; Ordering Provider:   Mark Chris MD; Catalog Code:   hydroCHLOROthiazide ; Order Dt/Tm:   4/2/2019 12:55:24 PM CDT          gabapentin  :   gabapentin ; Status:   Prescribed ; Ordered As Mnemonic:   gabapentin 300 mg oral capsule ; Simple Display Line:   300 mg, 1 cap(s), po, daily, for 90 day(s), 90 cap(s), 3 Refill(s) ; Ordering Provider:   Mark Chris MD; Catalog Code:   gabapentin ; Order Dt/Tm:   4/2/2019 1:03:54 PM CDT          fluticasone-salmeterol  :   fluticasone-salmeterol ; Status:   Prescribed ; Ordered As Mnemonic:   Advair Diskus 250 mcg-50 mcg inhalation powder ; Simple Display Line:   1 puff(s), inh, bid, 60 EA, 11 Refill(s) ; Ordering Provider:   Mark Chris MD; Catalog Code:   fluticasone-salmeterol ; Order Dt/Tm:   4/2/2019 12:57:08 PM CDT          finasteride  :   finasteride ; Status:   Prescribed ; Ordered As Mnemonic:   finasteride 5 mg oral tablet ; Simple Display Line:   1 tab(s), Oral, daily, for 90 day(s), 90 tab(s), 3 Refill(s) ; Ordering Provider:   Mark Chris MD; Catalog Code:   finasteride ; Order Dt/Tm:   4/2/2019 12:54:58 PM CDT          clopidogrel  :   clopidogrel ; Status:   Prescribed ; Ordered As Mnemonic:   clopidogrel 75 mg oral tablet ; Simple Display Line:   75 mg, 1 tab(s), po, daily, for 90 day(s), 90 tab(s), 3 Refill(s) ; Ordering Provider:   Mark Chris MD; Catalog Code:   clopidogrel ; Order Dt/Tm:   4/2/2019 12:56:53 PM CDT          atorvastatin  :   atorvastatin ; Status:   Prescribed ; Ordered As Mnemonic:   atorvastatin 20 mg oral tablet ; Simple Display Line:   20 mg, 1 tab(s), po, daily, for 90 day(s), 90 tab(s), 3 Refill(s) ; Ordering Provider:   Mark Chris MD; Catalog Code:   atorvastatin ; Order Dt/Tm:   4/2/2019 12:56:39 PM CDT          amLODIPine  :   amLODIPine ; Status:   Prescribed ; Ordered As Mnemonic:   amLODIPine 10 mg oral tablet ;  Simple Display Line:   10 mg, 1 tab(s), po, daily, for 90 day(s), 90 tab(s), 3 Refill(s) ; Ordering Provider:   Mark Chris MD; Catalog Code:   amLODIPine ; Order Dt/Tm:   4/2/2019 12:56:24 PM CDT          albuterol  :   albuterol ; Status:   Prescribed ; Ordered As Mnemonic:   Ventolin HFA 90 mcg/inh inhalation aerosol ; Simple Display Line:   2 puff(s), inh, q4 hrs, PRN: for cough, 1 EA, 11 Refill(s) ; Ordering Provider:   Mark Chris MD; Catalog Code:   albuterol ; Order Dt/Tm:   4/2/2019 1:04:40 PM CDT          albuterol  :   albuterol ; Status:   Prescribed ; Ordered As Mnemonic:   albuterol 2.5 mg/3 mL (0.083%) inhalation solution ; Simple Display Line:   2.5 mg, 3 mL, neb, q4-6 hrs, PRN: as needed for cough, 1 box(es), 1 Refill(s) ; Ordering Provider:   Mark Chris MD; Catalog Code:   albuterol ; Order Dt/Tm:   4/2/2019 1:04:20 PM CDT            Home Meds    cholecalciferol  :   cholecalciferol ; Status:   Documented ; Ordered As Mnemonic:   Vitamin D3 ; Simple Display Line:   0 Refill(s) ; Catalog Code:   cholecalciferol ; Order Dt/Tm:   11/1/2017 1:13:39 PM CDT          cetirizine  :   cetirizine ; Status:   Documented ; Ordered As Mnemonic:   Zyrtec 10 mg oral tablet ; Simple Display Line:   10 mg, 1 tab(s), PO, Daily ; Catalog Code:   cetirizine ; Order Dt/Tm:   1/25/2010 6:14:51 PM CST

## 2022-02-16 NOTE — TELEPHONE ENCOUNTER
Entered by Norma Patel CMA on October 16, 2020 11:32:58 AM CDT  ---------------------  From: Norma Patel CMA   To: [facility]    Sent: 10/16/2020 11:32:58 AM CDT  Subject: Medication Management     ** Not Approved: Refill not appropriate, GIVEN SUPPLY 10/15/2020 **  finasteride (FINASTERIDE 5MG TABS)  TAKE ONE TABLET BY MOUTH EVERY DAY  Qty:  90 unknown unit        Days Supply:  90        Refills:  0          Substitutions Allowed     Route To Pharmacy - [place] [address]   Signed by Norma Patel CMA            ------------------------------------------  From: [place] [address]  To: Mark Chris MD  Sent: October 15, 2020 7:43:18 PM CDT  Subject: Medication Management  Due: October 8, 2020 2:04:31 PM CDT     ** On Hold Pending Signature **     Drug: finasteride (finasteride 5 mg oral tablet), TAKE ONE TABLET BY MOUTH EVERY DAY  Quantity: 90 unknown unit  Days Supply: 90  Refills: 0  Substitutions Allowed  Notes from Pharmacy:     Dispensed Drug: finasteride (finasteride 5 mg oral tablet), TAKE ONE TABLET BY MOUTH EVERY DAY  Quantity: 90 unknown unit  Days Supply: 90  Refills: 0  Substitutions Allowed  Notes from Pharmacy:  ------------------------------------------

## 2022-02-16 NOTE — NURSING NOTE
Fall Prevention Assessment Entered On:  7/22/2021 1:27 PM CDT    Performed On:  7/22/2021 1:27 PM CDT by Ivelisse Hernandez CMA Fall Risk   History of Fall in Last 3 Months [place] :   Ivelisse Dyer CMA - 7/22/2021 1:27 PM CDT

## 2022-02-16 NOTE — NURSING NOTE
Comprehensive Intake Entered On:  5/18/2020 11:36 AM CDT    Performed On:  5/18/2020 11:33 AM CDT by Nicole Umanzor CMA               Summary   Chief Complaint :   f/u BP: BP's have been running 140 or more/ 80-85. Has been feeling fine. verbal  consent given for telemed. visit.   Advance Directive :   Yes   Height Measured :   68 in(Converted to: 5 ft 8 in, 172.72 cm)    Race :      Languages :   English   Ethnicity :   Not  or    Nicole Umanzor CMA - 5/18/2020 11:33 AM CDT   Health Status   Allergies Verified? :   Yes   Medication History Verified? :   Yes   Immunizations Current :   Yes   Pre-Visit Planning Status :   Completed   Tobacco Use? :   Former smoker   Nicole Umanzor CMA - 5/18/2020 11:33 AM CDT   Consents   Consent for Immunization Exchange :   Consent Granted   Consent for Immunizations to Providers :   Consent Granted   Nicole Umanzor CMA - 5/18/2020 11:33 AM CDT   Meds / Allergies   (As Of: 5/18/2020 11:36:24 AM CDT)   Allergies (Active)   aspirin  Estimated Onset Date:   Unspecified ; Reactions:   Nasal Polyps ; Created By:   Vinod Santana MD; Reaction Status:   Active ; Category:   Drug ; Substance:   aspirin ; Type:   Allergy ; Updated By:   Vinod Santana MD; Reviewed Date:   2/3/2020 11:55 AM CST      doxycycline  Estimated Onset Date:   Unspecified ; Reactions:   sun rash ; Created By:   Berna Huynh; Reaction Status:   Active ; Category:   Drug ; Substance:   doxycycline ; Type:   Allergy ; Updated By:   Berna Huynh; Source:   Paper Chart ; Reviewed Date:   2/3/2020 11:55 AM CST      Shailesh  Estimated Onset Date:   Unspecified ; Reactions:   Hives ; Created By:   Bree Babin CMA; Reaction Status:   Active ; Category:   Drug ; Substance:   Shailesh ; Type:   Allergy ; Updated By:   Bree Babin CMA; Reviewed Date:   2/3/2020 11:55 AM CST      zymetrin shampo  Estimated Onset Date:   Unspecified ; Reactions:   Rash ; Created By:    Bree Babin CMA; Reaction Status:   Active ; Category:   Drug ; Substance:   zymetrin shampo ; Type:   Allergy ; Updated By:   Bree Baibn CMA; Reviewed Date:   2/3/2020 11:55 AM CST        Medication List   (As Of: 5/18/2020 11:36:24 AM CDT)   Prescription/Discharge Order    albuterol  :   albuterol ; Status:   Prescribed ; Ordered As Mnemonic:   albuterol 2.5 mg/3 mL (0.083%) inhalation solution ; Simple Display Line:   2.5 mg, 3 mL, neb, q4-6 hrs, PRN: as needed for cough, 1 box(es), 1 Refill(s) ; Ordering Provider:   Mark Chris MD; Catalog Code:   albuterol ; Order Dt/Tm:   4/2/2019 1:04:20 PM CDT          albuterol  :   albuterol ; Status:   Prescribed ; Ordered As Mnemonic:   Ventolin HFA 90 mcg/inh inhalation aerosol ; Simple Display Line:   2 puff(s), inh, q4 hrs, PRN: for cough, 1 EA, 11 Refill(s) ; Ordering Provider:   Mark Chris MD; Catalog Code:   albuterol ; Order Dt/Tm:   4/2/2019 1:04:40 PM CDT          amLODIPine  :   amLODIPine ; Status:   Prescribed ; Ordered As Mnemonic:   amLODIPine 10 mg oral tablet ; Simple Display Line:   1 tab(s), Oral, daily, 90 tab(s), 0 Refill(s) ; Ordering Provider:   Mark Chris MD; Catalog Code:   amLODIPine ; Order Dt/Tm:   4/29/2020 4:14:10 PM CDT          atorvastatin  :   atorvastatin ; Status:   Prescribed ; Ordered As Mnemonic:   atorvastatin 20 mg oral tablet ; Simple Display Line:   1 tab(s), Oral, daily, 90 tab(s), 0 Refill(s) ; Ordering Provider:   Mark Chris MD; Catalog Code:   atorvastatin ; Order Dt/Tm:   3/23/2020 7:32:27 AM CDT          clopidogrel  :   clopidogrel ; Status:   Prescribed ; Ordered As Mnemonic:   clopidogrel 75 mg oral tablet ; Simple Display Line:   1 tab(s), Oral, daily, 90 unknown unit, 0 Refill(s) ; Ordering Provider:   Mark Chris MD; Catalog Code:   clopidogrel ; Order Dt/Tm:   4/13/2020 10:55:04 AM CDT          finasteride  :   finasteride ; Status:   Prescribed ; Ordered As Mnemonic:    finasteride 5 mg oral tablet ; Simple Display Line:   1 tab(s), Oral, daily, 90 unknown unit, 0 Refill(s) ; Ordering Provider:   Mark Chris MD; Catalog Code:   finasteride ; Order Dt/Tm:   4/13/2020 10:55:26 AM CDT          fluticasone-salmeterol  :   fluticasone-salmeterol ; Status:   Prescribed ; Ordered As Mnemonic:   fluticasone-salmeterol 250 mcg-50 mcg inhalation powder ; Simple Display Line:   1 puff(s), NEB, bid, 60 unknown unit, 1 Refill(s) ; Ordering Provider:   Mark Chris MD; Catalog Code:   fluticasone-salmeterol ; Order Dt/Tm:   4/1/2020 8:12:08 AM CDT          gabapentin  :   gabapentin ; Status:   Prescribed ; Ordered As Mnemonic:   gabapentin 300 mg oral capsule ; Simple Display Line:   See Instructions, TAKE ONE CAPSULE BY MOUTH EVERY DAY, 90 cap(s), 0 Refill(s) ; Ordering Provider:   Mark Chris MD; Catalog Code:   gabapentin ; Order Dt/Tm:   3/30/2020 3:44:15 PM CDT          hydroCHLOROthiazide  :   hydroCHLOROthiazide ; Status:   Prescribed ; Ordered As Mnemonic:   hydroCHLOROthiazide 25 mg oral tablet ; Simple Display Line:   1 tab(s), Oral, daily, 90 unknown unit, 0 Refill(s) ; Ordering Provider:   Mark Chris MD; Catalog Code:   hydroCHLOROthiazide ; Order Dt/Tm:   4/17/2020 11:56:27 AM CDT          losartan  :   losartan ; Status:   Prescribed ; Ordered As Mnemonic:   losartan 100 mg oral tablet ; Simple Display Line:   See Instructions, TAKE ONE TABLET BY MOUTH EVERY DAY, 90 tab(s), 0 Refill(s) ; Ordering Provider:   Mark Chris MD; Catalog Code:   losartan ; Order Dt/Tm:   3/31/2020 10:10:51 AM CDT          nitroglycerin  :   nitroglycerin ; Status:   Prescribed ; Ordered As Mnemonic:   nitroglycerin 0.4 mg sublingual tablet ; Simple Display Line:   See Instructions, PLACE 1 TABLET UNDER TONGUE FOR CHEST PAIN TO A MAX OF 3 TABS-ONE EVERY 5 MINUTES - IF NO RELIEF CALL 911, 25 tab(s), 3 Refill(s) ; Ordering Provider:   Mark Chris MD; Catalog Code:    nitroglycerin ; Order Dt/Tm:   7/29/2019 7:47:42 PM CDT          olopatadine ophthalmic  :   olopatadine ophthalmic ; Status:   Prescribed ; Ordered As Mnemonic:   Patanol 0.1% ophthalmic solution ; Simple Display Line:   2 gtts, Both eyes, BID, 5 mL ; Ordering Provider:   Mark Chris MD; Catalog Code:   olopatadine ophthalmic ; Order Dt/Tm:   3/11/2015 2:28:49 PM CDT          pantoprazole  :   pantoprazole ; Status:   Prescribed ; Ordered As Mnemonic:   pantoprazole 40 mg oral delayed release tablet ; Simple Display Line:   1 tab(s), Oral, daily, 90 unknown unit, 0 Refill(s) ; Ordering Provider:   Mark Chris MD; Catalog Code:   pantoprazole ; Order Dt/Tm:   4/17/2020 11:56:45 AM CDT          predniSONE  :   predniSONE ; Status:   Prescribed ; Ordered As Mnemonic:   predniSONE 10 mg oral tablet ; Simple Display Line:   See Instructions, 3 tabs daily for 4 days, 2 tabs daily for 4 days, 1 tab daily for 4 days, 24 EA, 0 Refill(s) ; Ordering Provider:   Brian Husain PA-C; Catalog Code:   predniSONE ; Order Dt/Tm:   2/3/2020 12:00:28 PM CST          silodosin  :   silodosin ; Status:   Prescribed ; Ordered As Mnemonic:   silodosin 8 mg oral capsule ; Simple Display Line:   8 mg, 1 cap(s), Oral, daily, 90 cap(s), 0 Refill(s) ; Ordering Provider:   Mark Chris MD; Catalog Code:   silodosin ; Order Dt/Tm:   3/19/2020 5:48:16 PM CDT          triamcinolone topical  :   triamcinolone topical ; Status:   Prescribed ; Ordered As Mnemonic:   triamcinolone 0.1% topical ointment ; Simple Display Line:   1 ree, TOP, TID, 240 gm ; Ordering Provider:   Mark Chris MD; Catalog Code:   triamcinolone topical ; Order Dt/Tm:   3/11/2015 2:29:32 PM CDT          zolpidem 10 mg oral tablet  :   zolpidem 10 mg oral tablet ; Status:   Prescribed ; Ordered As Mnemonic:   zolpidem 10 mg oral tablet ; Simple Display Line:   1 tab(s), Oral, qhs, PRN: AS NEEDED FOR SLEEP, 30 unknown unit, 5 Refill(s) ; Ordering Provider:    Mark Chris MD; Catalog Code:   zolpidem ; Order Dt/Tm:   3/31/2020 8:15:35 AM CDT            Home Meds    cetirizine  :   cetirizine ; Status:   Documented ; Ordered As Mnemonic:   Zyrtec 10 mg oral tablet ; Simple Display Line:   10 mg, 1 tab(s), PO, Daily ; Catalog Code:   cetirizine ; Order Dt/Tm:   1/25/2010 6:14:51 PM CST          cholecalciferol  :   cholecalciferol ; Status:   Documented ; Ordered As Mnemonic:   Vitamin D3 ; Simple Display Line:   0 Refill(s) ; Catalog Code:   cholecalciferol ; Order Dt/Tm:   11/1/2017 1:13:39 PM CDT            ID Risk Screen   Recent Travel History :   No recent travel   Family Member Travel History :   No recent travel   Other Exposure to Infectious Disease :   Unknown   Nicole Umanzor CMA - 5/18/2020 11:33 AM CDT

## 2022-02-16 NOTE — TELEPHONE ENCOUNTER
---------------------  From: Nicole Umanzor CMA (eRx Pool ([id number]_[facility]))   To: ZIM Message Pool ([id number]_[place] [facility] [place]);     Sent: 7/7/2021 7:37:10 AM CDT  Subject: FW: Medication Management   Due Date/Time: 7/7/2021 7:34:00 PM CDT           ------------------------------------------  From: [facility]  To: Mark Chris MD  Sent: July 6, 2021 7:34:23 PM CDT  Subject: Medication Management  Due: June 4, 2021 8:25:53 PM CDT     ** On Hold Pending Signature **     Drug: fluticasone-salmeterol (fluticasone-salmeterol 250 mcg-50 mcg inhalation powder), INHALE ONE PUFF BY MOUTH TWICE A DAY  Quantity: 180 unknown unit  Days Supply: 90  Refills: 1  Substitutions Allowed  Notes from Pharmacy:     Dispensed Drug: fluticasone-salmeterol (fluticasone-salmeterol 250 mcg-50 mcg inhalation powder), INHALE ONE PUFF BY MOUTH TWICE A DAY  Quantity: 180 unknown unit  Days Supply: 90  Refills: 2  Substitutions Allowed  Notes from Pharmacy:  ---------------------------------------------------------------  From: Nicole Umanzor CMA   To: [id number]    Sent: 7/7/2021 9:26:06 AM CDT  Subject: FW: Medication Management     ** Submitted: **  Order:fluticasone-salmeterol (fluticasone-salmeterol 250 mcg-50 mcg inhalation powder)  See Instructions  INHALE ONE PUFF BY MOUTH TWICE A DAY  Qty:  180 unknown unit        Days Supply:  90        Refills:  0          Substitutions Allowed     Route To Pharmacy - [facility]    Signed by Nicole Umanzor CMA  7/7/2021 2:25:00 PM [facility]    ** Submitted: **  Complete:fluticasone-salmeterol (fluticasone-salmeterol 250 mcg-50 mcg inhalation powder)   Signed by Nicole Umanzor CMA  7/7/2021 2:26:00 PM [facility]    ** Not Approved:  **  fluticasone-salmeterol (FLUTICASONE-SALMETEROL 250-50 AEPB)  INHALE ONE PUFF BY MOUTH TWICE A DAY  Qty:  180 unknown unit        Days Supply:  90        Refills:  2          Substitutions Allowed     Route  To Pharmacy - [id number]   Signed by Crystal Umanzor CMAraLV: 12/10/20 rash  AWV: 7/27/20  RTC: 1 yr   labs: 7/6/20  I refilled medication per protocol pt is due visit at the beginning of Aug.

## 2022-02-16 NOTE — PROGRESS NOTES
Chief Complaint    changed BP meds, losartin was not working  History of Present Illness      patient present to clinic today for follow up hypertension. switched from valsartan to losartin last week, BP ranging from 160's to 120's, usually decrease after he gois for a walk, has had bradycardia in the past from htn meds and lajayden has BPH, feels asymptomatic withthe elevated BP,      Review of systems is negative except as per HPI including:  no fevers, chills, sore throat, runny nose, nausea, vomiting, constipation, diarrhea, rash or new skin lesions, chest pain, palpitations, slurred speech, new paresthesia, shortness of breath or wheeze.      Exam:      General: alert and oriented ×3 no acute distress.      HEENT: pupils are equal round and reactive to light extraocular motion is intact. Normocephalic and atraumatic.       Hearing is grossly normal and there is no otorrhea.       Nares are patent there is no rhinorrhea.       Mucous membranes are moist and pink.      Chest: has bilateral rise with no increased work of breathing.      Cardiovascular: normal perfusion and brisk capillary refill.      Musculoskeletal: no gross focal abnormalities and normal gait.      Neuro: no gross focal abnormalities and memory seems intact.      Psychiatric: speech is clear and coherent and fluent. Patient dressed appropriately for the weather. Mood is appropriate and affect is full.                     Discussed with patient to return to clinic if symptoms worsen or do not improve  Physical Exam   Vitals & Measurements    T: 98.0   F (Tympanic)  HR: 81(Peripheral)  BP: 122/80  SpO2: 96%        Assessment/Plan       HTN, goal below 140/90 (I10)         recommended pt continue to monitor for the next week to allow losartan to have a week to reach maximum effectiveness, then if it still seems labile to consider talking with his PCP about a 24 hour BP monitor, and when he comes into clin to bring his home machine so we can double  check it.        15 minutes spent with patient in direct face to face contact, > 50% of time spent counseling and coordinating care.   Patient Information     Name:MELISSA ROSAS      Address:      [address] [place] [id number]     Sex:Male     YOB: 1950     [phone]     Emergency Contact:NASIM ROSAS     MRN:[id number]     FIN:[id number]     Location:[facility]     Date of Service:07/24/2018      Primary Care Physician:       Mark Chris MD, [phone]      Attending Physician:       Dionna Merritt MD, [phone]  Problem List/Past Medical History    Ongoing     AK (actinic keratosis)     Allergic rhinitis     Asthma     Benign prostatic hyperplasia     Bronchiectasis     CAD (coronary artery disease)       Comments: dec 2009 angiogram with 50% blockage,, treated with meds     Diverticulitis     DJD of shoulder     Eczematous dermatitis     GERD (gastroesophageal reflux disease)     Hip arthritis     HLD (hyperlipidemia)     HTN, goal below 140/90     Hx of malignant skin melanoma     Insomnia     Nasal polyps     Obese     Obstructive sleep apnea syndrome in adult     Prosthetic hip infection       Comments: Right hip     Tobacco abuse     Tubulovillous adenoma of colon     Urinary retention       Comments: Post Prostatic Surgery     Vocal cord dysfunction    Historical     Cancer of skin     Inpatient stay       Comments: @[facility], [place] - 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 Cellulitis of the right thigh.     Inpatient stay       Comments: @[facility], [place] - Chest pain, unspecified     Otitis externa     Otitis media  Procedure/Surgical History     Revision of total hip replacement (04/24/2018)            Comments:      Right hip revision total hip arthroplasty and extensive irrigation and debridement of the right thigh including muscle, fascia, bone and exchange of the femoral head and  polyethylene acetabular liner.     THR - Total hip replacement (04/16/2018)            Comments:      Right hip     Colonoscopy (04/12/2016)            Comments:      Tubular adenoma x 3 no high grade dysplasia      Indication: History of adenomatous polyps on last colonoscopy.      Sedation:  MAC.      Recommendation: Repeat in 5 years.     TURP - Transurethral resection of prostate (10/03/2011)           Colonoscopy (09/20/2010)            Comments:      4 mm polyp noted at 90 cm in the ascending colon; appearance adenomatous, removed      Recommend colonoscopic follow up in 5 years.      Conscious sedation/ MAC if his medical conditions dictate ASA III for level of sedation for future procedures     [facility] Laser - Prostate (03/2009)           Coronary angiogram (2009)           BL nasal Turbinoplasty (01/2008)           Prostate TUNA surgery (02/2007)           Right shoulder arthroscopy (10/16/2006)           Flexible sigmoidoscopy (07/08/2005)           left shoulder arthroscopy (07/2005)           excisional cervical schwannoma (11/30/2001)           Cholecystectomy (02/1982)           Vasectomy        Medications     Zyrtec 10 mg oral tablet: 10 mg, 1 tab(s), PO, Daily.     Patanol 0.1% ophthalmic solution: 2 gtts, Both eyes, BID, 5 mL.     triamcinolone 0.1% topical ointment: 1 ree, TOP, TID, 240 gm.     Vitamin D3: 0 Refill(s).     Ventolin HFA 90 mcg/inh inhalation aerosol: 2 puff(s), inh, q4 hrs, PRN: for cough, 1 EA, 11 Refill(s).     albuterol 2.5 mg/3 mL (0.083%) inhalation solution: 2.5 mg, 3 mL, neb, q4-6 hrs, PRN: as needed for cough, 1 box(es), 1 Refill(s).     gabapentin 300 mg oral capsule: 300 mg, 1 cap(s), po, daily, for 90 day(s), 90 cap(s), 3 Refill(s).     pantoprazole 40 mg oral delayed release tablet: 40 mg, 1 tab(s), po, daily, for 90 day(s), 90 tab(s), 3 Refill(s).     Advair Diskus 250 mcg-50 mcg inhalation powder: 1 puff(s), inh, bid, 60 EA, 11 Refill(s).     clopidogrel 75 mg  oral tablet: 75 mg, 1 tab(s), po, daily, for 90 day(s), 90 tab(s), 3 Refill(s).     atorvastatin 20 mg oral tablet: 20 mg, 1 tab(s), po, daily, for 90 day(s), 90 tab(s), 3 Refill(s).     amLODIPine 10 mg oral tablet: 10 mg, 1 tab(s), po, daily, for 90 day(s), 90 tab(s), 3 Refill(s).     finasteride 5 mg oral tablet: 5 mg, 1 tab(s), po, daily, for 90 day(s), 90 tab(s), 3 Refill(s).     doxepin 10 mg oral capsule: 10 mg, 1 cap(s), po, hs, Hospital discharge, 0 Refill(s).     acetaminophen-hydrocodone 325 mg-5 mg oral tablet: See Instructions, 1-2  tab(s) po q4 hrs  Hospital discharge, 0 Refill(s).     Rapaflo 8 mg oral capsule: 8 mg, 1 cap(s), po, daily, 0 Refill(s).     senna 8.6 mg oral tablet: 17.2 mg, 2 tab(s), PO, Once a day (at bedtime), PRN: for constipation, 20 tab(s), 0 Refill(s).     aspirin: 325 mg, bid, 0 Refill(s).     eszopiclone 2 mg oral tablet: 2 mg, 1 tab(s), po, hs, 30 tab(s), 2 Refill(s).     Nitrostat 0.4 mg sublingual tablet: See Instructions, PLACE 1 TABLET UNDER TONGUE FOR CHEST PAIN TO A MAX OF 3 TABS-ONE EVERY 5 MINUTES - IF NO RELIEF CALL 911, 25 unknown unit, 3 Refill(s).     rifAMPin 300 mg oral capsule: 300 mg, 1 cap(s), po, bid, per infectious disease doctor, take 1 tab BID for the next 3 months, 0 Refill(s).     levoFLOXacin 750 mg oral tablet: 750 mg, 1 tab(s), po, daily, per infectious disease dr, 1 tab daily for 3 months, 0 Refill(s).     Levaquin: q 24 hrs, 0 Refill(s).     losartan 100 mg oral tablet: 100 mg, 1 tab(s), Oral, daily, 90 tab(s), 0 Refill(s).          Allergies    Shailesh (Hives)    aspirin (Nasal Polyps)    doxycycline (sun rash)    zymetrin shampo (Rash)  Social History    Smoking Status - 07/24/2018     Former smoker     Alcohol      Current, Beer (12 oz), 3-4 times per year, 03/17/2015     Employment and Education      Retired, 09/26/2011     Exercise and Physical Activity - Regular exercise, 09/26/2011      Exercise frequency: 3-4 times/week. Exercise type: Weight  lifting, Cardio., 03/17/2015     Home and Environment      Marital status: . 1 children., 03/17/2015     Sexual      Sexually active: Yes. Sexual orientation: Heterosexual., 03/17/2015     Substance Abuse      Never, 03/17/2015     Tobacco      Past, 03/17/2015  Family History    Alzheimer's disease: Father.    CHF - Congestive heart failure: Father.    Sister: History is negative    Sister: History is negative    Sister: History is negative    Son: History is negative  Immunizations      Vaccine Date Status Comments      influenza virus vaccine, inactivated 09/22/2016 Given      pneumococcal (PCV13) 03/17/2016 Given      influenza virus vaccine, inactivated 10/02/2015 Recorded [10/9/2015] [facility] [place]      influenza virus vaccine, inactivated 08/27/2014 Given      ZOS, shingles 05/27/2014 Given      pneumococcal (PPSV23) 05/27/2014 Given      influenza virus vaccine, inactivated 09/30/2013 Recorded      influenza virus vaccine, inactivated 09/23/2012 Recorded [facility]      tetanus/diphth/pertuss (Tdap) adult/adol 09/08/2011 Given      influenza virus vaccine, inactivated 09/08/2011 Given      influenza 10/08/2010 Given      influenza 09/18/2009 Recorded      pneumococcal (PPSV23) 11/13/2002 Recorded      Td 08/07/2001 Recorded  Lab Results       Lab Results (Last 4 results within 90 days)        Sodium Level: 138 [135 mmol/L - 145 mmol/L] (05/15/18 11:22:00 CDT)       Sodium Level TR: 143 mEq/L [10  - 20] (06/06/18 11:07:00 CDT)       Sodium Level TR: 141 mEq/L [10  - 20] (05/29/18 10:05:00 CDT)       Potassium Level: 3.4 Low [3.5 mmol/L - 5 mmol/L] (05/15/18 11:22:00 CDT)       Potassium Level TR: 3.5 mEq/L [10  - 20] (06/06/18 11:07:00 CDT)       Potassium Level TR: 3.6 mEq/L [10  - 20] (05/29/18 10:05:00 CDT)       Chloride Level: 106 [98 mmol/L - 110 mmol/L] (05/15/18 11:22:00 CDT)       Chloride TR: 105 mEq/L [10  - 20] (06/06/18 11:07:00 CDT)       Chloride TR: 105  mEq/L [10  - 20] (05/29/18 10:05:00 CDT)       CO2 Level: 21 [21 mmol/L - 31 mmol/L] (05/15/18 11:22:00 CDT)       AGAP: 11 [5  - 18] (05/15/18 11:22:00 CDT)       Anion Gap TR: 14 mEq/L [21 mmol/L - 31 mmol/L] (06/06/18 11:07:00 CDT)       Anion Gap TR: 12 mEq/L [21 mmol/L - 31 mmol/L] (05/29/18 10:05:00 CDT)       Glucose Level: 99 [65 mg/dL - 100 mg/dL] (05/15/18 11:22:00 CDT)       Glucose Level TR: 85 mg/dL [10  - 20] (06/06/18 11:07:00 CDT)       Glucose Level TR: 90 mg/dL [10  - 20] (05/29/18 10:05:00 CDT)       BUN: 13 [8 mg/dL - 25 mg/dL] (05/15/18 11:22:00 CDT)       BUN TR: 15 mg/dL [10  - 20] (06/06/18 11:07:00 CDT)       BUN TR: 17 mg/dL [10  - 20] (05/29/18 10:05:00 CDT)       Creatinine Level: 1.95 High [0.72 mg/dL - 1.25 mg/dL] (05/15/18 11:22:00 CDT)       Creatinine TR: 1.82 mg/dL [10  - 20] (06/06/18 11:07:00 CDT)       Creatinine TR: 1.84 mg/dL [10  - 20] (05/29/18 10:05:00 CDT)       BUN/Creat Ratio: 7 Low [10  - 20] (05/15/18 11:22:00 CDT)       eGFR TR: 37 mL/min [10  - 20] (06/06/18 11:07:00 CDT)       eGFR TR: 37 mL/min [10  - 20] (05/29/18 10:05:00 CDT)       GFR Calculated TR: 43 mL/min [10  - 20] (06/06/18 11:07:00 CDT)       eGFR : 42 Low [10  - 20] (05/15/18 11:22:00 CDT)       eGFR Non-: 34 Low [10  - 20] (05/15/18 11:22:00 CDT)       Calcium Level: 8.9 [8.5 mg/dL - 10.5 mg/dL] (05/15/18 11:22:00 CDT)       Calcium TR: 9.3 mEq/dL [10  - 20] (06/06/18 11:07:00 CDT)       Calcium TR: 9 mEq/dL [10  - 20] (05/29/18 10:05:00 CDT)       Bilirubin Total TR: 0.2 mg/dL [10  - 20] (06/06/18 11:07:00 CDT)       Bilirubin Total TR: 0.3 mg/dL [10  - 20] (05/29/18 10:05:00 CDT)       Alkaline Phosphatase TR: 86 unit/L [10  - 20] (06/06/18 11:07:00 CDT)       Alkaline Phosphatase TR: 55 unit/L [10  - 20] (05/29/18 10:05:00 CDT)       AST TR: 16 unit/L [10  - 20] (06/06/18 11:07:00 CDT)       AST TR: 15 unit/L [10  - 20] (05/29/18 10:05:00 CDT)       ALT TR: 13 unit/L  [10  - 20] (06/06/18 11:07:00 CDT)       ALT TR: 14 unit/L [10  - 20] (05/29/18 10:05:00 CDT)       Protein Total TR: 6.3 gm/dL [10  - 20] (06/06/18 11:07:00 CDT)       Protein Total TR: 6.2 gm/dL [10  - 20] (05/29/18 10:05:00 CDT)       Albumin Level TR: 4.1 g/dL [10  - 20] (06/06/18 11:07:00 CDT)       Albumin Level TR: 3.9 g/dL [10  - 20] (05/29/18 10:05:00 CDT)       C-Reactive Protein (CRP): 50.6 mg/L High [8.5 mg/dL - 10.5 mg/dL] (05/15/18 11:15:00 CDT)       CRP High Sensitivity: 22.4 mg/L High [8.5 mg/dL - 10.5 mg/dL] (05/07/18 13:52:00 CDT)       WBC: 4.6 [4.5  - 11] (05/15/18 11:22:00 CDT)       WBC: 8 [3.8  - 10.8] (05/07/18 13:52:00 CDT)       RBC: 3.16 Low [4.3  - 5.9] (05/15/18 11:22:00 CDT)       RBC: 3.04 Low [4.2  - 5.8] (05/07/18 13:52:00 CDT)       RBC TR: 13.4 x10^6/uL [10  - 20] (06/06/18 11:07:00 CDT)       RBC TR: 13.2 x10^6/uL [10  - 20] (05/29/18 10:05:00 CDT)       Hgb: 10.1 Low [13.5 g/dL - 17.5 g/dL] (05/15/18 11:22:00 CDT)       Hgb: 9.7 gm/dL Low [13.2 gm/dL - 17.1 gm/dL] (05/07/18 13:52:00 CDT)       Hgb TR: 9.7 g/dL [10  - 20] (06/06/18 11:07:00 CDT)       Hgb TR: 9.6 g/dL [10  - 20] (05/29/18 10:05:00 CDT)       Hct: 29.8 Low [37 % - 53 %] (05/15/18 11:22:00 CDT)       Hct: 29.1 % Low [38.5 % - 50 %] (05/07/18 13:52:00 CDT)       Hct TR: 26.6 % [10  - 20] (06/06/18 11:07:00 CDT)       Hct TR: 28.4 % [10  - 20] (05/29/18 10:05:00 CDT)       MCV: 94 [80 fL - 100 fL] (05/15/18 11:22:00 CDT)       MCV: 95.7 fL [80 fL - 100 fL] (05/07/18 13:52:00 CDT)       MCV TR: 92 fL [10  - 20] (06/06/18 11:07:00 CDT)       MCV TR: 93.4 fL [10  - 20] (05/29/18 10:05:00 CDT)       MCH: 32.0 [26 pg - 34 pg] (05/15/18 11:22:00 CDT)       MCH: 31.9 pg [27 pg - 33 pg] (05/07/18 13:52:00 CDT)       MCHC: 33.9 [32 g/dL - 36 g/dL] (05/15/18 11:22:00 CDT)       MCHC: 33.3 gm/dL [32 gm/dL - 36 gm/dL] (05/07/18 13:52:00 CDT)       RDW: 13.1 [11.5 % - 15.5 %] (05/15/18 11:22:00 CDT)       RDW: 12.9 % [11 % - 15  %] (05/07/18 13:52:00 CDT)       Platelet: 256 [140  - 440] (05/15/18 11:22:00 CDT)       Platelet: 582 High [140  - 400] (05/07/18 13:52:00 CDT)       Platelet TR: 315 x10^3/uL [10  - 20] (06/06/18 11:07:00 CDT)       Platelet TR: 318 x10^3/uL [10  - 20] (05/29/18 10:05:00 CDT)       MPV: 10.2 [6.5 fL - 11 fL] (05/15/18 11:22:00 CDT)       MPV: 9.5 fL [7.5 fL - 12.5 fL] (05/07/18 13:52:00 CDT)       Lymphocytes: 9.9 % [8.5 mg/dL - 10.5 mg/dL] (05/07/18 13:52:00 CDT)       Lymphocytes TR: 0.83 % [21 mmol/L - 31 mmol/L] (06/06/18 11:07:00 CDT)       Lymphocytes TR: 0.8 % [21 mmol/L - 31 mmol/L] (05/29/18 10:05:00 CDT)       Abs Lymphocytes: 792 Low [850  - 3900] (05/07/18 13:52:00 CDT)       Neutrophils: 75.5 % [0  - 200] (05/07/18 13:52:00 CDT)       Neutrophils TR: 3.11 % [21 mmol/L - 31 mmol/L] (06/06/18 11:07:00 CDT)       Neutrophils TR: 3.84 % [21 mmol/L - 31 mmol/L] (05/29/18 10:05:00 CDT)       Abs Neutrophils: 6040 [1500  - 7800] (05/07/18 13:52:00 CDT)       Monocytes: 9.5 % [8.5 mg/dL - 10.5 mg/dL] (05/07/18 13:52:00 CDT)       Monocytes TR: 0.58 % [21 mmol/L - 31 mmol/L] (06/06/18 11:07:00 CDT)       Monocytes TR: 0.6 % [21 mmol/L - 31 mmol/L] (05/29/18 10:05:00 CDT)       Abs Monocytes: 760 [200  - 950] (05/07/18 13:52:00 CDT)       Eosinophils: 4.1 % [8.5 mg/dL - 10.5 mg/dL] (05/07/18 13:52:00 CDT)       Eosinophils TR: 0.31 % [21 mmol/L - 31 mmol/L] (06/06/18 11:07:00 CDT)       Eosinophils TR: 0.57 % [21 mmol/L - 31 mmol/L] (05/29/18 10:05:00 CDT)       Abs Eosinophils: 328 [15  - 500] (05/07/18 13:52:00 CDT)       Basophils: 1 % [8.5 mg/dL - 10.5 mg/dL] (05/07/18 13:52:00 CDT)       Basophils TR: 0.05 % [21 mmol/L - 31 mmol/L] (06/06/18 11:07:00 CDT)       Basophils TR: 0.11 % [21 mmol/L - 31 mmol/L] (05/29/18 10:05:00 CDT)       Abs Basophils: 80 [0  - 200] (05/07/18 13:52:00 CDT)       Sed Rate: 65 High [11 % - 15 %] (05/15/18 11:22:00 CDT)       Sed Rate: 48 High [11 % - 15 %] (05/07/18 13:52:00  CDT)       Sed Rate TR: 48 mm/hr [10  - 20] (06/06/18 11:07:00 CDT)       Sed Rate TR: 57 mm/hr [10  - 20] (05/29/18 10:05:00 CDT)

## 2022-02-16 NOTE — PROGRESS NOTES
Chief Complaint    concerns with blood pressures. At home numbers have been in the 140's  History of Present Illness      69-year-old gentleman here discuss his blood pressure.  Also wants to discuss erectile dysfunction       Patient developed some photodermatitis on hydrochlorothiazide.  This was stopped unfortunately his blood pressure went up despite the photodermatitis clearing.  I note he is also going to dermatology for UVB treatments.  He has been taking his blood pressure for the last 3 weeks on low-dose metoprolol.  He has had an occasional high but usually only up to 145 90% of the readings are in the 120s or 130s systolic.  He did not record heart rate he says he has been feeling good and been active without problems.  His erectile dysfunction precedes his starting metoprolol.  Is been something he just decided he would like to try to treat  Review of Systems      See HPI.  All other review of systems negative.  Physical Exam   Vitals & Measurements    T: 97.6   F (Tympanic)  HR: 64(Peripheral)  BP: 122/78     HT: 68 in  WT: 201 lb  BMI: 30.56       Alert and oriented      Skin is relatively clear       No peripheral edema       Nonlabored breathing  Assessment/Plan       1. HTN, goal below 140/90 (I10)         Metoprolol seems to be a success we will continue at his low dose along with his other blood pressure medicines       2. Erectile dysfunction (N52.9)         He can try sildenafil       Orders:         sildenafil, = 1 tab(s) ( 100 mg ), Oral, daily, # 30 tab(s), 0 Refill(s), Type: Maintenance, Pharmacy: [id number], 1 tab(s) Oral daily, 68, in, 06/04/20 10:03:00 CDT, Height Measured, 201, lb, 06/04/20 10:03:00 CDT, Weight Measured, (Ordered)  Patient Information     Name:MELISSA ROSAS      Address:      [address] [place] [id number]     Sex:Male     YOB: 1950     [phone]     Emergency Contact:ROSAS NASIM MELCHOR     MRN:[id number]      FIN:[id number]     Location:[facility]     Date of Service:06/04/2020      Primary Care Physician:       Mark Chris MD, [phone]      Attending Physician:       Mark Chris MD, [phone]  Problem List/Past Medical History    Ongoing     AK (actinic keratosis)     Allergic rhinitis     Asthma     Benign prostatic hyperplasia     Bronchiectasis     CAD (coronary artery disease)       Comments: dec 2009 angiogram with 50% blockage,, treated with meds     Diverticulitis     DJD of shoulder     Eczematous dermatitis     GERD (gastroesophageal reflux disease)     Hip arthritis     HLD (hyperlipidemia)     HTN, goal below 140/90     Hx of malignant skin melanoma     Insomnia     Nasal polyps     Obese     Obstructive sleep apnea syndrome in adult     Prosthetic hip infection       Comments: Right hip     Tobacco abuse     Tubulovillous adenoma of colon     Urinary retention       Comments: Post Prostatic Surgery     Vocal cord dysfunction    Historical     Cancer of skin     Inpatient stay       Comments: @[facility], [place] - Chest pain, unspecified     Inpatient stay       Comments: @[facility], [place] - 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 Cellulitis of the right thigh.     Otitis externa     Otitis media  Procedure/Surgical History     Revision of total hip replacement (04/24/2018)      Comments: Right hip revision total hip arthroplasty and extensive irrigation and debridement of the right thigh including muscle, fascia, bone and exchange of the femoral head and polyethylene acetabular liner..     THR - Total hip replacement (04/16/2018)      Comments: Right hip.     Colonoscopy (04/12/2016)      Comments: Tubular adenoma x 3 no high grade dysplasia. Indication: History of adenomatous polyps on last colonoscopy.      Sedation:  MAC.      Recommendation: Repeat in 5 years..     TURP - Transurethral resection of prostate (10/03/2011)     Colonoscopy  (09/20/2010)      Comments: 4 mm polyp noted at 90 cm in the ascending colon; appearance adenomatous, removed      Recommend colonoscopic follow up in 5 years.      Conscious sedation/ MAC if his medical conditions dictate ASA III for level of sedation for future procedures.     Green Light Laser - Prostate (03.2009)     Coronary angiogram (2009)     BL nasal Turbinoplasty (01.2008)     Prostate TUNA surgery (02.2007)     Right shoulder arthroscopy (10/16/2006)     Flexible sigmoidoscopy (07/08/2005)     left shoulder arthroscopy (07.2005)     excisional cervical schwannoma (11/30/2001)     Cholecystectomy (02.1982)     Vasectomy  Medications    albuterol 2.5 mg/3 mL (0.083%) inhalation solution, 2.5 mg= 3 mL, NEB, q4-6 hrs, PRN, 1 refills    amLODIPine 10 mg oral tablet, 1 tab(s), Oral, daily    atorvastatin 20 mg oral tablet, 1 tab(s), Oral, daily    clopidogrel 75 mg oral tablet, 1 tab(s), Oral, daily    finasteride 5 mg oral tablet, 1 tab(s), Oral, daily    fluticasone-salmeterol 250 mcg-50 mcg inhalation powder, 1 puff(s), NEB, bid, 1 refills    gabapentin 300 mg oral capsule, See Instructions    losartan 100 mg oral tablet, See Instructions    metoprolol succinate 25 mg oral tablet, extended release, 25 mg= 1 tab(s), Oral, daily, 1 refills    nitroglycerin 0.4 mg sublingual tablet, See Instructions, 3 refills    pantoprazole 40 mg oral delayed release tablet, 1 tab(s), Oral, daily    Patanol 0.1% ophthalmic solution, 2 gtts, Eye-Both, bid, 3 refills    sildenafil 100 mg oral tablet, 100 mg= 1 tab(s), Oral, daily    silodosin 8 mg oral capsule, 8 mg= 1 cap(s), Oral, daily    triamcinolone 0.1% topical ointment, 1 ree, Topical, tid, 1 refills    Ventolin HFA 90 mcg/inh inhalation aerosol, 2 puff(s), Inhale, q4 hrs, PRN, 11 refills    Vitamin D3    zolpidem 10 mg oral tablet, 1 tab(s), Oral, qhs, PRN, 5 refills    Zyrtec 10 mg oral tablet, 10 mg= 1 tab(s), Oral, daily  Allergies    Shailesh (Hives)    aspirin  (Nasal Polyps)    doxycycline (sun rash)    zymetrin shampo (Rash)  Social History    Smoking Status - 05/18/2020     Former smoker     Alcohol      Current, Beer (12 oz), 3-4 times per year, 03/17/2015     Employment/School      Retired, Work/School description: farmer., 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 Exercise type: Weight lifting, Cardio., 04/02/2019     Home/Environment      Marital status: . 1 children., 03/17/2015     Sexual      Sexually active: Yes. Sexual orientation: Heterosexual., 03/17/2015     Substance Abuse      Never, 03/17/2015     Tobacco - Past, 04/02/2019      Cigarettes, 04/02/2019  Family History    Alzheimer's disease: Father.    Arthritis: Father.    CHF - Congestive heart failure: Father.    Heart disease: Mother.    High blood pressure: Mother.    Sister: History is negative    Sister: History is negative    Sister: History is negative    Son: History is negative  Immunizations      Vaccine Date Status          influenza virus vaccine, inactivated 09/18/2018 Recorded          influenza virus vaccine, inactivated 09/20/2017 Recorded          influenza virus vaccine, inactivated 09/22/2016 Given          pneumococcal (PCV13) 03/17/2016 Given          influenza virus vaccine, inactivated 10/02/2015 Recorded              Comments : [10/9/2015] Received at [facility], [place], [place]          influenza virus vaccine, inactivated 08/27/2014 Given          ZOS, shingles 05/27/2014 Given          pneumococcal (PPSV23) 05/27/2014 Given          influenza virus vaccine, inactivated 09/30/2013 Recorded          influenza virus vaccine, inactivated 09/23/2012 Recorded              Comments : [facility]          influenza, H1N1, inactivated 09/15/2011 Recorded          tetanus/diphth/pertuss (Tdap) adult/adol 09/08/2011 Given          influenza virus vaccine, inactivated 09/08/2011 Given          influenza 10/08/2010 Given           influenza 09/18/2009 Recorded          pneumococcal (PPSV23) 11/13/2002 Recorded          Td 08/07/2001 Recorded

## 2022-02-16 NOTE — NURSING NOTE
CAGE Assessment Entered On:  7/27/2020 11:02 AM CDT    Performed On:  7/27/2020 11:02 AM CDT by Nicole Umanzor CMA               Assessment   Have you ever felt you should cut down on your drinking :   No   Have people annoyed you by criticizing your drinking :   No   Have you ever felt bad or guilty about your drinking :   No   Have you ever taken a drink first thing in the morning to steady your nerves or get rid of a hangover (Eye-opener) :   No   CAGE Score :   0    Nicole Umanzor CMA - 7/27/2020 11:02 AM CDT

## 2022-02-16 NOTE — CARE COORDINATION
Patient:   MELISSA ROSAS            MRN: [id number]            FIN: [id number]               Age:   67 years     Sex:  Male     :  1950   Associated Diagnoses:   None   Author:   Nelia Babcock CMA      Sources of Information:  [ ] Patient, family member, or caregiver (Please list):  [X ] Hospital discharge summary  [ ] Hospital fax  [ ] List of recent hospitalizations or ED visits  [ ] Other:   Discharged From: [facility] with home health   Discharge Date: 2018  Diagnosis/Problem:Septic arthritis of right hip prosthetic   Discharge note reviewed: X[ ] Yes [ ]No  Medication Changes: [ X] Yes [ ] No   Medication List Updated: [ X] Yes [ ] No  Needs Referral or Lab: [ X] Yes [ ] No  Home Health   Every Monday of the week get CBC with differential, Comprehensive metabolic panel ESR, CRP and vancomycin trough level startinfr from  2018  unitl 2018   Labes to be sent to Dr. Corina Birch's office Phone [phone] and Fax [phone] Home Health ordered   F/U Appointment Made With: Dr. Chris   Date:2018  Patient contacted: Talked to his wife because patient was resting.    Home Health is in place they have already started the infusions.   is aware of the labs that need to be drawn.    Additional Information : Appt in [place] with infectioous disease on Monday so moved Dr. Chris appt to Tuesday.     Skilled Nursing to eval and treat

## 2022-02-16 NOTE — PROGRESS NOTES
Patient:   MELISSA ROSAS            MRN: [id number]            FIN: [id number]               Age:   67 years     Sex:  Male     :  1950   Associated Diagnoses:   Keratosis   Author:   Mark Chris MD      Visit Information      Date of Service: 2018 10:48 am  Performing Location: [facility]  Encounter#: [id number]      Primary Care Provider (PCP):  Mark Chris MD    NPI# [id number]      Referring Provider:  Mark Chirs MD    NPI# [id number]      Chief Complaint   2018 10:51 AM CDT    c/o skin spots        Subjective   Chief complaint 2018 10:51 AM CDT    c/o skin spots  .     scabbed irritated area on left neck and right jaw      Health Status   Allergies:    Allergic Reactions (Selected)  Severity Not Documented  Aspirin (Nasal polyps)  Doxycycline (Sun rash)  Shailesh (Hives)  Zymetrin shampo (Rash)   Medications:  (Selected)   Prescriptions  Prescribed  Advair Diskus 250 mcg-50 mcg inhalation powder: 1 puff(s), inh, bid, # 60 EA, 11 Refill(s), Type: Maintenance, Pharmacy: [facility] [place], 1 puff(s) inh bid  Nitrostat 0.4 mg sublingual tablet: See Instructions, Instructions: PLACE 1 TABLET UNDER TONGUE FOR CHEST PAIN TO A MAX OF 3 TABS-ONE EVERY 5 MINUTES - IF NO RELIEF CALL 911, # 25 unknown unit, 3 Refill(s), Type: Soft Stop, Pharmacy: [facility] [place]  Patanol 0.1% ophthalmic solution: 2 gtts, Both eyes, BID, # 5 mL, 3 Refill(s), Type: Maintenance, [id number], 2 gtts both eyes bid  Ventolin HFA 90 mcg/inh inhalation aerosol: 2 puff(s), inh, q4 hrs, PRN: for cough, # 1 EA, 11 Refill(s), Type: Maintenance, Pharmacy: [facility] [place], 2 puff(s) inh q4 hrs,PRN:for cough  albuterol 2.5 mg/3 mL (0.083%) inhalation solution: 3 mL ( 2.5 mg ), neb, q4-6 hrs, PRN: as needed for cough, # 1 box(es), 1 Refill(s), Type: Maintenance, [facility] [place], 3 mL neb q4-6 hrs,PRN:as  needed for cough  amLODIPine 10 mg oral tablet: 1 tab(s) ( 10 mg ), po, daily, # 90 tab(s), 3 Refill(s), Type: Maintenance, Pharmacy: [facility], 1 tab(s) po daily,x90 day(s)  atorvastatin 20 mg oral tablet: 1 tab(s) ( 20 mg ), po, daily, # 90 tab(s), 3 Refill(s), Type: Maintenance, Pharmacy: [facility], 1 tab(s) po daily,x90 day(s)  clopidogrel 75 mg oral tablet: 1 tab(s) ( 75 mg ), po, daily, # 90 tab(s), 3 Refill(s), Type: Maintenance, Pharmacy: [facility], 1 tab(s) po daily,x90 day(s)  eszopiclone 2 mg oral tablet: 1 tab(s) ( 2 mg ), po, hs, # 30 tab(s), 2 Refill(s), Type: Maintenance, Pharmacy: [facility], 1 tab(s) po hs  finasteride 5 mg oral tablet: 1 tab(s) ( 5 mg ), po, daily, # 90 tab(s), 3 Refill(s), Type: Maintenance, Pharmacy: [facility], 1 tab(s) po daily,x90 day(s)  gabapentin 300 mg oral capsule: 1 cap(s) ( 300 mg ), po, daily, # 90 cap(s), 3 Refill(s), Type: Maintenance, Pharmacy: [facility], Pt is now due for annual med check per [facility] for further refills., 1 cap(s) po daily,x90 day(s)  pantoprazole 40 mg oral delayed release tablet: 1 tab(s) ( 40 mg ), po, daily, # 90 tab(s), 3 Refill(s), Type: Maintenance, Pharmacy: [facility], 1 tab(s) po daily,x90 day(s)  triamcinolone 0.1% topical ointment: 1 ree, TOP, TID, # 240 gm, 1 Refill(s), Type: Maintenance, [id number], 1 ree top tid  valsartan 320 mg oral tablet: 1 tab(s) ( 320 mg ), po, daily, # 90 tab(s), 3 Refill(s), Type: Maintenance, Pharmacy: [facility] [place], 1 tab(s) po daily,x90 day(s)  Documented Medications  Documented  Levaquin: q 24 hrs, 0 Refill(s), Type: Maintenance  Rapaflo 8 mg oral capsule: 1 cap(s) ( 8 mg ), po, daily, 0 Refill(s), Type: Maintenance  Vitamin D3: 0 Refill(s), Type: Maintenance  Zyrtec 10 mg oral tablet: 1 tab(s) ( 10 mg ), PO, Daily,  0  acetaminophen-hydrocodone 325 mg-5 mg oral tablet: See Instructions, Instructions: 1-2  tab(s) po q4 hrs  Hospital discharge, 0 Refill(s), Type: Maintenance  aspirin: ( 325 mg ), bid, 0 Refill(s), Type: Maintenance  doxepin 10 mg oral capsule: 1 cap(s) ( 10 mg ), po, hs, Instructions: Hospital discharge, 0 Refill(s), Type: Maintenance  levoFLOXacin 750 mg oral tablet: 1 tab(s) ( 750 mg ), po, daily, Instructions: per infectious disease dr, 1 tab daily for 3 months, 0 Refill(s), Type: Maintenance  rifAMPin 300 mg oral capsule: 1 cap(s) ( 300 mg ), po, bid, Instructions: per infectious disease doctor, take 1 tab BID for the next 3 months, 0 Refill(s), Type: Maintenance  senna 8.6 mg oral tablet: 2 tab(s) ( 17.2 mg ), PO, Once a day (at bedtime), PRN: for constipation, # 20 tab(s), 0 Refill(s), Type: Maintenance   Problem list:    All Problems (Selected)  Eczematous dermatitis / 72262279 / Confirmed  DJD of shoulder / 019113312 / Confirmed  Asthma / 147690005 / Confirmed  Insomnia / 630849331 / Confirmed  CAD (coronary artery disease) / 3653518244 / Confirmed  dec 2009 angiogram with 50% blockage,, treated with meds  Urinary retention / 308851996 / Confirmed  Post Prostatic Surgery  Nasal polyps / 59382220 / Confirmed  Allergic rhinitis / 680101429 / Confirmed  Vocal cord dysfunction / 828377115 / Confirmed  Bronchiectasis / 10795982 / Confirmed  Obese / 5028679982 / Probable  AK (actinic keratosis) / 7819330999 / Confirmed  HLD (hyperlipidemia) / 69943119 / Confirmed  Obstructive sleep apnea syndrome in adult / 6582061521 / Confirmed  Tubulovillous adenoma of colon / 937572879 / Confirmed  Hip arthritis / 788796750 / Confirmed  Diverticulitis / 738772797 / Confirmed  Hx of malignant skin melanoma / 0910293962 / Confirmed  GERD (gastroesophageal reflux disease) / 337995384 / Confirmed  Prosthetic hip infection / 327215615 / Confirmed  Right hip  Benign prostatic hyperplasia / 184278592 / Confirmed  HTN, goal below  140/90 / 9463321549 / Confirmed      Objective   Vital Signs   7/5/2018 10:51 AM CDT Peripheral Pulse Rate 64 bpm    Systolic Blood Pressure 122 mmHg    Diastolic Blood Pressure 70 mmHg    Mean Arterial Pressure 87 mmHg      Measurements from flowsheet : Measurements   7/5/2018 10:51 AM CDT Height Measured - Standard 68 in    Weight Measured - Standard 193.6 lb    BSA 2.05 m2    Body Mass Index 29.43 kg/m2  HI      General:  Alert and oriented, No acute distress.    Integumentary:  lesions are red and about 4mm diameter each.       Results Review   Results review      Impression and Plan   Assessment and Plan:          Diagnosis: Keratosis (OQT71-HX L57.0).         Course: Discussed with patient goals and risks of therapy.  They understood so proceeded and applied liquid nitrogen to lesion(s) (2 total) for 30 second thaw time without complication.  Return in 2-4 wks for retreatment if necessary..

## 2022-02-16 NOTE — TELEPHONE ENCOUNTER
---------------------  From: Yanci Patten RN ([facility] Pool ([id number]))   To: [facility] Message Pool ([id number] [facility] [place]);     Sent: 9/29/2020 11:27:49 AM CDT  Subject: FW: Medication Management   Due Date/Time: 9/28/2020 6:45:00 PM CDT     Medication Refill Approval Required    PCP:   [facility]    Medication:   Zolpidem  Last Filled:  6/29/20   Quantity:  30  Refills:  2    Medication:   Gabapentin  Last Filled:  6/29/20    Quantity:  90  Refills:  0    CSA on file?   _     Return to Clinic order placed?  Yes. Due for AWV 7/2021    Date of last office visit and reason:   7/27/20, AWV    Date of last labs pertaining to condition:  7/2020    Note:  Please advise on refills. Send in 9 month supply?      ------------------------------------------  From: [id number]  To: Mark Chris MD  Sent: September 27, 2020 6:45:25 PM CDT  Subject: Medication Management  Due: September 9, 2020 4:21:06 PM CDT     ** On Hold Pending Signature **     Drug: gabapentin (gabapentin 300 mg oral capsule), TAKE ONE CAPSULE BY MOUTH EVERY DAY  Quantity: 90 unknown unit  Days Supply: 90  Refills: 0  Substitutions Allowed  Notes from Pharmacy:     Dispensed Drug: gabapentin (gabapentin 300 mg oral capsule), TAKE ONE CAPSULE BY MOUTH EVERY DAY  Quantity: 90 unknown unit  Days Supply: 90  Refills: 0  Substitutions Allowed  Notes from Pharmacy:     ** On Hold Pending Signature **     Drug: zolpidem (zolpidem 10 mg oral tablet), TAKE ONE TABLET BY MOUTH AT BEDTIME AS NEEDED FOR SLEEP  Quantity: 30 unknown unit  Days Supply: 30  Refills: 2  Substitutions Allowed  Notes from Pharmacy:     Dispensed Drug: zolpidem (zolpidem 10 mg oral tablet), TAKE ONE TABLET BY MOUTH AT BEDTIME AS NEEDED FOR SLEEP  Quantity: 30 unknown unit  Days Supply: 30  Refills: 2  Substitutions Allowed  Notes from Pharmacy:  ---------------------------------------------------------------  [id number] [facility] [place]   To: Mark Chris MD;     Sent: 9/29/2020 12:03:50 PM CDT  Subject: FW: Medication Management   Due Date/Time: 9/28/2020 6:45:00 PM CDT  ** Submitted: **  Complete:gabapentin (gabapentin 300 mg oral capsule)   Signed by Mark Chris MD  9/29/2020 5:12:00 PM [place]    ** Approved with modifications: **  gabapentin (GABAPENTIN 300MG CAPS)  TAKE ONE CAPSULE BY MOUTH EVERY DAY  Qty:  90 unknown unit        Days Supply:  90        Refills:  0          Substitutions Allowed     [facility] To [place] [address]  ** Submitted: **  Order:zolpidem (zolpidem 5 mg oral tablet)  1 tab(s)  Oral  hs  Qty:  30 tab(s)        Refills:  3          Substitutions Allowed     PRN  for sleep      [facility] [place] [address]    Signed by Mark Chris MD  9/29/2020 5:12:00 PM [place]    ** Submitted: **  Delete:zolpidem (zolpidem 10 mg oral tablet)   Signed by Mark Chris MD  9/29/2020 5:12:00 PM [facility]

## 2022-02-16 NOTE — PROGRESS NOTES
Patient:   SURENDRA ROSAS            MRN: [id number]            FIN: [id number]               Age:   68 years     Sex:  Male     :  1950   Associated Diagnoses:   HTN, goal below 140/90; HLD (hyperlipidemia); Asthma; CAD (coronary artery disease); Bronchiectasis; Insomnia; GERD (gastroesophageal reflux disease); Prosthetic hip infection; Obese; Hernia, umbilical; Medicare annual wellness visit, initial   Author:   Mark Chris MD      Visit Information      Care Providers  Not recorded for selected visit.  Ancillary Services  Not recorded for selected visit.      Current Visit Date:  2019      Chief Complaint      History of Present Illness             The patient presents for Medicare annual wellness exam.  The patient's general health status is described as unchanged and stable.  The patient's diet is described as balanced.  Exercise occasional.  Associated symptoms consist of denies shortness of breath and denies weight loss.       Surendra is a 68 year old male who presents today for an annual wellness visit, overall he is doing well.     Labs: He had labs drawn today, he was fasting today so labs were drawn.     Colon Cancer Screen: Last Colonoscopy 2016 and he is to repeat in .     Vaccines: He has had both Pneumonia vaccines, last Tdap was in , and he had a Shingles shot in 2016, he would be a candidate for the new Shingrix vaccine.     CAD/HTN: He is on Plavix 75mg daily, he has some mild bruising on his legs and forearms. He does not tolerate Aspirin or non steroidal medications, he has nasal polyps and since stopping the aspirin his Asthma has improved. He is on Amlodipine 10mg daily for treatment of Hypertension, he also is on Losartan 100mg tablet daily, along with hydrochlorothiazide 25mg daily. Blood pressure in clinic is very good, he checks his blood pressure at home and it is generally running 120's over high 70's/low 80's. He denies chest pains or pressures, denies  nausea, denies vomiting, denies palpitations.     BPH: He follows urologist Dr. Garcia for this diagnosis, he has been prescribed Rapaflo, the last time he got it filled he was given the generic brand, he has found his erections have decreased and he was not having issues with erections when he was on the name brand Rapaflo. He would like us to send in the name brand Rapaflo. He has had multiple TURP procedures in the past.       Hearing: He has bilateral hearing aids, he purchased them online, they are current and work well for him.     Asthma/ Bronchiectasis He has an Advair Diskus inhaler that he uses daily, he has Albuterol solution for a nebulizer along with an Albuterol inhaler. He has bronchiectasis due to multiple lung infections. He has not used Albuterol inhaler for quite some time as he feels his asthma is well controlled with the Advair Diskus inhaler. He is getting over a cold right now and does have some lingering chest congestion. He denies recent fevers, denies shortness of breath, and denies any nausea, or vomiting.     History of hip replacement with following hip infection: He had right hip replacement on 4-, following this he had an infection in his hip, he was on IV antibiotics and he saw an infectious disease doctor who placed him on oral antibiotics. He feels it took quite some time for his hip to improve, he does say he has no pain in his right hip, he is able to bend, squat, and climb without pain. He has been able to climb up a ladder and clean air vents without any issues. He has a follow up appointment with orthopedic surgeon Dr. Rodriguez tomorrow.    Insomnia: He takes Ambien to help with sleep, it works well for him, and he denies concerns with the medication.        Review of Systems   Constitutional:  No fever, No chills, No sweats, No weakness, No fatigue, No decreased activity.    Ear/Nose/Mouth/Throat:  Hearing is evaluated-patient has bilateral hearing aids. , No ear  pain, No nasal congestion, No sore throat.    See completed Health History for Review of Systems   Respiratory:  No shortness of breath, No cough, No sputum production, No wheezing.    Cardiovascular:  No chest pain, No palpitations, No syncope.    Gastrointestinal:  No nausea, No vomiting, No diarrhea, No abdominal pain.    Genitourinary:  No dysuria, No hematuria.    Hematology/Lymphatics:  Bruising tendency, No swollen lymph glands.    Musculoskeletal:  No back pain.    Integumentary:  No rash.    Neurologic:  Alert and oriented X4, No headache.    Psychiatric:  GDS noted.       Health Status   Allergies:    Allergic Reactions (Selected)  Severity Not Documented  Aspirin (Nasal polyps)  Doxycycline (Sun rash)  Shailesh (Hives)  Zymetrin shampo (Rash)   Medications:  (Selected)   Prescriptions  Prescribed  Advair Diskus 250 mcg-50 mcg inhalation powder: 1 puff(s), inh, bid, # 60 EA, 11 Refill(s), Type: Maintenance, [facility]acy: [facility], 1 puff(s) inh bid  Nitrostat 0.4 mg sublingual tablet: See Instructions, Instructions: PLACE 1 TABLET UNDER TONGUE FOR CHEST PAIN TO A MAX OF 3 TABS-ONE EVERY 5 MINUTES - IF NO RELIEF CALL 911, # 25 unknown unit, 3 Refill(s), Type: Soft Stop, Pharmacy: [facility] [place]  Patanol 0.1% ophthalmic solution: 2 gtts, Both eyes, BID, # 5 mL, 3 Refill(s), Type: Maintenance, Pharmacy: [id number], 2 gtts both eyes bid  Ventolin HFA 90 mcg/inh inhalation aerosol: 2 puff(s), inh, q4 hrs, PRN: for cough, # 1 EA, 11 Refill(s), Type: Maintenance, Pharmacy: [facility] [place], 2 puff(s) inh q4 hrs,PRN:for cough  albuterol 2.5 mg/3 mL (0.083%) inhalation solution: 3 mL ( 2.5 mg ), neb, q4-6 hrs, PRN: as needed for cough, # 1 box(es), 1 Refill(s), Type: Maintenance, Pharmacy: [facility], 3 mL neb q4-6 hrs,PRN:as needed for cough  amLODIPine 10 mg oral tablet: 1 tab(s) ( 10 mg ), po, daily, # 90 tab(s), 3 Refill(s),  Type: Maintenance, Pharmacy: [facility], 1 tab(s) po daily,x90 day(s)  atorvastatin 20 mg oral tablet: 1 tab(s) ( 20 mg ), po, daily, # 90 tab(s), 3 Refill(s), Type: Maintenance, Pharmacy: [facility], 1 tab(s) po daily,x90 day(s)  clopidogrel 75 mg oral tablet: 1 tab(s) ( 75 mg ), po, daily, # 90 tab(s), 3 Refill(s), Type: Maintenance, Pharmacy: [facility], 1 tab(s) po daily,x90 day(s)  finasteride 5 mg oral tablet: = 1 tab(s), Oral, daily, # 30 tab(s), 0 Refill(s), Type: Maintenance, Pharmacy: [place] [id number], Patient needs appt for further refills  gabapentin 300 mg oral capsule: 1 cap(s) ( 300 mg ), po, daily, # 90 cap(s), 3 Refill(s), Type: Maintenance, Pharmacy: [facility], Pt is now due for annual med check per [facility] for further refills., 1 cap(s) po daily,x90 day(s)  hydroCHLOROthiazide 25 mg oral tablet: = 1 tab(s), Oral, daily, # 90 tab(s), 0 Refill(s), Type: Maintenance, Pharmacy: [place] [address], Due for an appt in February 2019  losartan 100 mg oral tablet: 1 tab(s) ( 100 mg ), Oral, daily, # 90 tab(s), 0 Refill(s), Type: Maintenance, Pharmacy: [facility], alternative for valsartan, 1 tab(s) Oral daily  losartan 100 mg oral tablet: See Instructions, Instructions: TAKE ONE TABLET BY MOUTH EVERY DAY [ALTERNATIVE FOR VALSARTAN], # 90 unknown unit, 1 Refill(s), Type: Soft Stop, Pharmacy: [place] [address], TAKE ONE TABLET BY MOUTH EVERY DAY [ALTERNATIVE FOR VALSARTAN]  pantoprazole 40 mg oral delayed release tablet: 1 tab(s) ( 40 mg ), po, daily, # 90 tab(s), 3 Refill(s), Type: Maintenance, Pharmacy: [facility] [place], 1 tab(s) po daily,x90 day(s)  triamcinolone 0.1% topical ointment: 1 ree, TOP, TID, # 240 gm, 1 Refill(s), Type: Maintenance, Pharmacy: [id number], 1 ree top tid  zolpidem 10 mg oral tablet: = 1 tab(s), PO, Once a day (at bedtime), PRN:  for sleep, # 30 tab(s), 5 Refill(s), Type: Hard Stop, Pharmacy: [id number], 1 tab(s) Oral hs,PRN:for sleep  Documented Medications  Documented  Rapaflo 8 mg oral capsule: 1 cap(s) ( 8 mg ), po, daily, 0 Refill(s), Type: Maintenance  Vitamin D3: 0 Refill(s), Type: Maintenance  Zyrtec 10 mg oral tablet: 1 tab(s) ( 10 mg ), PO, Daily, 0   Problem list:    All Problems  AK (actinic keratosis) / SNOMED CT 9931524063 / Confirmed  Allergic rhinitis / SNOMED CT 488160556 / Confirmed  Hip arthritis / SNOMED CT 818811933 / Confirmed  Asthma / SNOMED CT 052517636 / Confirmed  Benign prostatic hyperplasia / SNOMED CT 219277533 / Confirmed  Bronchiectasis / SNOMED CT 28221263 / Confirmed  CAD (coronary artery disease) / SNOMED CT 7314647231 / Confirmed  DJD of shoulder / SNOMED CT 482588604 / Confirmed  Diverticulitis / SNOMED CT 961479522 / Confirmed  Eczematous dermatitis / SNOMED CT 75096717 / Confirmed  GERD (gastroesophageal reflux disease) / SNOMED CT 176765129 / Confirmed  Hx of malignant skin melanoma / SNOMED CT 2788303659 / Confirmed  HLD (hyperlipidemia) / SNOMED CT 84821972 / Confirmed  HTN, goal below 140/90 / SNOMED CT 0371620064 / Confirmed  Insomnia / SNOMED CT 701563933 / Confirmed  Nasal polyps / SNOMED CT 84352969 / Confirmed  Obese / SNOMED CT 4874329211 / Probable  Obstructive sleep apnea syndrome in adult / SNOMED CT 0089457556 / Confirmed  Tubulovillous adenoma of colon / SNOMED CT 963623500 / Confirmed  Prosthetic hip infection / SNOMED CT 524585779 / Confirmed  Urinary retention / SNOMED CT 910559495 / Confirmed  Vocal cord dysfunction / SNOMED CT 222379133 / Confirmed   Medicare Assessments      Hart Fall Risk  (04/02/2019 12:17 pm)       History of Fall in Last 3 Months Hart:  No   Functional Assessment  (04/02/2019 12:17 pm)       Bathing ADL Index:  Independent (2)       Dressing ADL Index:  Independent (2)       Toileting ADL Index:  Independent (2)       Transferring Bed or Chair  ADL Index:  Independent (2)       Continence ADL Index:  Independent (2)       Feeding ADL Index:  Independent (2)       ADL Index Score:  12   Depression Screening  Not recorded for selected visit.    Detailed Depression Screening  Not recorded for selected visit.    Geriatric Depression Screen  (04/02/2019 12:17 pm)       Geriatric Depression Satisfied Life:  Yes       Geriatric Depression Dropped Activities:  No       Geriatric Depression Life Empty:  No       Geriatric Depression Bored:  No       Geriatric Depression Good Spirits:  Yes       Geriatric Depression Afraid Bad Things:  No       Geriatric Depression Feel Happy:  Yes       Geriatric Depression Feel Helpless:  No       Geriatric Depression Prefer to Stay Home:  No       Geriatric Depression Memory Problems:  No       Geriatric Depression Wonderful Be Alive:  Yes       Geriatric Depression Feel Worthless:  No       Geriatric Depression Situation Hopeless:  No       Geriatric Depression Others Better Off:  No       Geriatric Depression Full of Energy:  Yes       Geriatric Depression Total Score:  0   Hearing Screen  (04/02/2019 12:17 pm)       Ear, Right Audiogram:  Fail       Ear, Left Audiogram:  Fail       Hearing Screen Comments:  has bilateral hearing aids   Home Safety Screen  (04/02/2019 12:17 pm)       Emergency Numbers Kept/Updated:  Yes       Aware of Smoking Dangers:  Yes       Smoke Alarms/Fire Extinguisher Available:  Yes       Household Members Fire Safety Knowledge:  Yes       Firearms Unloaded and Secure:  Yes       Floor Rugs Removed or Fastened:  Yes       Mats in Bathtub/Shower:  Yes       Stairway Rails or Banisters:  Yes       Outdoor Clutter Safety:  Yes       Indoor Clutter Safety:  Yes       Electrical Cord Safety:  Yes   Vision Screen  Not recorded for selected visit.   ADL's and Safety reviewed with patient.      Histories   Past Medical History:    Active  Prosthetic hip infection (472567942): Onset on 4/22/2018 at 67  years.  Comments:  2018 CDT 7:20 AM CDT - Alisha George  Right hip  Tubulovillous adenoma of colon (493924514): Onset on 2012 at 61 years.  AK (actinic keratosis) (9762904428): Onset on 2011 at 60 years.  CAD (coronary artery disease) (5475638298): Onset in the month of 2009 at 58 years  Comments:  2011 CDT 2:57 PM CDT - Mark Chris MD  dec 2009 angiogram with 50% blockage,, treated with meds  Eczematous dermatitis (08027087)  DJD of shoulder (040660187)  Asthma (936035962)  Insomnia (024561420)  Urinary retention (786397502)  Comments:  2010 CST 6:12 PM CST - Berna Huynh CMA  Post Prostatic Surgery  Nasal polyps (19559340)  Allergic rhinitis (520131803)  Vocal cord dysfunction (879882908)  Bronchiectasis (02179623)  Obese (1039876710)  HLD (hyperlipidemia) (24662896)  Obstructive sleep apnea syndrome in adult (9927954064)  Hip arthritis (152312602)  GERD (gastroesophageal reflux disease) (475298279)  Benign prostatic hyperplasia (074318266)  Resolved  Inpatient stay (361414396): Onset on 2018 at 67 years.  Resolved on 2018 at 67 years.  Comments:  2018 CDT 7:17 AM Alisha Renteria  @[place], [place] - 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  Cellulitis of the right thigh.  Inpatient stay (212481623): Onset on 10/11/2011 at 60 years.  Resolved.  Comments:  2018 CDT 7:16 AM Alisha Renteria  @[place], [place] - Chest pain, unspecified  Cancer of skin (0182027185):  Resolved.  Otitis media (240136157):  Resolved on 10/8/2010 at 59 years.  Otitis externa (7627297):  Resolved on 10/29/2010 at 59 years.   Family History:    CHF - Congestive heart failure  Father (Phuc, )  Alzheimer's disease  Father (Phuc, )     Procedure history:    Revision of total hip replacement (593459062) on 2018 at 67 Years.  Comments:  2018 7:26 AM MEMO - Alisha George  Right hip revision total hip arthroplasty and  extensive irrigation and debridement of the right thigh including muscle, fascia, bone and exchange of the femoral head and polyethylene acetabular liner.  THR - Total hip replacement (766583516) on 4/16/2018 at 67 Years.  Comments:  4/30/2018 7:21 AM CDT - Alisha George  Right hip  Colonoscopy (316436691) on 4/12/2016 at 65 Years.  Comments:  4/29/2016 11:48 AM CDT - Lizbeth Daniels RN  Tubular adenoma x 3 no high grade dysplasia    4/21/2016 9:11 AM CDT - Kate Valdez  Indication: History of adenomatous polyps on last colonoscopy.  Sedation:  MAC.  Recommendation: Repeat in 5 years.  TURP - Transurethral resection of prostate (893933417) on 10/3/2011 at 60 Years.  Colonoscopy (654355058) on 9/20/2010 at 59 Years.  Comments:  12/20/2010 3:33 PM CST - Karlene Sylvester  4 mm polyp noted at 90 cm in the ascending colon; appearance adenomatous, removed   Recommend colonoscopic follow up in 5 years.   Conscious sedation/ MAC if his medical conditions dictate ASA III for level of sedation for future procedures  Coronary angiogram (19907961) in 2009 at 59 Years.  Green Light Laser - Prostate in the month of 3/2009 at 58 Years.  BL nasal Turbinoplasty in the month of 1/2008 at 57 Years.  Prostate TUNA surgery in the month of 2/2007 at 56 Years.  Right shoulder arthroscopy on 10/16/2006 at 55 Years.  Flexible sigmoidoscopy (02163527) on 7/8/2005 at 54 Years.  left shoulder arthroscopy in the month of 7/2005 at 54 Years.  excisional cervical schwannoma on 11/30/2001 at 50 Years.  Cholecystectomy (12725789) in the month of 2/1982 at 31 Years.  Vasectomy (51757619).   Social History:        Alcohol Assessment            Current, Beer (12 oz), 3-4 times per year                     Comments:                      09/26/2011 - Alisha George                     1/month.      Tobacco Assessment            Past      Substance Abuse Assessment            Never      Employment and Education Assessment            Retired                      Comments:                      09/26/2011 - Alisha George                     2005.      Home and Environment Assessment            Marital status: .  1 children.                     Comments:                      09/26/2011 - Alisha George                     1970. Wife - Pauline.      Exercise and Physical Activity Assessment: Regular exercise            Exercise frequency: 3-4 times/week.  Exercise type: Weight lifting, Cardio.                     Comments:                      09/26/2011 - Alisha George                     2-3 times per week.      Sexual Assessment            Sexually active: Yes.  Sexual orientation: Heterosexual.      Physical Examination   VS/Measurements   General:  Alert and oriented, No acute distress.    Eye:  Pupils are equal, round and reactive to light, Normal conjunctiva.    HENT:  Normocephalic, Tympanic membranes are clear, Oral mucosa is moist, No pharyngeal erythema.    Neck:  Supple, Non-tender, No carotid bruit, No lymphadenopathy.    Respiratory:  Lungs are clear to auscultation, Respirations are non-labored.    Cardiovascular:  Normal rate, Regular rhythm, No edema.    Gastrointestinal:  Soft, Non-tender, Non-distended, No organomegaly, Small 2cm umbilical hernia-causing no pain or issues for patient. .         Rectum/ anus: Normal tone, Stool ( Within normal limits ).    Genitourinary:  No scrotal tenderness, No inguinal tenderness.         Testes: Bilateral, Within normal limits, Symmetric.         Prostate: Within normal limits, Symmetric.    Musculoskeletal:  Normal range of motion, Normal strength, No swelling, Normal gait.    Integumentary:  Warm, No rash.    Neurologic:  Alert, Oriented, No focal deficits.    Cognition and Speech:  Oriented, Speech clear and coherent, Functional cognition intact.    Psychiatric:  Cooperative, Appropriate mood & affect, Normal judgment, Patient's GDS and CAGE questionnaire reviewed and discussed with patient. .      "  Impression and Plan   Diagnosis     HTN, goal below 140/90 (HAF89-XL I10).     HLD (hyperlipidemia) (YDT79-RC E78.5).     Asthma (RUW35-ZX J45.909).     CAD (coronary artery disease) (OVM76-PL I25.10).     Bronchiectasis (UHF07-ZE J47.9).     Insomnia (QZP17-OU G47.00).     GERD (gastroesophageal reflux disease) (FPK11-XX K21.9).     Prosthetic hip infection (WJN48-YV T84.59XA).     Obese (WBA42-MQ E66.9).     Hernia, umbilical (YPJ79-NO K42.9).     Course:  Progressing as expected.    Plan:  Discussed  preventative services.  Appropriate weight and diet discussed.  Discussed [employer] Directives.    Preventative services needed::  Preventative services checklist reviewed, updated and copy given to patient.  Please see scanned document.         Aortic ultrasound: No.         Bone mass measurements: No.         Cardiovascular screening: Yes.         Colorectal cancer screening: No, UTD .         HIV risk screening: No.         Immunizations: No, UTD.         Mammography: No.         PAP: No.         Prostate screening: Yes.         Smoking/tobacco use cessation counseling: No.    Patient Instructions:          Limitations: Limit caffeine intake, Limit alcohol consumption.         Counseled: Patient, Regarding treatment, Regarding medications, Diet, Activity, Verbalized understanding, Will refill medications, will send in Rapaflo with request for it to be name brand if covered by insurance due to decreased erections.     UTD on Colonoscopy-due to repeat in 2021.     UTD on vaccines, would be a candidate for new Shingrix vaccine-encouraged to check with pharmacy due to long waiting list here at clinic.     ACT score today is \"24\" with \"0\" ER visits this year and \"0\" hospitilzations this year. Asthma seems to be well controlled with Advair Diskus inhalers. Refilled the Albuterol inhaler for prn sob and cough.     [facility]     Encouraged healthy eating habits and a good exercise " routine.     Return in one year for next annual wellness visit. .    Diagnosis     Medicare annual wellness visit, initial (FSM07-SW Z00.00).     Total time spent with patient and coordination of care:  _  minutes

## 2022-02-16 NOTE — PROGRESS NOTES
Patient:   MELISSA ROSAS            MRN: [id number]            FIN: [id number]               Age:   66 years     Sex:  Male     :  1950   Associated Diagnoses:   Hypertension; HLD (hyperlipidemia); Insomnia; Obstructive sleep apnea syndrome in adult; Nasal polyps; CAD (coronary artery disease); Hip arthritis; DJD of shoulder; Asthma; Allergic rhinitis   Author:   Mark Chris MD      Chief Complaint   3/13/2017 12:56 PM CDT   Med check      History of Present Illness   see chief complaint as noted above and confirmed with the patient   66 year old male here for his medication check up  CAD: Has some chest congestion occassionally but exercising and using his albuterol seems to help this go away. Has not had to use his nitro.  Asthma: This is well controlled. Uses his albuterol once or twice a week. Uses advair reguarly  HTN:Due Chem 8. Blood pressure has been controlled.  Hyperlipidemia: Due to for lipid panel   He currently runs about 4-5 days a week at the [employer]. Doesn't swim because that causes ear infections. Denies any chest pain. Hip pain has improved with exercising. If he doesn't exercise that pain gets worse. Rapaflo and finasteride that kept urination controlled. He uses gabapentin to help with aches and pain and sleeping.        Review of Systems   Constitutional:  No fever, No chills.    Eye   Ear/Nose/Mouth/Throat:  No nasal congestion, No sore throat.    Respiratory:  No shortness of breath, No cough.    Cardiovascular   Breast   Gastrointestinal:  No nausea, No vomiting, No diarrhea, No constipation.    Genitourinary:  No dysuria.    Gynecologic   Hematology/Lymphatics:  No bruising tendency, No swollen lymph glands.    Endocrine   Immunologic:  No recurrent fevers, No recurrent infections.    Musculoskeletal:  No muscle pain.    Integumentary:  No rash.    Neurologic:  No tingling, No headache.    Psychiatric             Health Status   Allergies:    Allergic Reactions  (Selected)  Severity Not Documented  Aspirin (Nasal polyps)  Doxycycline (Sun rash)  Shailesh (No reactions were documented)  Zymetrin shampo (No reactions were documented)   Medications:  (Selected)   Prescriptions  Prescribed  Advair Diskus 250 mcg-50 mcg inhalation powder: 1 puff(s), inh, bid, # 1 EA, 11 Refill(s), Type: Maintenance, Pharmacy: [id number], 1 puff(s) inh bid  Ciloxan 0.3% ophthalmic solution: 2 drop(s), both eyes, bid, Instructions: 2 drops each eye twice a day until patient sees , # 5 mL, 0 Refill(s), Type: Maintenance, Pharmacy: [facility], 2 drop(s) both eyes bid,Instr:2 drops each eye twice a day until patie...  Nitrostat 0.4 mg sublingual tablet: See Instructions, Instructions: 1 TABLET UNDER TONGUE FOR CHEST PAIN TO A MAX OF 3 TABS-ONE EVERY 5 MINUTES - IF NO RELIEF CALL 911, # 25 unknown unit, 3 Refill(s), Type: Soft Stop, Pharmacy: [facility], 1 TABLET UNDER TONGUE FOR C...  Patanol 0.1% ophthalmic solution: 2 gtts, Both eyes, BID, # 5 mL, 3 Refill(s), Type: Maintenance, Pharmacy: [facility] #[id number], 2 gtts both eyes bid  Rapaflo 8 mg oral capsule: 1 cap(s) ( 8 mg ), po, daily, # 90 tab(s), 3 Refill(s), Type: Maintenance, Pharmacy: [id number], 1 cap(s) po daily  Ventolin HFA 90 mcg/inh inhalation aerosol: 2 puff(s), inh, q4 hrs, PRN: for cough, # 1 EA, 11 Refill(s), Type: Maintenance, Pharmacy: [facility], 2 puff(s) inh q4 hrs,PRN:for cough  amLODIPine 10 mg oral tablet: 1 tab(s) ( 10 mg ), po, daily, # 90 tab(s), 3 Refill(s), Type: Maintenance, Pharmacy: [facility], 1 tab(s) po daily,x90 day(s)  atorvastatin 20 mg oral tablet: 1 tab(s) ( 20 mg ), po, daily, # 90 tab(s), 3 Refill(s), Type: Maintenance, Pharmacy: [facility] #[id number], 1 tab(s) po daily  clopidogrel 75 mg oral tablet: 1 tab(s) ( 75 mg ), po, daily, # 90 tab(s), 3 Refill(s), Type: Maintenance,  Pharmacy: [facility] #[id number], 1 tab(s) po daily  finasteride 5 mg oral tablet: 1 tab(s) ( 5 mg ), po, daily, # 90 tab(s), 3 Refill(s), Type: Maintenance, Pharmacy: [facility] #[id number], 1 tab(s) po daily  gabapentin 300 mg oral capsule: 1 cap(s) ( 300 mg ), po, daily, # 90 cap(s), 3 Refill(s), Type: Maintenance, Pharmacy: [id number], 1 cap(s) po daily,x90 day(s)  pantoprazole 40 mg oral delayed release tablet: 1 tab(s) ( 40 mg ), po, daily, # 90 tab(s), 3 Refill(s), Type: Maintenance, Pharmacy: [id number], 1 tab(s) po daily  predniSONE 20 mg oral tablet: 2 tab(s) ( 40 mg ), PO, Daily, # 12 tab(s), 0 Refill(s), Type: Maintenance, Pharmacy: [facility], 2 tab(s) po daily,x6 day(s)  triamcinolone 0.1% topical ointment: 1 ree, TOP, TID, # 240 gm, 1 Refill(s), Type: Maintenance, Pharmacy: [facility] #[id number], 1 ree top tid  valsartan 320 mg oral tablet: 1 tab(s) ( 320 mg ), po, daily, # 90 tab(s), 3 Refill(s), Type: Maintenance, Pharmacy: [id number], 1 tab(s) po daily  zolpidem 10 mg oral tablet: 1 tab(s), PO, Once a day (at bedtime), PRN: for sleep, # 30 tab(s), 5 Refill(s), Type: Maintenance, Pharmacy: [facility] [place], 1 tab(s) po hs,PRN:for sleep  Documented Medications  Documented  Zyrtec 10 mg oral tablet: 1 tab(s) ( 10 mg ), PO, Daily, 0   Problem list:    All Problems  Vocal cord dysfunction / SNOMED CT 913261999 / Confirmed  Urinary retention / SNOMED CT 439560231 / Confirmed  Tubulovillous adenoma of colon / SNOMED CT 309198542 / Confirmed  Otitis media / SNOMED CT 553204505 / Confirmed  Otitis externa / SNOMED CT 2013279 / Confirmed  Obstructive sleep apnea syndrome in adult / SNOMED CT 8085711967 / Confirmed  Obese / SNOMED CT 6362900603 / Probable  Nasal polyps / SNOMED CT 51178005 / Confirmed  Cancer of skin / SNOMED CT 7675763779 / Confirmed  Insomnia / SNOMED CT 306638671 / Confirmed  Hypertension /  SNOMED CT 1867760447 / Confirmed  HLD (hyperlipidemia) / SNOMED CT 89944052 / Confirmed  Eczematous dermatitis / SNOMED CT 46756334 / Confirmed  DJD of shoulder / SNOMED CT 067661071 / Confirmed  CAD (coronary artery disease) / SNOMED CT 9089654883 / Confirmed  Bronchiectasis / SNOMED CT 78919261 / Confirmed  Asthma / SNOMED CT 488698578 / Confirmed  Hip arthritis / SNOMED CT 687890091 / Confirmed  Allergic rhinitis / SNOMED CT 936624687 / Confirmed  AK (actinic keratosis) / SNOMED CT 8679584638 / Confirmed  Inactive: Tobacco abuse / ICD-9-.1  Resolved: *Hospitalized@[facility] - Chest pain, unspecified      Histories   Past Medical History:    Active  Tubulovillous adenoma of colon (573984043): Onset on 2012 at 61 years.  AK (actinic keratosis) (9179279061): Onset on 2011 at 60 years.  CAD (coronary artery disease) (0364893959): Onset in the month of 2009 at 58 years  Comments:  2011 CDT 2:57 PM CDT - Mark Chris MD  dec 2009 angiogram with 50% blockage,, treated with meds  Eczematous dermatitis (59387667)  DJD of shoulder (436383961)  Hypertension (9521995255)  Asthma (323928307)  Insomnia (780691887)  Urinary retention (705323699)  Comments:  2010 CST 6:12 PM CST - Berna Huynh CMA  Post Prostatic Surgery  Nasal polyps (93367210)  Allergic rhinitis (833093621)  Vocal cord dysfunction (284546500)  Bronchiectasis (17247398)  Cancer of skin (7802158273)  Otitis media (332794349)  Otitis externa (5806590)  Obese (1658318099)  HLD (hyperlipidemia) (18421852)  Obstructive sleep apnea syndrome in adult (7465441504)  Hip arthritis (842067146)  Resolved  *Hospitalized@[facility] - Chest pain, unspecified: Onset on 10/11/2011 at 60 years.  Resolved.   Family History:    CHF - Congestive heart failure  Father (Phuc, )  Alzheimer's disease  Father (Phuc, )     Procedure history:    Colonoscopy (232858468) on 2016 at 65 Years.  Comments:  2016 11:48 AM - Jeremiah CAREY,  Liz[place]  Tubular adenoma x 3 no high grade dysplasia    4/21/2016 9:11 AM - Kate Valdez  Indication: History of adenomatous polyps on last colonoscopy.  Sedation:  MAC.  Recommendation: Repeat in 5 years.  TURP - Transurethral resection of prostate (201778721) on 10/3/2011 at 60 Years.  Colonoscopy (638746003) on 9/20/2010 at 59 Years.  Comments:  12/20/2010 3:33 PM - Karlene Sylvester  4 mm polyp noted at 90 cm in the ascending colon; appearance adenomatous, removed   Recommend colonoscopic follow up in 5 years.   Conscious sedation/ MAC if his medical conditions dictate ASA III for level of sedation for future procedures  Coronary angiogram (54792257) in 2009 at 59 Years.  Green Light Laser - Prostate in the month of 3/2009 at 58 Years.  BL nasal Turbinoplasty in the month of 1/2008 at 57 Years.  Prostate TUNA surgery in the month of 2/2007 at 56 Years.  Right shoulder arthroscopy on 10/16/2006 at 55 Years.  Flexible sigmoidoscopy (34498448) on 7/8/2005 at 54 Years.  left shoulder arthroscopy in the month of 7/2005 at 54 Years.  excisional cervical schwannoma on 11/30/2001 at 50 Years.  Cholecystectomy (79671403) in the month of 2/1982 at 31 Years.  Vasectomy (85400691).   Social History:        Alcohol Assessment            Current, Beer (12 oz), 3-4 times per year                     Comments:                      09/26/2011 - Alisha George                     1/month.      Tobacco Assessment            Past      Substance Abuse Assessment            Never      Employment and Education Assessment            Retired                     Comments:                      09/26/2011 - Alisha George                     2005.      Home and Environment Assessment            Marital status: .  1 children.                     Comments:                      09/26/2011 - Alisha George                     1970. Wife - Pauline.      Exercise and Physical Activity Assessment: Regular exercise            Exercise  frequency: 3-4 times/week.  Exercise type: Weight lifting, Cardio.                     Comments:                      09/26/2011 - Alisha George                     2-3 times per week.      Sexual Assessment            Sexually active: Yes.  Sexual orientation: Heterosexual.        Physical Examination   Vital Signs   3/13/2017 12:56 PM CDT Peripheral Pulse Rate 78 bpm    Systolic Blood Pressure 122 mmHg    Diastolic Blood Pressure 68 mmHg    Mean Arterial Pressure 86 mmHg      Measurements from flowsheet : Measurements   3/13/2017 12:56 PM CDT Height Measured - Standard 68 in    Weight Measured - Standard 200.4 lb    BSA 2.09 m2    Body Mass Index 30.47 kg/m2      General:  Alert and oriented, No acute distress.    Eye:  Pupils are equal, round and reactive to light, Normal conjunctiva.    HENT:  Oral mucosa is moist.    Neck:  Supple, No lymphadenopathy.    Respiratory:  Lungs are clear to auscultation, Respirations are non-labored.    Cardiovascular:  Normal rate, Regular rhythm, No edema.    Gastrointestinal:  Non-distended.    Musculoskeletal:  Normal gait.    Integumentary:  Warm, No rash.    Psychiatric:  Cooperative, Appropriate mood & affect, Normal judgment.       Review / Management   Results review      Impression and Plan   Diagnosis     Hypertension (FON78-KV I10).     HLD (hyperlipidemia) (HEI76-QN E78.5).     Insomnia (OXK60-YT G47.00).     Obstructive sleep apnea syndrome in adult (NAL65-BC G47.33).     Nasal polyps (UED43-WI J33.9).     CAD (coronary artery disease) (FKW02-RI I25.10).     Hip arthritis (BLG79-YF M19.90).     DJD of shoulder (KPU18-UT M19.019).     Asthma (OUW10-AA J45.909).     Allergic rhinitis (JYT38-QU J30.9).     Plan:  Will return for Lipid panel and chem 8. Will refill medications for 1 year. Follow up in 1 year  Reviewed expected course, what to watch for, and when to return.   IMakenzie CMA, acted solely as a scribe for, and in the presence of Dr. Mark Chris  who performed the service..

## 2022-02-16 NOTE — TELEPHONE ENCOUNTER
---------------------  From: Nicole Umanzor CMA (eRx Pool (32224_[facility]))   To: [facility] Message Pool ([address]_[place]);     Sent: 4/13/2020 10:37:19 AM CDT  Subject: FW: Medication Management   Due Date/Time: 4/13/2020 7:44:00 PM CDT           ------------------------------------------  From: [id number]  To: Mark Chris MD  Sent: April 12, 2020 7:44:01 PM CDT  Subject: Medication Management  Due: April 13, 2020 7:44:01 PM CDT    ** On Hold Pending Signature **  Drug: finasteride (finasteride 5 mg oral tablet)  TAKE ONE TABLET BY MOUTH EVERY DAY  Quantity: 90 unknown unit  Days Supply: 90  Refills: 3  Substitutions Allowed  Notes from Pharmacy:     Dispensed Drug: finasteride (finasteride 5 mg oral tablet)  TAKE ONE TABLET BY MOUTH EVERY DAY  Quantity: 90 unknown unit  Days Supply: 90  Refills: 3  Substitutions Allowed  Notes from Pharmacy:     ** On Hold Pending Signature **  Drug: clopidogrel (clopidogrel 75 mg oral tablet)  TAKE ONE TABLET BY MOUTH EVERY DAY  Quantity: 90 unknown unit  Days Supply: 90  Refills: 3  Substitutions Allowed  Notes from Pharmacy:     Dispensed Drug: clopidogrel (clopidogrel 75 mg oral tablet)  TAKE ONE TABLET BY MOUTH EVERY DAY  Quantity: 90 unknown unit  Days Supply: 90  Refills: 3  Substitutions Allowed  Notes from Pharmacy:   ------------------------------------------  ** Submitted: **  Order:clopidogrel (clopidogrel 75 mg oral tablet)  1 tab(s)  Oral  daily  Qty:  90 unknown unit        Days Supply:  90        Refills:  0          Substitutions Allowed     Route To Pharmacy - [facility]    Signed by Nicole Umanzor CMA  4/13/2020 3:55:00 PM    ** Submitted: **  Complete:clopidogrel (clopidogrel 75 mg oral tablet)   Signed by Nicole Umanzor CMA  4/13/2020 3:55:00 PM    ** Not Approved:  **  clopidogrel (CLOPIDOGREL BISULFATE 75MG TABS)  TAKE ONE TABLET BY MOUTH EVERY DAY  Qty:  90 unknown unit        Days Supply:  90         Refills:  3          Substitutions Allowed     Route To [facility]   Signed by Nicole Umanzor CMA---------------------  From: Nicole Umanzor CMA   To: [place] [id number]    Sent: 4/13/2020 10:55:39 AM CDT  Subject: FW: Medication Management     ** Submitted: **  Order:finasteride (finasteride 5 mg oral tablet)  1 tab(s)  Oral  daily  Qty:  90 unknown unit        Days Supply:  90        Refills:  0          Substitutions Allowed     Route To [facility]    Signed by Nicole Umanzor CMA  4/13/2020 3:55:00 PM    ** Submitted: **  Complete:finasteride (finasteride 5 mg oral tablet)   Signed by Nicole Umanzor CMA  4/13/2020 3:55:00 PM    ** Not Approved:  **  finasteride (FINASTERIDE 5MG TABS)  TAKE ONE TABLET BY MOUTH EVERY DAY  Qty:  90 unknown unit        Days Supply:  90        Refills:  3          Substitutions Allowed     Route To [facility]   Signed by Nicole Umanzor CMAPt is due AW in july. Filled for 3 months

## 2022-02-16 NOTE — NURSING NOTE
Health Status Assessment Entered On:  7/22/2021 1:28 PM CDT    Performed On:  7/22/2021 1:27 PM CDT by Ivelisse Hernandez CMA               Health Status   Immunizations Current :   Yes   Ivelisse Hernandez CMA - 7/22/2021 1:27 PM CDT

## 2022-02-16 NOTE — TELEPHONE ENCOUNTER
---------------------  From: Yanci Patten RN (eRx Pool (32224_[facility]))   To: [facility] Message Pool ([address]_[place] - [place]);     Sent: 12/20/2021 4:21:07 PM CST  Subject: FW: Medication Management   Due Date/Time: 12/20/2021 7:08:00 PM CST     Medication Refill Approval Required    PCP:   [facility]    Medication:   Zolpidem  Last Filled:  7/22/21    Quantity:  30 Refills:  3    CSA on file?   _     Return to Clinic order placed?  NONE    Date of last office visit and reason:   7/22/21, AWV    Date of last labs pertaining to condition:  _    Note:  Please place appropriate RTC and address refill request    Resource:   eRx pool    Phone:   _        ** Patient matched by Yanci Patten RN on 12/20/2021 4:19:08 PM CST **      ------------------------------------------  From: [id number]  To: Mark Chris MD  Sent: December 18, 2021 7:08:45 PM CST  Subject: Medication Management  Due: December 15, 2021 8:20:29 PM CST     ** On Hold Pending Signature **     Drug: zolpidem (zolpidem 5 mg oral tablet), TAKE ONE TABLET BY MOUTH AT BEDTIME AS NEEDED FOR SLEEP  Quantity: 30 tab(s)  Days Supply: 30  Refills: 2  Substitutions Allowed  Notes from Pharmacy:     Dispensed Drug: zolpidem (zolpidem 5 mg oral tablet), TAKE ONE TABLET BY MOUTH AT BEDTIME AS NEEDED FOR SLEEP  Quantity: 30 tab(s)  Days Supply: 30  Refills: 3  Substitutions Allowed  Notes from Pharmacy:  ---------------------------------------------------------------  From: Darrius Kirby ([facility] Message Pool ([address]224_[facility]))   To: Mark Chris MD;     Sent: 12/20/2021 5:02:26 PM CST  Subject: FW: Medication Management   Due Date/Time: 12/20/2021 7:08:00 PM CST     please advise on refill.   When should pt return to clinic?  ** Submitted: **  Order:zolpidem (zolpidem 5 mg oral tablet)  1 tab(s)  Oral  hs  Qty:  30 tab(s)        Refills:  3          Substitutions Allowed     PRN  for sleep      Route To Pharmacy - [facility]    Signed by Mark Chris MD  12/21/2021 4:19:00 AM [facility]---------------------  From: Mark Chris MD   To: [facility] Message Pool ([address]_[place]);     Sent: 12/20/2021 10:20:00 PM CST  Subject: RE: Medication Management     filled

## 2022-02-16 NOTE — PROGRESS NOTES
Patient:   MELISSA ROSAS            MRN: [id number]            FIN: [id number]               Age:   67 years     Sex:  Male     :  1950   Associated Diagnoses:   Arthritis of right hip   Author:   Mark Chris MD      Preoperative Information   Indication for surgery   Accompanied by   Source of history          Chief Complaint   3/29/2018 12:47 PM CDT   Pre-op Right hip replacement with Dr. Rodriguez on 18 at [place].   right hip replacement for DJD      Review of Systems   Constitutional:  No fever, No chills.    Eye   Ear/Nose/Mouth/Throat:  No nasal congestion, No sore throat.    Respiratory:  No shortness of breath, No cough.    Cardiovascular   Breast   Gastrointestinal:  No nausea, No vomiting, No diarrhea, No constipation.    Genitourinary:  No dysuria.    Gynecologic   Hematology/Lymphatics:  No bruising tendency, No swollen lymph glands.    Endocrine   Immunologic:  No recurrent fevers, No recurrent infections.    Musculoskeletal:  No muscle pain.    Integumentary:  No rash.    Neurologic:  No tingling, No headache.    Psychiatric   All other systems.     Health Status   Allergies:    Allergic Reactions (Selected)  Severity Not Documented  Aspirin (Nasal polyps)  Doxycycline (Sun rash)  Shailesh (Hives)  Zymetrin shampo (Rash)   Medications:  (Selected)   Prescriptions  Prescribed  Advair Diskus 250 mcg-50 mcg inhalation powder: 1 puff(s), inh, bid, # 60 EA, 11 Refill(s), Type: Maintenance, Pharmacy: [facility], 1 puff(s) inh bid  Nitrostat 0.4 mg sublingual tablet: See Instructions, Instructions: 1 TABLET UNDER TONGUE FOR CHEST PAIN TO A MAX OF 3 TABS-ONE EVERY 5 MINUTES - IF NO RELIEF CALL 911, # 25 unknown unit, 3 Refill(s), Type: Soft Stop, Pharmacy: [facility], 1 TABLET UNDER TONGUE FOR C...  Patanol 0.1% ophthalmic solution: 2 gtts, Both eyes, BID, # 5 mL, 3 Refill(s), Type: Maintenance, [id number], 2 gtts both  eyes bid  Rapaflo 8 mg oral capsule: 1 cap(s) ( 8 mg ), po, daily, # 90 cap(s), 3 Refill(s), Type: Maintenance, Pharmacy: [facility], 1 cap(s) po daily,x90 day(s)  Ventolin HFA 90 mcg/inh inhalation aerosol: 2 puff(s), inh, q4 hrs, PRN: for cough, # 1 EA, 11 Refill(s), Type: Maintenance, Pharmacy: [facility], 2 puff(s) inh q4 hrs,PRN:for cough  albuterol 2.5 mg/3 mL (0.083%) inhalation solution: 3 mL ( 2.5 mg ), neb, q4-6 hrs, PRN: as needed for cough, # 1 box(es), 1 Refill(s), Type: Maintenance, Pharmacy: [facility], 3 mL neb q4-6 hrs,PRN:as needed for cough  amLODIPine 10 mg oral tablet: 1 tab(s) ( 10 mg ), po, daily, # 90 tab(s), 3 Refill(s), Type: Maintenance, Pharmacy: [facility], 1 tab(s) po daily,x90 day(s)  atorvastatin 20 mg oral tablet: 1 tab(s) ( 20 mg ), po, daily, # 90 tab(s), 3 Refill(s), Type: Maintenance, Pharmacy: [facility], 1 tab(s) po daily,x90 day(s)  clopidogrel 75 mg oral tablet: 1 tab(s) ( 75 mg ), po, daily, # 90 tab(s), 3 Refill(s), Type: Maintenance, Pharmacy: [facility], 1 tab(s) po daily,x90 day(s)  finasteride 5 mg oral tablet: 1 tab(s) ( 5 mg ), po, daily, # 90 tab(s), 3 Refill(s), Type: Maintenance, Pharmacy: [facility], 1 tab(s) po daily,x90 day(s)  gabapentin 300 mg oral capsule: 1 cap(s) ( 300 mg ), po, daily, # 90 cap(s), 3 Refill(s), Type: Maintenance, Pharmacy: [facility], Pt is now due for annual med check per [facility] for further refills., 1 cap(s) po daily,x90 day(s)  pantoprazole 40 mg oral delayed release tablet: 1 tab(s) ( 40 mg ), po, daily, # 90 tab(s), 3 Refill(s), Type: Maintenance, Pharmacy: [facility], 1 tab(s) po daily,x90 day(s)  triamcinolone 0.1% topical ointment: 1 ree, TOP, TID, # 240 gm, 1 Refill(s), Type: Maintenance, Pharmacy: [id number], 1 ree top tid  valsartan 320  mg oral tablet: 1 tab(s) ( 320 mg ), po, daily, # 90 tab(s), 3 Refill(s), Type: Maintenance, Pharmacy: "[facility], 1 tab(s) po daily,x90 day(s)  zolpidem 10 mg oral tablet: 1 tab(s), PO, Once a day (at bedtime), PRN: for sleep, # 30 tab(s), 5 Refill(s), Type: Maintenance, Pharmacy: "[facility], 1 tab(s) po hs,PRN:for sleep  Documented Medications  Documented  Vitamin D3: 0 Refill(s), Type: Maintenance  Zyrtec 10 mg oral tablet: 1 tab(s) ( 10 mg ), PO, Daily, 0   Problem list:    All Problems  Eczematous dermatitis / 52103907 / Confirmed  DJD of shoulder / 500102666 / Confirmed  Hypertension / 1713502746 / Confirmed  Asthma / 696007931 / Confirmed  Insomnia / 744253409 / Confirmed  CAD (coronary artery disease) / 9815452505 / Confirmed  dec 2009 angiogram with 50% blockage,, treated with meds  Urinary retention / 754208103 / Confirmed  Post Prostatic Surgery  Nasal polyps / 79566394 / Confirmed  Allergic rhinitis / 485517983 / Confirmed  Vocal cord dysfunction / 257945292 / Confirmed  Bronchiectasis / 45972413 / Confirmed  Obese / 9870814120 / Probable  AK (actinic keratosis) / 9105513201 / Confirmed  HLD (hyperlipidemia) / 20616212 / Confirmed  Obstructive sleep apnea syndrome in adult / 3517209656 / Confirmed  Tubulovillous adenoma of colon / 336651176 / Confirmed  Hip arthritis / 720254461 / Confirmed  Diverticulitis / 404083445 / Confirmed  Hx of malignant skin melanoma / 4811691204 / Confirmed  Inactive: Tobacco abuse / 305.1  Resolved: Cancer of skin / 5092566871  Resolved: Otitis media / 160121065  Resolved: Otitis externa / 5736284  Resolved: *[email or web address]@[facility] - Chest pain, unspecified      Histories   Past Medical History:    Active  Tubulovillous adenoma of colon (667323899): Onset on 9/20/2012 at 61 years.  AK (actinic keratosis) (3084627156): Onset on 8/16/2011 at 60 years.  CAD (coronary artery disease) (3746435539): Onset in the month of 12/2009 at 58  years  Comments:  2011 CDT 2:57 PM CDT - Mark Chris MD  dec 2009 angiogram with 50% blockage,, treated with meds  Eczematous dermatitis (43338418)  DJD of shoulder (938271003)  Hypertension (0273498360)  Asthma (369398134)  Insomnia (433562588)  Urinary retention (983021903)  Comments:  2010 CST 6:12 PM CST - Amina DOWELLBerna  Post Prostatic Surgery  Nasal polyps (41389535)  Allergic rhinitis (281967660)  Vocal cord dysfunction (219099155)  Bronchiectasis (57939105)  Obese (0970156231)  HLD (hyperlipidemia) (80813059)  Obstructive sleep apnea syndrome in adult (1148682149)  Hip arthritis (013987104)  Resolved  *Hospitalized@[facility] - Chest pain, unspecified: Onset on 10/11/2011 at 60 years.  Resolved.  Cancer of skin (9724880643):  Resolved.  Otitis media (787303458):  Resolved on 10/8/2010 at 59 years.  Otitis externa (4941394):  Resolved on 10/29/2010 at 59 years.   Family History:    CHF - Congestive heart failure  Father (Phuc, )  Alzheimer's disease  Father (Phuc, )     Procedure history:    Colonoscopy (SNOMED CT 016063095) performed by Herson Ocampo MD on 2016 at 65 Years.  Comments:  2016 11:48 AM - Lizbeth Daniels RN  Tubular adenoma x 3 no high grade dysplasia    2016 9:11 AM - Kate Valdez  Indication: History of adenomatous polyps on last colonoscopy.  Sedation:  MAC.  Recommendation: Repeat in 5 years.  TURP - Transurethral resection of prostate (SNOMED CT 307642195) performed by Vinod Lipscomb MD on 10/3/2011 at 60 Years.  Colonoscopy (SNOMED CT 455041072) performed by Herson Ocampo MD on 2010 at 59 Years.  Comments:  2010 3:33 PM - Karlene Sylvester  4 mm polyp noted at 90 cm in the ascending colon; appearance adenomatous, removed   Recommend colonoscopic follow up in 5 years.   Conscious sedation/ MAC if his medical conditions dictate ASA III for level of sedation for future procedures  Coronary angiogram (SNOMED CT 75998800)  in 2009 at 59 Years.  Green Light Laser - Prostate in the month of 3/2009 at 58 Years.  BL nasal Turbinoplasty in the month of 1/2008 at 57 Years.  Prostate TUNA surgery in the month of 2/2007 at 56 Years.  Right shoulder arthroscopy on 10/16/2006 at 55 Years.  Flexible sigmoidoscopy (SNOMED CT 14710646) on 7/8/2005 at 54 Years.  left shoulder arthroscopy in the month of 7/2005 at 54 Years.  excisional cervical schwannoma on 11/30/2001 at 50 Years.  Cholecystectomy (SNOMED CT 41656255) in the month of 2/1982 at 31 Years.  Vasectomy (SNOMED CT 08589436).   Social History:        Alcohol Assessment            Current, Beer (12 oz), 3-4 times per year                     Comments:                      09/26/2011 Alisha Pacheco                     1/month.      Tobacco Assessment            Past      Substance Abuse Assessment            Never      Employment and Education Assessment            Retired                     Comments:                      09/26/2011 Alihsa Pacheco                     2005.      Home and Environment Assessment            Marital status: .  1 children.                     Comments:                      09/26/2011 Alisha Pacheco                     1970. Wife - Pauline.      Exercise and Physical Activity Assessment: Regular exercise            Exercise frequency: 3-4 times/week.  Exercise type: Weight lifting, Cardio.                     Comments:                      09/26/2011 Alisha Pacheco                     2-3 times per week.      Sexual Assessment            Sexually active: Yes.  Sexual orientation: Heterosexual.       Has  no history of anemia.  Has no history of DVT or pulmonary embolism.  Has  no personal history of bleeding problems.   Has  no personal or family history of anesthesia reactions.  Patient  does not have active tuberculosis.    S/he has not taken aspirin or aspirin containing products in the last week.     S/he has  taken Plavix (Clopidogrel) in the last  2 weeks.  counselled to hold 10 days prior to surgery  S/he  has not taken warfarin in the past week.    S/he has not been on corticosteroids for more than 2 weeks recently.      S/he  is not DNR before, during or after surgery.    Chest pain / SOB walking up 2 flights of steps? no  Pain in neck or jaw? no  CAD MI?  non occlusive CAD,  asymptomatic  Afib? no  Heart Failure? no  Asthma  or Bronchitis? minimal symptoms with albuterol 2 times per week  Diabetes? no       Insulin/Orals?   Seizure Disorder?  no  CKD?  no  Thyroid Disease? no  Liver Disease  no  CVA?  no         Physical Examination   Vital Signs   3/29/2018 12:47 PM CDT Peripheral Pulse Rate 74 bpm    Systolic Blood Pressure 122 mmHg    Diastolic Blood Pressure 74 mmHg    Mean Arterial Pressure 90 mmHg      Measurements from flowsheet : Measurements   3/29/2018 12:47 PM CDT Height Measured - Standard 68 in    Weight Measured - Standard 204.4 lb    BSA 2.11 m2    Body Mass Index 31.08 kg/m2  HI      General:  Alert and oriented, No acute distress.    Eye:  Pupils are equal, round and reactive to light, Normal conjunctiva.    HENT:  Oral mucosa is moist.    Neck:  Supple.    Respiratory:  Respirations are non-labored.    Cardiovascular:  Normal rate, Regular rhythm, No edema.    Gastrointestinal:  Non-distended.    Musculoskeletal:  Normal gait.    Integumentary:  Warm, No rash.    Psychiatric:  Cooperative, Appropriate mood & affect, Normal judgment.       Review / Management         Results review:  Lab results   3/22/2018 1:22 PM CDT Sodium Level 144 mmol/L    Potassium Level 4.5 mmol/L    Chloride Level 109 mmol/L    CO2 Level 27 mmol/L    Glucose Level 83 mg/dL    BUN 18 mg/dL    Creatinine 1.42 mg/dL  HI    BUN/Creat Ratio 13    eGFR 50 mL/min/1.73m2  LOW    eGFR African American 58 mL/min/1.73m2  LOW    Calcium Level 9.6 mg/dL    Cholesterol 132 mg/dL    Non-HDL 90    HDL 42 mg/dL    Chol/HDL Ratio 3.1    LDL 74    Triglyceride 84 mg/dL   .        Impression and Plan   Diagnosis     Arthritis of right hip ([id number]-WF M16.11).     Condition:  has history of urinary retention after surgery especially,  is on medication now withgood results.  also had laser therapy    has DEQUAN with good results.    non obstructive CAD and no angina symptoms    no other special concerns.

## 2022-02-16 NOTE — TELEPHONE ENCOUNTER
---------------------  From: Zulay Scott MA (Phone Messages Pool ([id number]_[facility]))   To: [facility] Message Pool ([id number]_[place]);     Sent: 3/4/2021 1:36:36 PM CST  Subject: Amoxicillin refill     Phone Message    PCP:   [facility]      Time of Call:  1314       Person Calling:  pt  Phone number:  [phone]--ok LM    Reason for call:  pt is requesting rx for Amoxicillin prior to dental appt next week.  Hx hip replacement  Returned call at: _    Note:   _    Last office visit and reason:  12/10/2020 sonia/ [facility] for rash    Transferred to: _---------------------  From: Nicole Umanzor CMA ([facility] Message Pool ([id number]))   To: Mark Chris MD;     Sent: 3/4/2021 1:54:11 PM CST  Subject: FW: Amoxicillin refill---------------------  From: Mark Chris MD   To: [facility] Message Pool ([id number]_[place]);     Sent: 3/4/2021 2:21:15 PM CST  Subject: RE: Amoxicillin refill     okPt called and informed that this was [facility]

## 2022-02-16 NOTE — NURSING NOTE
Asthma Control Test (ACT) Total Entered On:  3/31/2020 12:42 PM CDT    Performed On:  3/31/2020 12:41 PM CDT by Apoorva Figueroa CMA               Asthma Control Test (ACT) Total   Asthma Control Test Total (Adult) :   21    Apoorva Figueroa CMA - 3/31/2020 12:41 PM CDT

## 2022-02-16 NOTE — TELEPHONE ENCOUNTER
---------------------  From: Taniya Wade LPN (Phone Messages Pool ([id number]))   To: [facility] Message Pool ([id number]);     Sent: 9/30/2020 10:26:05 AM CDT  Subject: zolpidem     Phone Message    PCP:   [facility]      Time of Call:  9:50am       Person Calling:  pt  Phone number:  [phone]    Note:   Pt LM stating he requested refills of his zolpidem and the dose that was refilled was for 5mg. Pt says his old Rx was for 10mg.    Pt says he would like a call to address this as he will be going through the medication a lot faster with the 5mg tabs.    Refill request from pharmacy on 9/29 was for zolpidem 10mg. Rx sent was zolpidem 5mg.    Last office visit and reason:  7/27/20 Medicare AWV---------------------  From: Monika Dia CMA ([facility] Message Pool ([id number]))   To: Mark Chris MD;     Sent: 9/30/2020 12:49:32 PM CDT  Subject: FW: zolpidem---------------------  From: Mark Chris MD   To: [facility] Message Pool ([id number]);     Sent: 9/30/2020 2:04:00 PM CDT  Subject: RE: zolpidem     FDA is recommending a maximum dose of 5mg now for people over the age of 65.  I would try it and see if it workspt contacted at 1426 and advised.  He will trial and c/b if not working

## 2022-02-16 NOTE — TELEPHONE ENCOUNTER
Entered by Selin Lockwood CMA on November 05, 2020 7:12:19 AM CST  ---------------------  From: Selin Lockwood CMA   To: [place] [address]    Sent: 11/5/2020 7:12:19 AM CST  Subject: Medication Management     ** Submitted: **  Order:metoprolol (Metoprolol Succinate ER 25 mg oral tablet, extended release)  1 tab(s)  Oral  daily  Qty:  90 tab(s)        Refills:  2          Substitutions Allowed     Route To Pharmacy - [place] [address]    Signed by Selin Lockwood CMA  11/5/2020 1:12:00 PM [facility]    ** Submitted: **  Complete:metoprolol (metoprolol succinate 25 mg oral tablet, extended release)   Signed by Selin Lockwood CMA  11/5/2020 1:12:00 PM [facility]    ** Not Approved:  **  metoprolol (METOPROLOL SUCCINATE ER 25MG TB24)  TAKE ONE TABLET BY MOUTH EVERY DAY  Qty:  90 unknown unit        Days Supply:  90        Refills:  0          Substitutions Allowed     Route To Pharmacy - [place] [address]   Signed by Selin Lockwood CMA          Entered by Selin Lockwood CMA on November 05, 2020 7:11:43 AM CST    PCP: [facility]    Medication:  metoprolol  Last Filled:  6/29/2020    Quantity: 90  Refills:  0    Medication:  zolpidem  Last Filled:  9/29/2020    Quantity: 30  Refills:  3    Date of last office visit and reason:  7/27/2020 AWV  Date of last labs pertaining to condition:  7/6/2020 BMP    Note:  Refills given on Metoprolol until July 2021. Please advise on Zolpidem.       Return to Clinic order placed?  yes- AWV 7/2021        ------------------------------------------  From: [place] [address]  To: Mark Chris MD  Sent: November 4, 2020 6:45:52 PM CST  Subject: Medication Management  Due: October 31, 2020 3:13:38 PM CDT     ** On Hold Pending Signature **     Drug: metoprolol (Metoprolol Succinate ER 25 mg oral tablet, extended release), TAKE ONE TABLET BY MOUTH EVERY DAY  Quantity: 90 unknown unit  Days Supply: 90  Refills: 0  Substitutions Allowed  Notes from Pharmacy:     Dispensed Drug:  metoprolol (Metoprolol Succinate ER 25 mg oral tablet, extended release), TAKE ONE TABLET BY MOUTH EVERY DAY  Quantity: 90 unknown unit  Days Supply: 90  Refills: 0  Substitutions Allowed  Notes from Pharmacy:  ---------------------------------------------------------------  From: Selin Lockwood CMA (eRx Pool ([id number]_[facility]))   To: [facility] Message Pool ([id number]_[place]);     Sent: 11/5/2020 7:12:48 AM CST  Subject: FW: Medication Management   Due Date/Time: 11/5/2020 6:45:00 PM CST     ---------------------  From: Selin Lockwood CMA   To: [facility]    Sent: 11/5/2020 7:12:19 AM CST  Subject: Medication Management     ** Submitted: **  Order:metoprolol (Metoprolol Succinate ER 25 mg oral tablet, extended release)  1 tab(s)  Oral  daily  Qty:  90 tab(s)        Refills:  2          Substitutions Allowed     Route To [place] [address]    Signed by Selin Lockwood CMA  11/5/2020 1:12:00 PM [facility]    ** Submitted: **  Complete:metoprolol (metoprolol succinate 25 mg oral tablet, extended release)   Signed by Selin Lockwood CMA  11/5/2020 1:12:00 PM [facility]    ** Not Approved:  **  metoprolol (METOPROLOL SUCCINATE ER 25MG TB24)  TAKE ONE TABLET BY MOUTH EVERY DAY  Qty:  90 unknown unit        Days Supply:  90        Refills:  0          Substitutions Allowed     Route To [facility]   Signed by Selin Lockwood CMA          E---------------------  From: Zulay Scott MA ([facility] Message Pool ([id number]_[facility]))   To: Mark Chris MD;     Sent: 11/5/2020 8:04:21 AM CST  Subject: FW: Medication Management   Due Date/Time: 11/5/2020 6:45:00 PM CST     Please advise re: zolpidem refills.  ** Submitted: **  Order:zolpidem (zolpidem 5 mg oral tablet)  1 tab(s)  Oral  hs  Qty:  30 tab(s)        Refills:  3          Substitutions Allowed     PRN  for sleep      Route To Pharmacy - [id number]    Signed by Mark Chris MD  11/5/2020 7:47:00 PM  [facility]---------------------  From: Mark Chris MD   To: [facility] Message Pool ([id number]_[place]);     Sent: 11/5/2020 1:48:23 PM CST  Subject: RE: Medication Management     filled

## 2022-02-16 NOTE — NURSING NOTE
Functional Assessment EBN Entered On:  7/22/2021 1:28 PM CDT    Performed On:  7/22/2021 1:27 PM CDT by Ivelisse Hernandez CMA               Functional Assessment   Focused Functional Assessment Grid   Bathing :   Independent (2)   Dressing :   Independent (2)   Toileting :   Independent (2)   Transferring Bed or Chair :   Independent (2)   Continence :   Independent (2)   Feeding :   Independent (2)   Ivelisse Hernandez CMA - 7/22/2021 1:27 PM CDT   ADL Index Score :   12    Capable of Shopping :   Yes   Capable of Walking :   Yes   Capable of Housekeeping :   Yes   Capable of Managing Medications :   Yes   Capable of Handling Finances :   Yes   Ivelisse Hernandez CMA - 7/22/2021 1:27 PM CDT

## 2022-02-16 NOTE — PROGRESS NOTES
Patient:   MELISSA ROSAS            MRN: [id number]            FIN: [id number]               Age:   70 years     Sex:  Male     :  1950   Associated Diagnoses:   Asthma; Benign prostatic hyperplasia; CAD (coronary artery disease); Encounter for colonoscopy following colon polyp removal; HTN, goal below 140/90   Author:   Brian Husain PA-C.      Preoperative Information   Procedure/ Case: colonoscopy   Location scheduled: [facility]   Date/ Time scheduled: 2021 8:00:00 AM   surgeon= Dr Lamas      Chief Complaint   2021 11:50 AM CDT    Pt here for a Preop Px for a Colonoscopy at [facility].  DOS 21      Review of Systems   Constitutional:  Negative.    Ear/Nose/Mouth/Throat:  Negative.    Respiratory:  Negative.    Cardiovascular:  Negative.    Gastrointestinal:  Negative.       Health Status   Allergies:    Allergic Reactions (Selected)  Severity Not Documented  Aspirin (Nasal polyps)  Doxycycline (Sun rash)  Shailesh (Hives)  Zymetrin shampo (Rash)   Medications:  (Selected)   Prescriptions  Prescribed  Metoprolol Succinate ER 25 mg oral tablet, extended release: = 1 tab(s), Oral, daily, # 90 tab(s), 2 Refill(s), Type: Maintenance, Pharmacy: [facility], 1 tab(s) Oral daily, 68, in, 20 10:57:00 CDT, Height Measured, 205.8, lb, 20 10:57:00 CDT, Weight Measured  Patanol 0.1% ophthalmic solution: 2 gtts, Both eyes, BID, # 5 mL, 3 Refill(s), Type: Maintenance, Pharmacy: [facility], 2 gtts both eyes bid  Ventolin HFA 90 mcg/inh inhalation aerosol: 2 puff(s), inh, q4 hrs, PRN: for cough, # 1 EA, 2 Refill(s), Type: Maintenance, Pharmacy: [facility], 2 puff(s) Inhale q4 hrs,PRN:for cough, 68, in, 20 10:03:00 CDT, Height Measured, 201, lb, 20 10:03:00 CDT, Weight Measured...  amLODIPine 10 mg oral tablet: = 1 tab(s), Oral, daily, # 90 tab(s), 2 Refill(s), Type: Maintenance, Pharmacy: [facility], 1 tab(s) Oral daily, 68, in,  07/27/20 10:57:00 CDT, Height Measured, 205.8, lb, 07/27/20 10:57:00 CDT, Weight Measured  amoxicillin 500 mg oral tablet: = 4 tab(s) ( 2,000 mg ), Oral, once, # 4 tab(s), 3 Refill(s), Type: Soft Stop, Pharmacy: [place] [address], 4 tab(s) Oral once, 68, in, 12/10/20 11:50:00 CST, Height Measured, 206.6, lb, 12/10/20 11:50:00 CST, Weight Measured  atorvastatin 20 mg oral tablet: = 1 tab(s), Oral, daily, # 90 tab(s), 2 Refill(s), Type: Maintenance, Pharmacy: [place] [address], 1 tab(s) Oral daily, 68, in, 07/27/20 10:57:00 CDT, Height Measured, 205.8, lb, 07/27/20 10:57:00 CDT, Weight Measured  clopidogrel 75 mg oral tablet: = 1 tab(s), Oral, daily, # 90 tab(s), 2 Refill(s), Type: Maintenance, Pharmacy: [place] [address], 1 tab(s) Oral daily, 68, in, 07/27/20 10:57:00 CDT, Height Measured, 205.8, lb, 07/27/20 10:57:00 CDT, Weight Measured  finasteride 5 mg oral tablet: = 1 tab(s), Oral, daily, # 90 tab(s), 2 Refill(s), Type: Maintenance, Pharmacy: [place] [address], 1 tab(s) Oral daily, 68, in, 07/27/20 10:57:00 CDT, Height Measured, 205.8, lb, 07/27/20 10:57:00 CDT, Weight Measured  fluticasone-salmeterol 250 mcg-50 mcg inhalation powder: See Instructions, Instructions: INHALE ONE PUFF BY MOUTH TWICE A DAY, # 180 unknown unit, 0 Refill(s), Type: Maintenance, Pharmacy: [place] [address], due visit in Aug., INHALE ONE PUFF BY MOUTH TWICE A DAY, 68, in, 12/10/20 11:50:00 CST, Luis...  gabapentin 300 mg oral capsule: = 1 cap(s), Oral, daily, # 90 unknown unit, 2 Refill(s), Type: Maintenance, Pharmacy: [facility], 1 cap(s) Oral daily, 68, in, 12/10/20 11:50:00 CST, Height Measured, 206.6, lb, 12/10/20 11:50:00 CST, Weight Measured  losartan 100 mg oral tablet: = 1 tab(s), Oral, daily, # 90 tab(s), 0 Refill(s), Type: Maintenance, Pharmacy: [facility], 1 tab(s) Oral daily, 68, in, 12/10/20 11:50:00 CST, Height Measured, 206.6, lb, 12/10/20 11:50:00 CST,  Weight Measured  nitroglycerin 0.4 mg sublingual tablet: See Instructions, Instructions: PLACE 1 TABLET UNDER TONGUE FOR CHEST PAIN TO A MAX OF 3 TABS-ONE EVERY 5 MINUTES - IF NO RELIEF CALL 911, # 25 tab(s), 3 Refill(s), Type: Maintenance, Pharmacy: [place] [address]  pantoprazole 40 mg oral delayed release tablet: = 1 tab(s), Oral, daily, # 90 tab(s), 2 Refill(s), Type: Maintenance, Pharmacy: [place] [address], 1 tab(s) Oral daily, 68, in, 07/27/20 10:57:00 CDT, Height Measured, 205.8, lb, 07/27/20 10:57:00 CDT, Weight Measured  sildenafil 100 mg oral tablet: = 1 tab(s) ( 100 mg ), Oral, daily, # 30 tab(s), 0 Refill(s), Type: Maintenance, Pharmacy: [place] [address], 1 tab(s) Oral daily, 68, in, 06/04/20 10:03:00 CDT, Height Measured, 201, lb, 06/04/20 10:03:00 CDT, Weight Measured  silodosin 8 mg oral capsule: = 1 cap(s), Oral, daily, # 90 cap(s), 3 Refill(s), Type: Maintenance, Pharmacy: [place] [address], 1 cap(s) Oral daily, 68, in, 07/27/20 10:57:00 CDT, Height Measured, Weight Measured  triamcinolone 0.1% topical ointment: 1 ree, TOP, TID, # 240 gm, 1 Refill(s), Type: Maintenance, [email or web address], 1 ree top tid  zolpidem 5 mg oral tablet: = 1 tab(s) ( 5 mg ), Oral, hs, PRN: for sleep, # 30 tab(s), 1 Refill(s), Type: Maintenance, Pharmacy: [place] [address], 1 tab(s) Oral hs,PRN:for sleep, 68, in, 12/10/20 11:50:00 CST, Height Measured, 206.6, lb, 12/10/20 11:50:00 CST, Weight M...  Documented Medications  Documented  Vitamin D3: 0 Refill(s), Type: Maintenance  Zyrtec 10 mg oral tablet: 1 tab(s) ( 10 mg ), PO, Daily, 0   Problem list:    All Problems  Allergic rhinitis / SNOMED CT 143863272 / Confirmed  DJD of shoulder / SNOMED CT 000492339 / Confirmed  Tubulovillous adenoma of colon / SNOMED CT 142278170 / Confirmed  AK (actinic keratosis) / SNOMED CT 6448247474 / Confirmed  HTN, goal below 140/90 / SNOMED CT 7968563860 / Confirmed  Hip  arthritis / SNOMED CT 968283573 / Confirmed  Bronchiectasis / SNOMED CT 64648915 / Confirmed  Vocal cord dysfunction / SNOMED CT 785368662 / Confirmed  Obese / SNOMED CT 1934398757 / Probable  CAD (coronary artery disease) / SNOMED CT 7031392431 / Confirmed  Obstructive sleep apnea syndrome in adult / SNOMED CT 8715881391 / Confirmed  Hx of malignant skin melanoma / SNOMED CT 4321623858 / Confirmed  Insomnia / SNOMED CT 019240467 / Confirmed  Asthma / SNOMED CT 743110727 / Confirmed  Prosthetic hip infection / SNOMED CT 649940374 / Confirmed  GERD (gastroesophageal reflux disease) / SNOMED CT 470371601 / Confirmed  Benign prostatic hyperplasia / SNOMED CT 543775037 / Confirmed  Urinary retention / SNOMED CT 456467049 / Confirmed  Diverticulitis / SNOMED CT 294731146 / Confirmed  Eczematous dermatitis / SNOMED CT 34326314 / Confirmed  Nasal polyps / SNOMED CT 00414634 / Confirmed  HLD (hyperlipidemia) / SNOMED CT 50375600 / Confirmed  Inactive: Tobacco abuse / ICD-9-.1  Resolved: Otitis media / SNOMED CT 809586484  Resolved: Cancer of skin / SNOMED CT 3658321492  Resolved: Inpatient stay / SNOMED CT 559346294  Resolved: Inpatient stay / SNOMED CT 403755613  Resolved: Otitis externa / SNOMED CT 3354603  Canceled: Hypertension / SNOMED CT 7125865913      Histories   Past Medical History:    Active  Prosthetic hip infection (141484352): Onset on 4/22/2018 at 67 years.  Comments:  4/30/2018 CDT 7:20 AM CDT - Alisha George  Right hip  Tubulovillous adenoma of colon (643055679): Onset on 9/20/2012 at 61 years.  AK (actinic keratosis) (3473978102): Onset on 8/16/2011 at 60 years.  CAD (coronary artery disease) (5148274561): Onset in the month of 12/2009 at 58 years  Comments:  9/28/2011 CDT 2:57 PM CDT - Mark Chris MD  dec 2009 angiogram with 50% blockage,, treated with meds  Eczematous dermatitis (02637949)  DJD of shoulder (516014387)  Asthma (875245690)  Insomnia (439646677)  Urinary retention  (514345899)  Comments:  2010 CST 6:12 PM CST - CandaceBerna pat CMA  Post Prostatic Surgery  Nasal polyps (95968281)  Allergic rhinitis (566750693)  Vocal cord dysfunction (979122552)  Bronchiectasis (70389529)  Obese (0901917257)  HLD (hyperlipidemia) (86136722)  Obstructive sleep apnea syndrome in adult (9218199115)  Hip arthritis (188408901)  GERD (gastroesophageal reflux disease) (849352580)  Benign prostatic hyperplasia (628645981)  Resolved  Inpatient stay (085924187): Onset on 2018 at 67 years.  Resolved on 2018 at 67 years.  Comments:  2018 CDT 7:17 AM Alisha Renteria  @[place], [place] - 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  Cellulitis of the right thigh.  Inpatient stay (519429584): Onset on 10/11/2011 at 60 years.  Resolved.  Comments:  2018 CDT 7:16 AM Alisha Renteria  @[facility], [place] - Chest pain, unspecified  Cancer of skin (9634988226):  Resolved.  Otitis media (041568486):  Resolved on 10/8/2010 at 59 years.  Otitis externa (7651533):  Resolved on 10/29/2010 at 59 years.   Family History:    Heart disease  Mother (Meron, )  High blood pressure  Mother (Meron, )  Arthritis  Father (Phuc, )  CHF - Congestive heart failure  Father (Phuc, )  Alzheimer's disease  Father (Phuc, )     Procedure history:    Revision of total hip replacement (255138345) on 2018 at 67 Years.  Comments:  2018 7:26 AM Alisha Renteria  Right hip revision total hip arthroplasty and extensive irrigation and debridement of the right thigh including muscle, fascia, bone and exchange of the femoral head and polyethylene acetabular liner.  THR - Total hip replacement (634799767) on 2018 at 67 Years.  Comments:  2018 7:21 AM Alisha Renteria  Right hip  Colonoscopy (107009966) on 2016 at 65 Years.  Comments:  2016 11:48 AM CDT - Jeremiah CAREY, Lizbeth  Tubular adenoma x 3 no high grade  dysplasia    4/21/2016 9:11 AM CDT - Kate Valdez  Indication: History of adenomatous polyps on last colonoscopy.  Sedation:  MAC.  Recommendation: Repeat in 5 years.  TURP - Transurethral resection of prostate (273697583) on 10/3/2011 at 60 Years.  Colonoscopy (539930976) on 9/20/2010 at 59 Years.  Comments:  12/20/2010 3:33 PM CST - Karlene Sylvester  4 mm polyp noted at 90 cm in the ascending colon; appearance adenomatous, removed   Recommend colonoscopic follow up in 5 years.   Conscious sedation/ MAC if his medical conditions dictate ASA III for level of sedation for future procedures  Coronary angiogram (41632428) in 2009 at 59 Years.  Green Light Laser - Prostate in the month of 3/2009 at 58 Years.  BL nasal Turbinoplasty in the month of 1/2008 at 57 Years.  Prostate TUNA surgery in the month of 2/2007 at 56 Years.  Right shoulder arthroscopy on 10/16/2006 at 55 Years.  Flexible sigmoidoscopy (26850302) on 7/8/2005 at 54 Years.  left shoulder arthroscopy in the month of 7/2005 at 54 Years.  excisional cervical schwannoma on 11/30/2001 at 50 Years.  Cholecystectomy (94697013) in the month of 2/1982 at 31 Years.  Vasectomy (84013197).   Social History:        Electronic Cigarette/Vaping Assessment            Electronic Cigarette Use: Never.      Alcohol Assessment            Current, Beer (12 oz), 3-4 times per year                     Comments:                      09/26/2011 - Alisha George                     1/month.      Tobacco Assessment: Past            Former smoker, quit more than 30 days ago      Substance Abuse Assessment            Never      Employment and Education Assessment            Retired, Work/School description: farmer.                     Comments:                      09/26/2011 - Alisha George                     2005.      Home and Environment Assessment            Marital status: .  1 children.                     Comments:                      09/26/2011 - Alisha George                      1970. Wife - Pauline.      Exercise and Physical Activity Assessment: Regular exercise            Exercise frequency: 4-5 x per week..  Exercise type: Weight lifting, Cardio.                     Comments:                      09/26/2011 - Alisha George                     2-3 times per week.      Sexual Assessment            Sexually active: Yes.  Sexual orientation: Heterosexual.       Has no history of anemia.  Has  no history of DVT or pulmonary embolism.  Has no personal history of bleeding problems.   Has  no personal history of anesthesia reactions.  Has  no  personal history of sleep apnea  Patient does not have active tuberculosis.    S/he has not taken aspirin or aspirin-containing products in the last week.     S/he has not taken Plavix (Clopidogrel) in the last 2 weeks.    S/he  has not taken warfarin in the past week.    S/he  has not been on corticosteroids for more than 2 weeks recently.   S/he is not DNR before, during or after surgery.          Chest pain / SOB walking up 2 flights of steps? No  Pain in neck or jaw? No  CAD MI?  No  Afib? No  Heart Failure? No  Asthma  or Bronchitis? Yes  Diabetes? No       Insulin/Orals?   Seizure Disorder? No  CKD? No  Thyroid Disease? No  Liver Disease? No  CVA? No      Physical Examination   Vital Signs   7/8/2021 11:50 AM CDT Temperature Tympanic 96.8 DegF  LOW    Peripheral Pulse Rate 64 bpm    Pulse Site Radial artery    Respiratory Rate 20 br/min    Systolic Blood Pressure 114 mmHg    Diastolic Blood Pressure 78 mmHg    Mean Arterial Pressure 90 mmHg    BP Site Right arm      Measurements from flowsheet : Measurements   7/8/2021 11:50 AM CDT Height Measured - Standard 68 in    Weight Measured - Standard 203 lb    BSA 2.1 m2    Body Mass Index 30.86 kg/m2  HI      General:  No acute distress.    Eye:  Pupils are equal, round and reactive to light, Extraocular movements are intact.    HENT:  Tympanic membranes are clear, No pharyngeal erythema, No  sinus tenderness.    Neck:  Supple, Non-tender, No lymphadenopathy.    Respiratory:  Lungs are clear to auscultation.    Cardiovascular:  Normal rate, Regular rhythm, No murmur.    Gastrointestinal:  Soft, Non-tender, Non-distended, Normal bowel sounds, No organomegaly.    Musculoskeletal:  Normal range of motion.    Neurologic:  Cranial Nerves II-XII are grossly intact, Normal deep tendon reflexes.    Psychiatric:  Appropriate mood & affect.       Review / Management   ECG interpretation:  Rate  56  beats per minute, Within normal limits, No ST-T changes, sinus bradycardia, read by Dr Lewis.       Impression and Plan   Diagnosis     Pre-operative examination (QQQ68-FF Z01.818).     Asthma (JPI31-PH J45.909).     Benign prostatic hyperplasia (KHK45-AG N40.0).     CAD (coronary artery disease) (FIQ70-XJ I25.10).     Encounter for colonoscopy following colon polyp removal (NRT66-YN Z09).     HTN, goal below 140/90 (XDY13-CG I10).     Orders     Orders   Lab (Gen Lab  Reference Lab):  PSA, Total (Room)* ([facility]) (Order): Specimen Type: Serum, Collection Date: 7/8/2021 12:17 PM CDT  Lipid panel with reflex to direct ldl* ([facility]) (Order): Specimen Type: Serum, Collection Date: 7/8/2021 12:17 PM CDT  Basic Metabolic Panel* ([facility]) (Order): Specimen Type: Serum, Collection Date: 7/8/2021 12:17 PM CDT.     Orders   Charges (Evaluation and Management):  [id number] office o/p est mod 30-39 min (Charge) (Order): Quantity: 1, Pre-operative examination  Encounter for colonoscopy following colon polyp removal  HTN, goal below 140/90  CAD (coronary artery disease)  Benign prostatic hyperplasia  Asthma.     Approved for surgery without restrictions.     he had testing for Covid-19 at [facility] this morning.

## 2022-02-16 NOTE — TELEPHONE ENCOUNTER
Entered by Mariam Johnson CMA on October 15, 2020 12:01:35 PM CDT  ---------------------  From: Mariam Johnson CMA   To: [facility]    Sent: 10/15/2020 12:01:35 PM CDT  Subject: Medication Management     ** Submitted: **  Order:pantoprazole (pantoprazole 40 mg oral delayed release tablet)  1 tab(s)  Oral  daily  Qty:  90 tab(s)        Refills:  2          Substitutions Allowed     Route To Pharmacy - [place] [address]    Signed by Mariam Johnson CMA  10/15/2020 5:01:00 PM [facility]    ** Submitted: **  Complete:pantoprazole (pantoprazole 40 mg oral delayed release tablet)   Signed by Mariam Johnson CMA  10/15/2020 5:01:00 PM [facility]    ** Not Approved:  **  pantoprazole (PANTOPRAZOLE SODIUM 40MG TBEC)  TAKE ONE TABLET BY MOUTH EVERY DAY  Qty:  90 unknown unit        Days Supply:  90        Refills:  0          Substitutions Allowed     Route To Pharmacy - [place] [address]   Signed by Mariam Johnson CMA            ------------------------------------------  From: [place] [address]  To: Mark Chris MD  Sent: October 15, 2020 9:42:33 AM CDT  Subject: Medication Management  Due: October 8, 2020 2:03:37 PM CDT     ** On Hold Pending Signature **     Drug: pantoprazole (pantoprazole 40 mg oral delayed release tablet), TAKE ONE TABLET BY MOUTH EVERY DAY  Quantity: 90 unknown unit  Days Supply: 90  Refills: 0  Substitutions Allowed  Notes from Pharmacy:     Dispensed Drug: pantoprazole (pantoprazole 40 mg oral delayed release tablet), TAKE ONE TABLET BY MOUTH EVERY DAY  Quantity: 90 unknown unit  Days Supply: 90  Refills: 0  Substitutions Allowed  Notes from Pharmacy:  ------------------------------------------Med Refill      Date of last office visit and reason:  7/27/20; AWV      Date of last Med Check / Px:   7/27/20  Date of last labs pertaining to med:  7/6/20    RTC order in chart:  yes; July    For Protocol refill, has patient been contacted:  n/a

## 2022-02-16 NOTE — PROGRESS NOTES
Chief Complaint    c/o reaction to hydrochlorizide - notices he turns red after walking, started 3-4 weeks ago. Noticed a round crusty lesion on his back last week. thinks it may have come from tick bite. denies fevers, body aches, fatigue. verbal consent given for televisi  History of Present Illness      69-year-old with telemedicine visit due to viral pandemic.  Visit occurred 10 4010 10:55 AM.  Problem #1 patient had a tick bite.  Says he is a very small area of redness around where he was bit.  He has not had any fever or chills.  He denies any muscle aches or joint aches.       Patient also has noticed that he gets sunburn very easily he wears sunscreen hats and he still even with a little bit of springtime sun has turned red.  He has not had an actual burn  Review of Systems      See HPI.  All other review of systems negative.  Physical Exam   Vitals & Measurements    HT: 68 in   Assessment/Plan       1. 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  He will stop this will see if he does better.  He will be monitoring his blood pressure off the hydrochlorothiazide to see if he needs additional adjustments.  Our goal will be below 140/90       2. Tick bite (W57.XXXA)         We discussed tick bites in general and specifically Lyme's disease he will watch it for now  Patient Information     Name:MELISSA ROSAS      Address:      [address] [place] [id number]     Sex:Male     YOB: 1950     [phone]     Emergency Contact:NASIM ROSAS     MRN:[id number]     FIN:[id number]     Location:[facility]     Date of Service:05/11/2020      Primary Care Physician:       Mark Chris MD, [phone]      Attending Physician:       Mark Chris MD, [phone]  Problem List/Past Medical History    Ongoing     AK (actinic keratosis)     Allergic rhinitis     Asthma     Benign prostatic  hyperplasia     Bronchiectasis     CAD (coronary artery disease)       Comments: dec 2009 angiogram with 50% blockage,, treated with meds     Diverticulitis     DJD of shoulder     Eczematous dermatitis     GERD (gastroesophageal reflux disease)     Hip arthritis     HLD (hyperlipidemia)     HTN, goal below 140/90     Hx of malignant skin melanoma     Insomnia     Nasal polyps     Obese     Obstructive sleep apnea syndrome in adult     Prosthetic hip infection       Comments: Right hip     Tobacco abuse     Tubulovillous adenoma of colon     Urinary retention       Comments: Post Prostatic Surgery     Vocal cord dysfunction    Historical     Cancer of skin     Inpatient stay       Comments: @[facility] [place], [place] - Chest pain, unspecified     Inpatient stay       Comments: @[facility] [place], [place] - 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 Cellulitis of the right thigh.     Otitis externa     Otitis media  Procedure/Surgical History     Revision of total hip replacement (04/24/2018)      Comments: Right hip revision total hip arthroplasty and extensive irrigation and debridement of the right thigh including muscle, fascia, bone and exchange of the femoral head and polyethylene acetabular liner..     THR - Total hip replacement (04/16/2018)      Comments: Right hip.     Colonoscopy (04/12/2016)      Comments: Tubular adenoma x 3 no high grade dysplasia. Indication: History of adenomatous polyps on last colonoscopy.      Sedation:  MAC.      Recommendation: Repeat in 5 years..     TURP - Transurethral resection of prostate (10/03/2011)     Colonoscopy (09/20/2010)      Comments: 4 mm polyp noted at 90 cm in the ascending colon; appearance adenomatous, removed      Recommend colonoscopic follow up in 5 years.      Conscious sedation/ MAC if his medical conditions dictate ASA III for level of sedation for future procedures.     [facility] Laser - Prostate (03.2009)     Coronary angiogram (2009)      BL nasal Turbinoplasty (01.2008)     Prostate TUNA surgery (02.2007)     Right shoulder arthroscopy (10/16/2006)     Flexible sigmoidoscopy (07/08/2005)     left shoulder arthroscopy (07.2005)     excisional cervical schwannoma (11/30/2001)     Cholecystectomy (02.1982)     Vasectomy  Medications    albuterol 2.5 mg/3 mL (0.083%) inhalation solution, 2.5 mg= 3 mL, NEB, q4-6 hrs, PRN, 1 refills    amLODIPine 10 mg oral tablet, 1 tab(s), Oral, daily    atorvastatin 20 mg oral tablet, 1 tab(s), Oral, daily    clopidogrel 75 mg oral tablet, 1 tab(s), Oral, daily    finasteride 5 mg oral tablet, 1 tab(s), Oral, daily    fluticasone-salmeterol 250 mcg-50 mcg inhalation powder, 1 puff(s), NEB, bid, 1 refills    gabapentin 300 mg oral capsule, See Instructions    hydroCHLOROthiazide 25 mg oral tablet, 1 tab(s), Oral, daily    losartan 100 mg oral tablet, See Instructions    nitroglycerin 0.4 mg sublingual tablet, See Instructions, 3 refills    pantoprazole 40 mg oral delayed release tablet, 1 tab(s), Oral, daily    Patanol 0.1% ophthalmic solution, 2 gtts, Eye-Both, bid, 3 refills    predniSONE 10 mg oral tablet, See Instructions    silodosin 8 mg oral capsule, 8 mg= 1 cap(s), Oral, daily    triamcinolone 0.1% topical ointment, 1 ree, Topical, tid, 1 refills    Ventolin HFA 90 mcg/inh inhalation aerosol, 2 puff(s), Inhale, q4 hrs, PRN, 11 refills    Vitamin D3    zolpidem 10 mg oral tablet, 1 tab(s), Oral, qhs, PRN, 5 refills    Zyrtec 10 mg oral tablet, 10 mg= 1 tab(s), Oral, daily  Allergies    Shailesh (Hives)    aspirin (Nasal Polyps)    doxycycline (sun rash)    zymetrin shampo (Rash)  Social History    Smoking Status - 05/11/2020     Former smoker     Alcohol      Current, Beer (12 oz), 3-4 times per year, 03/17/2015     Employment/School      Retired, Work/School description: farmer., 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 Exercise type: Weight lifting, Cardio.,  04/02/2019     Home/Environment      Marital status: . 1 children., 03/17/2015     Sexual      Sexually active: Yes. Sexual orientation: Heterosexual., 03/17/2015     Substance Abuse      Never, 03/17/2015     Tobacco - Past, 04/02/2019      Cigarettes, 04/02/2019  Family History    Alzheimer's disease: Father.    Arthritis: Father.    CHF - Congestive heart failure: Father.    Heart disease: Mother.    High blood pressure: Mother.    Sister: History is negative    Sister: History is negative    Sister: History is negative    Son: History is negative  Immunizations      Vaccine Date Status          influenza virus vaccine, inactivated 09/18/2018 Recorded          influenza virus vaccine, inactivated 09/20/2017 Recorded          influenza virus vaccine, inactivated 09/22/2016 Given          pneumococcal (PCV13) 03/17/2016 Given          influenza virus vaccine, inactivated 10/02/2015 Recorded              Comments : [facility]2015[facility] [place]          influenza virus vaccine, inactivated 08/27/2014 Given          ZOS, shingles 05/27/2014 Given          pneumococcal (PPSV23) 05/27/2014 Given          influenza virus vaccine, inactivated 09/30/2013 Recorded          influenza virus vaccine, inactivated 09/23/2012 Recorded              Comments : [facility]          influenza, H1N1, inactivated 09/15/2011 Recorded          tetanus/diphth/pertuss (Tdap) adult/adol 09/08/2011 Given          influenza virus vaccine, inactivated 09/08/2011 Given          influenza 10/08/2010 Given          influenza 09/18/2009 Recorded          pneumococcal (PPSV23) 11/13/2002 Recorded          Td 08/07/2001 Recorded

## 2022-02-16 NOTE — TELEPHONE ENCOUNTER
Entered by Mariam Johnson CMA on June 29, 2021 9:29:38 PM CDT  ---------------------  From: Mariam Johnson CMA   To: [place] [address]    Sent: 6/29/2021 9:29:38 PM CDT  Subject: Medication Management     ** Submitted: **  Order:losartan (losartan 100 mg oral tablet)  1 tab(s)  Oral  daily  Qty:  30 tab(s)        Refills:  0          Substitutions Allowed     Route To Pharmacy - [place] [address]    Signed by Mariam Johnson CMA  6/30/2021 2:29:00 AM [facility]    ** Submitted: **  Complete:losartan (losartan 100 mg oral tablet)   Signed by Mariam Johnson CMA  6/30/2021 2:29:00 AM [facility]    ** Not Approved:  **  losartan (LOSARTAN POTASSIUM 100MG TABS)  TAKE ONE TABLET BY MOUTH EVERY DAY  Qty:  90 unknown unit        Days Supply:  90        Refills:  2          Substitutions Allowed     Route To [place] [address]   Signed by Mariam Johnson CMA            ------------------------------------------  From: [place] [address]  To: Mark Chris MD  Sent: June 27, 2021 7:44:34 PM CDT  Subject: Medication Management  Due: June 4, 2021 8:26:15 PM CDT     ** On Hold Pending Signature **     Drug: losartan (losartan 100 mg oral tablet), TAKE ONE TABLET BY MOUTH EVERY DAY  Quantity: 90 unknown unit  Days Supply: 90  Refills: 1  Substitutions Allowed  Notes from Pharmacy:     Dispensed Drug: losartan (losartan 100 mg oral tablet), TAKE ONE TABLET BY MOUTH EVERY DAY  Quantity: 90 unknown unit  Days Supply: 90  Refills: 2  Substitutions Allowed  Notes from Pharmacy:  ------------------------------------------

## 2022-02-16 NOTE — TELEPHONE ENCOUNTER
---------------------  From: Zulay Scott MA (eRx Pool ([id number]))   To: [facility] Message Pool ([id number]_[place] [facility] [place]);     Sent: 5/17/2021 11:40:03 AM CDT  Subject: FW: Medication Management   Due Date/Time: 5/15/2021 6:11:00 PM CDT     Pt has appt samra BARRIOS 7/28/2021  for AWV.  Please advise re: further refills.      ------------------------------------------  From: [id number]  To: Mark Chris MD  Sent: May 14, 2021 6:11:31 PM CDT  Subject: Medication Management  Due: May 14, 2021 8:13:56 PM CDT     ** On Hold Pending Signature **     Drug: zolpidem (zolpidem 5 mg oral tablet), TAKE ONE TABLET BY MOUTH AT BEDTIME AS NEEDED FOR SLEEP  Quantity: 30 unknown unit  Days Supply: 30  Refills: 0  Substitutions Allowed  Notes from Pharmacy:     Dispensed Drug: zolpidem (zolpidem 5 mg oral tablet), TAKE ONE TABLET BY MOUTH AT BEDTIME AS NEEDED FOR SLEEP  Quantity: 30 unknown unit  Days Supply: 30  Refills: 1  Substitutions Allowed  Notes from Pharmacy:  ---------------------------------------------------------------  From: Nicole Umanzor CMA ([facility] Message Pool ([id number]))   To: Mark Chris MD;     Sent: 5/17/2021 11:47:25 AM CDT  Subject: FW: Medication Management   Due Date/Time: 5/15/2021 6:11:00 PM CDT  ** Submitted: **  Order:zolpidem (zolpidem 5 mg oral tablet)  1 tab(s)  Oral  hs  Qty:  30 tab(s)        Refills:  1          Substitutions Allowed     PRN  for sleep      Route To Pharmacy - [id number]    Signed by Mark Chris MD  5/17/2021 4:49:00 PM [facility]

## 2022-02-16 NOTE — TELEPHONE ENCOUNTER
Entered by Monika Dia CMA on October 02, 2020 4:43:37 PM CDT  ---------------------  From: Monika Dia CMA   To: [place] [address]    Sent: 10/2/2020 4:43:37 PM CDT  Subject: Medication Management     ** Submitted: **  Order:losartan (losartan 100 mg oral tablet)  1 tab(s)  Oral  daily  Qty:  90 unknown unit        Days Supply:  90        Refills:  2          Substitutions Allowed     Route To [place] [address]    Signed by Monika Dia CMA  10/2/2020 9:43:00 PM [facility]    ** Submitted: **  Complete:losartan (losartan 100 mg oral tablet)   Signed by Monika Dia CMA  10/2/2020 9:43:00 PM [facility]    ** Not Approved:  **  losartan (LOSARTAN POTASSIUM 100MG TABS)  TAKE ONE TABLET BY MOUTH EVERY DAY  Qty:  90 unknown unit        Days Supply:  90        Refills:  0          Substitutions Allowed     Route To [place] [address]   Signed by Monika Dia CMA            ** Submitted: **  Order:fluticasone-salmeterol (fluticasone-salmeterol 250 mcg-50 mcg inhalation powder)  See Instructions  INHALE ONE PUFF BY MOUTH TWICE A DAY  Qty:  180 EA        Refills:  2          Substitutions Allowed     Route To [facility]    Signed by Monika Dia CMA  10/2/2020 9:39:00 PM [facility]    ** Submitted: **  Complete:fluticasone-salmeterol (fluticasone-salmeterol 250 mcg-50 mcg inhalation powder)   Signed by Monika Dia CMA  10/2/2020 9:43:00 PM [facility]    ** Not Approved:  **  fluticasone-salmeterol (FLUTICASONE-SALMETEROL 250-50 AEPB)  INHALE ONE PUFF BY MOUTH TWICE A DAY  Qty:  60 unknown unit        Days Supply:  30        Refills:  2          Substitutions Allowed     Route To [facility]   Signed by Monika Dia CMA          Return in one year for next annual wellness visit. .  RTC placed  DAILY 7/27      ------------------------------------------  From: [id number]  To: Mark Chris MD  Sent: October 1, 2020 5:26:40 PM  CDT  Subject: Medication Management  Due: September 9, 2020 4:20:39 PM CDT     ** On Hold Pending Signature **     Drug: losartan (losartan 100 mg oral tablet), TAKE ONE TABLET BY MOUTH EVERY DAY  Quantity: 90 unknown unit  Days Supply: 90  Refills: 0  Substitutions Allowed  Notes from Pharmacy:     Dispensed Drug: losartan (losartan 100 mg oral tablet), TAKE ONE TABLET BY MOUTH EVERY DAY  Quantity: 90 unknown unit  Days Supply: 90  Refills: 0  Substitutions Allowed  Notes from Pharmacy:     ** On Hold Pending Signature **     Drug: fluticasone-salmeterol (fluticasone-salmeterol 250 mcg-50 mcg inhalation powder), INHALE ONE PUFF BY MOUTH TWICE A DAY  Quantity: 60 unknown unit  Days Supply: 30  Refills: 2  Substitutions Allowed  Notes from Pharmacy:     Dispensed Drug: fluticasone-salmeterol (fluticasone-salmeterol 250 mcg-50 mcg inhalation powder), INHALE ONE PUFF BY MOUTH TWICE A DAY  Quantity: 60 unknown unit  Days Supply: 30  Refills: 2  Substitutions Allowed  Notes from Pharmacy:  ------------------------------------------

## 2022-02-16 NOTE — PROGRESS NOTES
Patient:   MELISSA ROSAS            MRN: [id number]            FIN: [id number]               Age:   67 years     Sex:  Male     :  1950   Associated Diagnoses:   Hypertension; HLD (hyperlipidemia); CAD (coronary artery disease); Obese; Asthma; Skin ulcer   Author:   Mark Chris MD      Chief Complaint   3/13/2018 12:21 PM CDT   1. Needs refills 2. f/u MRI  3. wants spot on back of head froze      History of Present Illness   see chief complaint as noted above and confirmed with the patient   67 year old male presenting today with mulitiple concerns.  Lesion on back of head:  Hypertension:  Hyperlipidemia:  CAD:  Asthma:  MRI:  This 67 year old is here following up on his MRI.  His MRI showed some fluid and significant arthritis of the right hip as well, vascular necrosis.  I have referred him to orthopedics and said they will evaluate him for possible surgical intervention, which I think is likely.    Hypertension.  The patient has been on his medications longstanding without difficulty.  His blood pressures on his visits have been well-controlled.  He denies any swelling.  No chest pains or pressures.    Coronary artery disease.  The patient has not been having symptoms and he can be active walking.  His heart showed mild blockage on angiogram but he has not had intervention needed.    The patient has also had some reactive airway disease.  At present he takes Advair and occasional albuterol.  He doesn t find it limiting.  It does get worse when he has a cold or other respiratory ailment and then he will add some nebulizer treatments.    Prostatic hypertrophy.  The patient has required both finasteride and Rapaflo for treatment and he says the Rapaflo helped far more than the finasteride ever did.  He is interested in stopping the finasteride and will consider that.  Insomnia.  He is using his sleep apnea machine very regularly now but notes that the zolpidem helps use it much better and he doesn t  fight it as much.           Review of Systems   Constitutional:  No fever, No chills.    Respiratory:  No shortness of breath, No cough.    Cardiovascular:  No palpitations.    Gastrointestinal:  No nausea, No vomiting.    Genitourinary:  No dysuria, No hematuria.    Musculoskeletal:  Joint pain, No trauma.    Integumentary:  No rash.    Neurologic:  No headache.              Health Status   Allergies:    Allergic Reactions (Selected)  Severity Not Documented  Aspirin (Nasal polyps)  Doxycycline (Sun rash)  Shailesh (Hives)  Zymetrin shampo (Rash)   Medications:  (Selected)   Prescriptions  Prescribed  Advair Diskus 250 mcg-50 mcg inhalation powder: 1 puff(s), inh, bid, # 60 EA, 11 Refill(s), Type: Maintenance, Pharmacy: [facility], 1 puff(s) inh bid  Nitrostat 0.4 mg sublingual tablet: See Instructions, Instructions: 1 TABLET UNDER TONGUE FOR CHEST PAIN TO A MAX OF 3 TABS-ONE EVERY 5 MINUTES - IF NO RELIEF CALL 911, # 25 unknown unit, 3 Refill(s), Type: Soft Stop, Pharmacy: [email or web address], 1 TABLET UNDER TONGUE FOR C...  Patanol 0.1% ophthalmic solution: 2 gtts, Both eyes, BID, # 5 mL, 3 Refill(s), Type: Maintenance, Pharmacy: [id number], 2 gtts both eyes bid  Rapaflo 8 mg oral capsule: 1 cap(s) ( 8 mg ), po, daily, # 90 cap(s), 3 Refill(s), Type: Maintenance, Pharmacy: [email or web address], 1 cap(s) po daily,x90 day(s)  Ventolin HFA 90 mcg/inh inhalation aerosol: 2 puff(s), inh, q4 hrs, PRN: for cough, # 1 EA, 11 Refill(s), Type: Maintenance, Pharmacy: [email or web address], 2 puff(s) inh q4 hrs,PRN:for cough  albuterol 2.5 mg/3 mL (0.083%) inhalation solution: 3 mL ( 2.5 mg ), neb, q4-6 hrs, PRN: as needed for cough, # 1 box(es), 1 Refill(s), Type: Maintenance, Pharmacy: [facility] [place], 3 mL neb q4-6 hrs,PRN:as needed for cough  amLODIPine 10 mg oral tablet: 1 tab(s) ( 10 mg ), po, daily, # 90 tab(s), 3 Refill(s), Type:  Maintenance, Pharmacy: [facility], 1 tab(s) po daily,x90 day(s)  atorvastatin 20 mg oral tablet: 1 tab(s) ( 20 mg ), po, daily, # 90 tab(s), 3 Refill(s), Type: Maintenance, Pharmacy: [facility], 1 tab(s) po daily,x90 day(s)  clopidogrel 75 mg oral tablet: 1 tab(s) ( 75 mg ), po, daily, # 90 tab(s), 3 Refill(s), Type: Maintenance, Pharmacy: [facility], 1 tab(s) po daily,x90 day(s)  finasteride 5 mg oral tablet: 1 tab(s) ( 5 mg ), po, daily, # 90 tab(s), 3 Refill(s), Type: Maintenance, Pharmacy: [facility], 1 tab(s) po daily,x90 day(s)  gabapentin 300 mg oral capsule: 1 cap(s) ( 300 mg ), po, daily, # 30 cap(s), 0 Refill(s), Type: Maintenance, Pharmacy: [facility], Pt is now due for annual med check per [facility] for further refills.  pantoprazole 40 mg oral delayed release tablet: 1 tab(s) ( 40 mg ), po, daily, # 90 tab(s), 3 Refill(s), Type: Maintenance, Pharmacy: [facility], 1 tab(s) po daily,x90 day(s)  triamcinolone 0.1% topical ointment: 1 ere, TOP, TID, # 240 gm, 1 Refill(s), Type: Maintenance, Pharmacy: [id number], 1 ree top tid  valsartan 320 mg oral tablet: 1 tab(s) ( 320 mg ), po, daily, # 90 tab(s), 3 Refill(s), Type: Maintenance, Pharmacy: [facility], 1 tab(s) po daily,x90 day(s)  zolpidem 10 mg oral tablet: 1 tab(s), PO, Once a day (at bedtime), PRN: for sleep, # 30 tab(s), 5 Refill(s), Type: Maintenance, Pharmacy: [facility] [place], 1 tab(s) po hs,PRN:for sleep  Documented Medications  Documented  Vitamin D3: 0 Refill(s), Type: Maintenance  Zyrtec 10 mg oral tablet: 1 tab(s) ( 10 mg ), PO, Daily, 0   Problem list:    All Problems  Vocal cord dysfunction / SNOMED CT 544859127 / Confirmed  Urinary retention / SNOMED CT 454813847 / Confirmed  Tubulovillous adenoma of colon / SNOMED CT 045826426 / Confirmed  Obstructive sleep apnea syndrome in  adult / SNOMED CT 2751372693 / Confirmed  Obese / SNOMED CT 8756875178 / Probable  Nasal polyps / SNOMED CT 09363720 / Confirmed  Cancer of skin / SNOMED CT 4880497890 / Confirmed  Insomnia / SNOMED CT 449652005 / Confirmed  Hypertension / SNOMED CT 1129490209 / Confirmed  HLD (hyperlipidemia) / SNOMED CT 66280978 / Confirmed  Eczematous dermatitis / SNOMED CT 65821089 / Confirmed  Diverticulitis / SNOMED CT 548868088 / Confirmed  DJD of shoulder / SNOMED CT 495019935 / Confirmed  CAD (coronary artery disease) / SNOMED CT 1562036344 / Confirmed  Bronchiectasis / SNOMED CT 23695444 / Confirmed  Asthma / SNOMED CT 765874061 / Confirmed  Hip arthritis / SNOMED CT 433629486 / Confirmed  Allergic rhinitis / SNOMED CT 011534848 / Confirmed  AK (actinic keratosis) / SNOMED CT 6983799996 / Confirmed  Inactive: Tobacco abuse / ICD-9-.1  Resolved: Otitis media / SNOMED CT 523367456  Resolved: Otitis externa / SNOMED CT 3050524  Resolved: *Hospitalized@[facility] - Chest pain, unspecified      Histories   Past Medical History:    Active  Tubulovillous adenoma of colon (750885902): Onset on 9/20/2012 at 61 years.  AK (actinic keratosis) (4609581130): Onset on 8/16/2011 at 60 years.  CAD (coronary artery disease) (9232566290): Onset in the month of 12/2009 at 58 years  Comments:  9/28/2011 CDT 2:57 PM CDT - Mark Chris MD  dec 2009 angiogram with 50% blockage,, treated with meds  Eczematous dermatitis (08875225)  DJD of shoulder (337189644)  Hypertension (7553570519)  Asthma (268682107)  Insomnia (849302124)  Urinary retention (100313207)  Comments:  1/25/2010 CST 6:12 PM CST - Berna Huynh CMA  Post Prostatic Surgery  Nasal polyps (57260619)  Allergic rhinitis (125159215)  Vocal cord dysfunction (849045493)  Bronchiectasis (33445304)  Cancer of skin (2177664521)  Obese (7219047135)  HLD (hyperlipidemia) (85716300)  Obstructive sleep apnea syndrome in adult (3386271200)  Hip arthritis  (518038618)  Resolved  *Hospitalized@[facility] - Chest pain, unspecified: Onset on 10/11/2011 at 60 years.  Resolved.  Otitis media (459597962):  Resolved on 10/8/2010 at 59 years.  Otitis externa (2072303):  Resolved on 10/29/2010 at 59 years.   Family History:    CHF - Congestive heart failure  Father (Phuc, )  Alzheimer's disease  Father (Phuc, )     Procedure history:    Colonoscopy (219339213) on 2016 at 65 Years.  Comments:  2016 11:48 AM - Lizbeth Daniels RN  Tubular adenoma x 3 no high grade dysplasia    2016 9:11 AM - Kate Valdez  Indication: History of adenomatous polyps on last colonoscopy.  Sedation:  MAC.  Recommendation: Repeat in 5 years.  TURP - Transurethral resection of prostate (233771624) on 10/3/2011 at 60 Years.  Colonoscopy (438248894) on 2010 at 59 Years.  Comments:  2010 3:33 PM - Karlene Sylvester  4 mm polyp noted at 90 cm in the ascending colon; appearance adenomatous, removed   Recommend colonoscopic follow up in 5 years.   Conscious sedation/ MAC if his medical conditions dictate ASA III for level of sedation for future procedures  Coronary angiogram (55571675) in  at 59 Years.  Green Light Laser - Prostate in the month of 3/2009 at 58 Years.  BL nasal Turbinoplasty in the month of 2008 at 57 Years.  Prostate TUNA surgery in the month of 2007 at 56 Years.  Right shoulder arthroscopy on 10/16/2006 at 55 Years.  Flexible sigmoidoscopy (24285658) on 2005 at 54 Years.  left shoulder arthroscopy in the month of 2005 at 54 Years.  excisional cervical schwannoma on 2001 at 50 Years.  Cholecystectomy (45745914) in the month of 1982 at 31 Years.  Vasectomy (20040292).   Social History:        Alcohol Assessment            Current, Beer (12 oz), 3-4 times per year                     Comments:                      2011 - Alisha George                     1/month.      Tobacco Assessment            Past      Substance  Abuse Assessment            Never      Employment and Education Assessment            Retired                     Comments:                      09/26/2011 - Alsiha George                     2005.      Home and Environment Assessment            Marital status: .  1 children.                     Comments:                      09/26/2011 - Alisha George                     1970. Wife - Pauline.      Exercise and Physical Activity Assessment: Regular exercise            Exercise frequency: 3-4 times/week.  Exercise type: Weight lifting, Cardio.                     Comments:                      09/26/2011 Alisha Pacheco                     2-3 times per week.      Sexual Assessment            Sexually active: Yes.  Sexual orientation: Heterosexual.        Physical Examination   Vital Signs   3/13/2018 12:21 PM CDT Peripheral Pulse Rate 64 bpm    Systolic Blood Pressure 112 mmHg    Diastolic Blood Pressure 62 mmHg    Mean Arterial Pressure 79 mmHg      Measurements from flowsheet : Measurements   3/13/2018 12:21 PM CDT Height Measured - Standard 68 in    Weight Measured - Standard 203.4 lb    BSA 2.1 m2    Body Mass Index 30.92 kg/m2  HI      General:  Alert and oriented, No acute distress.    Eye:  Pupils are equal, round and reactive to light, Normal conjunctiva.    HENT:  Oral mucosa is moist, No sinus tenderness.    Neck:  Supple.    Respiratory:  Lungs are clear to auscultation, Respirations are non-labored.    Cardiovascular:  Normal rate, Regular rhythm, No edema.    Gastrointestinal:  Non-distended.    Musculoskeletal:  Normal gait.    Integumentary:  Warm, No rash, On the back of his neck there is a small superficial ulcerative lesion about 3 mm in diameter. .    Psychiatric:  Cooperative, Appropriate mood & affect, Normal judgment.       Review / Management   Results review      Impression and Plan   Diagnosis     Hypertension (EOX33-DK I10).     HLD (hyperlipidemia) (DML92-UD E78.5).     CAD  (coronary artery disease) (EQW49-CY I25.10).     Obese (GCV32-UJ E66.9).     Asthma (JGV45-HN J45.909).     Skin ulcer (NWU23-BN L98.499).     Course:    1. Hip arthritis and significant findings on MRI.  He is going to see the orthopedic surgeon.  2. Hypertension is well-controlled.  He will continue on his present medications.    3. Coronary artery disease.  No evidence of progression.  No significant limitations.  4. Sleep apnea.  He will continue on his CPAP and use the Ambien as needed.    5. Skin ulcer on the back of the neck.  This could be an early keratosis so I froze it with a 30 second thaw time with liquid nitrogen..    Plan:  I, Makenzie Umanzor [facility], acted solely as a scribe for, and in the presence of Dr. Mark Chris who performed the service..

## 2022-02-16 NOTE — LETTER
(Inserted Image. Unable to display)   July 07, 2020        MELISSA ROSAS      [address], [place] [id number]        Dear MELISSA,    Thank you for choosing [facility] for your healthcare needs. Below you will find the results of your recent test(s) done at our clinic.      Your blood tests show excellent cholesterol results.   The PSA is negative for any evidence of prostate cancer.   Your kidneys are showing some aging but the results are better than when last checked.        Result Name Current Result Previous Result Reference Range   Sodium Level (mmol/L)  139 7/6/2020  139 4/2/2019 135 - 146   Potassium Level (mmol/L)  4.1 7/6/2020  4.0 4/2/2019 3.5 - 5.3   Chloride Level (mmol/L)  109 7/6/2020  104 4/2/2019 98 - 110   CO2 Level (mmol/L)  25 7/6/2020  26 4/2/2019 20 - 32   Glucose Level (mg/dL)  96 7/6/2020  87 4/2/2019 65 - 99   BUN (mg/dL) ((H)) 28 7/6/2020 ((H)) 30 4/2/2019 7 - 25   Creatinine Level (mg/dL) ((H)) 1.78 7/6/2020 ((H)) 1.92 4/2/2019 0.70 - 1.18   BUN/Creat Ratio  16 7/6/2020  16 4/2/2019 6 - 22   eGFR (mL/min/1.73m2) ((L)) 38 7/6/2020 ((L)) 35 4/2/2019 > OR = 60 -    Calcium Level (mg/dL)  9.2 7/6/2020  9.5 4/2/2019 8.6 - 10.3   Cholesterol (mg/dL)  153 7/6/2020  159 4/2/2019  - <200   Non-HDL Cholesterol  113 7/6/2020  122 4/2/2019  - <130   HDL (mg/dL)  40 7/6/2020 ((L)) 37 4/2/2019 > OR = 40 -    Cholesterol/HDL Ratio  3.8 7/6/2020  4.3 4/2/2019  - <5.0   LDL  92 7/6/2020  93 4/2/2019    Triglyceride (mg/dL)  118 7/6/2020 ((H)) 193 4/2/2019  - <150   PSA (ng/mL)  1.9 7/6/2020  2.6 4/2/2019  - < OR = 4.0       Please contact me or my assistant at [phone] if you have any questions or concerns.     Sincerely,        Mark Chris MD        What do your labs mean?  Below is a glossary of commonly ordered labs:  LDL   Bad Cholesterol   HDL   Good Cholesterol  AST/ALT   Liver Function   Cr/Creatinine   Kidney Function  Microalbumin   Kidney Function  BUN    Kidney Function  PSA   Prostate    TSH   Thyroid Hormone  HgbA1c   Diabetes Test   Hgb (Hemoglobin)   Red Blood Cells

## 2022-02-16 NOTE — TELEPHONE ENCOUNTER
Entered by Mariam Johnson CMA on March 28, 2019 8:55:14 AM CDT  ---------------------  From: Mariam Johnson CMA   To: [place] [id number]    Sent: 3/28/2019 8:55:13 AM CDT  Subject: Medication Management     ** Submitted: **  Order:finasteride (finasteride 5 mg oral tablet)  1 tab(s)  Oral  daily  Qty:  30 tab(s)        Refills:  0          Substitutions Allowed     Route To Pharmacy - [place] [id number]    Signed by Mariam Johnson CMA  3/28/2019 8:54:00 AM    ** Submitted: **  Complete:finasteride (finasteride 5 mg oral tablet)   Signed by Mariam Johnson CMA  3/28/2019 8:55:00 AM    ** Not Approved:  **  finasteride (FINASTERIDE 5MG TABS)  TAKE ONE TABLET BY MOUTH EVERY DAY  Qty:  90 unknown unit        Days Supply:  90        Refills:  3          Substitutions Allowed     Route To Pharmacy - [place] [address]   Signed by Mariam Johnson CMA            ------------------------------------------  From: [place] [address]  To: Mark Chris MD  Sent: March 27, 2019 7:32:37 PM CDT  Subject: Medication Management  Due: March 28, 2019 7:32:37 PM CDT    ** On Hold Pending Signature **  Drug: finasteride (finasteride 5 mg oral tablet)  TAKE ONE TABLET BY MOUTH EVERY DAY  Quantity: 90 unknown unit  Days Supply: 90        Refills: 3  Substitutions Allowed  Notes from Pharmacy:     Dispensed Drug: finasteride (finasteride 5 mg oral tablet)  TAKE ONE TABLET BY MOUTH EVERY DAY  Quantity: 90 unknown unit  Days Supply: 90        Refills: 3  Substitutions Allowed  Notes from Pharmacy:   ------------------------------------------Med Refill      Date of last office visit and reason:  1/11/19; right sided rib pain      Date of last Med Check / Px:   8/14/18  Date of last labs pertaining to med:  6/6/18    RTC order in chart:  yes; overdue    For Protocol refill, has patient been contacted:  yes; transferred to schedule

## 2022-02-16 NOTE — PROGRESS NOTES
Patient:   MELISSA ROSAS            MRN: [id number]            FIN: [id number]               Age:   66 years     Sex:  Male     :  1950   Associated Diagnoses:   Diverticulitis   Author:   Crescencio Soria MD      Visit Information      Date of Service: 2017 12:12 pm  Performing Location: [facility]  Encounter#: [id number]      Primary Care Provider (PCP):  Mark Chris MD    NPI# [id number]      Referring Provider:  No referring provider recorded for selected visit.      Chief Complaint   2017 12:18 PM CDT   Patient presents today to address his digestive issues. He left on trip 5 days ago to [place] after having stomach cramping, diarrhea and flatulence the previous two days. The total duration of his symptoms have been 7 days. He is better today.        History of Present Illness   chief complaint and symptoms as noted above confirmed with patient   3-4 small stools a day  no fever, no blood  dry heaves   taking po  llq pain      Review of Systems   Constitutional:  Negative except as documented in history of present illness.    Ear/Nose/Mouth/Throat:  Negative.    Respiratory:  Negative.    Cardiovascular:  Negative.    Gastrointestinal:  Negative except as documented in history of present illness.    Genitourinary:  Negative.    Musculoskeletal:  Negative.    Integumentary:  Negative.    Neurologic:  Negative.             Health Status   Allergies:    Allergic Reactions (Selected)  Severity Not Documented  Aspirin (Nasal polyps)  Doxycycline (Sun rash)  Shailesh (Hives)  Zymetrin shampo (Rash)   Medications:  (Selected)   Prescriptions  Prescribed  Advair Diskus 250 mcg-50 mcg inhalation powder: 1 puff(s), inh, bid, # 60 EA, 11 Refill(s), Type: Maintenance, Pharmacy: [facility] [place], 1 puff(s) inh bid  Cipro 500 mg oral tablet: 1 tab(s) ( 500 mg ), PO, q12hr, # 20 tab(s), 0 Refill(s), Type: Maintenance, Pharmacy: [facility] [place], 1  tab(s) po q12 hrs,x10 day(s)  Flagyl 500 mg oral tablet: 1 tab(s) ( 0.5 gm ), PO, q8hr, # 30 tab(s), 0 Refill(s), Type: Maintenance, Pharmacy: [facility], 1 tab(s) po q8 hrs,x10 day(s)  Nitrostat 0.4 mg sublingual tablet: See Instructions, Instructions: 1 TABLET UNDER TONGUE FOR CHEST PAIN TO A MAX OF 3 TABS-ONE EVERY 5 MINUTES - IF NO RELIEF CALL 911, # 25 unknown unit, 3 Refill(s), Type: Soft Stop, Pharmacy: [facility], 1 TABLET UNDER TONGUE FOR C...  Patanol 0.1% ophthalmic solution: 2 gtts, Both eyes, BID, # 5 mL, 3 Refill(s), Type: Maintenance, Pharmacy: [id number], 2 gtts both eyes bid  Rapaflo 8 mg oral capsule: 1 cap(s) ( 8 mg ), po, daily, # 90 cap(s), 3 Refill(s), Type: Maintenance, Pharmacy: [facility], 1 cap(s) po daily,x90 day(s)  Ventolin HFA 90 mcg/inh inhalation aerosol: 2 puff(s), inh, q4 hrs, PRN: for cough, # 1 EA, 11 Refill(s), Type: Maintenance, Pharmacy: [facility], 2 puff(s) inh q4 hrs,PRN:for cough  amLODIPine 10 mg oral tablet: 1 tab(s) ( 10 mg ), po, daily, # 90 tab(s), 3 Refill(s), Type: Maintenance, [facility], 1 tab(s) po daily,x90 day(s)  atorvastatin 20 mg oral tablet: 1 tab(s) ( 20 mg ), po, daily, # 90 tab(s), 3 Refill(s), Type: Maintenance, Pharmacy: [facility], 1 tab(s) po daily,x90 day(s)  clopidogrel 75 mg oral tablet: 1 tab(s) ( 75 mg ), po, daily, # 90 tab(s), 3 Refill(s), Type: Maintenance, Pharmacy: [facility], 1 tab(s) po daily,x90 day(s)  finasteride 5 mg oral tablet: 1 tab(s) ( 5 mg ), po, daily, # 90 tab(s), 3 Refill(s), Type: Maintenance, Pharmacy: [facility], 1 tab(s) po daily,x90 day(s)  gabapentin 300 mg oral capsule: 1 cap(s) ( 300 mg ), po, daily, # 90 cap(s), 3 Refill(s), Type: Maintenance, Pharmacy: [facility], 1 cap(s) po daily,x90 day(s)  pantoprazole 40 mg  oral delayed release tablet: 1 tab(s) ( 40 mg ), po, daily, # 90 tab(s), 3 Refill(s), Type: Maintenance, Pharmacy: [facility], 1 tab(s) po daily,x90 day(s)  triamcinolone 0.1% topical ointment: 1 ree, TOP, TID, # 240 gm, 1 Refill(s), Type: Maintenance, Pharmacy: [facility] [id number], 1 ree top tid  valsartan 320 mg oral tablet: 1 tab(s) ( 320 mg ), po, daily, # 90 tab(s), 3 Refill(s), Type: Maintenance, Pharmacy: [facility], 1 tab(s) po daily,x90 day(s)  zolpidem 10 mg oral tablet: 1 tab(s), PO, Once a day (at bedtime), PRN: for sleep, # 30 tab(s), 5 Refill(s), Type: Maintenance, Pharmacy: [facility], 1 tab(s) po hs,PRN:for sleep  Documented Medications  Documented  Zyrtec 10 mg oral tablet: 1 tab(s) ( 10 mg ), PO, Daily, 0   Problem list:    All Problems (Selected)  Eczematous dermatitis / SNOMED CT 60241812 / Confirmed  DJD of shoulder / SNOMED CT 213658679 / Confirmed  Hypertension / SNOMED CT 2855864669 / Confirmed  Asthma / SNOMED CT 776123465 / Confirmed  Insomnia / SNOMED CT 229091628 / Confirmed  CAD (coronary artery disease) / SNOMED CT 2306768331 / Confirmed  Urinary retention / SNOMED CT 919446813 / Confirmed  Nasal polyps / SNOMED CT 60269377 / Confirmed  Allergic rhinitis / SNOMED CT 614838637 / Confirmed  Vocal cord dysfunction / SNOMED CT 632959773 / Confirmed  Bronchiectasis / SNOMED CT 68167067 / Confirmed  Cancer of skin / SNOMED CT 1141416812 / Confirmed  Otitis media / SNOMED CT 876598819 / Confirmed  Otitis externa / SNOMED CT 6814685 / Confirmed  Obese / SNOMED CT 7834055701 / Probable  AK (actinic keratosis) / SNOMED CT 2686226345 / Confirmed  HLD (hyperlipidemia) / SNOMED CT 52850647 / Confirmed  Obstructive sleep apnea syndrome in adult / SNOMED CT 9489160118 / Confirmed  Tubulovillous adenoma of colon / SNOMED CT 565603062 / Confirmed  Hip arthritis / SNOMED CT 634180647 / Confirmed      Histories   Past Medical History:     Active  Tubulovillous adenoma of colon (850284384): Onset on 2012 at 61 years.  AK (actinic keratosis) (1564943960): Onset on 2011 at 60 years.  CAD (coronary artery disease) (0558242467): Onset in the month of 2009 at 58 years  Comments:  2011 CDT 2:57 PM CDT - Mark Chris MD  dec 2009 angiogram with 50% blockage,, treated with meds  Eczematous dermatitis (13896497)  DJD of shoulder (010781960)  Hypertension (7428260226)  Asthma (419912258)  Insomnia (567836751)  Urinary retention (223128211)  Comments:  2010 CST 6:12 PM CST - Berna Huynh CMA  Post Prostatic Surgery  Nasal polyps (93304567)  Allergic rhinitis (990288287)  Vocal cord dysfunction (620260957)  Bronchiectasis (93671181)  Cancer of skin (6384645899)  Otitis media (174629901)  Otitis externa (0213486)  Obese (0789864427)  HLD (hyperlipidemia) (83467939)  Obstructive sleep apnea syndrome in adult (5867763508)  Hip arthritis (093295525)  Resolved  *[facility]ospital[facility] - Chest pain, unspecified: Onset on 10/11/2011 at 60 years.  Resolved.   Family History:    CHF - Congestive heart failure  Father (Phuc, )  Alzheimer's disease  Father (Phuc, )     Procedure history:    Colonoscopy (SNOMED CT 212636394) performed by Herson Ocampo MD on 2016 at 65 Years.  Comments:  2016 11:48 AM - Lizbeth Daniels RN  Tubular adenoma x 3 no high grade dysplasia    2016 9:11 AM - Kate Valdez  Indication: History of adenomatous polyps on last colonoscopy.  Sedation:  MAC.  Recommendation: Repeat in 5 years.  TURP - Transurethral resection of prostate (SNOMED CT 439814686) performed by Vinod Lipscomb MD on 10/3/2011 at 60 Years.  Colonoscopy (SNOMED CT 967065405) performed by Herson Ocampo MD on 2010 at 59 Years.  Comments:  2010 3:33 PM - Karlene Sylvester  4 mm polyp noted at 90 cm in the ascending colon; appearance adenomatous, removed   Recommend colonoscopic follow up in 5 years.    Conscious sedation/ MAC if his medical conditions dictate ASA III for level of sedation for future procedures  Coronary angiogram (SNOMED CT 98224741) in 2009 at 59 Years.  [facility] - Prostate in the month of 3/2009 at 58 Years.  BL nasal Turbinoplasty in the month of 1/2008 at 57 Years.  Prostate TUNA surgery in the month of 2/2007 at 56 Years.  Right shoulder arthroscopy on 10/16/2006 at 55 Years.  Flexible sigmoidoscopy (SNOMED CT 35331383) on 7/8/2005 at 54 Years.  left shoulder arthroscopy in the month of 7/2005 at 54 Years.  excisional cervical schwannoma on 11/30/2001 at 50 Years.  Cholecystectomy (SNOMED CT 52208080) in the month of 2/1982 at 31 Years.  Vasectomy (SNOMED CT 02442768).   Social History:        Alcohol Assessment            Current, Beer (12 oz), 3-4 times per year                     Comments:                      09/26/2011 - Alisha George                     1/month.      Tobacco Assessment            Past      Substance Abuse Assessment            Never      Employment and Education Assessment            Retired                     Comments:                      09/26/2011 - Alisha George                     2005.      Home and Environment Assessment            Marital status: .  1 children.                     Comments:                      09/26/2011 - Alisha George                     1970. Wife - Pauline.      Exercise and Physical Activity Assessment: Regular exercise            Exercise frequency: 3-4 times/week.  Exercise type: Weight lifting, Cardio.                     Comments:                      09/26/2011 Alisha Pacheco                     2-3 times per week.      Sexual Assessment            Sexually active: Yes.  Sexual orientation: Heterosexual.        Physical Examination   Vital Signs   4/21/2017 12:18 PM CDT Temperature Tympanic 97.4 DegF  LOW    Peripheral Pulse Rate 74 bpm    HR Method Electronic    Systolic Blood Pressure 110 mmHg    Diastolic  Blood Pressure 78 mmHg    Mean Arterial Pressure 89 mmHg    BP Site Right arm    BP Method Manual    Oxygen Saturation 98 %      Measurements from flowsheet : Measurements   4/21/2017 12:18 PM CDT Height Measured - Standard 68 in    Weight Measured - Standard 197 lb    BSA 2.07 m2    Body Mass Index 29.95 kg/m2      General:  Alert and oriented, No acute distress.    Neck:  Supple, Non-tender.    Respiratory:  Lungs are clear to auscultation, Respirations are non-labored.    Cardiovascular:  Normal rate, Regular rhythm.    Gastrointestinal:  Non-distended, Normal bowel sounds, No organomegaly.         Abdomen: Left, Lower quadrant, Guarding, Tenderness.    Genitourinary:  No costovertebral angle tenderness.    Neurologic:  Alert, Oriented.       Impression and Plan   Diagnosis     Diverticulitis (QUE29-FJ K57.92).     Course:  Not progressing as expected.    Plan:  see meds  fu 1 week  sooner if worse.    Patient Instructions:       Counseled: Patient, Regarding diagnosis, Regarding treatment, Regarding medications, Diet.

## 2022-02-16 NOTE — TELEPHONE ENCOUNTER
---------------------  From: Jannet Avendaño ([facility] Pool ([address] [place] [address]))   To: [facility] Message Pool ([address] [place] [address]);     Sent: 8/21/2020 9:43:05 AM CDT  Subject: General Message       Received fax from [facility] requesting a refill of Escitalopram 10mg tab. Only documented in chart. Last visit 6/29/20 A. fib. RTC 6/29/20; Cholesterol screen in 6 mo. Please advise.Received another fax stating that pt is all out. Please advise on refill.---------------------  From: Jannet Avendaño ([facility] Message Pool ([facility]))   To: [facility] Practice Provider Pool ([address] [place] [address]);     Sent: 8/21/2020 12:45:35 PM CDT  Subject: FW: General Message---------------------  From: Giana Rodriguez ([facility] Practice Provider Pool ([address] [place] [address]))   To: [facility] Message Pool ([address] [place] [address]);     Sent: 8/21/2020 4:09:23 PM CDT  Subject: RE: General Message     please call the pharmacy and ask who's name is on the prescription because it doesn't look like it has ever been prescribed here and he would need a visit to have it prescribed from here

## 2022-02-16 NOTE — TELEPHONE ENCOUNTER
---------------------  From: Henry DOWELLApoorva   Sent: 3/31/2020 11:13:10 AM CDT  Subject: ACT f/u     Called pt at 1107, pt was not home, wife answered. I let her know we are checking up on our asthma pts and if pt has time, to have pt go onto our website and look at ACT form and call us with his answers. She will relay the message to him.     Last visit: 2/3/2020 asthma flare w/DWG (ACT not completed)  Last ACT = 24 done on 4/4/19Spoke to pt at 1235. Answers are below:    1) In the past 4 weeks, how much of the time did your asthma keep you from getting as much done at work, school or at home?   1 = All of the time   2 = Most of the time   3 = Some of the time   4 = A little of the time    5 = None of the time  2)  During the past 4 weeks, how often have you had shortness of breath?   1 = More than once a day   2 = Once a day   3 = 3 to 6 times a week   4 = Once or twice a week   5 = Not at all  3) During the past 4 weeks, how often did your asthma symptoms (wheezing, coughing, shortness of breath, chest tightness or pain) wake you up at night or earlier than usual in the morning?   1 = 4 or more nights a week   2 = 2 or 3 nights a week   3 = Once a week   4 = Once or twice   5 = Not at all  4) During the past 4 weeks, how often have you used your rescue inhaler or nebulizer medication (such as albuterol)?   1 = 3 or more times per day   2 = 1 or 2 times per day   3 = 2 or 3 times per week   4 = Once a week or less   5 = Not at all  5) How would you rate your asthma control during the past 4 weeks?   1 = Not controlled at all   2 = Poorly controlled   3 = Somewhat controlled   4 = Well controlled   5 = Completely controlled    If your score is 19 or less, your asthma may not be under control. Be sure to talk with your doctor about your results. The answers below should not be added to your total score. These answers should be discussed with your doctor.    In the past 12 months, how many emergency department  visits have you had due to asthma (that did not result in a hospitalization)? none__   In the past 12 months, how many inpatient hospitalizations have you had due to asthma?  _none____

## 2022-02-16 NOTE — NURSING NOTE
Medicare Visit Entered On:  2020 10:59 AM CDT    Performed On:  2020 10:57 AM CDT by Nicole Umanzor CMA               Summary   Chief Complaint :   AWV   Advance Directive :   Yes   Weight Measured :   205.8 lb(Converted to: 205 lb 13 oz, 93.35 kg)    Height Measured :   68 in(Converted to: 5 ft 8 in, 172.72 cm)    Body Mass Index :   31.29 kg/m2 (HI)    Body Surface Area :   2.11 m2   Systolic Blood Pressure :   128 mmHg   Diastolic Blood Pressure :   62 mmHg   Mean Arterial Pressure :   84 mmHg   Peripheral Pulse Rate :   72 bpm   Race :      Languages :   English   Ethnicity :   Not  or    Nicole Umanzor CMA 2020 10:57 AM CDT   Health Status   Allergies Verified? :   Yes   Medication History Verified? :   Yes   Immunizations Current :   Yes   Pre-Visit Planning Status :   Completed   Tobacco Use? :   Former smoker   Nicole Umanzor CMA 2020 10:57 AM CDT   Demographics   Last Name :   [place]   Address :   [address]   First Name :   Surendra Zavala Initial :   ASH   Responsible Party Date of Birth () :   1950 CST   City :   [place]   State :   [place]   Zip Code :   [address]   Nicole Umanzor CMA 2020 10:57 AM CDT   Providers Grid   Provider Name :    Dr. Jose Lara     Provider Specialty :    Urologist   Dermatologist   Ophthalmologist   Dentist       Nicole Umanzor CMA - 2020 10:57 AM CDT  Nicole Umanzor CMA 2020 10:57 AM CDT  Nicole Umanzor CMA 2020 10:57 AM CDT  Nicole Umanzor CMA 2020 10:57 AM CDT      Provider Name :    Dr. Suzette Vila              Provider Specialty :    eye                Nicole Umanzor CMA 2020 10:57 AM CDT         Ancillary Services Grid   Name :    [facility]              Type of Service :    Other: dermatology                Nicole Umanzor CMA 2020 10:57 AM CDT         Consents    Consent for Immunization Exchange :   Consent Granted   Consent for Immunizations to Providers :   Consent Granted   Nicole Umanzor CMA - 7/27/2020 10:57 AM CDT   Problems   (As Of: 7/27/2020 11:02:13 AM CDT)   Problems(Active)    AK (actinic keratosis) (SNOMED CT  :4527902635 )  Name of Problem:   AK (actinic keratosis) ; Onset Date:   8/16/2011 ; Recorder:   Mark Chris MD; Confirmation:   Confirmed ; Classification:   Medical ; Code:   [id number] ; Contributor System:   PowerChart ; Last Updated:   3/3/2014 9:47 AM CST ; Life Cycle Status:   Active ; Responsible Provider:   Mark Chris MD; Vocabulary:   SNOMED CT        Allergic rhinitis (SNOMED CT  :577834629 )  Name of Problem:   Allergic rhinitis ; Recorder:   Monika Dia CMA; Confirmation:   Confirmed ; Classification:   Medical ; Code:   [id number] ; Contributor System:   PowerChart ; Last Updated:   3/3/2014 9:45 AM CST ; Life Cycle Date:   3/17/2010 ; Life Cycle Status:   Active ; Vocabulary:   SNOMED CT        Asthma (SNOMED CT  :410050934 )  Name of Problem:   Asthma ; Recorder:   Berna Huynh CMA; Confirmation:   Confirmed ; Classification:   Medical ; Code:   793255616 ; Contributor System:   PowerChart ; Last Updated:   3/3/2014 4:02 PM CST ; Life Cycle Date:   1/25/2010 ; Life Cycle Status:   Active ; Vocabulary:   SNOMED CT        Benign prostatic hyperplasia (SNOMED CT  :418748482 )  Name of Problem:   Benign prostatic hyperplasia ; Recorder:   Alisha George; Confirmation:   Confirmed ; Classification:   Medical ; Code:   [id number] ; Contributor System:   PowerChart ; Last Updated:   4/30/2018 7:18 AM CDT ; Life Cycle Date:   4/30/2018 ; Life Cycle Status:   Active ; Vocabulary:   SNOMED CT        Bronchiectasis (SNOMED CT  :84403950 )  Name of Problem:   Bronchiectasis ; Recorder:   Mark Chris MD; Confirmation:   Confirmed ; Classification:   Medical ; Code:   49058231 ; Contributor System:   PowerChart ; Last Updated:    3/3/2014 9:45 AM CST ; Life Cycle Date:   4/22/2010 ; Life Cycle Status:   Active ; Responsible Provider:   Mark Chris MD; Vocabulary:   SNOMED CT        CAD (coronary artery disease) (SNOMED CT  :5207651331 )  Name of Problem:   CAD (coronary artery disease) ; Onset Date:   12/2009 ; Recorder:   Berna Huynh CMA; Confirmation:   Confirmed ; Classification:   Medical ; Code:   2754282152 ; Contributor System:   PowerChart ; Last Updated:   3/3/2014 4:02 PM CST ; Life Cycle Date:   1/25/2010 ; Life Cycle Status:   Active ; Vocabulary:   SNOMED CT   ; Comments:        9/28/2011 2:57 PM - Mark Chris MD  dec 2009 angiogram with 50% blockage,, treated with meds      Diverticulitis (SNOMED CT  :210930907 )  Name of Problem:   Diverticulitis ; Recorder:   Mark Chris MD; Confirmation:   Confirmed ; Classification:   Medical ; Code:   753438149 ; Contributor System:   PowerChart ; Last Updated:   4/27/2017 1:38 PM CDT ; Life Cycle Status:   Active ; Responsible Provider:   Mark Chris MD; Vocabulary:   SNOMED CT        DJD of shoulder (SNOMED CT  :933442974 )  Name of Problem:   DJD of shoulder ; Recorder:   Berna Huynh CMA; Confirmation:   Confirmed ; Classification:   Medical ; Code:   336222249 ; Contributor System:   PowerChart ; Last Updated:   3/3/2014 4:02 PM CST ; Life Cycle Date:   1/25/2010 ; Life Cycle Status:   Active ; Vocabulary:   SNOMED CT        Eczematous dermatitis (SNOMED CT  :79104372 )  Name of Problem:   Eczematous dermatitis ; Recorder:   Berna Huynh CMA; Confirmation:   Confirmed ; Classification:   Medical ; Code:   65892619 ; Contributor System:   PowerChart ; Last Updated:   3/3/2014 9:44 AM CST ; Life Cycle Date:   1/25/2010 ; Life Cycle Status:   Active ; Vocabulary:   SNOMED CT        GERD (gastroesophageal reflux disease) (SNOMED CT  :406401103 )  Name of Problem:   GERD (gastroesophageal reflux disease) ; Recorder:   Alisha George; Confirmation:   Confirmed ;  Classification:   Medical ; Code:   683831042 ; Contributor System:   PowerChart ; Last Updated:   4/30/2018 7:17 AM CDT ; Life Cycle Date:   4/30/2018 ; Life Cycle Status:   Active ; Vocabulary:   SNOMED CT        Hip arthritis (SNOMED CT  :617504508 )  Name of Problem:   Hip arthritis ; Recorder:   Mark Chris MD; Confirmation:   Confirmed ; Classification:   Medical ; Code:   462052806 ; Contributor System:   PowerChart ; Last Updated:   3/3/2014 9:46 AM CST ; Life Cycle Status:   Active ; Responsible Provider:   Mark Chris MD; Vocabulary:   SNOMED CT        HLD (hyperlipidemia) (SNOMED CT  :49674994 )  Name of Problem:   HLD (hyperlipidemia) ; Recorder:   Alisha George; Confirmation:   Confirmed ; Classification:   Medical ; Code:   69703645 ; Contributor System:   PowerChart ; Last Updated:   3/3/2014 9:46 AM CST ; Life Cycle Date:   9/26/2011 ; Life Cycle Status:   Active ; Vocabulary:   SNOMED CT        HTN, goal below 140/90 (SNOMED CT  :1482400101 )  Name of Problem:   HTN, goal below 140/90 ; Recorder:   Nicole Umanzor CMA; Confirmation:   Confirmed ; Classification:   Medical ; Code:   4738780101 ; Contributor System:   PowerChart ; Last Updated:   5/1/2018 1:39 PM CDT ; Life Cycle Date:   5/1/2018 ; Life Cycle Status:   Active ; Vocabulary:   SNOMED CT        Hx of malignant skin melanoma (SNOMED CT  :2288307813 )  Name of Problem:   Hx of malignant skin melanoma ; Recorder:   Zulay Cassidy; Confirmation:   Confirmed ; Classification:   Medical ; Code:   7244841745 ; Contributor System:   PowerChart ; Last Updated:   3/27/2018 5:13 PM CDT ; Life Cycle Date:   3/27/2018 ; Life Cycle Status:   Active ; Vocabulary:   SNOMED CT        Insomnia (SNOMED CT  :219991148 )  Name of Problem:   Insomnia ; Recorder:   Berna Huynh CMA; Confirmation:   Confirmed ; Classification:   Medical ; Code:   711128715 ; Contributor System:   PowerChart ; Last Updated:   3/3/2014 4:02 PM CST ; Life Cycle Date:    1/25/2010 ; Life Cycle Status:   Active ; Vocabulary:   SNOMED CT        Nasal polyps (SNOMED CT  :44895440 )  Name of Problem:   Nasal polyps ; Recorder:   Berna Huynh CMA; Confirmation:   Confirmed ; Classification:   Medical ; Code:   88169860 ; Contributor System:   PowerChart ; Last Updated:   3/3/2014 9:45 AM CST ; Life Cycle Date:   1/25/2010 ; Life Cycle Status:   Active ; Vocabulary:   SNOMED CT        Obese (SNOMED CT  :3315181577 )  Name of Problem:   Obese ; Recorder:   SYSTEM; Confirmation:   Probable ; Classification:   Medical ; Code:   1783078580 ; Contributor System:   PowerChart ; Last Updated:   3/3/2014 9:46 AM CST ; Life Cycle Date:   2/7/2011 ; Life Cycle Status:   Active ; Vocabulary:   SNOMED CT        Obstructive sleep apnea syndrome in adult (SNOMED CT  :5124073837 )  Name of Problem:   Obstructive sleep apnea syndrome in adult ; Recorder:   Alisha George; Confirmation:   Confirmed ; Classification:   Medical ; Code:   0710625080 ; Contributor System:   PowerChart ; Last Updated:   3/3/2014 9:46 AM CST ; Life Cycle Date:   9/26/2011 ; Life Cycle Status:   Active ; Vocabulary:   SNOMED CT        Prosthetic hip infection (SNOMED CT  :270989637 )  Name of Problem:   Prosthetic hip infection ; Onset Date:   4/22/2018 ; Recorder:   Alisha George; Confirmation:   Confirmed ; Classification:   Medical ; Code:   259758594 ; Contributor System:   PowerChart ; Last Updated:   4/30/2018 7:20 AM CDT ; Life Cycle Date:   4/30/2018 ; Life Cycle Status:   Active ; Vocabulary:   SNOMED CT   ; Comments:        4/30/2018 7:20 AM - Alisha George  Right hip      Tubulovillous adenoma of colon (SNOMED CT  :058181921 )  Name of Problem:   Tubulovillous adenoma of colon ; Onset Date:   9/20/2012 ; Recorder:   Claudia Romero CMA; Confirmation:   Confirmed ; Classification:   Medical ; Code:   011650776 ; Contributor System:   PowerChart ; Last Updated:   3/3/2014 9:47 AM CST ; Life Cycle Date:   1/3/2013 ;  Life Cycle Status:   Active ; Vocabulary:   SNOMED CT        Urinary retention (SNOMED CT  :265455598 )  Name of Problem:   Urinary retention ; Recorder:   Berna Huynh CMA; Confirmation:   Confirmed ; Classification:   Medical ; Code:   687030533 ; Contributor System:   Xfire ; Last Updated:   3/3/2014 9:44 AM CST ; Life Cycle Date:   1/25/2010 ; Life Cycle Status:   Active ; Vocabulary:   SNOMED CT   ; Comments:        1/25/2010 6:12 PM - Berna Huynh CMA  Post Prostatic Surgery      Vocal cord dysfunction (SNOMED CT  :959900748 )  Name of Problem:   Vocal cord dysfunction ; Recorder:   Monika Dia CMA; Confirmation:   Confirmed ; Classification:   Medical ; Code:   066573659 ; Contributor System:   Xfire ; Last Updated:   3/3/2014 9:45 AM CST ; Life Cycle Date:   3/17/2010 ; Life Cycle Status:   Active ; Vocabulary:   SNOMED CT          Procedures / Surgeries        -    Procedure History   (As Of: 7/27/2020 11:02:13 AM CDT)     Procedure Dt/Tm:   07/2005 ; Anesthesia Minutes:   0 ; Procedure Name:   left shoulder arthroscopy ; Procedure Minutes:   0 ; Last Reviewed Dt/Tm:   7/27/2020 11:02:03 AM CDT            Procedure Dt/Tm:   02/2007 ; Anesthesia Minutes:   0 ; Procedure Name:   Prostate TUNA surgery ; Procedure Minutes:   0 ; Last Reviewed Dt/Tm:   7/27/2020 11:02:03 AM CDT            Procedure Dt/Tm:   03/2009 ; Anesthesia Minutes:   0 ; Procedure Name:   Green Light Laser - Prostate ; Procedure Minutes:   0 ; Last Reviewed Dt/Tm:   7/27/2020 11:02:03 AM CDT            Anesthesia Minutes:   0 ; Procedure Name:   Vasectomy ; Procedure Minutes:   0 ; Last Reviewed Dt/Tm:   7/27/2020 11:02:03 AM CDT            Procedure Dt/Tm:   01/2008 ; Anesthesia Minutes:   0 ; Procedure Name:   BL nasal Turbinoplasty ; Procedure Minutes:   0 ; Last Reviewed Dt/Tm:   7/27/2020 11:02:03 AM CDT            Procedure Dt/Tm:   11/30/2001 ; Anesthesia Minutes:   0 ; Procedure Name:   excisional cervical schwannoma ;  Procedure Minutes:   0 ; Last Reviewed Dt/Tm:   7/27/2020 11:02:03 AM CDT            Procedure Dt/Tm:   2009 ; Anesthesia Minutes:   0 ; Procedure Name:   Coronary angiogram ; Procedure Minutes:   0 ; Last Reviewed Dt/Tm:   7/27/2020 11:02:03 AM CDT            Procedure Dt/Tm:   7/8/2005 12:00:00 AM CDT ; Anesthesia Minutes:   0 ; Procedure Name:   Flexible sigmoidoscopy ; Procedure Minutes:   0 ; Clinical Service:   Non-Specified ; Last Reviewed Dt/Tm:   7/27/2020 11:02:03 AM CDT            Procedure Dt/Tm:   9/20/2010 ; Provider:   Herson Ocampo MD; Anesthesia Minutes:   0 ; Procedure Name:   Colonoscopy ; Procedure Minutes:   0 ; Comments:     12/20/2010 3:33 PM CST - Karlene Sylvester  4 mm polyp noted at 90 cm in the ascending colon; appearance adenomatous, removed   Recommend colonoscopic follow up in 5 years.   Conscious sedation/ MAC if his medical conditions dictate ASA III for level of sedation for future procedures ; Last Reviewed Dt/Tm:   7/27/2020 11:02:03 AM CDT            Procedure Dt/Tm:   02/1982 ; Anesthesia Minutes:   0 ; Procedure Name:   Cholecystectomy ; Procedure Minutes:   0 ; Last Reviewed Dt/Tm:   7/27/2020 11:02:03 AM CDT            Procedure Dt/Tm:   10/16/2006 ; Anesthesia Minutes:   0 ; Procedure Name:   Right shoulder arthroscopy ; Procedure Minutes:   0 ; Last Reviewed Dt/Tm:   7/27/2020 11:02:03 AM CDT            Procedure Dt/Tm:   10/3/2011 ; Anesthesia Minutes:   0 ; Procedure Name:   TURP - Transurethral resection of prostate ; Procedure Minutes:   0 ; Last Reviewed Dt/Tm:   7/27/2020 11:02:03 AM CDT            Procedure Dt/Tm:   4/12/2016 ; Anesthesia Minutes:   0 ; Procedure Name:   Colonoscopy ; Procedure Minutes:   0 ; Comments:     4/29/2016 11:48 AM CDT - Lizbeth Daniels RN  Tubular adenoma x 3 no high grade dysplasia  4/21/2016 9:11 AM CDT - Kate Valdez  Indication: History of adenomatous polyps on last colonoscopy.  Sedation:  MAC.  Recommendation: Repeat in 5 years.  ; Last Reviewed Dt/Tm:   7/27/2020 11:02:03 AM CDT            Procedure Dt/Tm:   4/16/2018 ; Anesthesia Minutes:   0 ; Procedure Name:   THR - Total hip replacement ; Procedure Minutes:   0 ; Comments:     4/30/2018 7:21 AM CDT - Alisha George  Right hip ; Last Reviewed Dt/Tm:   7/27/2020 11:02:03 AM CDT            Procedure Dt/Tm:   4/24/2018 ; Anesthesia Minutes:   0 ; Procedure Name:   Revision of total hip replacement ; Procedure Minutes:   0 ; Comments:     4/30/2018 7:26 AM CDT - Alisha George  Right hip revision total hip arthroplasty and extensive irrigation and debridement of the right thigh including muscle, fascia, bone and exchange of the femoral head and polyethylene acetabular liner. ; Last Reviewed Dt/Tm:   7/27/2020 11:02:03 AM CDT            Meds / Allergies   (As Of: 7/27/2020 10:59:52 AM CDT)   Allergies (Active)   aspirin  Estimated Onset Date:   Unspecified ; Reactions:   Nasal Polyps ; Created By:   Vinod Santana MD; Reaction Status:   Active ; Category:   Drug ; Substance:   aspirin ; Type:   Allergy ; Updated By:   Vinod Santana MD; Reviewed Date:   7/27/2020 10:57 AM CDT      doxycycline  Estimated Onset Date:   Unspecified ; Reactions:   sun rash ; Created By:   Berna Huynh CMA; Reaction Status:   Active ; Category:   Drug ; Substance:   doxycycline ; Type:   Allergy ; Updated By:   Berna Huynh CMA; Source:   Paper Chart ; Reviewed Date:   7/27/2020 10:57 AM CDT      Shailesh  Estimated Onset Date:   Unspecified ; Reactions:   Hives ; Created By:   Bree Babin CMA; Reaction Status:   Active ; Category:   Drug ; Substance:   Shailesh ; Type:   Allergy ; Updated By:   Bree Babin CMA; Reviewed Date:   7/27/2020 10:57 AM CDT      zymetrin shampo  Estimated Onset Date:   Unspecified ; Reactions:   Rash ; Created By:   Bree Babin CMA; Reaction Status:   Active ; Category:   Drug ; Substance:   zymetrin shampo ; Type:   Allergy ; Updated By:   Bree Babin CMA; Reviewed  Date:   7/27/2020 10:57 AM CDT        Medication List   (As Of: 7/27/2020 10:59:52 AM CDT)   Prescription/Discharge Order    albuterol  :   albuterol ; Status:   Prescribed ; Ordered As Mnemonic:   Ventolin HFA 90 mcg/inh inhalation aerosol ; Simple Display Line:   2 puff(s), inh, q4 hrs, PRN: for cough, 1 EA, 2 Refill(s) ; Ordering Provider:   Mark Chris MD; Catalog Code:   albuterol ; Order Dt/Tm:   6/29/2020 8:30:04 AM CDT          amLODIPine  :   amLODIPine ; Status:   Prescribed ; Ordered As Mnemonic:   amLODIPine 10 mg oral tablet ; Simple Display Line:   1 tab(s), Oral, daily, 90 tab(s), 0 Refill(s) ; Ordering Provider:   Mark Chris MD; Catalog Code:   amLODIPine ; Order Dt/Tm:   6/29/2020 8:30:22 AM CDT          atorvastatin  :   atorvastatin ; Status:   Prescribed ; Ordered As Mnemonic:   atorvastatin 20 mg oral tablet ; Simple Display Line:   1 tab(s), Oral, daily, 90 tab(s), 0 Refill(s) ; Ordering Provider:   Mark Chris MD; Catalog Code:   atorvastatin ; Order Dt/Tm:   6/29/2020 8:30:44 AM CDT          clopidogrel  :   clopidogrel ; Status:   Prescribed ; Ordered As Mnemonic:   clopidogrel 75 mg oral tablet ; Simple Display Line:   1 tab(s), Oral, daily, 90 tab(s), 0 Refill(s) ; Ordering Provider:   Mark Chris MD; Catalog Code:   clopidogrel ; Order Dt/Tm:   6/29/2020 8:31:24 AM CDT          finasteride  :   finasteride ; Status:   Prescribed ; Ordered As Mnemonic:   finasteride 5 mg oral tablet ; Simple Display Line:   1 tab(s), Oral, daily, 90 tab(s), 0 Refill(s) ; Ordering Provider:   Mark Chris MD; Catalog Code:   finasteride ; Order Dt/Tm:   6/29/2020 8:31:54 AM CDT          fluticasone-salmeterol  :   fluticasone-salmeterol ; Status:   Prescribed ; Ordered As Mnemonic:   fluticasone-salmeterol 250 mcg-50 mcg inhalation powder ; Simple Display Line:   1 puff(s), NEB, bid, 60 EA, 2 Refill(s) ; Ordering Provider:   Mark Chris MD; Catalog Code:    fluticasone-salmeterol ; Order Dt/Tm:   6/29/2020 8:32:15 AM CDT          gabapentin  :   gabapentin ; Status:   Prescribed ; Ordered As Mnemonic:   gabapentin 300 mg oral capsule ; Simple Display Line:   300 mg, 1 cap(s), Oral, daily, 90 cap(s), 0 Refill(s) ; Ordering Provider:   Mark Chris MD; Catalog Code:   gabapentin ; Order Dt/Tm:   6/29/2020 8:32:44 AM CDT          losartan  :   losartan ; Status:   Prescribed ; Ordered As Mnemonic:   losartan 100 mg oral tablet ; Simple Display Line:   100 mg, 1 tab(s), Oral, daily, 90 tab(s), 0 Refill(s) ; Ordering Provider:   Mark Chris MD; Catalog Code:   losartan ; Order Dt/Tm:   6/29/2020 8:33:15 AM CDT          metoprolol  :   metoprolol ; Status:   Prescribed ; Ordered As Mnemonic:   metoprolol succinate 25 mg oral tablet, extended release ; Simple Display Line:   25 mg, 1 tab(s), Oral, daily, 90 tab(s), 0 Refill(s) ; Ordering Provider:   Mark Chris MD; Catalog Code:   metoprolol ; Order Dt/Tm:   6/29/2020 8:33:45 AM CDT          nitroglycerin  :   nitroglycerin ; Status:   Prescribed ; Ordered As Mnemonic:   nitroglycerin 0.4 mg sublingual tablet ; Simple Display Line:   See Instructions, PLACE 1 TABLET UNDER TONGUE FOR CHEST PAIN TO A MAX OF 3 TABS-ONE EVERY 5 MINUTES - IF NO RELIEF CALL 911, 25 tab(s), 3 Refill(s) ; Ordering Provider:   Mark Chris MD; Catalog Code:   nitroglycerin ; Order Dt/Tm:   7/29/2019 7:47:42 PM CDT          olopatadine ophthalmic  :   olopatadine ophthalmic ; Status:   Prescribed ; Ordered As Mnemonic:   Patanol 0.1% ophthalmic solution ; Simple Display Line:   2 gtts, Both eyes, BID, 5 mL ; Ordering Provider:   Mark Chris MD; Catalog Code:   olopatadine ophthalmic ; Order Dt/Tm:   3/11/2015 2:28:49 PM CDT          pantoprazole  :   pantoprazole ; Status:   Prescribed ; Ordered As Mnemonic:   pantoprazole 40 mg oral delayed release tablet ; Simple Display Line:   1 tab(s), Oral, daily, 90 tab(s), 0  Refill(s) ; Ordering Provider:   Mark Chris MD; Catalog Code:   pantoprazole ; Order Dt/Tm:   6/29/2020 8:34:23 AM CDT          sildenafil  :   sildenafil ; Status:   Prescribed ; Ordered As Mnemonic:   sildenafil 100 mg oral tablet ; Simple Display Line:   100 mg, 1 tab(s), Oral, daily, 30 tab(s), 0 Refill(s) ; Ordering Provider:   Mark Chris MD; Catalog Code:   sildenafil ; Order Dt/Tm:   6/4/2020 10:26:42 AM CDT          silodosin  :   silodosin ; Status:   Prescribed ; Ordered As Mnemonic:   silodosin 8 mg oral capsule ; Simple Display Line:   1 cap(s), Oral, daily, 30 unknown unit, 0 Refill(s) ; Ordering Provider:   Mark Chris MD; Catalog Code:   silodosin ; Order Dt/Tm:   7/20/2020 2:15:05 PM CDT          triamcinolone topical  :   triamcinolone topical ; Status:   Prescribed ; Ordered As Mnemonic:   triamcinolone 0.1% topical ointment ; Simple Display Line:   1 ree, TOP, TID, 240 gm ; Ordering Provider:   Mark Chris MD; Catalog Code:   triamcinolone topical ; Order Dt/Tm:   3/11/2015 2:29:32 PM CDT          zolpidem  :   zolpidem ; Status:   Prescribed ; Ordered As Mnemonic:   zolpidem 10 mg oral tablet ; Simple Display Line:   1 tab(s), Oral, qhs, PRN: AS NEEDED FOR SLEEP, 30 tab(s), 2 Refill(s) ; Ordering Provider:   Mark Chris MD; Catalog Code:   zolpidem ; Order Dt/Tm:   6/29/2020 8:51:12 AM CDT            Home Meds    cetirizine  :   cetirizine ; Status:   Documented ; Ordered As Mnemonic:   Zyrtec 10 mg oral tablet ; Simple Display Line:   10 mg, 1 tab(s), PO, Daily ; Catalog Code:   cetirizine ; Order Dt/Tm:   1/25/2010 6:14:51 PM CST          cholecalciferol  :   cholecalciferol ; Status:   Documented ; Ordered As Mnemonic:   Vitamin D3 ; Simple Display Line:   0 Refill(s) ; Catalog Code:   cholecalciferol ; Order Dt/Tm:   11/1/2017 1:13:39 PM CDT            Family History   Family History   (As Of: 7/27/2020 11:02:13 AM CDT)   Son: Haris  Full Name:   Haris ;  Relation:   Son ; Gender:   Male ;  Negative History          Mother: Meron  Full Name:   Meron ; Relation:   Mother ; Gender:   Female ;  ; Age at Death:    88 Years ; Cause of Death:   CHF            Nomenclature:   Heart disease ; Value:   Positive            Nomenclature:   High blood pressure ; Value:   Positive          Father: Phuc  Full Name:   Phuc ; Relation:   Father ; Gender:   Male ;  ; Age at Death:    86 Years ; Cause of Death:   choking accident from dentures ; YOB: 1918 12:00:00 AM CST            Nomenclature:   CHF - Congestive heart failure ; Value:   Positive            Nomenclature:   Alzheimer's disease ; Value:   Positive            Nomenclature:   Arthritis ; Value:   Positive          Sister: Ema  Full Name:   Ema ; Relation:   Sister ; Gender:   Female ;  YOB: 1946 12:00:00 AM CST ; Negative History          Sister: Hilda  Full Name:   Hilda ; Relation:   Sister ; Gender:   Female ;  YOB: 1954 12:00:00 AM CST ; Negative History          Sister: Nithya  Full Name:   Nithya ; Relation:   Sister ; Gender:   Female ;  YOB: 1959 12:00:00 AM CST ; Negative History            Social History   Social History   (As Of: 2020 11:02:13 AM CDT)   Alcohol:        Current, Beer (12 oz), 3-4 times per year   Comments:  2011 7:44 PM - Alisha George: 1/month.   (Last Updated: 3/17/2015 10:41:06 AM CDT by Apoorva Figueroa CMA)          Tobacco:  Past      Cigarettes   Comments:  2019 3:10 PM - Charmaine Bernardo: 2-3 per day.  Quit 1980s.   (Last Updated: 2019 3:10:47 PM CDT by Charmaine Bernardo)          Substance Abuse:        Never   (Last Updated: 3/17/2015 10:41:13 AM CDT by Apoorva Figueroa CMA)          Employment/School:        Retired, Work/School description: farmer.   Comments:  2011 7:43 PM - Alisha George: 2005.   (Last Updated: 2019 3:11:38 PM CDT by Charmaine Bernardo)          Home/Environment:         Marital status: .  1 children.   Comments:  9/26/2011 7:42 PM - Alisha George: 1972. Wife - Pauline.   (Last Updated: 3/17/2015 10:41:37 AM CDT by Apoorva Figueroa CMA)          Exercise:  Regular exercise      Exercise frequency: 4-5 x per week..  Exercise type: Weight lifting, Cardio.   Comments:  9/26/2011 7:45 PM - Alisha George: 2-3 times per week.   (Last Updated: 4/2/2019 3:11:22 PM CDT by Charmaine Bernardo)          Sexual:        Sexually active: Yes.  Sexual orientation: Heterosexual.   (Last Updated: 3/17/2015 10:40:27 AM CDT by Apoorva Figueroa CMA)            Geriatric Depression Screening   Geriatric Depression Satisfied Life :   Yes   Geriatric Depression Dropped Activities :   No   Geriatric Depression Life Empty :   No   Geriatric Depression Bored :   No   Geriatric Depression Good Spirits :   Yes   Geriatric Depression Afraid Bad Things :   No   Geriatric Depression Feel Happy :   Yes   Geriatric Depression Feel Helpless :   No   Geriatric Depression Prefer to Stay Home :   No   Geriatric Depression Memory Problems :   No   Geriatric Depression Wonderful Be Alive :   Yes   Geriatric Depression Feel Worthless :   No   Geriatric Depression Situation Hopeless :   No   Geriatric Depression Others Better Off :   No   Geriatric Depression Full of Energy :   Yes   Geriatric Depression Total Score :   0    Nicole Umanzor CMA - 7/27/2020 11:00 AM CDT   Hearing and Vision Screening   Audiogram Result Right Ear :   Fail   Audiogram Result Left Ear :   Fail   Hearing Screen Comments :    Has hearing aids   Nicole Umanzor CMA - 7/27/2020 10:57 AM CDT   Home Safety Screen   Emergency Numbers Kept/Updated :   Yes   Aware of Smoking Dangers :   Yes   Smoke Alarms/Fire Extinguisher Available :   Yes   Household Members Fire Safety Knowledge :   Yes   Firearms Unloaded and Secure :   Yes   Floor Rugs Removed or Fastened :   No   Mats in Bathtub/Shower :   No   Stairway Rails or Banisters :   Yes    Outdoor Clutter Safety :   Yes   Indoor Clutter Safety :   Yes   Electrical Cord Safety :   Yes   Nicole Umanzor CMA - 7/27/2020 11:00 AM CDT   Advance Directives   Advance Directive :   Yes   Advanced Directives Status :   Current and verified   Advance Directive Type :   Medical durable power of    Medical Power of  Name :   Pauline Wallace  Haris Costacristino Wallace   Advance Directive Intent Stated By :   Self   Advance Directive Additional Information :   No   Nicole Umanzor CMA - 7/27/2020 10:57 AM CDT   Hart Fall Risk   History of Fall in Last 3 Months Hart :   No   Nicole Umanzor CMA - 7/27/2020 11:00 AM CDT   Functional Assessment   Focused Functional Assessment Grid   Bathing :   Independent (2)   Dressing :   Independent (2)   Toileting :   Independent (2)   Transferring Bed or Chair :   Independent (2)   Continence :   Independent (2)   Feeding :   Independent (2)   Nicole Umanzor CMA - 7/27/2020 11:00 AM CDT   ADL Index Score :   12    Capable of Shopping :   Yes   Capable of Walking :   Yes   Capable of Housekeeping :   Yes   Capable of Managing Medications :   Yes   Capable of Handling Finances :   Yes   Nicole Umanzor CMA - 7/27/2020 11:00 AM CDT

## 2022-03-02 VITALS
SYSTOLIC BLOOD PRESSURE: 118 MMHG | WEIGHT: 205 LBS | SYSTOLIC BLOOD PRESSURE: 117 MMHG | BODY MASS INDEX: 31.07 KG/M2 | HEART RATE: 70 BPM | DIASTOLIC BLOOD PRESSURE: 74 MMHG | TEMPERATURE: 96.8 F | RESPIRATION RATE: 18 BRPM | DIASTOLIC BLOOD PRESSURE: 77 MMHG | BODY MASS INDEX: 31.17 KG/M2 | WEIGHT: 205.7 LBS | TEMPERATURE: 97.3 F | OXYGEN SATURATION: 97 % | HEIGHT: 68 IN | HEIGHT: 68 IN | HEART RATE: 74 BPM

## 2022-03-02 NOTE — PROGRESS NOTES
Patient:   MELISSA ROSAS            MRN: [id number]            FIN: [id number]               Age:   71 years     Sex:  Male     :  1950   Associated Diagnoses:   BPV (benign positional vertigo)   Author:   Mark Chris MD      Chief Complaint   2022 11:43 AM CST   concerns with vertigo two days ago. wondering if it is ear or blood pressure related.        History of Present Illness             The patient presents with had episode of vertigo that last about 1 hour.  he had trouble walking.   He used his exercise ball to attempt to relax.   He describes his position as laying over ball much like Epley maneuvers .  It went away suddenly and has not returned.   .        Review of Systems   Constitutional:  No fever, No chills.    Respiratory:  No shortness of breath, No cough.    Cardiovascular:  No chest pain.    Gastrointestinal:  No nausea, No vomiting, No diarrhea.    Genitourinary:  No dysuria.    Hematology/Lymphatics:  No bleeding tendency.    Integumentary:  No rash.    Neurologic:  No headache.       Health Status   Allergies:    Allergic Reactions (Selected)  Severity Not Documented  Aspirin (Nasal polyps)  Doxycycline (Sun rash)  Shailesh (Hives)  Zymetrin shampo (Rash)   Medications:  (Selected)   Prescriptions  Prescribed  Edex 10 mcg injectable kit: ( 5 mcg ), Intralesional, 3x/wk, Instructions: start at 5mcg and may increase up to 20, # 1 packet(s), 4 Refill(s), Type: Maintenance, Pharmacy: [id number], 5 mcg Intralesional 3x/wk,Instr:start at 5mcg and may increase up to 20, 68, in...  Metoprolol Succinate ER 25 mg oral tablet, extended release: = 1 tab(s), Oral, daily, # 90 tab(s), 3 Refill(s), Type: Maintenance, Pharmacy: [id number], 1 tab(s) Oral daily, 68, in, 21 13:22:00 CDT, Height Measured, 204, lb, 21 13:22:00 CDT, Weight Measured  Patanol 0.1% ophthalmic solution: 2 gtts, Both eyes, BID, # 5 mL, 3 Refill(s), Type: Maintenance, Pharmacy:  [place] [address], 2 gtts Eye-Both bid, 68, in, 07/22/21 13:22:00 CDT, Height Measured, 204, lb, 07/22/21 13:22:00 CDT, Weight Measured  Ventolin HFA 90 mcg/inh inhalation aerosol: 2 puff(s), inh, q4 hrs, PRN: for cough, # 1 EA, 2 Refill(s), Type: Maintenance, Pharmacy: [facility] [address], 2 puff(s) Inhale q4 hrs,PRN:for cough, 68, in, 07/22/21 13:22:00 CDT, Height Measured, 204, lb, 07/22/21 13:22:00 CDT, Weight Measured...  Westcort 0.2% topical ointment: 1 ree, Topical, tid, Instructions: apply in a thin film to the affected skin and rub in gently and completely, # 45 gm, 0 Refill(s), Type: Acute, Pharmacy: [facility] [address], 1 ree Topical tid,Instr:apply in a thin film to the affected skin an...  amLODIPine 10 mg oral tablet: = 1 tab(s), Oral, daily, # 90 tab(s), 3 Refill(s), Type: Maintenance, Pharmacy: [facility] [address], 1 tab(s) Oral daily, 68, in, 07/22/21 13:22:00 CDT, Height Measured, 204, lb, 07/22/21 13:22:00 CDT, Weight Measured  amoxicillin 500 mg oral tablet: = 4 tab(s) ( 2,000 mg ), Oral, once, # 4 tab(s), 3 Refill(s), Type: Soft Stop, Pharmacy: [facility] [address], 4 tab(s) Oral once, 68, in, 12/10/20 11:50:00 CST, Height Measured, 206.6, lb, 12/10/20 11:50:00 CST, Weight Measured  atorvastatin 20 mg oral tablet: = 1 tab(s), Oral, daily, # 90 tab(s), 3 Refill(s), Type: Maintenance, Pharmacy: [facility] [address], 1 tab(s) Oral daily, 68, in, 07/22/21 13:22:00 CDT, Height Measured, 204, lb, 07/22/21 13:22:00 CDT, Weight Measured  clopidogrel 75 mg oral tablet: = 1 tab(s), Oral, daily, # 90 tab(s), 3 Refill(s), Type: Maintenance, Pharmacy: [facility], 1 tab(s) Oral daily, 68, in, 07/22/21 13:22:00 CDT, Height Measured, 204, lb, 07/22/21 13:22:00 CDT, Weight Measured  finasteride 5 mg oral tablet: = 1 tab(s), Oral, daily, # 90 tab(s), 3 Refill(s), Type: Maintenance, Pharmacy: [facility], 1 tab(s) Oral daily,x90 day(s), 68, in,  07/22/21 13:22:00 CDT, Height Measured, 204, lb, 07/22/21 13:22:00 CDT, Weight Measured  fluticasone-salmeterol 250 mcg-50 mcg inhalation powder: See Instructions, Instructions: INHALE ONE PUFF BY MOUTH TWICE A DAY, # 180 unknown unit, 3 Refill(s), Type: Maintenance, Pharmacy: [place] [facility] [address], due visit in Aug., INHALE ONE PUFF BY MOUTH TWICE A DAY, 68, in, 07/22/21 13:22:00 CDT, Heig...  gabapentin 300 mg oral capsule: = 1 cap(s), Oral, daily, # 90 unknown unit, 3 Refill(s), Type: Maintenance, Pharmacy: [place] [facility] [address], 1 cap(s) Oral daily, 68, in, 07/22/21 13:22:00 CDT, Height Measured, 204, lb, 07/22/21 13:22:00 CDT, Weight Measured  losartan 100 mg oral tablet: = 1 tab(s), Oral, daily, # 90 tab(s), 3 Refill(s), Type: Maintenance, Pharmacy: [place] [address], 1 tab(s) Oral daily, 68, in, 07/22/21 13:22:00 CDT, Height Measured, 204, lb, 07/22/21 13:22:00 CDT, Weight Measured  nitroglycerin 0.4 mg sublingual tablet: See Instructions, Instructions: PLACE 1 TABLET UNDER TONGUE FOR CHEST PAIN TO A MAX OF 3 TABS-ONE EVERY 5 MINUTES - IF NO RELIEF CALL 911, # 25 tab(s), 3 Refill(s), Type: Maintenance, Pharmacy: [facility] [facility] [address], PLACE 1 TABLET UNDER TONGUE FO...  pantoprazole 40 mg oral delayed release tablet: = 1 tab(s), Oral, daily, # 90 tab(s), 3 Refill(s), Type: Maintenance, Pharmacy: [place] [facility] [address], 1 tab(s) Oral daily,x90 day(s), 68, in, 07/22/21 13:22:00 CDT, Height Measured, 204, lb, 07/22/21 13:22:00 CDT, Weight Measured  sildenafil 100 mg oral tablet: = 1 tab(s) ( 100 mg ), Oral, daily, # 30 tab(s), 0 Refill(s), Type: Maintenance, Pharmacy: [facility], 1 tab(s) Oral daily, 68, in, 06/04/20 10:03:00 CDT, Height Measured, 201, lb, 06/04/20 10:03:00 CDT, Weight Measured  silodosin 8 mg oral capsule: = 1 cap(s), Oral, daily, # 90 cap(s), 3 Refill(s), Type: Maintenance, Pharmacy: [facility], 1 cap(s) Oral daily, 68, in, 07/22/21 13:22:00  CDT, Height Measured, 204, lb, 07/22/21 13:22:00 CDT, Weight Measured  triamcinolone 0.1% topical ointment: 1 ree, TOP, TID, # 240 gm, 1 Refill(s), Type: Maintenance, Pharmacy: [id number], 1 ree top tid  zolpidem 5 mg oral tablet: = 1 tab(s) ( 5 mg ), Oral, hs, PRN: for sleep, # 30 tab(s), 3 Refill(s), Type: Maintenance, Pharmacy: [facility], 1 tab(s) Oral hs,PRN:for sleep, 68, in, 07/22/21 13:22:00 CDT, Height Measured, 204, lb, 07/22/21 13:22:00 CDT, Weight Farrah...  Documented Medications  Documented  Vitamin D3: 0 Refill(s), Type: Maintenance  Zyrtec 10 mg oral tablet: 1 tab(s) ( 10 mg ), PO, Daily, 0,    Medications          *denotes recorded medication          Ventolin HFA 90 mcg/inh inhalation aerosol: 2 puff(s), inh, q4 hrs, PRN: for cough, 1 EA, 2 Refill(s).          Edex 10 mcg injectable kit: 5 mcg, Intralesional, 3x/wk, start at 5mcg and may increase up to 20, 1 packet(s), 4 Refill(s).          amLODIPine 10 mg oral tablet: 1 tab(s), Oral, daily, 90 tab(s), 3 Refill(s).          amoxicillin 500 mg oral tablet: 2,000 mg, 4 tab(s), Oral, once, 4 tab(s), 3 Refill(s).          atorvastatin 20 mg oral tablet: 1 tab(s), Oral, daily, 90 tab(s), 3 Refill(s).          *Zyrtec 10 mg oral tablet: 10 mg, 1 tab(s), PO, Daily.          *Vitamin D3: 0 Refill(s).          clopidogrel 75 mg oral tablet: 1 tab(s), Oral, daily, 90 tab(s), 3 Refill(s).          finasteride 5 mg oral tablet: 1 tab(s), Oral, daily, for 90 day(s), 90 tab(s), 3 Refill(s).          fluticasone-salmeterol 250 mcg-50 mcg inhalation powder: See Instructions, INHALE ONE PUFF BY MOUTH TWICE A DAY, 180 unknown unit, 3 Refill(s).          gabapentin 300 mg oral capsule: 1 cap(s), Oral, daily, 90 unknown unit, 3 Refill(s).          Westcort 0.2% topical ointment: 1 ree, Topical, tid, apply in a thin film to the affected skin and rub in gently and completely, 45 gm, 0 Refill(s).          losartan 100 mg oral tablet: 1  tab(s), Oral, daily, 90 tab(s), 3 Refill(s).          Metoprolol Succinate ER 25 mg oral tablet, extended release: 1 tab(s), Oral, daily, 90 tab(s), 3 Refill(s).          nitroglycerin 0.4 mg sublingual tablet: See Instructions, PLACE 1 TABLET UNDER TONGUE FOR CHEST PAIN TO A MAX OF 3 TABS-ONE EVERY 5 MINUTES - IF NO RELIEF CALL 911, 25 tab(s), 3 Refill(s).          Patanol 0.1% ophthalmic solution: 2 gtts, Both eyes, BID, 5 mL, 3 Refill(s).          pantoprazole 40 mg oral delayed release tablet: 1 tab(s), Oral, daily, for 90 day(s), 90 tab(s), 3 Refill(s).          sildenafil 100 mg oral tablet: 100 mg, 1 tab(s), Oral, daily, 30 tab(s), 0 Refill(s).          silodosin 8 mg oral capsule: 1 cap(s), Oral, daily, 90 cap(s), 3 Refill(s).          triamcinolone 0.1% topical ointment: 1 ree, TOP, TID, 240 gm.          zolpidem 5 mg oral tablet: 5 mg, 1 tab(s), Oral, hs, PRN: for sleep, 30 tab(s), 3 Refill(s).       Problem list:    All Problems (Selected)  Eczematous dermatitis / 14849842 / Confirmed  DJD of shoulder / 319206300 / Confirmed  Asthma / 778907656 / Confirmed  Insomnia / 185751224 / Confirmed  Urinary retention / 582690008 / Confirmed  Post Prostatic Surgery  Nasal polyps / 53375318 / Confirmed  Allergic rhinitis / 915841822 / Confirmed  Vocal cord dysfunction / 882578395 / Confirmed  Bronchiectasis / 04873330 / Confirmed  Obese / 3057745518 / Probable  HLD (hyperlipidemia) / 59812520 / Confirmed  Hip arthritis / 216793409 / Confirmed  Diverticulitis / 561491960 / Confirmed  Hx of malignant skin melanoma / 9210235819 / Confirmed  GERD (gastroesophageal reflux disease) / 976689073 / Confirmed  Benign prostatic hyperplasia / 062569509 / Confirmed  HTN, goal below 140/90 / 1049538185 / Confirmed  CAD (coronary artery disease) / 8341752905 / Confirmed  dec 2009 angiogram with 50% blockage,, treated with meds  AK (actinic keratosis) / 7080150503 / Confirmed  Tubulovillous adenoma of colon / 190321353 / Confirmed       Histories   Past Medical History:    Active  Tubulovillous adenoma of colon (573805604): Onset on 2012 at 61 years.  AK (actinic keratosis) (1110797601): Onset on 2011 at 60 years.  CAD (coronary artery disease) (0148266734): Onset in the month of 2009 at 58 years  Comments:  2011 CDT 2:57 PM CDT - Mark Chris MD  dec 2009 angiogram with 50% blockage,, treated with meds  Eczematous dermatitis (34684193)  DJD of shoulder (120072121)  Asthma (815648589)  Insomnia (997259105)  Urinary retention (822533763)  Comments:  2010 CST 6:12 PM CST - Berna Huynh CMA  Post Prostatic Surgery  Nasal polyps (90310434)  Allergic rhinitis (887364729)  Vocal cord dysfunction (028869611)  Bronchiectasis (07878008)  Obese (4392827162)  HLD (hyperlipidemia) (41317767)  Hip arthritis (248777660)  GERD (gastroesophageal reflux disease) (738211118)  Benign prostatic hyperplasia (798444163)  Resolved  Inpatient stay (127391560): Onset on 2018 at 67 years.  Resolved on 2018 at 67 years.  Comments:  2018 CDT 7:17 AM Alisha Renteria  @[place], [place] - 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  Cellulitis of the right thigh.  Prosthetic hip infection (977676142): Onset on 2018 at 67 years.  Resolved.  Comments:  2018 CDT 7:20 AM Alisha Renteria  Right hip  Inpatient stay (519299097): Onset on 10/11/2011 at 60 years.  Resolved.  Comments:  2018 CDT 7:16 AM Alisha Renteria  @[place], [place] - Chest pain, unspecified  Cancer of skin (9623729565):  Resolved.  Otitis media (705809184):  Resolved on 10/8/2010 at 59 years.  Otitis externa (1535402):  Resolved on 10/29/2010 at 59 years.  Obstructive sleep apnea syndrome in adult (1362269167):  Resolved.   Family History:    Asthma  Father (Phuc, )  Heart disease  Mother (Meron, )  Diabetes mellitus type 1  Mother (Meron, )  High blood pressure  Mother (Meron,  )  Arthritis  Father (Phuc, )  Mother (Meron, )  CHF - Congestive heart failure  Father (Phuc, )  Migraine  Mother (Meron, )  Alzheimer's disease  Father (Phuc, )  High cholesterol  Mother (Meron, )     Procedure history:    Colonoscopy (SNOMED CT 299760584) performed by Reynaldo Lamas MD on 2021 at 70 Years.  Comments:  1/10/2022 8:11 AM CST - Zulay Scott MA  tubular adenoma x3    2021 12:44 PM CDT - Kate Valdez  Indication: Surveillance.  Sedation: Propofol.  Impression: One 7 mm polyp in cecum.  One 4 mm polyp proximal transverse colon.  One 6 mm polyp proximal sigmoid.  Revision of total hip replacement (SNOMED CT 684784863) on 2018 at 67 Years.  Comments:  2018 7:26 AM TRUONGT Alisha Pacheco  Right hip revision total hip arthroplasty and extensive irrigation and debridement of the right thigh including muscle, fascia, bone and exchange of the femoral head and polyethylene acetabular liner.  THR - Total hip replacement (SNOMED CT 510406258) on 2018 at 67 Years.  Comments:  2018 7:21 AM CDT - Alisha George  Right hip  Colonoscopy (SNOMED CT 668089752) performed by Herson Ocampo MD on 2016 at 65 Years.  Comments:  2016 11:48 AM CDT - Lizbeth Daniels RN  Tubular adenoma x 3 no high grade dysplasia    2016 9:11 AM CDT - Kate Valdez  Indication: History of adenomatous polyps on last colonoscopy.  Sedation:  MAC.  Recommendation: Repeat in 5 years.  TURP - Transurethral resection of prostate (SNOMED CT 392680179) performed by Vinod Lipscomb MD on 10/3/2011 at 60 Years.  Colonoscopy (SNOMED CT 286549056) performed by Herson Ocampo MD on 2010 at 59 Years.  Comments:  2010 3:33 PM Karlene Cortes  4 mm polyp noted at 90 cm in the ascending colon; appearance adenomatous, removed   Recommend colonoscopic follow up in 5 years.   Conscious sedation/ MAC if his medical conditions  dictate ASA III for level of sedation for future procedures  Coronary angiogram (SNOMED CT 87178224) in 2009 at 59 Years.  Green Light Laser - Prostate in the month of 3/2009 at 58 Years.  BL nasal Turbinoplasty in the month of 1/2008 at 57 Years.  Prostate TUNA surgery in the month of 2/2007 at 56 Years.  Right shoulder arthroscopy on 10/16/2006 at 55 Years.  Flexible sigmoidoscopy (SNOMED CT 32780906) on 7/8/2005 at 54 Years.  left shoulder arthroscopy in the month of 7/2005 at 54 Years.  excisional cervical schwannoma on 11/30/2001 at 50 Years.  Cholecystectomy (SNOMED CT 10843093) in the month of 2/1982 at 31 Years.  Vasectomy (SNOMED CT 40700123).   Social History:        Electronic Cigarette/Vaping Assessment            Electronic Cigarette Use: Never.      Alcohol Assessment            Current, Beer (12 oz), 1-2 times per year, 1 drinks/episode average.  Ready to change: No.                     Comments:                      09/26/2011 Alisha Pacheco                     1/month.      Tobacco Assessment: Past            Former smoker, quit more than 30 days ago      Substance Abuse Assessment            Never      Employment and Education Assessment            Retired, Work/School description: farmer.                     Comments:                      09/26/2011 Alisha Pacheco                     2005.      Home and Environment Assessment            Marital status: .  1 children.  Living situation: Home/Independent.  Feels unsafe at home: No.               Family/Friends available for support: Yes.                     Comments:                      09/26/2011 Alisha Pacheco                     1970. Wife - Pauline.      Nutrition and Health Assessment            Type of diet: Regular.  Wants to lose weight: Yes.  Sleeping concerns: No.  Feels highly stressed: No.      Exercise and Physical Activity Assessment: Regular exercise            Exercise frequency: Daily.  Exercise type: Weight lifting,  Cardio.                     Comments:                      09/26/2011 - Alisha George                     2-3 times per week.      Sexual Assessment            Sexually active: Yes.  Sexual orientation: Heterosexual.            Sexually active: Yes.  Identifies as male, Sexual orientation: Straight or heterosexual.  History of STD: No.               Contraceptive Use Details: None.  History of sexual abuse: No.        Physical Examination   Vital Signs   1/27/2022 11:43 AM CST Temperature Tympanic 97.3 DegF  LOW    Peripheral Pulse Rate 74 bpm    Pulse Site Radial artery    HR Method Manual    Respiratory Rate 18 br/min    Systolic Blood Pressure 118 mmHg    Diastolic Blood Pressure 74 mmHg    Mean Arterial Pressure 89 mmHg    BP Site Right arm    BP Method Manual    Oxygen Saturation 97 %      Measurements from flowsheet : Measurements   1/27/2022 11:43 AM CST Height Measured 68 in    Weight Measured 205 lb    BSA 2.11 m2    Body Mass Index 31.17 kg/m2  HI      General:  Alert and oriented, No acute distress.    Eye:  Pupils are equal, round and reactive to light.    HENT:  Normocephalic, Oral mucosa is moist, No pharyngeal erythema.    Neck:  Supple, Non-tender, No lymphadenopathy.    Respiratory:  Lungs are clear to auscultation, Respirations are non-labored.    Gastrointestinal:  Soft, Non-tender.    Integumentary:  Warm.    Psychiatric:  Cooperative, Appropriate mood & affect.       Impression and Plan   Diagnosis     BPV (benign positional vertigo) ([id number]-SK H81.10).     Plan:  if he has further episodes may need to evaluate for central cause but with the resolution on his exercise ball, it suggests positional vertigo  Reviewed expected course, what to watch for and when to return..

## 2022-03-02 NOTE — NURSING NOTE
Comprehensive Intake Entered On:  1/10/2022 11:53 AM CST    Performed On:  1/10/2022 11:47 AM CST by Tyler DOMÍNGUEZ, Zulay               Summary   Chief Complaint :   check lesions on back on neck--noticed this past autumn.   Advance Directive :   Yes   Weight Measured :   205.7 lb(Converted to: 205 lb 11 oz, 93.304 kg)    Height Measured :   68 in(Converted to: 5 ft 8 in, 172.72 cm)    Body Mass Index :   31.27 kg/m2 (HI)    Body Surface Area :   2.11 m2   Systolic Blood Pressure :   117 mmHg   Diastolic Blood Pressure :   77 mmHg   Mean Arterial Pressure :   90 mmHg   Peripheral Pulse Rate :   70 bpm   BP Site :   Right arm   Pulse Site :   Brachial artery   BP Method :   Electronic   HR Method :   Electronic   Temperature Tympanic :   96.8 DegF(Converted to: 36.0 DegC)  (LOW)    Race :      Languages :   English   Ethnicity :   Not  or    Zluay Scott MA - 1/10/2022 11:47 AM CST   Health Status   Allergies Verified? :   Yes   Medication History Verified? :   Yes   Immunizations Current :   Yes   Medical History Verified? :   Yes   Pre-Visit Planning Status :   Completed   Tobacco Use? :   Former smoker   Zulay Scott MA - 1/10/2022 11:47 AM CST   Consents   Consent for Immunization Exchange :   Consent Granted   Consent for Immunizations to Providers :   Consent Granted   Zulay Scott MA - 1/10/2022 11:47 AM CST   Meds / Allergies   (As Of: 1/10/2022 11:53:45 AM CST)   Allergies (Active)   aspirin  Estimated Onset Date:   Unspecified ; Reactions:   Nasal Polyps ; Created By:   Vinod Santana MD; Reaction Status:   Active ; Category:   Drug ; Substance:   aspirin ; Type:   Allergy ; Updated By:   Vinod Santana MD; Reviewed Date:   7/22/2021 1:23 PM CDT      doxycycline  Estimated Onset Date:   Unspecified ; Reactions:   sun rash ; Created By:   Berna Huynh; Reaction Status:   Active ; Category:   Drug ; Substance:   doxycycline ; Type:   Allergy ; Updated By:   Brena Huynh;  Source:   Paper Chart ; Reviewed Date:   7/22/2021 1:23 PM CDT      Shailesh  Estimated Onset Date:   Unspecified ; Reactions:   Hives ; Created By:   Bree Babin CMA; Reaction Status:   Active ; Category:   Drug ; Substance:   Shailesh ; Type:   Allergy ; Updated By:   Bree Babin CMA; Reviewed Date:   7/22/2021 1:23 PM CDT      zymetrin shampo  Estimated Onset Date:   Unspecified ; Reactions:   Rash ; Created By:   Bree Babin CMA; Reaction Status:   Active ; Category:   Drug ; Substance:   zymetrin shampo ; Type:   Allergy ; Updated By:   Bree Babin CMA; Reviewed Date:   7/22/2021 1:23 PM CDT        Medication List   (As Of: 1/10/2022 11:53:45 AM CST)   Prescription/Discharge Order    albuterol  :   albuterol ; Status:   Prescribed ; Ordered As Mnemonic:   Ventolin HFA 90 mcg/inh inhalation aerosol ; Simple Display Line:   2 puff(s), inh, q4 hrs, PRN: for cough, 1 EA, 2 Refill(s) ; Ordering Provider:   Mark Chris MD; Catalog Code:   albuterol ; Order Dt/Tm:   7/22/2021 1:47:28 PM CDT          amLODIPine  :   amLODIPine ; Status:   Prescribed ; Ordered As Mnemonic:   amLODIPine 10 mg oral tablet ; Simple Display Line:   1 tab(s), Oral, daily, 90 tab(s), 3 Refill(s) ; Ordering Provider:   Mark Chris MD; Catalog Code:   amLODIPine ; Order Dt/Tm:   7/22/2021 1:45:52 PM CDT          amoxicillin  :   amoxicillin ; Status:   Prescribed ; Ordered As Mnemonic:   amoxicillin 500 mg oral tablet ; Simple Display Line:   2,000 mg, 4 tab(s), Oral, once, 4 tab(s), 3 Refill(s) ; Ordering Provider:   Mark Chris MD; Catalog Code:   amoxicillin ; Order Dt/Tm:   3/4/2021 2:34:14 PM CST          atorvastatin  :   atorvastatin ; Status:   Prescribed ; Ordered As Mnemonic:   atorvastatin 20 mg oral tablet ; Simple Display Line:   1 tab(s), Oral, daily, 90 tab(s), 3 Refill(s) ; Ordering Provider:   Mark Chris MD; Catalog Code:   atorvastatin ; Order Dt/Tm:   7/22/2021 3:06:00 PM CDT           clopidogrel  :   clopidogrel ; Status:   Prescribed ; Ordered As Mnemonic:   clopidogrel 75 mg oral tablet ; Simple Display Line:   1 tab(s), Oral, daily, 90 tab(s), 3 Refill(s) ; Ordering Provider:   Mark Chris MD; Catalog Code:   clopidogrel ; Order Dt/Tm:   7/22/2021 1:46:11 PM CDT          finasteride  :   finasteride ; Status:   Prescribed ; Ordered As Mnemonic:   finasteride 5 mg oral tablet ; Simple Display Line:   1 tab(s), Oral, daily, for 90 day(s), 90 tab(s), 3 Refill(s) ; Ordering Provider:   Mark Chris MD; Catalog Code:   finasteride ; Order Dt/Tm:   7/22/2021 1:42:22 PM CDT          fluticasone-salmeterol  :   fluticasone-salmeterol ; Status:   Prescribed ; Ordered As Mnemonic:   fluticasone-salmeterol 250 mcg-50 mcg inhalation powder ; Simple Display Line:   See Instructions, INHALE ONE PUFF BY MOUTH TWICE A DAY, 180 unknown unit, 3 Refill(s) ; Ordering Provider:   Mark Chris MD; Catalog Code:   fluticasone-salmeterol ; Order Dt/Tm:   7/22/2021 1:43:29 PM CDT          gabapentin  :   gabapentin ; Status:   Prescribed ; Ordered As Mnemonic:   gabapentin 300 mg oral capsule ; Simple Display Line:   1 cap(s), Oral, daily, 90 unknown unit, 3 Refill(s) ; Ordering Provider:   Mark Chris MD; Catalog Code:   gabapentin ; Order Dt/Tm:   7/22/2021 1:45:06 PM CDT          losartan  :   losartan ; Status:   Prescribed ; Ordered As Mnemonic:   losartan 100 mg oral tablet ; Simple Display Line:   1 tab(s), Oral, daily, 90 tab(s), 3 Refill(s) ; Ordering Provider:   Mark Chris MD; Catalog Code:   losartan ; Order Dt/Tm:   7/22/2021 1:44:23 PM CDT          metoprolol  :   metoprolol ; Status:   Prescribed ; Ordered As Mnemonic:   Metoprolol Succinate ER 25 mg oral tablet, extended release ; Simple Display Line:   1 tab(s), Oral, daily, 90 tab(s), 3 Refill(s) ; Ordering Provider:   Mark Chris MD; Catalog Code:   metoprolol ; Order Dt/Tm:   7/22/2021 1:45:36 PM CDT           nitroglycerin  :   nitroglycerin ; Status:   Prescribed ; Ordered As Mnemonic:   nitroglycerin 0.4 mg sublingual tablet ; Simple Display Line:   See Instructions, PLACE 1 TABLET UNDER TONGUE FOR CHEST PAIN TO A MAX OF 3 TABS-ONE EVERY 5 MINUTES - IF NO RELIEF CALL 911, 25 tab(s), 3 Refill(s) ; Ordering Provider:   Mark Chris MD; Catalog Code:   nitroglycerin ; Order Dt/Tm:   7/22/2021 1:48:11 PM CDT          olopatadine ophthalmic  :   olopatadine ophthalmic ; Status:   Prescribed ; Ordered As Mnemonic:   Patanol 0.1% ophthalmic solution ; Simple Display Line:   2 gtts, Both eyes, BID, 5 mL, 3 Refill(s) ; Ordering Provider:   Mark Chris MD; Catalog Code:   olopatadine ophthalmic ; Order Dt/Tm:   7/22/2021 1:47:51 PM CDT          pantoprazole  :   pantoprazole ; Status:   Prescribed ; Ordered As Mnemonic:   pantoprazole 40 mg oral delayed release tablet ; Simple Display Line:   1 tab(s), Oral, daily, for 90 day(s), 90 tab(s), 3 Refill(s) ; Ordering Provider:   Mark Chris MD; Catalog Code:   pantoprazole ; Order Dt/Tm:   7/22/2021 1:42:40 PM CDT          sildenafil  :   sildenafil ; Status:   Prescribed ; Ordered As Mnemonic:   sildenafil 100 mg oral tablet ; Simple Display Line:   100 mg, 1 tab(s), Oral, daily, 30 tab(s), 0 Refill(s) ; Ordering Provider:   Mark Chris MD; Catalog Code:   sildenafil ; Order Dt/Tm:   6/4/2020 10:26:42 AM CDT          silodosin  :   silodosin ; Status:   Prescribed ; Ordered As Mnemonic:   silodosin 8 mg oral capsule ; Simple Display Line:   1 cap(s), Oral, daily, 90 cap(s), 3 Refill(s) ; Ordering Provider:   Mark Chris MD; Catalog Code:   silodosin ; Order Dt/Tm:   7/22/2021 1:47:13 PM CDT          triamcinolone topical  :   triamcinolone topical ; Status:   Prescribed ; Ordered As Mnemonic:   triamcinolone 0.1% topical ointment ; Simple Display Line:   1 ree, TOP, TID, 240 gm ; Ordering Provider:   Mark Chris MD; Catalog Code:   triamcinolone  topical ; Order Dt/Tm:   3/11/2015 2:29:32 PM CDT          zolpidem  :   zolpidem ; Status:   Prescribed ; Ordered As Mnemonic:   zolpidem 5 mg oral tablet ; Simple Display Line:   5 mg, 1 tab(s), Oral, hs, PRN: for sleep, 30 tab(s), 3 Refill(s) ; Ordering Provider:   Mark Chris MD; Catalog Code:   zolpidem ; Order Dt/Tm:   12/20/2021 10:19:18 PM CST            Home Meds    cetirizine  :   cetirizine ; Status:   Documented ; Ordered As Mnemonic:   Zyrtec 10 mg oral tablet ; Simple Display Line:   10 mg, 1 tab(s), PO, Daily ; Catalog Code:   cetirizine ; Order Dt/Tm:   1/25/2010 6:14:51 PM CST          cholecalciferol  :   cholecalciferol ; Status:   Documented ; Ordered As Mnemonic:   Vitamin D3 ; Simple Display Line:   0 Refill(s) ; Catalog Code:   cholecalciferol ; Order Dt/Tm:   11/1/2017 1:13:39 PM CDT            Social History   Social History   (As Of: 1/10/2022 11:53:45 AM CST)   Alcohol:        Current, Beer (12 oz), 1-2 times per year, 1 drinks/episode average.  Ready to change: No.   Comments:  9/26/2011 7:44 PM Alisha Pacheco: 1/month.   (Last Updated: 8/2/2021 12:43:06 PM CDT by Binta Bailey)          Tobacco:  Past      Former smoker, quit more than 30 days ago   (Last Updated: 1/10/2022 11:48:17 AM CST by Zulay Scott MA)          Electronic Cigarette/Vaping:        Electronic Cigarette Use: Never.   (Last Updated: 1/10/2022 11:48:21 AM CST by Zulay Scott MA)          Substance Abuse:        Never   (Last Updated: 3/17/2015 10:41:13 AM CDT by Apoorva Figueroa CMA)          Employment/School:        Retired, Work/School description: farmer.   Comments:  9/26/2011 7:43 PM Alisha Pacheco: 2007.   (Last Updated: 4/2/2019 3:11:38 PM CDT by Charmaine Bernardo)          Home/Environment:        Marital status: .  1 children.  Living situation: Home/Independent.  Feels unsafe at home: No.  Family/Friends available for support: Yes.   Comments:  9/26/2011 7:42 PM - Alisha George: 1972. Wife  Fawad Carpenter.   (Last Updated: 8/2/2021 12:44:03 PM CDT by Binta Bailey)          Nutrition/Health:        Type of diet: Regular.  Wants to lose weight: Yes.  Sleeping concerns: No.  Feels highly stressed: No.   (Last Updated: 8/2/2021 12:44:14 PM CDT by Binta Bailey)          Exercise:  Regular exercise      Exercise frequency: Daily.  Exercise type: Weight lifting, Cardio.   Comments:  9/26/2011 7:45 PM - Alisha George: 2-3 times per week.   (Last Updated: 8/2/2021 12:44:27 PM CDT by Binta Bailey)          Sexual:        Sexually active: Yes.  Sexual orientation: Heterosexual.   (Last Updated: 3/17/2015 10:40:27 AM CDT by Apoorva Figueroa CMA)   Sexually active: Yes.  Identifies as male, Sexual orientation: Straight or heterosexual.  History of STD: No.  Contraceptive Use Details: None.  History of sexual abuse: No.   (Last Updated: 8/2/2021 12:44:48 PM CDT by Binta Bailey)

## 2022-03-02 NOTE — PROGRESS NOTES
Chief Complaint    check lesions on back on neck--noticed this past autumn.  History of Present Illness       Patient is worried about a lesion on his posterior neck.  He has had multiple lesions frozen and several excisions because of actinic keratosis who is seen the dermatologist but wants to have a different dermatologist for a seen again he also does have a history of a melanoma  Physical Exam   Vitals & Measurements    T: 96.8  F (Tympanic)  HR: 70 (Peripheral)  BP: 117/77     HT: 68 in  WT: 205.7 lb  BMI: 31.27        Right posterior neck irritated lesion about 8 mm obvious irritation from the top being scratched off skin around it is thickened and age without any discoloration  Assessment/Plan       1. Actinic keratosis (L57.0)          We discussed options and I applied liquid nitrogen for 30 seconds thaw time.  Told him he should not be picking at it         Harder to identify what the lesion has but he needs to have this resolved or excised I think it be appropriate to repeat freezing in 2 to 3 weeks if it is showing some resolving effects  Patient Information     Name:MELISSA ROSAS      Address:      [address] [place] [id number]     Sex:Male     YOB: 1950     [phone]     Emergency Contact:NASIM ROSAS     MRN:[id number]     FIN:[id number]     Location:[facility]     Date of Service:01/10/2022      Primary Care Physician:       Mark Chris MD, [phone]      Attending Physician:       Mark Chris MD, [phone]  Problem List/Past Medical History    Ongoing     AK (actinic keratosis)     Allergic rhinitis     Asthma     Benign prostatic hyperplasia     Bronchiectasis     CAD (coronary artery disease)       Comments: dec 2009 angiogram with 50% blockage,, treated with meds     Diverticulitis     DJD of shoulder     Eczematous dermatitis     GERD (gastroesophageal reflux disease)     Hip arthritis     HLD (hyperlipidemia)     HTN,  goal below 140/90     Hx of malignant skin melanoma     Insomnia     Nasal polyps     Obese     Tobacco abuse     Tubulovillous adenoma of colon     Urinary retention       Comments: Post Prostatic Surgery     Vocal cord dysfunction    Historical     Cancer of skin     Inpatient stay       Comments: @[facility], [place] - Chest pain, unspecified     Inpatient stay       Comments: @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 Cellulitis of the right thigh.     Obstructive sleep apnea syndrome in adult     Otitis externa     Otitis media     Prosthetic hip infection       Comments: Right hip  Procedure/Surgical History     Colonoscopy (07/12/2021)      Comments: Indication: Surveillance.      Sedation: Propofol.      Impression: One 7 mm polyp in cecum.  One 4 mm polyp proximal transverse colon.  One 6 mm polyp proximal sigmoid.. tubular adenoma x3.     Revision of total hip replacement (04/24/2018)      Comments: Right hip revision total hip arthroplasty and extensive irrigation and debridement of the right thigh including muscle, fascia, bone and exchange of the femoral head and polyethylene acetabular liner..     THR - Total hip replacement (04/16/2018)      Comments: Right hip.     Colonoscopy (04/12/2016)      Comments: Tubular adenoma x 3 no high grade dysplasia. Indication: History of adenomatous polyps on last colonoscopy.      Sedation:  MAC.      Recommendation: Repeat in 5 years..     TURP - Transurethral resection of prostate (10/03/2011)     Colonoscopy (09/20/2010)      Comments: 4 mm polyp noted at 90 cm in the ascending colon; appearance adenomatous, removed      Recommend colonoscopic follow up in 5 years.      Conscious sedation/ MAC if his medical conditions dictate ASA III for level of sedation for future procedures.     Green Light Laser - Prostate (03/2009)     Coronary angiogram (2009)     BL nasal Turbinoplasty (01/2008)     Prostate TUNA surgery  (02/2007)     Right shoulder arthroscopy (10/16/2006)     Flexible sigmoidoscopy (07/08/2005)     left shoulder arthroscopy (07/2005)     excisional cervical schwannoma (11/30/2001)     Cholecystectomy (02/1982)     Vasectomy  Medications    amLODIPine 10 mg oral tablet, 1 tab(s), Oral, daily, 3 refills    amoxicillin 500 mg oral tablet, 2000 mg= 4 tab(s), Oral, once, 3 refills    atorvastatin 20 mg oral tablet, 1 tab(s), Oral, daily, 3 refills    clopidogrel 75 mg oral tablet, 1 tab(s), Oral, daily, 3 refills    Edex 10 mcg injectable kit, 5 mcg, Intralesional, 3x/wk, 4 refills    finasteride 5 mg oral tablet, 1 tab(s), Oral, daily, 3 refills    fluticasone-salmeterol 250 mcg-50 mcg inhalation powder, See Instructions, 3 refills    gabapentin 300 mg oral capsule, 1 cap(s), Oral, daily, 3 refills    losartan 100 mg oral tablet, 1 tab(s), Oral, daily, 3 refills    Metoprolol Succinate ER 25 mg oral tablet, extended release, 1 tab(s), Oral, daily, 3 refills    nitroglycerin 0.4 mg sublingual tablet, See Instructions, 3 refills    pantoprazole 40 mg oral delayed release tablet, 1 tab(s), Oral, daily, 3 refills    Patanol 0.1% ophthalmic solution, 2 gtts, Eye-Both, bid, 3 refills    sildenafil 100 mg oral tablet, 100 mg= 1 tab(s), Oral, daily    silodosin 8 mg oral capsule, 1 cap(s), Oral, daily, 3 refills    triamcinolone 0.1% topical ointment, 1 ree, Topical, tid, 1 refills    Ventolin HFA 90 mcg/inh inhalation aerosol, 2 puff(s), Inhale, q4 hrs, PRN, 2 refills    Vitamin D3    Westcort 0.2% topical ointment, 1 ree, Topical, tid    zolpidem 5 mg oral tablet, 5 mg= 1 tab(s), Oral, hs, PRN, 3 refills    Zyrtec 10 mg oral tablet, 10 mg= 1 tab(s), Oral, daily  Allergies    Shailesh (Hives)    aspirin (Nasal Polyps)    doxycycline (sun rash)    zymetrin shampo (Rash)  Social History    Smoking Status     Former smoker     Alcohol      Current, Beer (12 oz), 1-2 times per year, 1 drinks/episode average. Ready to change: No.      Electronic Cigarette/Vaping      Electronic Cigarette Use: Never.     Employment/School      Retired, Work/School description: farmer.     Exercise - Regular exercise      Exercise frequency: Daily. Exercise type: Weight lifting, Cardio.     Home/Environment      Marital status: . 1 children. Living situation: Home/Independent. Feels unsafe at home: No. Family/Friends available for support: Yes.     Nutrition/Health      Type of diet: Regular. Wants to lose weight: Yes. Sleeping concerns: No. Feels highly stressed: No.     Sexual      Sexually active: Yes. Identifies as male, Sexual orientation: Straight or heterosexual. History of STD: No. Contraceptive Use Details: None. History of sexual abuse: No.     Substance Abuse      Never     Tobacco - Past      Former smoker, quit more than 30 days ago  Family History    Alzheimer's disease: Father.    Arthritis: Mother and Father.    Asthma: Father.    CHF - Congestive heart failure: Father.    Diabetes mellitus type 1: Mother.    Heart disease: Mother.    High blood pressure: Mother.    High cholesterol: Mother.    Migraine: Mother.    Sister: History is negative    Sister: History is negative    Sister: History is negative    Son: History is negative  Immunizations          Scheduled Immunizations          Dose Date(s)          influenza virus vaccine, inactivated          10/02/2015, 09/15/2011          tetanus/diphth/pertuss (Tdap) adult/adol          09/08/2011          Other Immunizations          influenza          09/18/2009, 10/08/2010          influenza virus vaccine, inactivated          09/08/2011, 09/23/2012, 09/30/2013, 08/27/2014, 09/22/2016, 09/20/2017, 09/18/2018          pneumococcal (PPSV23)          11/13/2002, 05/27/2014          Td          08/07/2001          ZOS, shingles          05/27/2014          influenza, H1N1, inactivated          09/15/2011          pneumococcal (PCV13)          03/17/2016          zoster vaccine, inactivated           12/16/2020, 05/06/2021          SARS-CoV-2 (COVID-19) Pfizer-162b2          02/24/2021, 03/17/2021

## 2022-03-02 NOTE — TELEPHONE ENCOUNTER
---------------------  From: Sally Davis RN (Phone Messages Pool ([id number]_[facility]))   To: [facility] Message Pool ([id number]_[place] [facility] [place]);     Sent: 1/11/2022 2:14:38 PM CST  Subject: medication allergies       PCP:  SOPHIE      Time of Call:  2:07pm       Person Calling:  [facility]  Phone number:  [phone]    Note:   Received call from [facility], stating ZIM sent in RX for hydrocortisone 0.2% topical ointment. Was told hydrocortisone and doxycycline are listed on pt's allergy list. Requested to clarify.  Chart reviewed; [facility] listed allergies given: aspirin, doxycycline, leanne, zymetrin shampoo.     Please review/ advise if hydrocortisone is an allergy.    Last office visit and reason:  1/11/22 skin lesions [facility]  7/22/21 AWV [facility]---------------------  From: Selin Moses LPN ([facility] Message Pool ([id number]))   To: Mark Chris MD;     Sent: 1/11/2022 2:20:38 PM CST  Subject: FW: medication allergies---------------------  From: Mark Chris MD   To: [facility] Pool ([id number]);     Sent: 1/11/2022 2:47:14 PM CST  Subject: RE: medication allergies     i have no evidence of allergic to hydrocortisone.   Hydrocortisone is a strong anti allergic medication so only reaction would be to the cream or ointment type---[facility]'t [facility] that listed as an allergy

## 2022-03-02 NOTE — NURSING NOTE
Comprehensive Intake Entered On:  1/27/2022 11:49 AM CST    Performed On:  1/27/2022 11:43 AM CST by Selin Moses LPN               Summary   Chief Complaint :   concerns with vertigo two days ago. wondering if it is ear or blood pressure related.    Advance Directive :   Yes   Weight Measured :   205 lb(Converted to: 205 lb 0 oz, 92.986 kg)    Height Measured :   68 in(Converted to: 5 ft 8 in, 172.72 cm)    Body Mass Index :   31.17 kg/m2 (HI)    Body Surface Area :   2.11 m2   Systolic Blood Pressure :   118 mmHg   Diastolic Blood Pressure :   74 mmHg   Mean Arterial Pressure :   89 mmHg   Peripheral Pulse Rate :   74 bpm   BP Site :   Right arm   Pulse Site :   Radial artery   BP Method :   Manual   HR Method :   Manual   Temperature Tympanic :   97.3 DegF(Converted to: 36.3 DegC)  (LOW)    Respiratory Rate :   18 br/min   Oxygen Saturation :   97 %   Race :      Languages :   English   Ethnicity :   Not  or    Selin Moses LPN - 1/27/2022 11:43 AM CST   Health Status   Allergies Verified? :   Yes   Medication History Verified? :   Yes   Immunizations Current :   Yes   Pre-Visit Planning Status :   Completed   Tobacco Use? :   Former smoker   Selin Moses LPN - 1/27/2022 11:43 AM CST   Consents   Consent for Immunization Exchange :   Consent Granted   Consent for Immunizations to Providers :   Consent Granted   Selin Moses LPN - 1/27/2022 11:43 AM CST   Meds / Allergies   (As Of: 1/27/2022 11:49:50 AM CST)   Allergies (Active)   aspirin  Estimated Onset Date:   Unspecified ; Reactions:   Nasal Polyps ; Created By:   Vinod Santana MD; Reaction Status:   Active ; Category:   Drug ; Substance:   aspirin ; Type:   Allergy ; Updated By:   Vinod Santana MD; Reviewed Date:   7/22/2021 1:23 PM CDT      doxycycline  Estimated Onset Date:   Unspecified ; Reactions:   sun rash ; Created By:   Berna Huynh; Reaction Status:   Active ; Category:   Drug ; Substance:    doxycycline ; Type:   Allergy ; Updated By:   Berna Huynh; Source:   Paper Chart ; Reviewed Date:   7/22/2021 1:23 PM CDT      Shailesh  Estimated Onset Date:   Unspecified ; Reactions:   Hives ; Created By:   Bree Babin CMA; Reaction Status:   Active ; Category:   Drug ; Substance:   Shailesh ; Type:   Allergy ; Updated By:   Bree Babin CMA; Reviewed Date:   7/22/2021 1:23 PM CDT      zymetrin shampo  Estimated Onset Date:   Unspecified ; Reactions:   Rash ; Created By:   Bree Babin CMA; Reaction Status:   Active ; Category:   Drug ; Substance:   zymetrin shampo ; Type:   Allergy ; Updated By:   Bree Babin CMA; Reviewed Date:   7/22/2021 1:23 PM CDT        Medication List   (As Of: 1/27/2022 11:49:50 AM CST)   Prescription/Discharge Order    albuterol  :   albuterol ; Status:   Prescribed ; Ordered As Mnemonic:   Ventolin HFA 90 mcg/inh inhalation aerosol ; Simple Display Line:   2 puff(s), inh, q4 hrs, PRN: for cough, 1 EA, 2 Refill(s) ; Ordering Provider:   Mark Chris MD; Catalog Code:   albuterol ; Order Dt/Tm:   7/22/2021 1:47:28 PM CDT          alprostadil  :   alprostadil ; Status:   Prescribed ; Ordered As Mnemonic:   Edex 10 mcg injectable kit ; Simple Display Line:   5 mcg, Intralesional, 3x/wk, start at 5mcg and may increase up to 20, 1 packet(s), 4 Refill(s) ; Ordering Provider:   Mark Chris MD; Catalog Code:   alprostadil ; Order Dt/Tm:   1/10/2022 12:11:42 PM CST          amLODIPine  :   amLODIPine ; Status:   Prescribed ; Ordered As Mnemonic:   amLODIPine 10 mg oral tablet ; Simple Display Line:   1 tab(s), Oral, daily, 90 tab(s), 3 Refill(s) ; Ordering Provider:   Mark Chris MD; Catalog Code:   amLODIPine ; Order Dt/Tm:   7/22/2021 1:45:52 PM CDT          amoxicillin  :   amoxicillin ; Status:   Prescribed ; Ordered As Mnemonic:   amoxicillin 500 mg oral tablet ; Simple Display Line:   2,000 mg, 4 tab(s), Oral, once, 4 tab(s), 3 Refill(s) ; Ordering  Provider:   Mark Chris MD; Catalog Code:   amoxicillin ; Order Dt/Tm:   3/4/2021 2:34:14 PM CST          atorvastatin  :   atorvastatin ; Status:   Prescribed ; Ordered As Mnemonic:   atorvastatin 20 mg oral tablet ; Simple Display Line:   1 tab(s), Oral, daily, 90 tab(s), 3 Refill(s) ; Ordering Provider:   Mark Chris MD; Catalog Code:   atorvastatin ; Order Dt/Tm:   7/22/2021 3:06:00 PM CDT          clopidogrel  :   clopidogrel ; Status:   Prescribed ; Ordered As Mnemonic:   clopidogrel 75 mg oral tablet ; Simple Display Line:   1 tab(s), Oral, daily, 90 tab(s), 3 Refill(s) ; Ordering Provider:   Mark Chris MD; Catalog Code:   clopidogrel ; Order Dt/Tm:   7/22/2021 1:46:11 PM CDT          finasteride  :   finasteride ; Status:   Prescribed ; Ordered As Mnemonic:   finasteride 5 mg oral tablet ; Simple Display Line:   1 tab(s), Oral, daily, for 90 day(s), 90 tab(s), 3 Refill(s) ; Ordering Provider:   Mark Chris MD; Catalog Code:   finasteride ; Order Dt/Tm:   7/22/2021 1:42:22 PM CDT          fluticasone-salmeterol  :   fluticasone-salmeterol ; Status:   Prescribed ; Ordered As Mnemonic:   fluticasone-salmeterol 250 mcg-50 mcg inhalation powder ; Simple Display Line:   See Instructions, INHALE ONE PUFF BY MOUTH TWICE A DAY, 180 unknown unit, 3 Refill(s) ; Ordering Provider:   Mark Chris MD; Catalog Code:   fluticasone-salmeterol ; Order Dt/Tm:   7/22/2021 1:43:29 PM CDT          gabapentin  :   gabapentin ; Status:   Prescribed ; Ordered As Mnemonic:   gabapentin 300 mg oral capsule ; Simple Display Line:   1 cap(s), Oral, daily, 90 unknown unit, 3 Refill(s) ; Ordering Provider:   Mark Chris MD; Catalog Code:   gabapentin ; Order Dt/Tm:   7/22/2021 1:45:06 PM CDT          hydrocortisone topical  :   hydrocortisone topical ; Status:   Prescribed ; Ordered As Mnemonic:   Westcort 0.2% topical ointment ; Simple Display Line:   1 ree, Topical, tid, apply in a thin film to the  affected skin and rub in gently and completely, 45 gm, 0 Refill(s) ; Ordering Provider:   Mark Chris MD; Catalog Code:   hydrocortisone topical ; Order Dt/Tm:   1/10/2022 12:09:55 PM CST          losartan  :   losartan ; Status:   Prescribed ; Ordered As Mnemonic:   losartan 100 mg oral tablet ; Simple Display Line:   1 tab(s), Oral, daily, 90 tab(s), 3 Refill(s) ; Ordering Provider:   Mark Chris MD; Catalog Code:   losartan ; Order Dt/Tm:   7/22/2021 1:44:23 PM CDT          metoprolol  :   metoprolol ; Status:   Prescribed ; Ordered As Mnemonic:   Metoprolol Succinate ER 25 mg oral tablet, extended release ; Simple Display Line:   1 tab(s), Oral, daily, 90 tab(s), 3 Refill(s) ; Ordering Provider:   Mark Chris MD; Catalog Code:   metoprolol ; Order Dt/Tm:   7/22/2021 1:45:36 PM CDT          nitroglycerin  :   nitroglycerin ; Status:   Prescribed ; Ordered As Mnemonic:   nitroglycerin 0.4 mg sublingual tablet ; Simple Display Line:   See Instructions, PLACE 1 TABLET UNDER TONGUE FOR CHEST PAIN TO A MAX OF 3 TABS-ONE EVERY 5 MINUTES - IF NO RELIEF CALL 911, 25 tab(s), 3 Refill(s) ; Ordering Provider:   Mark Chris MD; Catalog Code:   nitroglycerin ; Order Dt/Tm:   7/22/2021 1:48:11 PM CDT          olopatadine ophthalmic  :   olopatadine ophthalmic ; Status:   Prescribed ; Ordered As Mnemonic:   Patanol 0.1% ophthalmic solution ; Simple Display Line:   2 gtts, Both eyes, BID, 5 mL, 3 Refill(s) ; Ordering Provider:   Mark Chris MD; Catalog Code:   olopatadine ophthalmic ; Order Dt/Tm:   7/22/2021 1:47:51 PM CDT          pantoprazole  :   pantoprazole ; Status:   Prescribed ; Ordered As Mnemonic:   pantoprazole 40 mg oral delayed release tablet ; Simple Display Line:   1 tab(s), Oral, daily, for 90 day(s), 90 tab(s), 3 Refill(s) ; Ordering Provider:   Mark Chris MD; Catalog Code:   pantoprazole ; Order Dt/Tm:   7/22/2021 1:42:40 PM CDT          sildenafil  :   sildenafil ; Status:    Prescribed ; Ordered As Mnemonic:   sildenafil 100 mg oral tablet ; Simple Display Line:   100 mg, 1 tab(s), Oral, daily, 30 tab(s), 0 Refill(s) ; Ordering Provider:   Mark Chris MD; Catalog Code:   sildenafil ; Order Dt/Tm:   6/4/2020 10:26:42 AM CDT          silodosin  :   silodosin ; Status:   Prescribed ; Ordered As Mnemonic:   silodosin 8 mg oral capsule ; Simple Display Line:   1 cap(s), Oral, daily, 90 cap(s), 3 Refill(s) ; Ordering Provider:   Mark Chris MD; Catalog Code:   silodosin ; Order Dt/Tm:   7/22/2021 1:47:13 PM CDT          triamcinolone topical  :   triamcinolone topical ; Status:   Prescribed ; Ordered As Mnemonic:   triamcinolone 0.1% topical ointment ; Simple Display Line:   1 ree, TOP, TID, 240 gm ; Ordering Provider:   Mark Chris MD; Catalog Code:   triamcinolone topical ; Order Dt/Tm:   3/11/2015 2:29:32 PM CDT          zolpidem  :   zolpidem ; Status:   Prescribed ; Ordered As Mnemonic:   zolpidem 5 mg oral tablet ; Simple Display Line:   5 mg, 1 tab(s), Oral, hs, PRN: for sleep, 30 tab(s), 3 Refill(s) ; Ordering Provider:   Mark Chris MD; Catalog Code:   zolpidem ; Order Dt/Tm:   12/20/2021 10:19:18 PM CST            Home Meds    cetirizine  :   cetirizine ; Status:   Documented ; Ordered As Mnemonic:   Zyrtec 10 mg oral tablet ; Simple Display Line:   10 mg, 1 tab(s), PO, Daily ; Catalog Code:   cetirizine ; Order Dt/Tm:   1/25/2010 6:14:51 PM CST          cholecalciferol  :   cholecalciferol ; Status:   Documented ; Ordered As Mnemonic:   Vitamin D3 ; Simple Display Line:   0 Refill(s) ; Catalog Code:   cholecalciferol ; Order Dt/Tm:   11/1/2017 1:13:39 PM CDT

## 2022-03-09 ENCOUNTER — TRANSFERRED RECORDS (OUTPATIENT)
Dept: HEALTH INFORMATION MANAGEMENT | Facility: CLINIC | Age: 72
End: 2022-03-09
Payer: COMMERCIAL

## 2022-03-24 ENCOUNTER — OFFICE VISIT (OUTPATIENT)
Dept: FAMILY MEDICINE | Facility: CLINIC | Age: 72
End: 2022-03-24
Payer: MEDICARE

## 2022-03-24 VITALS
WEIGHT: 206 LBS | BODY MASS INDEX: 31.32 KG/M2 | SYSTOLIC BLOOD PRESSURE: 116 MMHG | HEART RATE: 78 BPM | DIASTOLIC BLOOD PRESSURE: 72 MMHG

## 2022-03-24 DIAGNOSIS — L57.0 ACTINIC KERATOSIS: Primary | ICD-10-CM

## 2022-03-24 PROBLEM — J45.909 ASTHMA: Status: ACTIVE | Noted: 2022-03-24

## 2022-03-24 PROBLEM — Z85.820 HISTORY OF MALIGNANT MELANOMA OF SKIN: Status: ACTIVE | Noted: 2022-03-24

## 2022-03-24 PROBLEM — M48.02 SPINAL STENOSIS OF CERVICAL REGION: Status: ACTIVE | Noted: 2022-03-24

## 2022-03-24 PROBLEM — E78.5 HYPERLIPIDEMIA: Status: ACTIVE | Noted: 2022-03-24

## 2022-03-24 PROBLEM — L30.9 ECZEMA: Status: ACTIVE | Noted: 2022-03-24

## 2022-03-24 PROBLEM — J38.3 VOCAL CORD DYSFUNCTION: Status: ACTIVE | Noted: 2022-03-24

## 2022-03-24 PROBLEM — L03.90 CELLULITIS: Status: ACTIVE | Noted: 2018-04-22

## 2022-03-24 PROBLEM — T84.59XA INFECTION OF PROSTHETIC HIP JOINT (H): Status: ACTIVE | Noted: 2022-03-24

## 2022-03-24 PROBLEM — Z96.649 INFECTION OF PROSTHETIC HIP JOINT (H): Status: ACTIVE | Noted: 2022-03-24

## 2022-03-24 PROBLEM — J30.9 ALLERGIC RHINITIS: Status: ACTIVE | Noted: 2022-03-24

## 2022-03-24 PROBLEM — G47.00 INSOMNIA: Status: ACTIVE | Noted: 2022-03-24

## 2022-03-24 PROBLEM — E66.9 OBESITY: Status: ACTIVE | Noted: 2022-03-24

## 2022-03-24 PROBLEM — M19.019 OSTEOARTHRITIS OF SHOULDER: Status: ACTIVE | Noted: 2022-03-24

## 2022-03-24 PROBLEM — J47.9 BRONCHIECTASIS (H): Status: ACTIVE | Noted: 2022-03-24

## 2022-03-24 PROBLEM — M16.10 ARTHROPATHY OF PELVIS: Status: ACTIVE | Noted: 2022-03-24

## 2022-03-24 PROBLEM — K57.92 DIVERTICULITIS: Status: ACTIVE | Noted: 2022-03-24

## 2022-03-24 PROCEDURE — 17000 DESTRUCT PREMALG LESION: CPT | Performed by: FAMILY MEDICINE

## 2022-03-24 RX ORDER — AMOXICILLIN 500 MG/1
2000 TABLET, FILM COATED ORAL
COMMUNITY
Start: 2021-03-04 | End: 2023-03-06

## 2022-03-24 RX ORDER — SILODOSIN 8 MG/1
CAPSULE ORAL
COMMUNITY
Start: 2021-07-22 | End: 2022-07-14

## 2022-03-24 RX ORDER — ALPROSTADIL 10 UG/ML
INJECTION, POWDER, LYOPHILIZED, FOR SOLUTION INTRACAVERNOUS
COMMUNITY
Start: 2022-01-10 | End: 2022-08-09

## 2022-03-24 RX ORDER — SILDENAFIL 100 MG/1
100 TABLET, FILM COATED ORAL
COMMUNITY
Start: 2020-06-04 | End: 2022-08-09

## 2022-03-24 RX ORDER — FINASTERIDE 5 MG/1
TABLET, FILM COATED ORAL
COMMUNITY
Start: 2022-02-03 | End: 2022-07-14

## 2022-03-24 RX ORDER — ALBUTEROL SULFATE 90 UG/1
2 AEROSOL, METERED RESPIRATORY (INHALATION)
COMMUNITY
End: 2022-08-09

## 2022-03-24 RX ORDER — ZOLPIDEM TARTRATE 5 MG/1
TABLET ORAL
COMMUNITY
Start: 2021-12-20 | End: 2022-04-10

## 2022-03-24 RX ORDER — AMLODIPINE BESYLATE 10 MG/1
TABLET ORAL
COMMUNITY
Start: 2022-01-17 | End: 2022-07-14

## 2022-03-24 RX ORDER — CLOPIDOGREL BISULFATE 75 MG/1
TABLET ORAL
COMMUNITY
Start: 2021-07-22 | End: 2022-07-13

## 2022-03-24 RX ORDER — GABAPENTIN 300 MG/1
CAPSULE ORAL
COMMUNITY
Start: 2021-07-22 | End: 2022-07-14

## 2022-03-24 RX ORDER — ALBUTEROL SULFATE 0.83 MG/ML
2.5 SOLUTION RESPIRATORY (INHALATION)
COMMUNITY
End: 2023-03-06

## 2022-03-24 RX ORDER — LOSARTAN POTASSIUM 100 MG/1
TABLET ORAL
COMMUNITY
Start: 2022-01-24 | End: 2022-07-14

## 2022-03-24 RX ORDER — METOPROLOL SUCCINATE 25 MG/1
TABLET, EXTENDED RELEASE ORAL
COMMUNITY
Start: 2022-01-29 | End: 2022-07-14

## 2022-03-24 RX ORDER — PANTOPRAZOLE SODIUM 40 MG/1
TABLET, DELAYED RELEASE ORAL
COMMUNITY
Start: 2021-07-22 | End: 2022-07-14

## 2022-03-24 RX ORDER — CETIRIZINE HYDROCHLORIDE 10 MG/1
10 TABLET ORAL
COMMUNITY

## 2022-03-24 RX ORDER — NITROGLYCERIN 0.4 MG/1
TABLET SUBLINGUAL
COMMUNITY
Start: 2021-07-22 | End: 2022-08-09

## 2022-03-24 RX ORDER — ATORVASTATIN CALCIUM 20 MG/1
TABLET, FILM COATED ORAL
COMMUNITY
Start: 2021-07-22 | End: 2022-07-14

## 2022-03-24 NOTE — PROGRESS NOTES
"  Assessment & Plan     Actinic keratosis  Left neck and behind left ear with 4mm dry patches that are frozen with liquid N2 for 30 second thaw time      0956}     BMI:   Estimated body mass index is 31.32 kg/m  as calculated from the following:    Height as of 1/27/22: 1.727 m (5' 8\").    Weight as of this encounter: 93.4 kg (206 lb).   Weight management plan: Discussed healthy diet and exercise guidelines        No follow-ups on file.    Mark Chris MD  [facility]    Niesha Agosto is a 71 year old who presents for the following health issues     History of Present Illness     Asthma:  He presents for follow up of asthma.  He has no cough, no wheezing, and no shortness of breath. He is using a relief medication a few times a month. He does not miss any doses of his controller medication throughout the week.Patient is aware of the following triggers: same as previous visit, humidity, mold, pollens and smoke. The patient has not had a visit to the Emergency Room, Urgent Care or Hospital due to asthma since the last clinic visit.     Back Pain:  He presents for follow up of back pain. Patient's back pain is a chronic problem.  Location of back pain:  Right lower back and left lower back  Description of back pain: dull ache  Back pain spreads: nowhere    Since patient first noticed back pain, pain is: unchanged  Does back pain interfere with his job:  Not applicable      Hypertension: He presents for follow up of hypertension.  He does check blood pressure  regularly outside of the clinic. Outpatient blood pressures have not been over 140/90. He follows a low salt diet.     He eats 2-3 servings of fruits and vegetables daily.He consumes 0 sweetened beverage(s) daily.He exercises with enough effort to increase his heart rate 30 to 60 minutes per day.  He exercises with enough effort to increase his heart rate 5 days per week.   He is taking medications regularly.       Concern - " Derm Problem  Onset: 2-3 months  Description: Lesion on left side of neck  Intensity: irritating  Progression of Symptoms:  worsening  Accompanying Signs & Symptoms: n/a  Previous history of similar problem: yes  Therapies tried and outcome: None        Review of Systems         Objective    There were no vitals taken for this visit.  There is no height or weight on file to calculate BMI.  Physical Exam   Face with some irregular skin color but no lesion  Left nape of neck with 4mm scaly lesion  Behind left ear 4mm lesion

## 2022-04-07 DIAGNOSIS — F51.01 PRIMARY INSOMNIA: Primary | ICD-10-CM

## 2022-04-08 NOTE — TELEPHONE ENCOUNTER
Routing refill request to provider for review/approval because:  Drug not on the [facility] refill protocol     Last Written Prescription Date:  12/20/21  Last Fill Quantity: 30,  # refills: 3   Last office visit: 3/24/2022 with prescribing provider:     Future Office Visit: None.    Alex Warner RN  [facility]

## 2022-04-10 RX ORDER — ZOLPIDEM TARTRATE 5 MG/1
TABLET ORAL
Qty: 30 TABLET | Refills: 3 | Status: SHIPPED | OUTPATIENT
Start: 2022-04-10 | End: 2022-07-14

## 2022-04-19 DIAGNOSIS — Z96.649 HISTORY OF HIP REPLACEMENT: Primary | ICD-10-CM

## 2022-04-21 RX ORDER — AMOXICILLIN 500 MG/1
CAPSULE ORAL
Qty: 4 CAPSULE | Refills: 3 | Status: SHIPPED | OUTPATIENT
Start: 2022-04-21 | End: 2022-08-09

## 2022-04-21 NOTE — TELEPHONE ENCOUNTER
Patient calling wondering about status of medication review for the amoxicillin. [facility] [place]. Please advise. Patient can be reached at [phone]

## 2022-04-21 NOTE — TELEPHONE ENCOUNTER
Routing refill request to provider for review/approval because:  Drug not on the [facility] [place]: Amoxicillin.  SOPHIE o/c. Please advise on refill request.     Last Written Prescription Date:  3/4/21  Last Fill Quantity: 4,  # refills: 3   Last office visit: 3/24/2022 with prescribing provider   7/22/21 DAILY BARRIOS

## 2022-07-09 DIAGNOSIS — I25.10 CORONARY ARTERY DISEASE INVOLVING NATIVE HEART, UNSPECIFIED VESSEL OR LESION TYPE, UNSPECIFIED WHETHER ANGINA PRESENT: Primary | ICD-10-CM

## 2022-07-12 ENCOUNTER — DOCUMENTATION ONLY (OUTPATIENT)
Dept: LAB | Facility: CLINIC | Age: 72
End: 2022-07-12

## 2022-07-12 DIAGNOSIS — E78.5 DYSLIPIDEMIA: Primary | ICD-10-CM

## 2022-07-12 DIAGNOSIS — Z12.5 SCREENING FOR PROSTATE CANCER: ICD-10-CM

## 2022-07-12 DIAGNOSIS — I10 PRIMARY HYPERTENSION: ICD-10-CM

## 2022-07-12 DIAGNOSIS — I25.10 CORONARY ARTERY DISEASE INVOLVING NATIVE CORONARY ARTERY OF NATIVE HEART WITHOUT ANGINA PECTORIS: ICD-10-CM

## 2022-07-13 RX ORDER — CLOPIDOGREL BISULFATE 75 MG/1
TABLET ORAL
Qty: 90 TABLET | Refills: 3 | Status: SHIPPED | OUTPATIENT
Start: 2022-07-13 | End: 2022-08-09

## 2022-07-13 NOTE — TELEPHONE ENCOUNTER
Reason for Call:  Medication or medication refill:    Do you use a [facility]?  Name of the pharmacy and phone number for the current request: [facility]    Name of the medication requested: several medications    Other request: Patient is scheduled for an appointment in august will need refilled before then.     Can we leave a detailed message on this number? YES    Phone number patient can be reached at: Cell number on file:    Telephone Information:   Mobile [phone]       Best Time: anytime    Call taken on 7/13/2022 at 10:34 AM by Genny Restrepo

## 2022-07-13 NOTE — TELEPHONE ENCOUNTER
"Routing refill request to provider for review/approval because:  Routing to CLEMENTE - SMOOTH patient  Patient does have office visit (JDL) and labs scheduled for 8/4 and 8/9.  Last prescription signed by SMOOTH    Last Written Prescription Date:  7/22/2021  Last Fill Quantity: 90,  # refills: 3   Last office visit provider:  3/24/2022     Requested Prescriptions   Pending Prescriptions Disp Refills     clopidogrel (PLAVIX) 75 MG tablet [Pharmacy Med Name: CLOPIDOGREL BISULFATE 75MG TABS] 90 tablet 3     Sig: TAKE ONE TABLET BY MOUTH EVERY DAY       Plavix Failed - 7/9/2022  8:01 PM        Failed - Normal HGB on file in past 12 months     Recent Labs   Lab Test 04/02/19  1134   HGB 12.8*               Failed - Normal Platelets on file in past 12 months     Recent Labs   Lab Test 04/02/19  1134                  Passed - No active PPI on record unless is Protonix        Passed - Recent (12 mo) or future (30 days) visit within the authorizing provider's specialty     Patient has had an office visit with the authorizing provider or a provider within the authorizing providers department within the previous 12 mos or has a future within next 30 days. See \"Patient Info\" tab in inbasket, or \"Choose Columns\" in Meds & Orders section of the refill encounter.              Passed - Medication is active on med list        Passed - Patient is age 18 or older             Daniel Warner RN 07/13/22 1:17 PM  "

## 2022-07-14 RX ORDER — ATORVASTATIN CALCIUM 20 MG/1
TABLET, FILM COATED ORAL
Qty: 90 TABLET | Refills: 3 | OUTPATIENT
Start: 2022-07-14

## 2022-07-14 NOTE — TELEPHONE ENCOUNTER
This refill request is a duplicate request, previously received or sent.   Sent denial notification to pharmacy.  CHERRY Medina  [facility]

## 2022-07-19 RX ORDER — ATORVASTATIN CALCIUM 20 MG/1
TABLET, FILM COATED ORAL
Qty: 90 TABLET | Refills: 3 | OUTPATIENT
Start: 2022-07-19

## 2022-08-02 RX ORDER — NITROGLYCERIN 0.4 MG/1
TABLET SUBLINGUAL
Qty: 25 TABLET | Refills: 0 | OUTPATIENT
Start: 2022-08-02

## 2022-08-02 NOTE — TELEPHONE ENCOUNTER
NTG has been refused by Dr. Rock due to interaction with Sildenafil. Will rectify at upcoming visit.    Last Written Prescription Date: 7/22/21  Last Fill Quantity: patient reported  Last office visit: 3/24/2022 with prescribing provider   Future Office Visit:   Next 5 appointments (look out 90 days)    Aug 09, 2022 10:40 AM  (Arrive by 10:20 AM)  Annual Wellness Visit with Hardik Rock MD  [facility] ([facility] ) [address] [place] [id number] [phone]

## 2022-08-04 ENCOUNTER — LAB (OUTPATIENT)
Dept: LAB | Facility: CLINIC | Age: 72
End: 2022-08-04
Payer: MEDICARE

## 2022-08-04 DIAGNOSIS — I10 PRIMARY HYPERTENSION: ICD-10-CM

## 2022-08-04 DIAGNOSIS — I25.10 CORONARY ARTERY DISEASE INVOLVING NATIVE CORONARY ARTERY OF NATIVE HEART WITHOUT ANGINA PECTORIS: ICD-10-CM

## 2022-08-04 DIAGNOSIS — Z12.5 SCREENING FOR PROSTATE CANCER: ICD-10-CM

## 2022-08-04 LAB
ANION GAP SERPL CALCULATED.3IONS-SCNC: 7 MMOL/L (ref 7–15)
BUN SERPL-MCNC: 21.1 MG/DL (ref 8–23)
CALCIUM SERPL-MCNC: 9.3 MG/DL (ref 8.8–10.2)
CHLORIDE SERPL-SCNC: 107 MMOL/L (ref 98–107)
CHOLEST SERPL-MCNC: 150 MG/DL
CREAT SERPL-MCNC: 1.62 MG/DL (ref 0.67–1.17)
DEPRECATED HCO3 PLAS-SCNC: 26 MMOL/L (ref 22–29)
GFR SERPL CREATININE-BSD FRML MDRD: 45 ML/MIN/1.73M2
GLUCOSE SERPL-MCNC: 95 MG/DL (ref 70–99)
HDLC SERPL-MCNC: 39 MG/DL
LDLC SERPL CALC-MCNC: 86 MG/DL
NONHDLC SERPL-MCNC: 111 MG/DL
POTASSIUM SERPL-SCNC: 4.4 MMOL/L (ref 3.4–5.3)
SODIUM SERPL-SCNC: 140 MMOL/L (ref 136–145)
TRIGL SERPL-MCNC: 126 MG/DL

## 2022-08-04 PROCEDURE — 36415 COLL VENOUS BLD VENIPUNCTURE: CPT

## 2022-08-04 PROCEDURE — G0103 PSA SCREENING: HCPCS

## 2022-08-04 PROCEDURE — 80048 BASIC METABOLIC PNL TOTAL CA: CPT

## 2022-08-04 PROCEDURE — 80061 LIPID PANEL: CPT

## 2022-08-05 LAB — PSA SERPL-MCNC: 1.34 NG/ML (ref 0–6.5)

## 2022-08-05 RX ORDER — NITROGLYCERIN 0.4 MG/1
TABLET SUBLINGUAL
Status: CANCELLED | OUTPATIENT
Start: 2022-08-05

## 2022-08-06 RX ORDER — FLUTICASONE PROPIONATE AND SALMETEROL 250; 50 UG/1; UG/1
1 POWDER RESPIRATORY (INHALATION) 2 TIMES DAILY
COMMUNITY
End: 2022-08-08

## 2022-08-08 DIAGNOSIS — J45.909 ASTHMA: Primary | ICD-10-CM

## 2022-08-08 RX ORDER — FLUTICASONE PROPIONATE AND SALMETEROL 250; 50 UG/1; UG/1
POWDER RESPIRATORY (INHALATION)
Qty: 180 EACH | Refills: 0 | Status: SHIPPED | OUTPATIENT
Start: 2022-08-08 | End: 2022-08-09

## 2022-08-08 NOTE — TELEPHONE ENCOUNTER
Prescription approved per [facility] Refill Protocol.    Last Written Prescription Date: 1/27/22  Last Fill Quantity: 180,  # refills: 3   Last office visit: 3/24/2022 with prescribing provider   Future Office Visit:   Next 5 appointments (look out 90 days)    Aug 09, 2022 10:40 AM  (Arrive by 10:20 AM)  Annual Wellness Visit with Hardik Rock MD  [facility] ([facility] ) [address] [place] [id number] [phone]

## 2022-08-09 ENCOUNTER — OFFICE VISIT (OUTPATIENT)
Dept: FAMILY MEDICINE | Facility: CLINIC | Age: 72
End: 2022-08-09
Payer: MEDICARE

## 2022-08-09 VITALS
HEIGHT: 69 IN | DIASTOLIC BLOOD PRESSURE: 73 MMHG | SYSTOLIC BLOOD PRESSURE: 111 MMHG | TEMPERATURE: 97.2 F | WEIGHT: 203.1 LBS | BODY MASS INDEX: 30.08 KG/M2 | HEART RATE: 65 BPM

## 2022-08-09 DIAGNOSIS — M54.42 ACUTE LEFT-SIDED LOW BACK PAIN WITH LEFT-SIDED SCIATICA: ICD-10-CM

## 2022-08-09 DIAGNOSIS — N18.32 CHRONIC KIDNEY DISEASE, STAGE 3B (H): ICD-10-CM

## 2022-08-09 DIAGNOSIS — E78.5 HYPERLIPIDEMIA LDL GOAL <100: ICD-10-CM

## 2022-08-09 DIAGNOSIS — C61 MALIGNANT TUMOR OF PROSTATE (H): ICD-10-CM

## 2022-08-09 DIAGNOSIS — I25.10 CORONARY ARTERY DISEASE INVOLVING NATIVE CORONARY ARTERY OF NATIVE HEART WITHOUT ANGINA PECTORIS: ICD-10-CM

## 2022-08-09 DIAGNOSIS — D36.10 NEURILEMMOMA: ICD-10-CM

## 2022-08-09 DIAGNOSIS — N40.1 BPH ASSOCIATED WITH NOCTURIA: ICD-10-CM

## 2022-08-09 DIAGNOSIS — I10 PRIMARY HYPERTENSION: ICD-10-CM

## 2022-08-09 DIAGNOSIS — Z11.59 NEED FOR HEPATITIS C SCREENING TEST: ICD-10-CM

## 2022-08-09 DIAGNOSIS — L57.0 ACTINIC KERATOSIS: ICD-10-CM

## 2022-08-09 DIAGNOSIS — Z13.6 ENCOUNTER FOR ABDOMINAL AORTIC ANEURYSM (AAA) SCREENING: ICD-10-CM

## 2022-08-09 DIAGNOSIS — F51.01 PRIMARY INSOMNIA: ICD-10-CM

## 2022-08-09 DIAGNOSIS — K21.00 GASTROESOPHAGEAL REFLUX DISEASE WITH ESOPHAGITIS WITHOUT HEMORRHAGE: ICD-10-CM

## 2022-08-09 DIAGNOSIS — R35.1 BPH ASSOCIATED WITH NOCTURIA: ICD-10-CM

## 2022-08-09 DIAGNOSIS — I25.10 CORONARY ARTERY DISEASE INVOLVING NATIVE HEART, UNSPECIFIED VESSEL OR LESION TYPE, UNSPECIFIED WHETHER ANGINA PRESENT: ICD-10-CM

## 2022-08-09 DIAGNOSIS — Z00.00 ENCOUNTER FOR MEDICARE ANNUAL WELLNESS EXAM: Primary | ICD-10-CM

## 2022-08-09 DIAGNOSIS — J45.20 MILD INTERMITTENT ASTHMA WITHOUT COMPLICATION: ICD-10-CM

## 2022-08-09 DIAGNOSIS — G47.33 OSA ON CPAP: ICD-10-CM

## 2022-08-09 PROCEDURE — 86803 HEPATITIS C AB TEST: CPT | Performed by: INTERNAL MEDICINE

## 2022-08-09 PROCEDURE — G0439 PPPS, SUBSEQ VISIT: HCPCS | Performed by: INTERNAL MEDICINE

## 2022-08-09 PROCEDURE — 36415 COLL VENOUS BLD VENIPUNCTURE: CPT | Performed by: INTERNAL MEDICINE

## 2022-08-09 RX ORDER — LOSARTAN POTASSIUM 100 MG/1
100 TABLET ORAL DAILY
Qty: 90 TABLET | Refills: 3 | Status: SHIPPED | OUTPATIENT
Start: 2022-08-09 | End: 2023-08-11

## 2022-08-09 RX ORDER — AMLODIPINE BESYLATE 10 MG/1
10 TABLET ORAL DAILY
Qty: 90 TABLET | Refills: 3 | Status: SHIPPED | OUTPATIENT
Start: 2022-08-09 | End: 2023-08-11

## 2022-08-09 RX ORDER — GABAPENTIN 300 MG/1
CAPSULE ORAL
Qty: 30 CAPSULE | Refills: 5 | Status: SHIPPED | OUTPATIENT
Start: 2022-08-09 | End: 2023-03-06

## 2022-08-09 RX ORDER — FINASTERIDE 5 MG/1
5 TABLET, FILM COATED ORAL DAILY
Qty: 90 TABLET | Refills: 3 | Status: SHIPPED | OUTPATIENT
Start: 2022-08-09 | End: 2022-11-07

## 2022-08-09 RX ORDER — NITROGLYCERIN 0.4 MG/1
TABLET SUBLINGUAL
Qty: 25 TABLET | Refills: 3 | Status: SHIPPED | OUTPATIENT
Start: 2022-08-09

## 2022-08-09 RX ORDER — SILODOSIN 8 MG/1
CAPSULE ORAL
Qty: 90 CAPSULE | Refills: 3 | Status: SHIPPED | OUTPATIENT
Start: 2022-08-09 | End: 2023-08-02

## 2022-08-09 RX ORDER — ALBUTEROL SULFATE 90 UG/1
2 AEROSOL, METERED RESPIRATORY (INHALATION) EVERY 4 HOURS PRN
Qty: 18 G | Refills: 11 | Status: SHIPPED | OUTPATIENT
Start: 2022-08-09 | End: 2023-08-11

## 2022-08-09 RX ORDER — METOPROLOL SUCCINATE 25 MG/1
25 TABLET, EXTENDED RELEASE ORAL DAILY
Qty: 90 TABLET | Refills: 3 | Status: SHIPPED | OUTPATIENT
Start: 2022-08-09 | End: 2023-08-11

## 2022-08-09 RX ORDER — PANTOPRAZOLE SODIUM 40 MG/1
TABLET, DELAYED RELEASE ORAL
Qty: 90 TABLET | Refills: 3 | Status: SHIPPED | OUTPATIENT
Start: 2022-08-09 | End: 2023-08-02

## 2022-08-09 RX ORDER — HYDROCHLOROTHIAZIDE 25 MG/1
25 TABLET ORAL DAILY
Qty: 90 TABLET | Refills: 3 | Status: SHIPPED | OUTPATIENT
Start: 2022-08-09 | End: 2023-05-03

## 2022-08-09 RX ORDER — ATORVASTATIN CALCIUM 20 MG/1
TABLET, FILM COATED ORAL
Qty: 90 TABLET | Refills: 3 | Status: SHIPPED | OUTPATIENT
Start: 2022-08-09 | End: 2023-03-17 | Stop reason: SINTOL

## 2022-08-09 RX ORDER — ZOLPIDEM TARTRATE 5 MG/1
TABLET ORAL
Qty: 30 TABLET | Refills: 5 | Status: SHIPPED | OUTPATIENT
Start: 2022-08-09 | End: 2023-02-08

## 2022-08-09 RX ORDER — FLUTICASONE PROPIONATE AND SALMETEROL 250; 50 UG/1; UG/1
1 POWDER RESPIRATORY (INHALATION) 2 TIMES DAILY
Qty: 180 EACH | Refills: 3 | Status: SHIPPED | OUTPATIENT
Start: 2022-08-09 | End: 2023-08-11

## 2022-08-09 RX ORDER — CLOPIDOGREL BISULFATE 75 MG/1
75 TABLET ORAL DAILY
Qty: 90 TABLET | Refills: 3 | Status: SHIPPED | OUTPATIENT
Start: 2022-08-09 | End: 2023-08-11

## 2022-08-09 ASSESSMENT — ENCOUNTER SYMPTOMS
HEMATURIA: 0
NAUSEA: 0
CONSTIPATION: 0
HEMATOCHEZIA: 0
SHORTNESS OF BREATH: 0
SORE THROAT: 0
PALPITATIONS: 0
FREQUENCY: 0
WEAKNESS: 0
DYSURIA: 0
PARESTHESIAS: 0
HEARTBURN: 0
COUGH: 0
ARTHRALGIAS: 1
JOINT SWELLING: 0
FEVER: 0
ABDOMINAL PAIN: 0
MYALGIAS: 0
NERVOUS/ANXIOUS: 0
EYE PAIN: 0
CHILLS: 0
DIZZINESS: 0
DIARRHEA: 0
HEADACHES: 0

## 2022-08-09 ASSESSMENT — ACTIVITIES OF DAILY LIVING (ADL): CURRENT_FUNCTION: NO ASSISTANCE NEEDED

## 2022-08-09 NOTE — PROGRESS NOTES
"SUBJECTIVE:   Surendra Wallace is a 71 year old male who presents for Preventive Visit.      Patient has been advised of split billing requirements and indicates understanding: Yes  Are you in the first 12 months of your Medicare coverage?  No    Healthy Habits:     In general, how would you rate your overall health?  Fair    Frequency of exercise:  6-7 days/week    Duration of exercise:  30-45 minutes    Do you usually eat at least 4 servings of fruit and vegetables a day, include whole grains    & fiber and avoid regularly eating high fat or \"junk\" foods?  Yes    Taking medications regularly:  Yes    Medication side effects:  Lightheadedness    Ability to successfully perform activities of daily living:  No assistance needed    Home Safety:  Throw rugs in the hallway    Hearing Impairment:  Difficulty following a conversation in a noisy restaurant or crowded room and find that men's voices are easier to understand than woman's    In the past 6 months, have you been bothered by leaking of urine?  No    In general, how would you rate your overall mental or emotional health?  Fair      PHQ-2 Total Score: 0    Additional concerns today:  No    Do you feel safe in your environment? Yes    Have you ever done Advance Care Planning? (For example, a Health Directive, POLST, or a discussion with a medical provider or your loved ones about your wishes): Yes, advance care planning is on file.    Hearing screen not done.  Not done- pt has hearing aids   Fall risk  Fallen 2 or more times in the past year?: Yes  Any fall with injury in the past year?: No      Cognitive Screening   1) Repeat 3 items (Leader, Season, Table)    2) Clock draw: NORMAL  3) 3 item recall: Recalls 2 objects   Results: NORMAL clock, 1-2 items recalled: COGNITIVE IMPAIRMENT LESS LIKELY    Mini-CogTM Copyright S [employer]. Licensed by the author for use in [facility]; reprinted with permission (elba@.[email or web address]). All rights reserved.      Do you " have sleep apnea, excessive snoring or daytime drowsiness?: no    Reviewed and updated as needed this visit by clinical staff   Tobacco  Allergies  Meds                Reviewed and updated as needed this visit by Provider                   Social History     Tobacco Use     Smoking status: Former Smoker     Smokeless tobacco: Never Used   Substance Use Topics     Alcohol use: Not Currently     Comment: rarely         Alcohol Use 8/9/2022   Prescreen: >3 drinks/day or >7 drinks/week? Not Applicable     Current providers sharing in care for this patient include:   Patient Care Team:  Hardik Rock MD as PCP - General (Internal Medicine)  Quintin Lewis MD as Assigned PCP    The following health maintenance items are reviewed in Epic and correct as of today:  Health Maintenance Due   Topic Date Due     ANNUAL REVIEW OF  ORDERS  Never done     ASTHMA ACTION PLAN  Never done     ASTHMA CONTROL TEST  Never done     ADVANCE CARE PLANNING  Never done     HEPATITIS C SCREENING  Never done     LUNG CANCER SCREENING  Never done     AORTIC ANEURYSM SCREENING (SYSTEM ASSIGNED)  Never done     DTAP/TDAP/TD IMMUNIZATION (3 - Td or Tdap) 09/08/2021     MEDICARE ANNUAL WELLNESS VISIT  07/22/2022   Patient complains of left-sided low back pain radiating down his left leg for about a month.  Chiropractic care has been unhelpful.  Patient has had cataracts.  Complains of a scaly lesion on the back of his neck.  We discussed the risks and benefits of continuing zolpidem.          Review of Systems   Constitutional: Negative for chills and fever.   HENT: Positive for congestion and hearing loss. Negative for ear pain and sore throat.    Eyes: Positive for visual disturbance. Negative for pain.   Respiratory: Negative for cough and shortness of breath.    Cardiovascular: Negative for chest pain, palpitations and peripheral edema.   Gastrointestinal: Negative for abdominal pain, constipation, diarrhea, heartburn, hematochezia  "and nausea.   Genitourinary: Positive for impotence. Negative for dysuria, frequency, genital sores, hematuria, penile discharge and urgency.   Musculoskeletal: Positive for arthralgias. Negative for joint swelling and myalgias.   Skin: Negative for rash.   Neurological: Negative for dizziness, weakness, headaches and paresthesias.   Psychiatric/Behavioral: Negative for mood changes. The patient is not nervous/anxious.          OBJECTIVE:   /73 (BP Location: Right arm)   Pulse 65   Temp 97.2  F (36.2  C)   Ht 1.74 m (5' 8.5\")   Wt 92.1 kg (203 lb 1.6 oz)   BMI 30.43 kg/m   Estimated body mass index is 30.43 kg/m  as calculated from the following:    Height as of this encounter: 1.74 m (5' 8.5\").    Weight as of this encounter: 92.1 kg (203 lb 1.6 oz).  Physical Exam  Patient appears comfortable  H&T exam unremarkable  Eyes unremarkable  Neck supple no adenopathy or thyromegaly  Chest clear  Cardiac exam regular no murmur or edema.  Carotid and posterior tibialis pulsations are normal  Abdomen nontender  Neurologic exam patient is alert and oriented, memory intact, speech fluent, cranial nerves normal, strength and gait normal  Mood and affect normal      Diagnostic Test Results:  Labs reviewed in Epic  Recent Results (from the past 720 hour(s))   Basic metabolic panel  (Ca, Cl, CO2, Creat, Gluc, K, Na, BUN)    Collection Time: 08/04/22  8:19 AM   Result Value Ref Range    Creatinine 1.62 (H) 0.67 - 1.17 mg/dL    Sodium 140 136 - 145 mmol/L    Potassium 4.4 3.4 - 5.3 mmol/L    Urea Nitrogen 21.1 8.0 - 23.0 mg/dL    Chloride 107 98 - 107 mmol/L    Carbon Dioxide (CO2) 26 22 - 29 mmol/L    Anion Gap 7 7 - 15 mmol/L    Glucose 95 70 - 99 mg/dL    GFR Estimate 45 (L) >60 mL/min/1.73m2    Calcium 9.3 8.8 - 10.2 mg/dL   Lipid panel reflex to direct LDL Fasting    Collection Time: 08/04/22  8:19 AM   Result Value Ref Range    Cholesterol 150 <200 mg/dL    Triglycerides 126 <150 mg/dL    Direct Measure HDL 39 (L) " >=40 mg/dL    LDL Cholesterol Calculated 86 <=100 mg/dL    Non HDL Cholesterol 111 <130 mg/dL   PSA, screen    Collection Time: 08/04/22  8:19 AM   Result Value Ref Range    Prostate Specific Antigen Screen 1.34 0.00 - 6.50 ng/mL         ASSESSMENT / PLAN:       ICD-10-CM    1. Encounter for Medicare annual wellness exam  Z00.00    2. Need for hepatitis C screening test  Z11.59 Hepatitis C Screen Reflex to HCV RNA Quant and Genotype   3. Encounter for abdominal aortic aneurysm (AAA) screening  Z13.6 US Abdominal Aorta Imaging   4. Chronic kidney disease, stage 3b (H)  N18.32    5. Actinic keratosis  L57.0    6. Acute left-sided low back pain with left-sided sciatica  M54.42    7. DEQUAN on CPAP  G47.33     Z99.89    8. Malignant tumor of prostate (H)  C61    9. Primary insomnia  F51.01 zolpidem (AMBIEN) 5 MG tablet   10. Primary hypertension  I10 metoprolol succinate ER (TOPROL XL) 25 MG 24 hr tablet     losartan (COZAAR) 100 MG tablet     amLODIPine (NORVASC) 10 MG tablet     hydrochlorothiazide (HYDRODIURIL) 25 MG tablet   11. Coronary artery disease involving native coronary artery of native heart without angina pectoris  I25.10    12. 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 Hyperlipidemia LDL goal <100  E78.5 atorvastatin (LIPITOR) 20 MG tablet   15. Coronary artery disease involving native heart, unspecified vessel or lesion type, unspecified whether angina present  I25.10 clopidogrel (PLAVIX) 75 MG tablet     nitroGLYcerin (NITROSTAT) 0.4 MG sublingual tablet   16. BPH associated with nocturia  N40.1 Silodosin (RAPAFLO) 8 MG CAPS capsule    R35.1 finasteride (PROSCAR) 5 MG tablet   17. Gastroesophageal reflux disease with esophagitis without hemorrhage  K21.00 pantoprazole (PROTONIX) 40 MG EC  "tablet   Recommended urology follow-up        COUNSELING:  Reviewed preventive health counseling, as reflected in patient instructions       Consider AAA screening for ages 65-75 and smoking history       Hepatitis C screening       Prostate cancer screening    Estimated body mass index is 30.43 kg/m  as calculated from the following:    Height as of this encounter: 1.74 m (5' 8.5\").    Weight as of this encounter: 92.1 kg (203 lb 1.6 oz).    Weight management plan: Discussed healthy diet and exercise guidelines    He reports that he has quit smoking. He has never used smokeless tobacco.      Appropriate preventive services were discussed with this patient, including applicable screening as appropriate for cardiovascular disease, diabetes, osteopenia/osteoporosis, and glaucoma.  As appropriate for age/gender, discussed screening for colorectal cancer, prostate cancer, breast cancer, and cervical cancer. Checklist reviewing preventive services available has been given to the patient.    Reviewed patients plan of care and provided an AVS. The Basic Care Plan (routine screening as documented in Health Maintenance) for Surendra meets the Care Plan requirement. This Care Plan has been established and reviewed with the Patient.    Counseling Resources:  ATP IV Guidelines  Pooled Cohorts Equation Calculator  Breast Cancer Risk Calculator  Breast Cancer: Medication to Reduce Risk  FRAX Risk Assessment  ICSI Preventive Guidelines  Dietary Guidelines for Americans, 2010  [employer]'s MyPlate  ASA Prophylaxis  Lung CA Screening    Hardik Rock MD  [facility]    Identified Health Risks:  "

## 2022-08-09 NOTE — LETTER
August 10, 2022      Surendra Wallace  [address] [address]        Dear ,    We are writing to inform you of your test results.    Your test results fall within the expected range(s) or remain unchanged from previous results.     Resulted Orders   Hepatitis C Screen Reflex to HCV RNA Quant and Genotype   Result Value Ref Range    Hepatitis C Antibody Nonreactive Nonreactive    Narrative    Assay performance characteristics have not been established for newborns, infants, and children.       If you have any questions or concerns, please call the clinic at the number listed above.       Sincerely,      Hardik Rock MD

## 2022-08-09 NOTE — PROGRESS NOTES
The patient was provided with suggestions to help him develop a healthy physical lifestyle.  The patient was provided with written information regarding signs of hearing loss.  The patient was provided with suggestions to help him develop a healthy emotional lifestyle.  He is at risk for falling and has been provided with information to reduce the risk of falling at home.

## 2022-08-09 NOTE — PATIENT INSTRUCTIONS
Patient Education   Personalized Prevention Plan  You are due for the preventive services outlined below.  Your care team is available to assist you in scheduling these services.  If you have already completed any of these items, please share that information with your care team to update in your medical record.  Health Maintenance Due   Topic Date Due     ANNUAL REVIEW OF HM ORDERS  Never done     Asthma Action Plan - yearly  Never done     Asthma Control Test  Never done     Hepatitis C Screening  Never done     LUNG CANCER SCREENING  Never done     AORTIC ANEURYSM SCREENING (SYSTEM ASSIGNED)  Never done     Diptheria Tetanus Pertussis (DTAP/TDAP/TD) Vaccine (3 - Td or Tdap) 09/08/2021     Your Health Risk Assessment indicates you feel you are not in good health    A healthy lifestyle helps keep the body fit and the mind alert. It helps protect you from disease, helps you fight disease, and helps prevent chronic disease (disease that doesn't go away) from getting worse. This is important as you get older and begin to notice twinges in muscles and joints and a decline in the strength and stamina you once took for granted. A healthy lifestyle includes good healthcare, good nutrition, weight control, recreation, and regular exercise. Avoid harmful substances and do what you can to keep safe. Another part of a healthy lifestyle is stay mentally active and socially involved.    Good healthcare     Have a wellness visit every year.     If you have new symptoms, let us know right away. Don't wait until the next checkup.     Take medicines exactly as prescribed and keep your medicines in a safe place. Tell us if your medicine causes problems.   Healthy diet and weight control     Eat 3 or 4 small, nutritious, low-fat, high-fiber meals a day. Include a variety of fruits, vegetables, and whole-grain foods.     Make sure you get enough calcium in your diet. Calcium, vitamin D, and exercise help prevent osteoporosis (bone  thinning).     If you live alone, try eating with others when you can. That way you get a good meal and have company while you eat it.     Try to keep a healthy weight. If you eat more calories than your body uses for energy, it will be stored as fat and you will gain weight.     Recreation   Recreation is not limited to sports and team events. It includes any activity that provides relaxation, interest, enjoyment, and exercise. Recreation provides an outlet for physical, mental, and social energy. It can give a sense of worth and achievement. It can help you stay healthy.    Mental Exercise and Social Involvement  Mental and emotional health is as important as physical health. Keep in touch with friends and family. Stay as active as possible. Continue to learn and challenge yourself.   Things you can do to stay mentally active are:    Learn something new, like a foreign language or musical instrument.     Play SCRABBLE or do crossword puzzles. If you cannot find people to play these games with you at home, you can play them with others on your computer through the Internet.     Join a games club--anything from card games to chess or checkers or lawn bowling.     Start a new hobby.     Go back to school.     Volunteer.     Read.   Keep up with world events.    Signs of Hearing Loss      Hearing much better with one ear can be a sign of hearing loss.   Hearing loss is a problem shared by many people. In fact, it is one of the most common health problems, particularly as people age. Most people age 65 and older have some hearing loss. By age 80, almost everyone does. Hearing loss often occurs slowly over the years. So you may not realize your hearing has gotten worse.  Have your hearing checked  Call your healthcare provider if you:    Have to strain to hear normal conversation    Have to watch other people s faces very carefully to follow what they re saying    Need to ask people to repeat what they ve said    Often  misunderstand what people are saying    Turn the volume of the television or radio up so high that others complain    Feel that people are mumbling when they re talking to you    Find that the effort to hear leaves you feeling tired and irritated    Notice, when using the phone, that you hear better with one ear than the other  [email or web address] last reviewed this educational content on 1/1/2020 2000-2021 [employer]. All rights reserved. This information is not intended as a substitute for professional medical care. Always follow your healthcare professional's instructions.        Your Health Risk Assessment indicates you feel you are not in good emotional health.    Recreation   Recreation is not limited to sports and team events. It includes any activity that provides relaxation, interest, enjoyment, and exercise. Recreation provides an outlet for physical, mental, and social energy. It can give a sense of worth and achievement. It can help you stay healthy.    Mental Exercise and Social Involvement  Mental and emotional health is as important as physical health. Keep in touch with friends and family. Stay as active as possible. Continue to learn and challenge yourself.   Things you can do to stay mentally active are:    Learn something new, like a foreign language or musical instrument.     Play SCRABBLE or do crossword puzzles. If you cannot find people to play these games with you at home, you can play them with others on your computer through the Internet.     Join a games club--anything from card games to chess or checkers or lawn bowling.     Start a new hobby.     Go back to school.     Volunteer.     Read.   Keep up with world events.    Preventing Falls at Home  A person can fall for many reasons. Older adults may fall because reaction time slows down as we age. Your muscles and joints may get stiff, weak, or less flexible because of illness, medicines, or a physical condition.   Other health  problems that make falls more likely include:     Arthritis    Dizziness or lightheadedness when you stand up (orthostatic hypotension)    History of a stroke    Dizziness    Anemia    Certain medicines taken for mental illness or to control blood pressure.    Problems with balance or gait    Bladder or urinary problems    History of falling    Changes in vision (vision impairment)    Changes in thinking skills and memory (cognitive impairment)  Injuries from a fall can include serious injuries such as broken bones, dislocated joints, internal bleeding and cuts. Injuries like these can limit your independence.   Prevention tips  To help prevent falls and fall-related injuries, follow the tips below.    Floors  To make floors safer:     Put nonskid pads under area rugs.    Remove small rugs.    Replace worn floor coverings.    Tack carpets firmly to each step on carpeted stairs. Put nonskid strips on the edges of uncarpeted stairs.    Keep floors and stairs free of clutter and cords.    Arrange furniture so there are clear pathways.    Clean up any spills right away.  Bathrooms    To make bathrooms safer:     Install grab bars in the tub or shower.    Apply nonskid strips or put a nonskid rubber mat in the tub or shower.    Sit on a bath chair to bathe.    Use bathmats with nonskid backing.  Lighting  To improve visibility in your home:      Keep a flashlight in each room. Or put a lamp next to the bed within easy reach.    Put nightlights in the bedrooms, hallways, kitchen, and bathrooms.    Make sure all stairways have good lighting.    Take your time when going up and down stairs.    Put handrails on both sides of stairs and in walkways for more support. To prevent injury to your wrist or arm, don t use handrails to pull yourself up.    Install grab bars to pull yourself up.    Move or rearrange items that you use often. This will make them easier to find or reach.    Look at your home to find any safety hazards.  Especially look at doorways, walkways, and the driveway. Remove or repair any safety problems that you find.  Other changes to make    Look around to find any safety hazards. Look closely at doorways, walkways, and the driveway. Remove or repair any safety problems that you find.    Wear shoes that fit well.    Take your time when going up and down stairs.    Put handrails on both sides of stairs and in walkways for more support. To prevent injury to your wrist or arm, don t use handrails to pull yourself up.    Install grab bars wherever needed to pull yourself up.    Arrange items that you use often. This will make them easier to find or reach.    [employer] last reviewed this educational content on 3/1/2020    9862-0526 [employer]. All rights reserved. This information is not intended as a substitute for professional medical care. Always follow your healthcare professional's instructions.

## 2022-08-10 ENCOUNTER — TELEPHONE (OUTPATIENT)
Dept: FAMILY MEDICINE | Facility: CLINIC | Age: 72
End: 2022-08-10

## 2022-08-10 DIAGNOSIS — M54.42 ACUTE LEFT-SIDED LOW BACK PAIN WITH LEFT-SIDED SCIATICA: Primary | ICD-10-CM

## 2022-08-10 LAB — HCV AB SERPL QL IA: NONREACTIVE

## 2022-08-10 NOTE — TELEPHONE ENCOUNTER
Patient calling to request a Rx for a sedative when he does the MRI.  MRI is scheduled for 8/22/2022 at [facility].    If this can be prescribed please send to [facility], [place], [place].    Any questions, call pt at [phone]

## 2022-08-11 RX ORDER — ALPRAZOLAM 0.5 MG
TABLET ORAL
Qty: 1 TABLET | Refills: 0 | Status: SHIPPED | OUTPATIENT
Start: 2022-08-11 | End: 2023-03-06

## 2022-08-19 ENCOUNTER — HOSPITAL ENCOUNTER (OUTPATIENT)
Dept: ULTRASOUND IMAGING | Facility: CLINIC | Age: 72
Discharge: HOME | End: 2022-08-19
Attending: INTERNAL MEDICINE | Admitting: INTERNAL MEDICINE
Payer: MEDICARE

## 2022-08-19 DIAGNOSIS — Z13.6 ENCOUNTER FOR ABDOMINAL AORTIC ANEURYSM (AAA) SCREENING: ICD-10-CM

## 2022-08-19 PROCEDURE — 76775 US EXAM ABDO BACK WALL LIM: CPT

## 2022-08-29 ENCOUNTER — TRANSFERRED RECORDS (OUTPATIENT)
Dept: HEALTH INFORMATION MANAGEMENT | Facility: CLINIC | Age: 72
End: 2022-08-29

## 2022-08-29 ENCOUNTER — MEDICAL CORRESPONDENCE (OUTPATIENT)
Dept: HEALTH INFORMATION MANAGEMENT | Facility: CLINIC | Age: 72
End: 2022-08-29

## 2022-09-12 ENCOUNTER — OFFICE VISIT (OUTPATIENT)
Dept: FAMILY MEDICINE | Facility: CLINIC | Age: 72
End: 2022-09-12
Payer: MEDICARE

## 2022-09-12 VITALS
TEMPERATURE: 97.3 F | SYSTOLIC BLOOD PRESSURE: 115 MMHG | WEIGHT: 204.2 LBS | DIASTOLIC BLOOD PRESSURE: 76 MMHG | OXYGEN SATURATION: 98 % | BODY MASS INDEX: 30.6 KG/M2 | HEART RATE: 65 BPM

## 2022-09-12 DIAGNOSIS — J47.9 BRONCHIECTASIS WITHOUT COMPLICATION (H): ICD-10-CM

## 2022-09-12 DIAGNOSIS — M25.552 HIP PAIN, LEFT: ICD-10-CM

## 2022-09-12 DIAGNOSIS — G47.33 OBSTRUCTIVE SLEEP APNEA SYNDROME, MILD: ICD-10-CM

## 2022-09-12 DIAGNOSIS — C61 MALIGNANT TUMOR OF PROSTATE (H): ICD-10-CM

## 2022-09-12 DIAGNOSIS — N18.32 CHRONIC KIDNEY DISEASE, STAGE 3B (H): ICD-10-CM

## 2022-09-12 DIAGNOSIS — D64.9 ANEMIA, UNSPECIFIED TYPE: ICD-10-CM

## 2022-09-12 DIAGNOSIS — G47.01 INSOMNIA DUE TO MEDICAL CONDITION: ICD-10-CM

## 2022-09-12 DIAGNOSIS — M54.42 ACUTE LEFT-SIDED LOW BACK PAIN WITH LEFT-SIDED SCIATICA: Primary | ICD-10-CM

## 2022-09-12 PROBLEM — N52.9 ERECTILE DYSFUNCTION: Status: ACTIVE | Noted: 2022-08-29

## 2022-09-12 LAB
ALBUMIN UR-MCNC: NEGATIVE MG/DL
APPEARANCE UR: CLEAR
BILIRUB UR QL STRIP: NEGATIVE
COLOR UR AUTO: YELLOW
GLUCOSE UR STRIP-MCNC: NEGATIVE MG/DL
HGB BLD-MCNC: 12.5 G/DL (ref 13.3–17.7)
HGB UR QL STRIP: NEGATIVE
KETONES UR STRIP-MCNC: NEGATIVE MG/DL
LEUKOCYTE ESTERASE UR QL STRIP: NEGATIVE
NITRATE UR QL: NEGATIVE
PH UR STRIP: 5.5 [PH] (ref 5–7)
PHOSPHATE SERPL-MCNC: 3.7 MG/DL (ref 2.5–4.5)
PTH-INTACT SERPL-MCNC: 58 PG/ML (ref 15–65)
SP GR UR STRIP: <=1.005 (ref 1–1.03)
UROBILINOGEN UR STRIP-ACNC: 0.2 E.U./DL

## 2022-09-12 PROCEDURE — 82043 UR ALBUMIN QUANTITATIVE: CPT | Performed by: INTERNAL MEDICINE

## 2022-09-12 PROCEDURE — 82607 VITAMIN B-12: CPT | Performed by: INTERNAL MEDICINE

## 2022-09-12 PROCEDURE — 99214 OFFICE O/P EST MOD 30 MIN: CPT | Mod: 25 | Performed by: INTERNAL MEDICINE

## 2022-09-12 PROCEDURE — 81003 URINALYSIS AUTO W/O SCOPE: CPT | Mod: QW | Performed by: INTERNAL MEDICINE

## 2022-09-12 PROCEDURE — 82728 ASSAY OF FERRITIN: CPT | Performed by: INTERNAL MEDICINE

## 2022-09-12 PROCEDURE — 85045 AUTOMATED RETICULOCYTE COUNT: CPT | Performed by: INTERNAL MEDICINE

## 2022-09-12 PROCEDURE — G0008 ADMIN INFLUENZA VIRUS VAC: HCPCS | Performed by: INTERNAL MEDICINE

## 2022-09-12 PROCEDURE — 83970 ASSAY OF PARATHORMONE: CPT | Performed by: INTERNAL MEDICINE

## 2022-09-12 PROCEDURE — 36415 COLL VENOUS BLD VENIPUNCTURE: CPT | Performed by: INTERNAL MEDICINE

## 2022-09-12 PROCEDURE — 84100 ASSAY OF PHOSPHORUS: CPT | Performed by: INTERNAL MEDICINE

## 2022-09-12 PROCEDURE — 90662 IIV NO PRSV INCREASED AG IM: CPT | Performed by: INTERNAL MEDICINE

## 2022-09-12 PROCEDURE — 85027 COMPLETE CBC AUTOMATED: CPT | Performed by: INTERNAL MEDICINE

## 2022-09-12 ASSESSMENT — ENCOUNTER SYMPTOMS
SLEEP DISTURBANCE: 1
BACK PAIN: 1
COUGH: 0
ARTHRALGIAS: 1
UNEXPECTED WEIGHT CHANGE: 0
NUMBNESS: 0
FATIGUE: 0
SHORTNESS OF BREATH: 0
PARESTHESIAS: 1
FEVER: 0
JOINT SWELLING: 0
DIFFICULTY URINATING: 1
WEAKNESS: 0
ABDOMINAL PAIN: 0

## 2022-09-12 ASSESSMENT — ASTHMA QUESTIONNAIRES
ACT_TOTALSCORE: 22
QUESTION_3 LAST FOUR WEEKS HOW OFTEN DID YOUR ASTHMA SYMPTOMS (WHEEZING, COUGHING, SHORTNESS OF BREATH, CHEST TIGHTNESS OR PAIN) WAKE YOU UP AT NIGHT OR EARLIER THAN USUAL IN THE MORNING: ONCE OR TWICE
QUESTION_4 LAST FOUR WEEKS HOW OFTEN HAVE YOU USED YOUR RESCUE INHALER OR NEBULIZER MEDICATION (SUCH AS ALBUTEROL): ONCE A WEEK OR LESS
ACT_TOTALSCORE: 22
QUESTION_5 LAST FOUR WEEKS HOW WOULD YOU RATE YOUR ASTHMA CONTROL: WELL CONTROLLED
QUESTION_1 LAST FOUR WEEKS HOW MUCH OF THE TIME DID YOUR ASTHMA KEEP YOU FROM GETTING AS MUCH DONE AT WORK, SCHOOL OR AT HOME: NONE OF THE TIME
QUESTION_2 LAST FOUR WEEKS HOW OFTEN HAVE YOU HAD SHORTNESS OF BREATH: NOT AT ALL

## 2022-09-12 NOTE — PROGRESS NOTES
Assessment & Plan     Acute left-sided low back pain with left-sided sciatica  Symptoms are a bit better.  Rare radiation down the leg.  No weakness or red flag symptoms.  MRI without concerning findings and in fact more findings on the right than the left side    Bronchiectasis without complication (H)  Stable    Malignant tumor of prostate (H)  Is just seen urology    Chronic kidney disease, stage 3b (H)  Stable.  Blood pressure controlled with medication.  - Albumin Random Urine Quantitative with Creat Ratio; Future  - Parathyroid Hormone Intact; Future  - Phosphorus; Future  - Hemoglobin; Future  - UA reflex to Microscopic - lab collect; Future    Hip pain, left  Patient is concerned there is an element of arthritis in the left hip.  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  - XR Hip Left 2-3 Views; Future    Insomnia due to medical condition  Patient with chronic insomnia.  He tells me that he slept better when on 10 mg rather than 5 mg of zolpidem.  Recommended referral for CBT-I.  - Adult Mental Health  Referral; Future    Obstructive sleep apnea syndrome, mild  Patient had mild sleep apnea with AHI of 8.3 on a polysomnogram done in 2007.  There was a prolonged sleep latency.    Asthma  ACT 22.  Action plan completed.  Patient has a trivial smoking history as well as bronchiectasis and history of vocal cord dysfunction.  He does feel like the asthma treatment is beneficial.                 No follow-ups on file.    Hardik Rock MD  [facility]    Niesha Agosto is a 71 year old accompanied by his self, presenting for the following health issues:  Follow Up (MRI of L hip and lower back ), Knee Pain (Bilateral knee pain/ arthritis pain ), and Medication Request (Wanting to change Ambien back to 10mg  )      History of Present Illness     Asthma:  He presents for follow up of asthma.  He has no cough, no wheezing, and some shortness of breath. He is  using a relief medication a few times a month. He does not miss any doses of his controller medication throughout the week.Patient is aware of the following triggers: animal dander, dust mites and humidity. The patient has not had a visit to the Emergency Room, Urgent Care or Hospital due to asthma since the last clinic visit.     Back Pain:  He presents for follow up of back pain. Patient's back pain is a recurring problem.  Location of back pain:  Left lower back  Description of back pain: dull ache  Back pain spreads: nowhere    Since patient first noticed back pain, pain is: unchanged  Does back pain interfere with his job:  Not applicable      Hypertension: He presents for follow up of hypertension.  He does check blood pressure  regularly outside of the clinic. Outpatient blood pressures have not been over 140/90. He follows a low salt diet.      ACT Total Scores 9/12/2022   ACT TOTAL SCORE (Goal Greater than or Equal to 20) 22   In the past 12 months, how many times did you visit the emergency room for your asthma without being admitted to the hospital? 0   In the past 12 months, how many times were you hospitalized overnight because of your asthma? 0         She feels the left back and buttocks pain radiating down the left leg intermittently not all the way to the foot has improved.  Concerned about the possibility of osteoarthritis in the left hip.  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  He has a history of vocal cord dysfunction as result of surgery.  Minimal smoking history.  Is helped by Advair and rare albuterol use.  Complains of insomnia.  Sleep study in 2007 revealed mild sleep apnea but he did not tolerate nor benefit from CPAP.  Dr. Chris decreased his zolpidem from 10 to 5 mg and he feels like he does not sleep as well.  Trazodone was unhelpful.    Review of Systems   Constitutional: Negative for fatigue, fever and unexpected  weight change.   Respiratory: Negative for cough and shortness of breath.    Cardiovascular: Negative for chest pain and peripheral edema.   Gastrointestinal: Negative for abdominal pain.   Genitourinary: Positive for difficulty urinating.   Musculoskeletal: Positive for arthralgias and back pain. Negative for joint swelling.   Neurological: Positive for paresthesias. Negative for weakness and numbness.   Psychiatric/Behavioral: Positive for sleep disturbance.            Objective    /76 (BP Location: Right arm)   Pulse 65   Temp 97.3  F (36.3  C)   Wt 92.6 kg (204 lb 3.2 oz)   SpO2 98%   BMI 30.60 kg/m    Body mass index is 30.6 kg/m .  Physical Exam   Patient appears comfortable.  Alert and oriented.  Chest clear.  Back and hips nontender.  Strength intact in the legs.  Gait normal.

## 2022-09-12 NOTE — LETTER
My Asthma Action Plan    Name: Surendra Wallace   YOB: 1950  Date: 9/12/2022   My doctor: Hardik Rock MD   My clinic: [facility]        My Control Medicine: Fluticasone propionate + salmeterol (Advair Diskus or Wixela Inhub) -  250/50 mcg twice daily  My Rescue Medicine: Albuterol (Proair/Ventolin/Proventil HFA) 2-4 puffs EVERY 4 HOURS as needed. Use a spacer if recommended by your provider.  My Oral Steroid Medicine: none My Asthma Severity:   Mild Persistent  Know your asthma triggers: animal dander               GREEN ZONE   Good Control    I feel good    No cough or wheeze    Can work, sleep and play without asthma symptoms       Take your asthma control medicine every day.     1. If exercise triggers your asthma, take your rescue medication    15 minutes before exercise or sports, and    During exercise if you have asthma symptoms  2. Spacer to use with inhaler: If you have a spacer, make sure to use it with your inhaler             YELLOW ZONE Getting Worse  I have ANY of these:    I do not feel good    Cough or wheeze    Chest feels tight    Wake up at night   1. Keep taking your Green Zone medications  2. Start taking your rescue medicine:    every 20 minutes for up to 1 hour. Then every 4 hours for 24-48 hours.  3. If you stay in the Yellow Zone for more than 12-24 hours, contact your doctor.  4. If you do not return to the Green Zone in 12-24 hours or you get worse, start taking your oral steroid medicine if prescribed by your provider.           RED ZONE Medical Alert - Get Help  I have ANY of these:    I feel awful    Medicine is not helping    Breathing getting harder    Trouble walking or talking    Nose opens wide to breathe       1. Take your rescue medicine NOW  2. If your provider has prescribed an oral steroid medicine, start taking it NOW  3. Call your doctor NOW  4. If you are still in the Red Zone after 20 minutes and you have not reached your  doctor:    Take your rescue medicine again and    Call 911 or go to the emergency room right away    See your regular doctor within 2 weeks of an Emergency Room or Urgent Care visit for follow-up treatment.          Annual Reminders:  Meet with Asthma Educator,  Flu Shot in the Fall, consider Pneumonia Vaccination for patients with asthma (aged 19 and older).    Pharmacy: [facility] [address] [place] [address]    Electronically signed by Hardik Rock MD   Date: 09/12/22                      Asthma Triggers  How To Control Things That Make Your Asthma Worse    Triggers are things that make your asthma worse.  Look at the list below to help you find your triggers and what you can do about them.  You can help prevent asthma flare-ups by staying away from your triggers.      Trigger                                                          What you can do   Cigarette Smoke  Tobacco smoke can make asthma worse. Do not allow smoking in your home, car or around you.  Be sure no one smokes at a child s day care or school.  If you smoke, ask your health care provider for ways to help you quit.  Ask family members to quit too.  Ask your health care provider for a referral to Quit Plan to help you quit smoking, or call 5-394-843-PLAN.     Colds, Flu, Bronchitis  These are common triggers of asthma. Wash your hands often.  Don t touch your eyes, nose or mouth.  Get a flu shot every year.     Dust Mites  These are tiny bugs that live in cloth or carpet. They are too small to see. Wash sheets and blankets in hot water every week.   Encase pillows and mattress in dust mite proof covers.  Avoid having carpet if you can. If you have carpet, vacuum weekly.   Use a dust mask and HEPA vacuum.   Pollen and Outdoor Mold  Some people are allergic to trees, grass, or weed pollen, or molds. Try to keep your windows closed.  Limit time out doors when pollen count is high.   Ask you health care provider about taking  medicine during allergy season.     Animal Dander  Some people are allergic to skin flakes, urine or saliva from pets with fur or feathers. Keep pets with fur or feathers out of your home.    If you can t keep the pet outdoors, then keep the pet out of your bedroom.  Keep the bedroom door closed.  Keep pets off cloth furniture and away from stuffed toys.     Mice, Rats, and Cockroaches   Some people are allergic to the waste from these pests.   Cover food and garbage.  Clean up spills and food crumbs.  Store grease in the refrigerator.   Keep food out of the bedroom.   Indoor Mold  This can be a trigger if your home has high moisture. Fix leaking faucets, pipes, or other sources of water.   Clean moldy surfaces.  Dehumidify basement if it is damp and smelly.   Smoke, Strong Odors, and Sprays  These can reduce air quality. Stay away from strong odors and sprays, such as perfume, powder, hair spray, paints, smoke incense, paint, cleaning products, candles and new carpet.   Exercise or Sports  Some people with asthma have this trigger. Be active!  Ask your doctor about taking medicine before sports or exercise to prevent symptoms.    Warm up for 5-10 minutes before and after sports or exercise.     Other Triggers of Asthma  Cold air:  Cover your nose and mouth with a scarf.  Sometimes laughing or crying can be a trigger.  Some medicines and food can trigger asthma.

## 2022-09-12 NOTE — LETTER
September 15, 2022      Surendra Wallace  [address] [facility] [address]        Dear ,    We are writing to inform you of your test results.    Chronic anemia is stable and I suspect due to chronic kidney disease.  No B12 or folate deficiency.       Resulted Orders   Albumin Random Urine Quantitative with Creat Ratio   Result Value Ref Range    Albumin Urine mg/L <12.0 mg/L      Comment:      The reference ranges have not been established in urine albumin. The results should be integrated into the clinical context for interpretation.    Albumin Urine mg/g Cr        Comment:      Unable to calculate, urine albumin and/or urine creatinine is outside detectable limits.  Microalbuminuria is defined as an albumin:creatinine ratio of 17 to 299 for males and 25 to 299 for females. A ratio of albumin:creatinine of 300 or higher is indicative of overt proteinuria.  Due to biologic variability, positive results should be confirmed by a second, first-morning random or 24-hour timed urine specimen. If there is discrepancy, a third specimen is recommended. When 2 out of 3 results are in the microalbuminuria range, this is evidence for incipient nephropathy and warrants increased efforts at glucose control, blood pressure control, and institution of therapy with an angiotensin-converting-enzyme (ACE) inhibitor (if the patient can tolerate it).      Creatinine Urine mg/dL 56.3 mg/dL      Comment:      The reference ranges have not been established in urine creatinine. The results should be integrated into the clinical context for interpretation.   Parathyroid Hormone Intact   Result Value Ref Range    Parathyroid Hormone Intact 58 15 - 65 pg/mL    Narrative    This result was obtained with the Roche Elecsys PTH STAT assay.   This reference range differs from PTH assays used in other [facility] laboratories.   Phosphorus   Result Value Ref Range    Phosphorus 3.7 2.5 - 4.5 mg/dL   Hemoglobin   Result Value  Ref Range    Hemoglobin 12.5 (L) 13.3 - 17.7 g/dL   UA reflex to Microscopic - lab collect   Result Value Ref Range    Color Urine Yellow Colorless, Straw, Light Yellow, Yellow    Appearance Urine Clear Clear    Glucose Urine Negative Negative mg/dL    Bilirubin Urine Negative Negative    Ketones Urine Negative Negative mg/dL    Specific Gravity Urine <=1.005 1.003 - 1.035    Blood Urine Negative Negative    pH Urine 5.5 5.0 - 7.0    Protein Albumin Urine Negative Negative mg/dL    Urobilinogen Urine 0.2 0.2, 1.0 E.U./dL    Nitrite Urine Negative Negative    Leukocyte Esterase Urine Negative Negative    Narrative    Microscopic not indicated   Ferritin   Result Value Ref Range    Ferritin 118 31 - 409 ng/mL   Vitamin B12   Result Value Ref Range    Vitamin B12 509 232 - 1,245 pg/mL   CBC with platelets   Result Value Ref Range    WBC Count 7.0 4.0 - 11.0 10e3/uL    RBC Count 3.84 (L) 4.40 - 5.90 10e6/uL    Hemoglobin 12.7 (L) 13.3 - 17.7 g/dL    Hematocrit 38.0 (L) 40.0 - 53.0 %    MCV 99 78 - 100 fL    MCH 33.1 (H) 26.5 - 33.0 pg    MCHC 33.4 31.5 - 36.5 g/dL    RDW 13.2 10.0 - 15.0 %    Platelet Count 249 150 - 450 10e3/uL   Reticulocyte count   Result Value Ref Range    % Reticulocyte 1.5 0.5 - 2.0 %    Absolute Reticulocyte 0.057 0.025 - 0.095 10e6/uL       If you have any questions or concerns, please call the clinic at the number listed above.       Sincerely,      Hardik Rock MD

## 2022-09-12 NOTE — LETTER
September 13, 2022      Surendra Wallace  [address] [facility] [address]        Dear ,    We are writing to inform you of your test results.    Lab work shows that you are anemic.  I have added some additional tests to the blood we will be in touch with results.      Resulted Orders   Parathyroid Hormone Intact   Result Value Ref Range    Parathyroid Hormone Intact 58 15 - 65 pg/mL    Narrative    This result was obtained with the Roche Elecsys PTH STAT assay.   This reference range differs from PTH assays used in other [facility] laboratories.   Phosphorus   Result Value Ref Range    Phosphorus 3.7 2.5 - 4.5 mg/dL   Hemoglobin   Result Value Ref Range    Hemoglobin 12.5 (L) 13.3 - 17.7 g/dL   UA reflex to Microscopic - lab collect   Result Value Ref Range    Color Urine Yellow Colorless, Straw, Light Yellow, Yellow    Appearance Urine Clear Clear    Glucose Urine Negative Negative mg/dL    Bilirubin Urine Negative Negative    Ketones Urine Negative Negative mg/dL    Specific Gravity Urine <=1.005 1.003 - 1.035    Blood Urine Negative Negative    pH Urine 5.5 5.0 - 7.0    Protein Albumin Urine Negative Negative mg/dL    Urobilinogen Urine 0.2 0.2, 1.0 E.U./dL    Nitrite Urine Negative Negative    Leukocyte Esterase Urine Negative Negative    Narrative    Microscopic not indicated       If you have any questions or concerns, please call the clinic at the number listed above.       Sincerely,      Hardik Rock MD

## 2022-09-13 DIAGNOSIS — D64.9 ANEMIA, UNSPECIFIED TYPE: Primary | ICD-10-CM

## 2022-09-13 LAB
CREAT UR-MCNC: 56.3 MG/DL
ERYTHROCYTE [DISTWIDTH] IN BLOOD BY AUTOMATED COUNT: 13.2 % (ref 10–15)
FERRITIN SERPL-MCNC: 118 NG/ML (ref 31–409)
HCT VFR BLD AUTO: 38 % (ref 40–53)
HGB BLD-MCNC: 12.7 G/DL (ref 13.3–17.7)
MCH RBC QN AUTO: 33.1 PG (ref 26.5–33)
MCHC RBC AUTO-ENTMCNC: 33.4 G/DL (ref 31.5–36.5)
MCV RBC AUTO: 99 FL (ref 78–100)
MICROALBUMIN UR-MCNC: <12 MG/L
MICROALBUMIN/CREAT UR: NORMAL MG/G{CREAT}
PLATELET # BLD AUTO: 249 10E3/UL (ref 150–450)
RBC # BLD AUTO: 3.84 10E6/UL (ref 4.4–5.9)
VIT B12 SERPL-MCNC: 509 PG/ML (ref 232–1245)
WBC # BLD AUTO: 7 10E3/UL (ref 4–11)

## 2022-09-14 LAB
RETICS # AUTO: 0.06 10E6/UL (ref 0.03–0.1)
RETICS/RBC NFR AUTO: 1.5 % (ref 0.5–2)

## 2022-10-14 ENCOUNTER — NURSE TRIAGE (OUTPATIENT)
Dept: FAMILY MEDICINE | Facility: CLINIC | Age: 72
End: 2022-10-14

## 2022-10-14 ENCOUNTER — ANCILLARY PROCEDURE (OUTPATIENT)
Dept: GENERAL RADIOLOGY | Facility: CLINIC | Age: 72
End: 2022-10-14
Attending: INTERNAL MEDICINE
Payer: MEDICARE

## 2022-10-14 ENCOUNTER — OFFICE VISIT (OUTPATIENT)
Dept: FAMILY MEDICINE | Facility: CLINIC | Age: 72
End: 2022-10-14
Payer: MEDICARE

## 2022-10-14 VITALS
DIASTOLIC BLOOD PRESSURE: 73 MMHG | BODY MASS INDEX: 30.91 KG/M2 | WEIGHT: 206.3 LBS | HEART RATE: 83 BPM | SYSTOLIC BLOOD PRESSURE: 109 MMHG | TEMPERATURE: 97.1 F

## 2022-10-14 DIAGNOSIS — M25.552 HIP PAIN, LEFT: ICD-10-CM

## 2022-10-14 DIAGNOSIS — M25.551 RIGHT HIP PAIN: Primary | ICD-10-CM

## 2022-10-14 PROCEDURE — 73502 X-RAY EXAM HIP UNI 2-3 VIEWS: CPT | Mod: TC | Performed by: RADIOLOGY

## 2022-10-14 PROCEDURE — 99213 OFFICE O/P EST LOW 20 MIN: CPT | Performed by: INTERNAL MEDICINE

## 2022-10-14 RX ORDER — OLOPATADINE HYDROCHLORIDE 1 MG/ML
SOLUTION/ DROPS OPHTHALMIC
COMMUNITY

## 2022-10-14 RX ORDER — KETOROLAC TROMETHAMINE 5 MG/ML
1 SOLUTION OPHTHALMIC
COMMUNITY
Start: 2022-11-05 | End: 2023-03-06

## 2022-10-14 RX ORDER — PREDNISOLONE ACETATE 10 MG/ML
1 SUSPENSION/ DROPS OPHTHALMIC
COMMUNITY
Start: 2022-11-05 | End: 2023-03-06

## 2022-10-14 RX ORDER — CLOBETASOL PROPIONATE 0.5 MG/G
CREAM TOPICAL
COMMUNITY
End: 2023-05-03

## 2022-10-14 RX ORDER — MOXIFLOXACIN 5 MG/ML
1 SOLUTION/ DROPS OPHTHALMIC
COMMUNITY
Start: 2022-11-05 | End: 2023-03-06

## 2022-10-14 ASSESSMENT — ENCOUNTER SYMPTOMS
BACK PAIN: 1
CHILLS: 0
PARESTHESIAS: 0
WEAKNESS: 0
UNEXPECTED WEIGHT CHANGE: 0
FEVER: 0

## 2022-10-14 NOTE — TELEPHONE ENCOUNTER
"    Reason for Disposition    Patient wants to be seen    Additional Information    Negative: Looks like a broken bone or dislocated joint (e.g., crooked or deformed)    Negative: Sounds like a life-threatening emergency to the triager    Negative: Followed a hip injury    Negative: Leg pain is main symptom    Negative: Back pain radiating (shooting) into hip    Negative: SEVERE pain (e.g., excruciating, unable to do any normal activities) and fever    Negative: Can't stand (bear weight) or walk    Negative: Fever and red area (or area very tender to touch)    Negative: Patient sounds very sick or weak to the triager    Negative: Painful rash with multiple small blisters grouped together (i.e., dermatomal distribution or 'band' or 'stripe')    Negative: Looks like a boil, infected sore, deep ulcer, or other infected rash (spreading redness, pus)    Negative: Localized rash is very painful (no fever)    Negative: SEVERE pain (e.g., excruciating, unable to do any normal activities)    Negative: Red area or streak > 2 inches (or 5 cm)    Answer Assessment - Initial Assessment Questions  1. LOCATION and RADIATION: \"Where is the pain located?\"       Right hip and groin  2. QUALITY: \"What does the pain feel like?\"  (e.g., sharp, dull, aching, burning)      sharp  3. SEVERITY: \"How bad is the pain?\" \"What does it keep you from doing?\"   (Scale 1-10; or mild, moderate, severe)    -  MILD (1-3): doesn't interfere with normal activities     -  MODERATE (4-7): interferes with normal activities (e.g., work or school) or awakens from sleep, limping     -  SEVERE (8-10): excruciating pain, unable to do any normal activities, unable to walk      Moderate; pt stated he cannot bend over to put socks on  4. ONSET: \"When did the pain start?\" \"Does it come and go, or is it there all the time?\"      This morning  5. WORK OR EXERCISE: \"Has there been any recent work or exercise that involved this part of the body?\"       No falls or " "injury  6. CAUSE: \"What do you think is causing the hip pain?\"       Hip replacement, blood clot?  7. AGGRAVATING FACTORS: \"What makes the hip pain worse?\" (e.g., walking, climbing stairs, running)      Bending; pt can stand and walk  8. OTHER SYMPTOMS: \"Do you have any other symptoms?\" (e.g., back pain, pain shooting down leg,  fever, rash)      No history of DVT, no breathing difficult, no change in bruising/bleeding    Protocols used: HIP PAIN-A-OH      "

## 2022-10-14 NOTE — PROGRESS NOTES
votar  Assessment & Plan     Right hip Pain  Atraumatic pain of left replaced hip.  Exam reassuring and x-ray negative.  -Ortho referral  -Topical Voltaren gel                 No follow-ups on file.    Hardik Rock MD  [facility]    Niesha Agosto is a 71 year old accompanied by his self, presenting for the following health issues:  Groin Pain (Started yesterday )      HPI   Patient awoke 10/13/2022 with right anterior groin pain.  Worse with movement.  No falls or trauma.  Able to ambulate.  Hip was replaced and then revised 6 days later in April 2018      Review of Systems   Constitutional: Negative for chills, fever and unexpected weight change.   Musculoskeletal: Positive for back pain.   Neurological: Negative for weakness and paresthesias.            Objective    /73 (BP Location: Right arm)   Pulse 83   Temp 97.1  F (36.2  C)   Wt 93.6 kg (206 lb 4.8 oz)   BMI 30.91 kg/m    Body mass index is 30.91 kg/m .  Physical Exam   Patient appears comfortable and in no distress.  Alert and oriented.  Genitourinary exam reveals no hernia or scrotal abnormality.  Right hip seems tender at the ligamentous insertions rather than the hip itself.  No warmth or erythema.  Range of motion is good.  Patient is able to walk and bear weight.    Right hip x-ray negative

## 2022-11-30 ENCOUNTER — OFFICE VISIT (OUTPATIENT)
Dept: FAMILY MEDICINE | Facility: CLINIC | Age: 72
End: 2022-11-30
Payer: MEDICARE

## 2022-11-30 VITALS
WEIGHT: 206 LBS | SYSTOLIC BLOOD PRESSURE: 123 MMHG | DIASTOLIC BLOOD PRESSURE: 81 MMHG | HEIGHT: 69 IN | TEMPERATURE: 97.8 F | BODY MASS INDEX: 30.51 KG/M2 | HEART RATE: 75 BPM

## 2022-11-30 DIAGNOSIS — R10.32 LLQ ABDOMINAL PAIN: Primary | ICD-10-CM

## 2022-11-30 LAB
BASOPHILS # BLD AUTO: 0 10E3/UL (ref 0–0.2)
BASOPHILS NFR BLD AUTO: 1 %
EOSINOPHIL # BLD AUTO: 0.4 10E3/UL (ref 0–0.7)
EOSINOPHIL NFR BLD AUTO: 7 %
ERYTHROCYTE [DISTWIDTH] IN BLOOD BY AUTOMATED COUNT: 13.6 % (ref 10–15)
HCT VFR BLD AUTO: 39.7 % (ref 40–53)
HGB BLD-MCNC: 13.2 G/DL (ref 13.3–17.7)
IMM GRANULOCYTES # BLD: 0 10E3/UL
IMM GRANULOCYTES NFR BLD: 0 %
LYMPHOCYTES # BLD AUTO: 1 10E3/UL (ref 0.8–5.3)
LYMPHOCYTES NFR BLD AUTO: 19 %
MCH RBC QN AUTO: 32.9 PG (ref 26.5–33)
MCHC RBC AUTO-ENTMCNC: 33.2 G/DL (ref 31.5–36.5)
MCV RBC AUTO: 99 FL (ref 78–100)
MONOCYTES # BLD AUTO: 0.5 10E3/UL (ref 0–1.3)
MONOCYTES NFR BLD AUTO: 9 %
NEUTROPHILS # BLD AUTO: 3.4 10E3/UL (ref 1.6–8.3)
NEUTROPHILS NFR BLD AUTO: 63 %
PLATELET # BLD AUTO: 259 10E3/UL (ref 150–450)
RBC # BLD AUTO: 4.01 10E6/UL (ref 4.4–5.9)
WBC # BLD AUTO: 5.3 10E3/UL (ref 4–11)

## 2022-11-30 PROCEDURE — 85025 COMPLETE CBC W/AUTO DIFF WBC: CPT | Mod: QW | Performed by: PHYSICIAN ASSISTANT

## 2022-11-30 PROCEDURE — 99213 OFFICE O/P EST LOW 20 MIN: CPT | Performed by: PHYSICIAN ASSISTANT

## 2022-11-30 PROCEDURE — 36415 COLL VENOUS BLD VENIPUNCTURE: CPT | Performed by: PHYSICIAN ASSISTANT

## 2022-11-30 ASSESSMENT — ENCOUNTER SYMPTOMS
HEMATOCHEZIA: 0
FEVER: 1
NAUSEA: 0
VOMITING: 0
DIARRHEA: 0
ABDOMINAL PAIN: 1
CONSTIPATION: 1

## 2022-11-30 NOTE — PROGRESS NOTES
Assessment & Plan     LLQ abdominal pain  CBC essentially normal. CT unremarkable. No new hernia. No diverticulitis.  Will push fluids and bulk. Follow-up if pain persists or worsens develops fevers chills nausea or other concerns  - CBC with Platelets & Differential  - CBC with Platelets & Differential  - CT Abdomen Pelvis w Contrast                   No follow-ups on file.    KIMBERLI Donovan [facility] [place]    Niesha Agosto is a 71 year old, presenting for the following health issues:  Gastrointestinal Problem (Pt c/o history of diverticulitis and has been having left lower quadrant pain the past 3-4 weeks-wondering about possible hernia or diverticulitis flare up. Still having regular BM's but maybe not normal amount.  )      71-year-old male presents to the clinic with left lower quadrant abdominal pain  He has not had any diarrhea  He feels somewhat constipated although he is having bowel movements  No melena or hematochezia  He has had a mild low-grade temperature  Has had symptoms for 3 to 4 weeks  It is just not improved  Occasionally the pain will wake him at night but typically he is able to sleep  Is having no urinary symptoms  He has a known umbilical hernia and has had that for a number of years  He said no other abdominal procedures  He has not had a rash  Has a history of diverticulitis    History of Present Illness       Reason for visit:  Bowel slowness  Symptom onset:  3-7 days ago  Symptom intensity:  Moderate  Symptom progression:  Worsening  Had these symptoms before:  No  What makes it better:  No    He eats 2-3 servings of fruits and vegetables daily.He consumes 0 sweetened beverage(s) daily.He exercises with enough effort to increase his heart rate 20 to 29 minutes per day.  He exercises with enough effort to increase his heart rate 5 days per week.   He is taking medications regularly.             Review of Systems   Constitutional: Positive for fever.  "  Gastrointestinal: Positive for abdominal pain and constipation. Negative for diarrhea, hematochezia, nausea and vomiting.   Genitourinary: Negative.             Objective    /81 (BP Location: Right arm, Patient Position: Sitting, Cuff Size: Adult Large)   Pulse 75   Temp 97.8  F (36.6  C)   Ht 1.74 m (5' 8.5\")   Wt 93.4 kg (206 lb)   BMI 30.87 kg/m    Body mass index is 30.87 kg/m .  Physical Exam alert attentive no acute distress  Lungs clear well ventilated  Cardiovascular regular rate and rhythm  Abdomen is rotund he has normal active bowel sounds he has reducible umbilical hernia he has some mild tenderness in the left lower quadrant no guarding no rebound there is no rash    Results for orders placed or performed in visit on 11/30/22   CBC with platelets and differential     Status: Abnormal   Result Value Ref Range    WBC Count 5.3 4.0 - 11.0 10e3/uL    RBC Count 4.01 (L) 4.40 - 5.90 10e6/uL    Hemoglobin 13.2 (L) 13.3 - 17.7 g/dL    Hematocrit 39.7 (L) 40.0 - 53.0 %    MCV 99 78 - 100 fL    MCH 32.9 26.5 - 33.0 pg    MCHC 33.2 31.5 - 36.5 g/dL    RDW 13.6 10.0 - 15.0 %    Platelet Count 259 150 - 450 10e3/uL    % Neutrophils 63 %    % Lymphocytes 19 %    % Monocytes 9 %    % Eosinophils 7 %    % Basophils 1 %    % Immature Granulocytes 0 %    Absolute Neutrophils 3.4 1.6 - 8.3 10e3/uL    Absolute Lymphocytes 1.0 0.8 - 5.3 10e3/uL    Absolute Monocytes 0.5 0.0 - 1.3 10e3/uL    Absolute Eosinophils 0.4 0.0 - 0.7 10e3/uL    Absolute Basophils 0.0 0.0 - 0.2 10e3/uL    Absolute Immature Granulocytes 0.0 <=0.4 10e3/uL   CBC with Platelets & Differential     Status: Abnormal    Narrative    The following orders were created for panel order CBC with Platelets & Differential.  Procedure                               Abnormality         Status                     ---------                               -----------         ------                     CBC with platelets and d...[[id number]]  Abnormal           "  Final result                 Please view results for these tests on the individual orders.

## 2023-03-06 ENCOUNTER — ANCILLARY PROCEDURE (OUTPATIENT)
Dept: GENERAL RADIOLOGY | Facility: CLINIC | Age: 73
End: 2023-03-06
Payer: MEDICARE

## 2023-03-06 ENCOUNTER — OFFICE VISIT (OUTPATIENT)
Dept: FAMILY MEDICINE | Facility: CLINIC | Age: 73
End: 2023-03-06
Payer: MEDICARE

## 2023-03-06 VITALS
WEIGHT: 207 LBS | RESPIRATION RATE: 16 BRPM | TEMPERATURE: 96 F | BODY MASS INDEX: 30.66 KG/M2 | HEART RATE: 72 BPM | OXYGEN SATURATION: 97 % | DIASTOLIC BLOOD PRESSURE: 80 MMHG | HEIGHT: 69 IN | SYSTOLIC BLOOD PRESSURE: 120 MMHG

## 2023-03-06 DIAGNOSIS — D36.10 NEURILEMMOMA: Primary | ICD-10-CM

## 2023-03-06 DIAGNOSIS — M25.561 ACUTE PAIN OF RIGHT KNEE: ICD-10-CM

## 2023-03-06 DIAGNOSIS — F51.01 PRIMARY INSOMNIA: ICD-10-CM

## 2023-03-06 DIAGNOSIS — M79.10 MYALGIA: ICD-10-CM

## 2023-03-06 PROBLEM — J45.20 MILD INTERMITTENT ASTHMA: Status: ACTIVE | Noted: 2022-03-24

## 2023-03-06 PROBLEM — I11.9 HYPERTENSIVE HEART DISEASE WITHOUT HEART FAILURE: Status: ACTIVE | Noted: 2022-10-31

## 2023-03-06 PROCEDURE — 73562 X-RAY EXAM OF KNEE 3: CPT | Mod: TC | Performed by: RADIOLOGY

## 2023-03-06 PROCEDURE — 99214 OFFICE O/P EST MOD 30 MIN: CPT | Performed by: PHYSICIAN ASSISTANT

## 2023-03-06 RX ORDER — FINASTERIDE 5 MG/1
TABLET, FILM COATED ORAL
COMMUNITY
Start: 2023-01-31 | End: 2023-08-02

## 2023-03-06 RX ORDER — CLOBETASOL PROPIONATE 0.5 MG/G
OINTMENT TOPICAL
COMMUNITY
Start: 2023-02-27 | End: 2024-08-12

## 2023-03-06 RX ORDER — GABAPENTIN 300 MG/1
CAPSULE ORAL
Qty: 30 CAPSULE | Refills: 5 | Status: SHIPPED | OUTPATIENT
Start: 2023-03-06 | End: 2023-08-11

## 2023-03-06 RX ORDER — ZOLPIDEM TARTRATE 5 MG/1
5 TABLET ORAL
Qty: 30 TABLET | Refills: 5 | Status: SHIPPED | OUTPATIENT
Start: 2023-03-06 | End: 2023-08-11

## 2023-03-06 ASSESSMENT — ASTHMA QUESTIONNAIRES
QUESTION_3 LAST FOUR WEEKS HOW OFTEN DID YOUR ASTHMA SYMPTOMS (WHEEZING, COUGHING, SHORTNESS OF BREATH, CHEST TIGHTNESS OR PAIN) WAKE YOU UP AT NIGHT OR EARLIER THAN USUAL IN THE MORNING: ONCE A WEEK
QUESTION_4 LAST FOUR WEEKS HOW OFTEN HAVE YOU USED YOUR RESCUE INHALER OR NEBULIZER MEDICATION (SUCH AS ALBUTEROL): NOT AT ALL
ACT_TOTALSCORE: 23
QUESTION_1 LAST FOUR WEEKS HOW MUCH OF THE TIME DID YOUR ASTHMA KEEP YOU FROM GETTING AS MUCH DONE AT WORK, SCHOOL OR AT HOME: NONE OF THE TIME
ACT_TOTALSCORE: 23
QUESTION_2 LAST FOUR WEEKS HOW OFTEN HAVE YOU HAD SHORTNESS OF BREATH: NOT AT ALL
QUESTION_5 LAST FOUR WEEKS HOW WOULD YOU RATE YOUR ASTHMA CONTROL: COMPLETELY CONTROLLED

## 2023-03-06 ASSESSMENT — ENCOUNTER SYMPTOMS
ARTHRALGIAS: 1
CARDIOVASCULAR NEGATIVE: 1
CONSTITUTIONAL NEGATIVE: 1
GASTROINTESTINAL NEGATIVE: 1
RESPIRATORY NEGATIVE: 1

## 2023-03-06 NOTE — PROGRESS NOTES
1. Neurilemmoma    - gabapentin (NEURONTIN) 300 MG capsule; gabapentin 300 mg capsule  300mg 1/day  Dispense: 30 capsule; Refill: 5    2. Primary insomnia    - zolpidem (AMBIEN) 5 MG tablet; Take 1 tablet (5 mg) by mouth nightly as needed for sleep  Dispense: 30 tablet; Refill: 5    3. Acute pain of right knee    - XR Knee Standing AP [place] Bilat Lat Right; Future  - Physical Therapy Referral; Future    4. Myalgia        Subjective   Surendra is a 72 year old accompanied by his self, presenting for the following health issues:  Knee Pain and Recheck Medication (Pt here needing refills on his gabapentin and zolpidem.  Pt also c/o Right knee pain x 2 months)      Knee Pain    History of Present Illness       Reason for visit:  Sore knere and refills  Symptom onset:  More than a month  Symptoms include:  Soreness  Symptom intensity:  Moderate  Symptom progression:  Staying the same  Had these symptoms before:  No  What makes it worse:  Yes  What makes it better:  Doing nothing    He eats 2-3 servings of fruits and vegetables daily.He consumes 0 sweetened beverage(s) daily.He exercises with enough effort to increase his heart rate 30 to 60 minutes per day.  He exercises with enough effort to increase his heart rate 4 days per week.   He is taking medications regularly.       Asthma Follow-Up    Was ACT completed today?    Yes    ACT Total Scores 3/6/2023   ACT TOTAL SCORE (Goal Greater than or Equal to 20) 23   In the past 12 months, how many times did you visit the emergency room for your asthma without being admitted to the hospital? 0   In the past 12 months, how many times were you hospitalized overnight because of your asthma? 0          How many days per week do you miss taking your asthma controller medication?  0    Please describe any recent triggers for your asthma: humidity    Have you had any Emergency Room Visits, Urgent Care Visits, or Hospital Admissions since your last office visit?  No        Review  "of Systems         Objective    /80 (BP Location: Right arm, Patient Position: Sitting, Cuff Size: Adult Large)   Pulse 72   Temp (!) 96  F (35.6  C) (Tympanic)   Resp 16   Ht 1.74 m (5' 8.5\")   Wt 93.9 kg (207 lb)   SpO2 97%   BMI 31.02 kg/m    Body mass index is 31.02 kg/m .  Physical Exam                       "

## 2023-03-06 NOTE — PROGRESS NOTES
1. Neurilemmoma  He is doing well on gabapentin and will renew rocky  - gabapentin (NEURONTIN) 300 MG capsule; gabapentin 300 mg capsule  300mg 1/day  Dispense: 30 capsule; Refill: 5    2. Primary insomnia  Doing well, renewed his zolpidem  - zolpidem (AMBIEN) 5 MG tablet; Take 1 tablet (5 mg) by mouth nightly as needed for sleep  Dispense: 30 tablet; Refill: 5    3. Acute pain of right knee  Will refer to PT for evaluation and treatment of right knee pain, he will probably go to [facility] for PT  If not improving would consider referral to ortho  - XR Knee Standing AP [place] Bilat Lat Right; Future  - Physical Therapy Referral; Future    4. Myalgia  He thinks his generalized muscle aches may be due to atorvastatin  He is advised to stop that for 2 weeks and see if his sxs improve  If so would consider trialing Crestor to see if that is better tolerated      Niesha Agosto is a 72 year old accompanied by his self, presenting for the following health issues:  Knee Pain and Recheck Medication (Pt here needing refills on his gabapentin and zolpidem.  Pt also c/o Right knee pain x 2 months)      Knee Pain  Associated symptoms include arthralgias (right knee ).   History of Present Illness       Reason for visit:  Sore knere and refills  Symptom onset:  More than a month  Symptoms include:  Soreness  Symptom intensity:  Moderate  Symptom progression:  Staying the same  Had these symptoms before:  No  What makes it worse:  Yes  What makes it better:  Doing nothing    He eats 2-3 servings of fruits and vegetables daily.He consumes 0 sweetened beverage(s) daily.He exercises with enough effort to increase his heart rate 30 to 60 minutes per day.  He exercises with enough effort to increase his heart rate 4 days per week.   He is taking medications regularly.             Here today for refills of his gabapentin and zolpidem  Also 2 month hx of right knee pain,  He twisted and heard a pop and since then has had  "ongoing pain, there is pain with walking, no instability  No swelling  He has seen his chiropractor for this      Review of Systems   Constitutional: Negative.    HENT: Negative.    Respiratory: Negative.    Cardiovascular: Negative.    Gastrointestinal: Negative.    Musculoskeletal: Positive for arthralgias (right knee ).            Objective    /80 (BP Location: Right arm, Patient Position: Sitting, Cuff Size: Adult Large)   Pulse 72   Temp (!) 96  F (35.6  C) (Tympanic)   Resp 16   Ht 1.74 m (5' 8.5\")   Wt 93.9 kg (207 lb)   SpO2 97%   BMI 31.02 kg/m    Body mass index is 31.02 kg/m .  Physical Exam  Vitals reviewed.   Constitutional:       Appearance: Normal appearance.   Cardiovascular:      Rate and Rhythm: Normal rate and regular rhythm.      Pulses: Normal pulses.      Heart sounds: Normal heart sounds.   Pulmonary:      Effort: Pulmonary effort is normal.      Breath sounds: Normal breath sounds.   Musculoskeletal:      Comments: No swelling or deformation of right knee.  Mild joint line tenderness over lateral joint line; no tenderness over medial joint line.  Anterior and posterior drawer tests are negative. No varus or valgus laxity.  No pain with lateral or medial deviation of foot.  No crepitus or pain with Fanny's test     Neurological:      Mental Status: He is alert.            Xray - Reviewed and interpreted by me.  Right knee xrays show no fractures or dislocations, will be over read by radiology                "

## 2023-03-17 ENCOUNTER — OFFICE VISIT (OUTPATIENT)
Dept: FAMILY MEDICINE | Facility: CLINIC | Age: 73
End: 2023-03-17
Payer: MEDICARE

## 2023-03-17 VITALS
DIASTOLIC BLOOD PRESSURE: 78 MMHG | HEART RATE: 64 BPM | HEIGHT: 69 IN | SYSTOLIC BLOOD PRESSURE: 114 MMHG | TEMPERATURE: 96.2 F | RESPIRATION RATE: 20 BRPM | BODY MASS INDEX: 30.51 KG/M2 | WEIGHT: 206 LBS | OXYGEN SATURATION: 98 %

## 2023-03-17 DIAGNOSIS — E78.5 DYSLIPIDEMIA: Primary | ICD-10-CM

## 2023-03-17 DIAGNOSIS — M79.10 MYALGIA: ICD-10-CM

## 2023-03-17 PROCEDURE — 99213 OFFICE O/P EST LOW 20 MIN: CPT | Performed by: PHYSICIAN ASSISTANT

## 2023-03-17 RX ORDER — ROSUVASTATIN CALCIUM 10 MG/1
10 TABLET, COATED ORAL DAILY
Qty: 30 TABLET | Refills: 2 | Status: SHIPPED | OUTPATIENT
Start: 2023-03-17 | End: 2023-06-09

## 2023-03-17 ASSESSMENT — ASTHMA QUESTIONNAIRES
ACT_TOTALSCORE: 25
QUESTION_3 LAST FOUR WEEKS HOW OFTEN DID YOUR ASTHMA SYMPTOMS (WHEEZING, COUGHING, SHORTNESS OF BREATH, CHEST TIGHTNESS OR PAIN) WAKE YOU UP AT NIGHT OR EARLIER THAN USUAL IN THE MORNING: NOT AT ALL
QUESTION_5 LAST FOUR WEEKS HOW WOULD YOU RATE YOUR ASTHMA CONTROL: COMPLETELY CONTROLLED
QUESTION_4 LAST FOUR WEEKS HOW OFTEN HAVE YOU USED YOUR RESCUE INHALER OR NEBULIZER MEDICATION (SUCH AS ALBUTEROL): NOT AT ALL
QUESTION_2 LAST FOUR WEEKS HOW OFTEN HAVE YOU HAD SHORTNESS OF BREATH: NOT AT ALL
QUESTION_1 LAST FOUR WEEKS HOW MUCH OF THE TIME DID YOUR ASTHMA KEEP YOU FROM GETTING AS MUCH DONE AT WORK, SCHOOL OR AT HOME: NONE OF THE TIME
ACT_TOTALSCORE: 25

## 2023-03-17 NOTE — PROGRESS NOTES
"  1. Dyslipidemia  He still needs to be on medication to help control his lipids, will trial rosuvastatin and see if he tolerates that without myalgias,  If they do develop would have to consider alternative treatment    - rosuvastatin (CRESTOR) 10 MG tablet; Take 1 tablet (10 mg) by mouth daily for 90 days  Dispense: 30 tablet; Refill: 2    2. Myalgia  Much improved after stopping atorvastatin      Niesha Agosto is a 72 year old accompanied by his self, presenting for the following health issues:  Lipids (Pt here for Follow-up on his statin.  Pt was holding atorvastatin due to body aches.  Pt states since holding med he has not had any body aches.) and Knee Pain (Pt here for for Follow-up on Right knee pain from 3/6.  Pt states pain is getting better but still having some pain)      Knee Pain       At visit on 3/6 he was complaining of myalgias, upon our advice he held his atorvastatin  Since then and his myalgias have improved    He also had right knee pain and was referred to PT and that has been improving          Review of Systems         Objective    /78 (BP Location: Right arm, Patient Position: Sitting, Cuff Size: Adult Large)   Pulse 64   Temp (!) 96.2  F (35.7  C) (Tympanic)   Resp 20   Ht 1.74 m (5' 8.5\")   Wt 93.4 kg (206 lb)   SpO2 98%   BMI 30.87 kg/m    Body mass index is 30.87 kg/m .  Physical Exam                       "

## 2023-04-24 ENCOUNTER — NURSE TRIAGE (OUTPATIENT)
Dept: FAMILY MEDICINE | Facility: CLINIC | Age: 73
End: 2023-04-24

## 2023-04-24 ENCOUNTER — TELEPHONE (OUTPATIENT)
Dept: FAMILY MEDICINE | Facility: CLINIC | Age: 73
End: 2023-04-24

## 2023-04-24 ENCOUNTER — TELEPHONE (OUTPATIENT)
Dept: NURSING | Facility: CLINIC | Age: 73
End: 2023-04-24

## 2023-04-24 ENCOUNTER — OFFICE VISIT (OUTPATIENT)
Dept: FAMILY MEDICINE | Facility: CLINIC | Age: 73
End: 2023-04-24
Payer: MEDICARE

## 2023-04-24 VITALS
HEART RATE: 81 BPM | SYSTOLIC BLOOD PRESSURE: 104 MMHG | HEIGHT: 69 IN | RESPIRATION RATE: 16 BRPM | DIASTOLIC BLOOD PRESSURE: 70 MMHG | TEMPERATURE: 100.4 F | OXYGEN SATURATION: 96 % | BODY MASS INDEX: 30.36 KG/M2 | WEIGHT: 205 LBS

## 2023-04-24 DIAGNOSIS — N18.32 CHRONIC KIDNEY DISEASE, STAGE 3B (H): ICD-10-CM

## 2023-04-24 DIAGNOSIS — J47.9 BRONCHIECTASIS WITHOUT COMPLICATION (H): ICD-10-CM

## 2023-04-24 DIAGNOSIS — J45.21 MILD INTERMITTENT ASTHMA WITH ACUTE EXACERBATION: Primary | ICD-10-CM

## 2023-04-24 DIAGNOSIS — C61 MALIGNANT TUMOR OF PROSTATE (H): ICD-10-CM

## 2023-04-24 LAB — SARS-COV-2 RNA RESP QL NAA+PROBE: POSITIVE

## 2023-04-24 PROCEDURE — 99214 OFFICE O/P EST MOD 30 MIN: CPT | Mod: CS | Performed by: FAMILY MEDICINE

## 2023-04-24 PROCEDURE — U0003 INFECTIOUS AGENT DETECTION BY NUCLEIC ACID (DNA OR RNA); SEVERE ACUTE RESPIRATORY SYNDROME CORONAVIRUS 2 (SARS-COV-2) (CORONAVIRUS DISEASE [COVID-19]), AMPLIFIED PROBE TECHNIQUE, MAKING USE OF HIGH THROUGHPUT TECHNOLOGIES AS DESCRIBED BY CMS-2020-01-R: HCPCS | Performed by: FAMILY MEDICINE

## 2023-04-24 PROCEDURE — U0005 INFEC AGEN DETEC AMPLI PROBE: HCPCS | Performed by: FAMILY MEDICINE

## 2023-04-24 RX ORDER — INHALER, ASSIST DEVICES
1 SPACER (EA) MISCELLANEOUS PRN
Qty: 1 EACH | Refills: 0 | Status: SHIPPED | OUTPATIENT
Start: 2023-04-24 | End: 2024-02-02

## 2023-04-24 RX ORDER — PREDNISONE 20 MG/1
20 TABLET ORAL DAILY
Qty: 5 TABLET | Refills: 0 | Status: SHIPPED | OUTPATIENT
Start: 2023-04-24 | End: 2023-05-03

## 2023-04-24 RX ORDER — AMOXICILLIN AND CLAVULANATE POTASSIUM 500; 125 MG/1; MG/1
1 TABLET, FILM COATED ORAL 2 TIMES DAILY
Qty: 20 TABLET | Refills: 0 | Status: SHIPPED | OUTPATIENT
Start: 2023-04-24 | End: 2023-05-03

## 2023-04-24 ASSESSMENT — ENCOUNTER SYMPTOMS: COUGH: 1

## 2023-04-24 NOTE — TELEPHONE ENCOUNTER
"04/24/24  COVID Positive/Requesting COVID treatment    Patient is positive for COVID and requesting treatment options.    Date of positive COVID test (PCR or at home)? 04/24/23    Current COVID symptoms: \"pneumonia like symptoms, high fever\"  Date COVID symptoms began: 04/17/23    Message should be routed to clinic RN pool. Best phone number to use for call back: [phone]    Pt had appt with Dr. Merritt this morning 04/24 where he was diagnosed with Covid. Pt was prescribed prednisone. Pt states he just received a call from someone who transferred him to this writer. Pt would like to know if he needs the paxlovid.   "

## 2023-04-24 NOTE — TELEPHONE ENCOUNTER
Call with pt, discussed covid + result and that pt is outside of window for paxlovid since symptoms started 4/17/23. Pt stated pharmacy does not have Augmentin 500-175 strength and are requesting provider to change dose to Augmentin 800-175. Please advise on request.        RN COVID TREATMENT VISIT  04/24/23      The patient has been triaged and does not require a higher level of care.    Surendra Wallace  72 year old  Current weight? 207lbs    Has the patient been seen by a primary care provider at an [facility] or [facility] within the past two years? Yes.   Have you been in close proximity to/do you have a known exposure to a person with a confirmed case of influenza? No.     General treatment eligibility:  Date of positive COVID test (PCR or at home)?  4/24/23    Are you or have you been hospitalized for this COVID-19 infection? No.   Have you received monoclonal antibodies or antiviral treatment for COVID-19 since this positive test? No.   Do you have any of the following conditions that place you at risk of being very sick from COVID-19?   - Age 50 years or older  - Chronic lung diseases such as asthma, bronchiectasis, COPD, interstitial lung disease, pulmonary embolism, pulmonary hypertension   - Heart conditions such as cardiomyopathies, congenital heart defects, coronary artery disease, heart arrhythmias, heart failure, hypertension, valve disorders   - Overweight or Obesity (BMI >85th percentile or BMI 25 or higher)  Yes, patient has at least one high risk condition as noted above.     Current COVID symptoms:   - fever or chills  - cough  Yes. Patient has at least one symptom as selected.     How many days since symptoms started? Greater than 7 days. Patient informed they do not qualify for treatment.   Sally Davis RN             Additional Information    Negative: SEVERE difficulty breathing (e.g., struggling for each breath, speaks in single words)    Negative: Difficult  to awaken or acting confused (e.g., disoriented, slurred speech)    Negative: Bluish (or gray) lips or face now    Negative: Shock suspected (e.g., cold/pale/clammy skin, too weak to stand, low BP, rapid pulse)    Negative: Sounds like a life-threatening emergency to the triager    Negative: [1] Diagnosed or suspected COVID-19 AND [2] symptoms lasting 3 or more weeks    Negative: [1] COVID-19 exposure AND [2] no symptoms    Negative: COVID-19 vaccine reaction suspected (e.g., fever, headache, muscle aches) occurring 1 to 3 days after getting vaccine    Negative: COVID-19 vaccine, questions about    Negative: [1] Lives with someone known to have influenza (flu test positive) AND [2] flu-like symptoms (e.g., cough, runny nose, sore throat, SOB; with or without fever)    Negative: [1] Adult with possible COVID-19 symptoms AND [2] triager concerned about severity of symptoms or other causes    Negative: COVID-19 and breastfeeding, questions about    Negative: SEVERE or constant chest pain or pressure  (Exception: Mild central chest pain, present only when coughing.)    Negative: MODERATE difficulty breathing (e.g., speaks in phrases, SOB even at rest, pulse 100-120)    Negative: Headache and stiff neck (can't touch chin to chest)    Negative: Oxygen level (e.g., pulse oximetry) 90 percent or lower    Negative: Chest pain or pressure  (Exception: MILD central chest pain, present only when coughing)    Negative: Patient sounds very sick or weak to the triager    Negative: MILD difficulty breathing (e.g., minimal/no SOB at rest, SOB with walking, pulse <100)    Negative: Fever > 103 F (39.4 C)    Negative: [1] Fever > 101 F (38.3 C) AND [2] over 60 years of age    Negative: [1] Fever > 100.0 F (37.8 C) AND [2] bedridden (e.g., nursing home patient, CVA, chronic illness, recovering from surgery)    Negative: [1] HIGH RISK for severe COVID complications (e.g., weak immune system, age > 64 years, obesity with BMI of 30 or  "higher, pregnant, chronic lung disease or other chronic medical condition) AND [2] COVID symptoms (e.g., cough, fever)  (Exceptions: Already seen by PCP and no new or worsening symptoms.)    Negative: [1] HIGH RISK patient AND [2] influenza is widespread in the community AND [3] ONE OR MORE respiratory symptoms: cough, sore throat, runny or stuffy nose    Negative: [1] HIGH RISK patient AND [2] influenza exposure within the last 7 days AND [3] ONE OR MORE respiratory symptoms: cough, sore throat, runny or stuffy nose    Negative: Oxygen level (e.g., pulse oximetry) 91 to 94 percent    Negative: [1] COVID-19 infection suspected by caller or triager AND [2] mild symptoms (cough, fever, or others) AND [3] negative COVID-19 rapid test    Negative: Fever present > 3 days (72 hours)    Negative: [1] Fever returns after gone for over 24 hours AND [2] symptoms worse or not improved    Negative: [1] Continuous (nonstop) coughing interferes with work or school AND [2] no improvement using cough treatment per Care Advice    Negative: Cough present > 3 weeks    Answer Assessment - Initial Assessment Questions  1. COVID-19 DIAGNOSIS: \"Who made your COVID-19 diagnosis?\" \"Was it confirmed by a positive lab test or self-test?\" If not diagnosed by a doctor (or NP/PA), ask \"Are there lots of cases (community spread) where you live?\" Note: See public health department website, if unsure.      [facility] lab test  2. COVID-19 EXPOSURE: \"Was there any known exposure to COVID before the symptoms began?\" CDC Definition of close contact: within 6 feet (2 meters) for a total of 15 minutes or more over a 24-hour period.        unsure  3. ONSET: \"When did the COVID-19 symptoms start?\"       4/17/23  4. WORST SYMPTOM: \"What is your worst symptom?\" (e.g., cough, fever, shortness of breath, muscle aches)      cough  5. COUGH: \"Do you have a cough?\" If Yes, ask: \"How bad is the cough?\"        Yes, being treated for pneumonia  6. FEVER: \"Do you have a " "fever?\" If Yes, ask: \"What is your temperature, how was it measured, and when did it start?\"      yes  7. RESPIRATORY STATUS: \"Describe your breathing?\" (e.g., shortness of breath, wheezing, unable to speak)       No shortness of breath. Speaking full sentences without difficulty  8. BETTER-SAME-WORSE: \"Are you getting better, staying the same or getting worse compared to yesterday?\"  If getting worse, ask, \"In what way?\"      Cough is the same  9. HIGH RISK DISEASE: \"Do you have any chronic medical problems?\" (e.g., asthma, heart or lung disease, weak immune system, obesity, etc.)      yes  10. VACCINE: \"Have you had the COVID-19 vaccine?\" If Yes, ask: \"Which one, how many shots, when did you get it?\"        yes  11. BOOSTER: \"Have you received your COVID-19 booster?\" If Yes, ask: \"Which one and when did you get it?\"        yes  12. PREGNANCY: \"Is there any chance you are pregnant?\" \"When was your last menstrual period?\"        n/a  13. OTHER SYMPTOMS: \"Do you have any other symptoms?\"  (e.g., chills, fatigue, headache, loss of smell or taste, muscle pain, sore throat)        fever  14. O2 SATURATION MONITOR:  \"Do you use an oxygen saturation monitor (pulse oximeter) at home?\" If Yes, ask \"What is your reading (oxygen level) today?\" \"What is your usual oxygen saturation reading?\" (e.g., 95%)        no    Protocols used: CORONAVIRUS (COVID-19) DIAGNOSED OR [id number]      "

## 2023-04-24 NOTE — TELEPHONE ENCOUNTER
Hyun, pharmacist from [facility], calling in to state they do not have Augmentin 500-125mg, they only have Augmentin 875-125mg.     Please call Hyun back at [phone]

## 2023-04-24 NOTE — PROGRESS NOTES
"  Assessment & Plan   Problem List Items Addressed This Visit        Respiratory    Mild intermittent asthma - Primary    Relevant Medications    EasiVent (EASIVENT) MISC Misc    amoxicillin-clavulanate (AUGMENTIN) 500-125 MG tablet    predniSONE (DELTASONE) 20 MG tablet    Bronchiectasis (H)    Relevant Medications    amoxicillin-clavulanate (AUGMENTIN) 500-125 MG tablet    predniSONE (DELTASONE) 20 MG tablet       Urinary    Malignant tumor of prostate (H)    Chronic kidney disease, stage 3b (H)      Patient has history of bronchiectasis and mild intermittent asthma.  He reports the last time he had a flareup was about 8 years ago.  For the past week he has had some subjective fevers and an occasionally productive cough.  His wife has similar symptoms.  He has not had his Bivalent COVID-vaccine but has had a total of 4 doses of COVID-vaccine.  He has chronic kidney disease and a history of prostate cancer not currently on any immune suppressants or chemotherapy.  These were noted and taken into account for medical decision making.    On exam no acute distress  HEENT eyes are anicteric, pupils are equally reactive, no conjunctival injection, mucous membranes moist and pink  Skin is warm and dry with normal turgor no rash noted  Left lower lobe crackles, no wheezes heard.  Cardiovascular S1-S2    Assessment and plan mild intermittent asthma with an acute flare versus flareup of his bronchiectasis versus pneumonia.  We will treat with Augmentin.  He reports he had poor response to azithromycin in the past.  Also give him some prednisone.  Told him he can use some Tylenol as needed for pain but to avoid NSAIDs.  Return to clinic if symptoms worsen or do not improve.           BMI:   Estimated body mass index is 30.72 kg/m  as calculated from the following:    Height as of this encounter: 1.74 m (5' 8.5\").    Weight as of this encounter: 93 kg (205 lb).           Dionna Merritt MD  [facility] - " "MELISSA Agosto is a 72 year old, presenting for the following health issues:  Cough (Pt c/o cough, congestion, fever x 7 days)         View : No data to display.              Cough    History of Present Illness       Reason for visit:  Lung congestion  Symptom onset:  3-7 days ago    He eats 0-1 servings of fruits and vegetables daily.He consumes 0 sweetened beverage(s) daily.He exercises with enough effort to increase his heart rate 30 to 60 minutes per day.  He exercises with enough effort to increase his heart rate 5 days per week.   He is taking medications regularly.               Review of Systems   Respiratory: Positive for cough.             Objective    /70 (BP Location: Right arm, Patient Position: Sitting)   Pulse 81   Temp 100.4  F (38  C) (Oral)   Resp 16   Ht 1.74 m (5' 8.5\")   Wt 93 kg (205 lb)   SpO2 96%   BMI 30.72 kg/m    Body mass index is 30.72 kg/m .  Physical Exam                       "

## 2023-05-03 ENCOUNTER — OFFICE VISIT (OUTPATIENT)
Dept: FAMILY MEDICINE | Facility: CLINIC | Age: 73
End: 2023-05-03
Payer: MEDICARE

## 2023-05-03 ENCOUNTER — ANCILLARY PROCEDURE (OUTPATIENT)
Dept: GENERAL RADIOLOGY | Facility: CLINIC | Age: 73
End: 2023-05-03
Attending: PHYSICIAN ASSISTANT
Payer: MEDICARE

## 2023-05-03 VITALS
HEIGHT: 69 IN | DIASTOLIC BLOOD PRESSURE: 64 MMHG | RESPIRATION RATE: 18 BRPM | HEART RATE: 76 BPM | SYSTOLIC BLOOD PRESSURE: 92 MMHG | OXYGEN SATURATION: 97 % | BODY MASS INDEX: 30.51 KG/M2 | WEIGHT: 206 LBS | TEMPERATURE: 97.6 F

## 2023-05-03 DIAGNOSIS — J45.21 MILD INTERMITTENT ASTHMA WITH ACUTE EXACERBATION: ICD-10-CM

## 2023-05-03 DIAGNOSIS — N18.32 CHRONIC KIDNEY DISEASE, STAGE 3B (H): Primary | ICD-10-CM

## 2023-05-03 DIAGNOSIS — Z23 NEED FOR DIPHTHERIA-TETANUS-PERTUSSIS (TDAP) VACCINE: ICD-10-CM

## 2023-05-03 DIAGNOSIS — J01.01 ACUTE RECURRENT MAXILLARY SINUSITIS: ICD-10-CM

## 2023-05-03 DIAGNOSIS — U07.1 INFECTION DUE TO 2019 NOVEL CORONAVIRUS: ICD-10-CM

## 2023-05-03 PROCEDURE — 99214 OFFICE O/P EST MOD 30 MIN: CPT | Mod: CS | Performed by: PHYSICIAN ASSISTANT

## 2023-05-03 PROCEDURE — 71046 X-RAY EXAM CHEST 2 VIEWS: CPT | Mod: TC | Performed by: RADIOLOGY

## 2023-05-03 RX ORDER — PREDNISONE 20 MG/1
TABLET ORAL
Qty: 15 TABLET | Refills: 0 | Status: SHIPPED | OUTPATIENT
Start: 2023-05-03 | End: 2023-05-15

## 2023-05-03 RX ORDER — CEFDINIR 300 MG/1
300 CAPSULE ORAL 2 TIMES DAILY
Qty: 20 CAPSULE | Refills: 0 | Status: SHIPPED | OUTPATIENT
Start: 2023-05-03 | End: 2023-05-15

## 2023-05-03 ASSESSMENT — ENCOUNTER SYMPTOMS
SINUS PAIN: 1
CHILLS: 0
COUGH: 1
ACTIVITY CHANGE: 1
FEVER: 1
SHORTNESS OF BREATH: 1
CHEST TIGHTNESS: 0
RHINORRHEA: 1
FATIGUE: 1
WHEEZING: 1
SINUS PRESSURE: 1
CARDIOVASCULAR NEGATIVE: 1

## 2023-05-03 NOTE — PROGRESS NOTES
"  Assessment & Plan     Need for diphtheria-tetanus-pertussis (Tdap) vaccine  Will get at pharmacy  - Tdap, tetanus-diptheria-acell pertussis, (BOOSTRIX) 5-2.5-18.5 LF-MCG/0.5 GARO injection  Dispense: 0.5 mL; Refill: 0    Chronic kidney disease, stage 3b (H)  Stable  - Albumin Random Urine Quantitative with Creat Ratio    Infection due to 2019 novel coronavirus  Improving slowly    Acute recurrent maxillary sinusitis  Will start on cefdinir milligrams twice daily x10 days and burst and taper of prednisone should slowly steadily improve not acutely worsen or recheck    Mild intermittent asthma with acute exacerbation  See above  - XR Chest 2 Views               BMI:   Estimated body mass index is 30.87 kg/m  as calculated from the following:    Height as of this encounter: 1.74 m (5' 8.5\").    Weight as of this encounter: 93.4 kg (206 lb).           KIMBERLI Donovan  [facility]    Niesha Agosto is a 72 year old, presenting for the following health issues:  Lung Concerns        5/3/2023    10:58 AM   Additional Questions   Roomed by [facility] LPN   Accompanied by -     72-year-old male presents to the clinic follow-up evaluation for ongoing sinus congestion and a cough.  Patient recently had a COVID infection and a secondary sinusitis had a short course of prednisone and round of Augmentin he had some some improvement but not resolution now his symptoms are worsening his cough is still productive of mucopurulent sputum no hemoptysis he still has some rhinorrhea is able to blow out and pressure mainly in the forehead low-grade temperature has been noted he does have a history of reactive airway disease his inhalers have been somewhat helpful         Description: Ongoing raspiness and sinus problems since testing positive for covid around 4-10-23, felt sick for at least 1 week prior.  Intensity: moderate  Progression of Symptoms:  same  Previous history of similar problem: patient has " "a history of pneumonia  Therapies tried and outcome: acetaminophen, pseudoephedrine, antibiotic therapy, prednisone.  Symptoms persist.          Review of Systems   Constitutional: Positive for activity change, fatigue and fever. Negative for chills.   HENT: Positive for congestion, rhinorrhea, sinus pressure and sinus pain.    Respiratory: Positive for cough, shortness of breath and wheezing. Negative for chest tightness.    Cardiovascular: Negative.             Objective    BP 92/64   Pulse 76   Temp 97.6  F (36.4  C)   Resp 18   Ht 1.74 m (5' 8.5\")   Wt 93.4 kg (206 lb)   SpO2 97%   BMI 30.87 kg/m    Body mass index is 30.87 kg/m .  Physical Exam  Vitals and nursing note reviewed.   Constitutional:       General: He is not in acute distress.     Appearance: Normal appearance. He is not toxic-appearing.   HENT:      Head: Normocephalic and atraumatic.      Right Ear: Tympanic membrane and ear canal normal.      Left Ear: Tympanic membrane and ear canal normal.      Nose: Congestion and rhinorrhea present.      Mouth/Throat:      Mouth: Mucous membranes are moist.      Pharynx: No oropharyngeal exudate or posterior oropharyngeal erythema.   Eyes:      Conjunctiva/sclera: Conjunctivae normal.   Cardiovascular:      Rate and Rhythm: Normal rate and regular rhythm.      Heart sounds: Normal heart sounds.   Pulmonary:      Effort: Pulmonary effort is normal. No respiratory distress.      Breath sounds: No stridor. No wheezing or rhonchi.   Musculoskeletal:      Cervical back: Normal range of motion and neck supple.   Lymphadenopathy:      Cervical: No cervical adenopathy.   Neurological:      Mental Status: He is alert.        Results for orders placed or performed in visit on 05/03/23   XR Chest 2 Views     Status: None    Narrative    EXAM: XR CHEST 2 VIEWS  LOCATION: [facility]  DATE/TIME: 5/3/2023 11:27 AM CDT    INDICATION:  Mild intermittent asthma with acute exacerbation. " Cough.  COMPARISON: 01/11/2019      Impression    IMPRESSION: Calcified granuloma in the right midlung again noted. Lungs are otherwise clear. Heart and pulmonary vascularity are normal. No signs of acute disease. Changes of DISH in the midthoracic spine.

## 2023-05-15 ENCOUNTER — OFFICE VISIT (OUTPATIENT)
Dept: FAMILY MEDICINE | Facility: CLINIC | Age: 73
End: 2023-05-15
Payer: MEDICARE

## 2023-05-15 VITALS
RESPIRATION RATE: 16 BRPM | DIASTOLIC BLOOD PRESSURE: 88 MMHG | WEIGHT: 208.8 LBS | SYSTOLIC BLOOD PRESSURE: 122 MMHG | OXYGEN SATURATION: 96 % | HEART RATE: 86 BPM | BODY MASS INDEX: 30.93 KG/M2 | TEMPERATURE: 97.8 F | HEIGHT: 69 IN

## 2023-05-15 DIAGNOSIS — K11.21 PAROTITIS, ACUTE: Primary | ICD-10-CM

## 2023-05-15 PROCEDURE — 99213 OFFICE O/P EST LOW 20 MIN: CPT | Performed by: FAMILY MEDICINE

## 2023-05-15 NOTE — PROGRESS NOTES
Clinical Decision Making:    At the end of the encounter, I discussed results, diagnosis, medications. Discussed red flags for immediate return to clinic/ER, as well as indications for follow up if no improvement. Patient understood and agreed to plan. Patient was stable for discharge.      ICD-10-CM    1. Parotitis, acute  K11.21 amoxicillin-clavulanate (AUGMENTIN) 875-125 MG tablet        Lemon drops as needed throughout the day  Fluids  Follow up if not improving as anticipated.  Printed patient education on parotitis      There are no Patient Instructions on file for this visit.   Return in about 5 days (around 5/20/2023).      chief complaint    HPI:  Surendra Wallace is a 72 year old male who presents today complaining of left ear feeling plugged with occasional drainage.  He has pressure changes in the left ear and is noticing swelling on the left side of his face.  He does have some nasal congestion but no cough and no shortness of breath.  He did have COVID about a month ago  He is needing to use his albuterol inhaler about every other day for his breathing but otherwise feels like he is generally improving from the COVID.  He did have night sweats a couple of weeks ago but those have improved.  He has been using Tylenol as well as a decongestant.  He was placed on Augmentin towards the end of his COVID illness for sinus infection and then on May 3 was prescribed cefdinir and prednisone for ongoing sinus infection.  He is having no sinus pain or pressure today.    History obtained from chart review and the patient.    Problem List:  2022-10: Hypertensive heart disease without heart failure  2022-08: Erectile dysfunction  2022-08: Chronic kidney disease, stage 3b (H)  2022-08: Acute left-sided low back pain with left-sided sciatica  2022-03: Allergic rhinitis  2022-03: Arthropathy of pelvis  2022-03: Mild intermittent asthma  2022-03: Bronchiectasis (H)  2022-03: Diverticulitis  2022-03: Eczema  2022-03:  "History of malignant melanoma of skin  2022: Hyperlipidemia  2022: Infection of prosthetic hip joint (H)  2022: Insomnia due to medical condition  2022: Obesity  2022: Osteoarthritis of shoulder  2022: Spinal stenosis of cervical region  2022: Vocal cord dysfunction  2018: Cellulitis  2016: Malignant tumor of prostate (H)  2014: Benign prostatic hyperplasia  2014: Atherosclerotic heart disease of native coronary artery   without angina pectoris  2014: Dyslipidemia  2014: GERD (gastroesophageal reflux disease)  2014: Primary hypertension  2014: Nasal polyps  2014: Neurilemmoma  2014: Orthostatic hypotension  2014: Obstructive sleep apnea syndrome  2012: Polyp of colon  2011: Actinic keratosis  2008: Other specified retention of urine      History reviewed. No pertinent past medical history.    Social History     Tobacco Use     Smoking status: Former     Packs/day: 0.25     Years: 5.00     Pack years: 1.25     Types: Cigarettes     Start date:      Quit date:      Years since quittin.4     Smokeless tobacco: Never   Vaping Use     Vaping status: Never Used   Substance Use Topics     Alcohol use: Not Currently     Comment: rarely       Review of systems  negative except listed in HPI    Vitals:    05/15/23 1142   BP: 122/88   BP Location: Right arm   Patient Position: Sitting   Cuff Size: Adult Large   Pulse: 86   Resp: 16   Temp: 97.8  F (36.6  C)   SpO2: 96%   Weight: 94.7 kg (208 lb 12.8 oz)   Height: 1.74 m (5' 8.5\")       Physical Exam  Vitals noted and within normal limits  In general he is alert, oriented, and in no acute distress  Ears: Canals patent, TMs intact with no erythema and no bulging  Left side his face with mild swelling at the jawline and near the TMJ.  Mild tenderness at the TMJ  No erythema  Mouth mucous membranes are slightly dry and pharynx is not erythematous  Neck is supple with no cervical lymphadenopathy  Heart has " a regular rate and rhythm with no murmurs  Lungs are clear to auscultation bilaterally

## 2023-05-15 NOTE — PATIENT INSTRUCTIONS
Lemon drops as needed throughout the day  Fluids  Follow up if not improving as anticipated.     65.28

## 2023-06-08 ENCOUNTER — TELEPHONE (OUTPATIENT)
Dept: PHARMACY | Facility: CLINIC | Age: 73
End: 2023-06-08
Payer: MEDICARE

## 2023-06-08 DIAGNOSIS — E78.5 DYSLIPIDEMIA: ICD-10-CM

## 2023-06-08 NOTE — TELEPHONE ENCOUNTER
Contacted patient today regarding recruitment for an annual MTM visit to review medications. This appointment would be covered under patients insurance plan and no charge.   Patient did not answer the phone and I left a generic message for patient to call MTM coordinators to schedule a visit at [phone]. No Mychart option     Caity Reddy, PharmD, BCACP  Medication Therapy Management Pharmacist

## 2023-06-09 RX ORDER — ROSUVASTATIN CALCIUM 10 MG/1
TABLET, COATED ORAL
Qty: 30 TABLET | Refills: 2 | Status: SHIPPED | OUTPATIENT
Start: 2023-06-09 | End: 2023-08-11

## 2023-06-09 NOTE — TELEPHONE ENCOUNTER
"  Last office visit: 5/15/2023     Future Appointments 6/9/2023 - 12/6/2023      Date Visit Type Length Department Provider     8/4/2023 11:00 AM LAB 15 min [facility]AT[facility]    Location Instructions:     [address] [place] [id number]              8/11/2023 12:30 PM ANNUAL WELLNESS 30 min [facility] FP/IM/PEDS Hardik Rock MD    Location Instructions:     [facility] is located at [address], one block north of [facility] and the [facility]. The clinic shares a building with the [employer]; use the clinic s parking lot and entrance on the building s south side.                    Requested Prescriptions   Pending Prescriptions Disp Refills     rosuvastatin (CRESTOR) 10 MG tablet [Pharmacy Med Name: ROSUVASTATIN CALCIUM 10MG TABS] 30 tablet 2     Sig: TAKE ONE TABLET BY MOUTH EVERY DAY       Statins Protocol Passed - 6/8/2023  8:07 PM        Passed - LDL on file in past 12 months     Recent Labs   Lab Test 08/04/22  0819   LDL 86             Passed - No abnormal creatine kinase in past 12 months     No lab results found.             Passed - Recent (12 mo) or future (30 days) visit within the authorizing provider's specialty     Patient has had an office visit with the authorizing provider or a provider within the authorizing providers department within the previous 12 mos or has a future within next 30 days. See \"Patient Info\" tab in inbasket, or \"Choose Columns\" in Meds & Orders section of the refill encounter.              Passed - Medication is active on med list        Passed - Patient is age 18 or older                   "

## 2023-07-13 DIAGNOSIS — Z96.649 HISTORY OF HIP REPLACEMENT: ICD-10-CM

## 2023-07-13 RX ORDER — AMOXICILLIN 500 MG/1
CAPSULE ORAL
Qty: 4 CAPSULE | Refills: 3 | Status: SHIPPED | OUTPATIENT
Start: 2023-07-13 | End: 2024-07-11

## 2023-07-13 NOTE — TELEPHONE ENCOUNTER
"      Last office visit: 5/15/2023     Future Appointments 7/13/2023 - 1/9/2024      Date Visit Type Length Department Provider     8/4/2023 11:00 AM LAB 15 min [facility]ORAT[facility]    Location Instructions:     [address] [place] [address]              8/11/2023 12:30 PM ANNUAL WELLNESS 30 min [facility] FP/IM/PEDS Hardik Rock MD    Location Instructions:     [facility] is located at [address], one block north of [facility] and the [facility]. The clinic shares a building with the [facility]; use the clinic s parking lot and entrance on the building s south side.                    Requested Prescriptions   Pending Prescriptions Disp Refills     amoxicillin (AMOXIL) 500 MG capsule [Pharmacy Med Name: AMOXICILLIN 500MG CAPS] 4 capsule 3     Sig: TAKE 4 CAPSULES BY MOUTH ONE HOUR BEFORE DENTAL APPOINTMENT       Oral Acne/Rosacea Medications Protocol Failed - 7/13/2023 11:23 AM        Failed - Confirmation of diagnosis is required     Please confirm diagnosis is acne or rosacea.     If NOT acne or rosacea; refer request to provider for further evaluation.    If diagnosis IS acne or rosacea, OK to refill BASED ON PREVIOUS REFILL CLINICAL NOTE RECOMMENDATION.          Failed - Medication is active on med list        Passed - Patient is 12 years of age or older        Passed - Recent (12 mo) or future (30 days) visit within the authorizing provider's specialty     Patient has had an office visit with the authorizing provider or a provider within the authorizing providers department within the previous 12 mos or has a future within next 30 days. See \"Patient Info\" tab in inbasket, or \"Choose Columns\" in Meds & Orders section of the refill encounter.                         "

## 2023-07-14 NOTE — TELEPHONE ENCOUNTER
Third outreach attempt. left voicemail to call either the RV clinic or the MTM coordinators.  Ping Morales PharmD  Medication Therapy Management (MTM) Pharmacist

## 2023-08-01 DIAGNOSIS — N40.1 BPH ASSOCIATED WITH NOCTURIA: ICD-10-CM

## 2023-08-01 DIAGNOSIS — K21.00 GASTROESOPHAGEAL REFLUX DISEASE WITH ESOPHAGITIS WITHOUT HEMORRHAGE: ICD-10-CM

## 2023-08-01 DIAGNOSIS — R35.1 BPH ASSOCIATED WITH NOCTURIA: ICD-10-CM

## 2023-08-02 RX ORDER — PANTOPRAZOLE SODIUM 40 MG/1
TABLET, DELAYED RELEASE ORAL
Qty: 90 TABLET | Refills: 2 | Status: SHIPPED | OUTPATIENT
Start: 2023-08-02 | End: 2023-08-11

## 2023-08-02 RX ORDER — FINASTERIDE 5 MG/1
TABLET, FILM COATED ORAL
Qty: 90 TABLET | Refills: 2 | Status: SHIPPED | OUTPATIENT
Start: 2023-08-02 | End: 2023-08-11

## 2023-08-02 RX ORDER — SILODOSIN 8 MG/1
CAPSULE ORAL
Qty: 90 CAPSULE | Refills: 2 | Status: SHIPPED | OUTPATIENT
Start: 2023-08-02 | End: 2023-08-11

## 2023-08-02 NOTE — TELEPHONE ENCOUNTER
Prescription approved per [facility] Refill Protocol.     CHERRY [facility]

## 2023-08-04 ENCOUNTER — LAB (OUTPATIENT)
Dept: LAB | Facility: CLINIC | Age: 73
End: 2023-08-04
Payer: MEDICARE

## 2023-08-04 DIAGNOSIS — I25.10 ATHEROSCLEROTIC HEART DISEASE OF NATIVE CORONARY ARTERY WITHOUT ANGINA PECTORIS: ICD-10-CM

## 2023-08-04 DIAGNOSIS — N18.32 CHRONIC KIDNEY DISEASE, STAGE 3B (H): ICD-10-CM

## 2023-08-04 LAB
ANION GAP SERPL CALCULATED.3IONS-SCNC: 10 MMOL/L (ref 7–15)
BUN SERPL-MCNC: 15.3 MG/DL (ref 8–23)
CALCIUM SERPL-MCNC: 9.2 MG/DL (ref 8.8–10.2)
CHLORIDE SERPL-SCNC: 108 MMOL/L (ref 98–107)
CHOLEST SERPL-MCNC: 161 MG/DL
CREAT SERPL-MCNC: 1.73 MG/DL (ref 0.67–1.17)
CREAT UR-MCNC: 232 MG/DL
DEPRECATED HCO3 PLAS-SCNC: 23 MMOL/L (ref 22–29)
GFR SERPL CREATININE-BSD FRML MDRD: 41 ML/MIN/1.73M2
GLUCOSE SERPL-MCNC: 93 MG/DL (ref 70–99)
HDLC SERPL-MCNC: 37 MG/DL
LDLC SERPL CALC-MCNC: 90 MG/DL
MICROALBUMIN UR-MCNC: 134 MG/L
MICROALBUMIN/CREAT UR: 57.76 MG/G CR (ref 0–17)
NONHDLC SERPL-MCNC: 124 MG/DL
POTASSIUM SERPL-SCNC: 4.5 MMOL/L (ref 3.4–5.3)
SODIUM SERPL-SCNC: 141 MMOL/L (ref 136–145)
TRIGL SERPL-MCNC: 172 MG/DL

## 2023-08-04 PROCEDURE — 82570 ASSAY OF URINE CREATININE: CPT

## 2023-08-04 PROCEDURE — 36415 COLL VENOUS BLD VENIPUNCTURE: CPT

## 2023-08-04 PROCEDURE — 80048 BASIC METABOLIC PNL TOTAL CA: CPT

## 2023-08-04 PROCEDURE — 82043 UR ALBUMIN QUANTITATIVE: CPT

## 2023-08-04 PROCEDURE — 80061 LIPID PANEL: CPT

## 2023-08-04 NOTE — LETTER
August 4, 2023      Surendra Wallace  [address] [address]        Dear ,    We are writing to inform you of your test results.    Creatinine and EGFR indicate stable chronic kidney disease.  Triglyceride slightly elevated. Recommend weight loss, a reduced carbohydrate diet, and regular physical activity.     Resulted Orders   Albumin Random Urine Quantitative with Creat Ratio   Result Value Ref Range    Creatinine Urine mg/dL 232.0 mg/dL      Comment:      The reference ranges have not been established in urine creatinine. The results should be integrated into the clinical context for interpretation.    Albumin Urine mg/L 134.0 mg/L      Comment:      The reference ranges have not been established in urine albumin. The results should be integrated into the clinical context for interpretation.    Albumin Urine mg/g Cr 57.76 (H) 0.00 - 17.00 mg/g Cr      Comment:      Microalbuminuria is defined as an albumin:creatinine ratio of 17 to 299 for males and 25 to 299 for females. A ratio of albumin:creatinine of 300 or higher is indicative of overt proteinuria.  Due to biologic variability, positive results should be confirmed by a second, first-morning random or 24-hour timed urine specimen. If there is discrepancy, a third specimen is recommended. When 2 out of 3 results are in the microalbuminuria range, this is evidence for incipient nephropathy and warrants increased efforts at glucose control, blood pressure control, and institution of therapy with an angiotensin-converting-enzyme (ACE) inhibitor (if the patient can tolerate it).     BASIC METABOLIC PANEL   Result Value Ref Range    Sodium 141 136 - 145 mmol/L    Potassium 4.5 3.4 - 5.3 mmol/L    Chloride 108 (H) 98 - 107 mmol/L    Carbon Dioxide (CO2) 23 22 - 29 mmol/L    Anion Gap 10 7 - 15 mmol/L    Urea Nitrogen 15.3 8.0 - 23.0 mg/dL    Creatinine 1.73 (H) 0.67 - 1.17 mg/dL    Calcium 9.2 8.8 - 10.2 mg/dL    Glucose 93 70 - 99 mg/dL     GFR Estimate 41 (L) >60 mL/min/1.73m2   Lipid panel reflex to direct LDL Non-fasting   Result Value Ref Range    Cholesterol 161 <200 mg/dL    Triglycerides 172 (H) <150 mg/dL    Direct Measure HDL 37 (L) >=40 mg/dL    LDL Cholesterol Calculated 90 <=100 mg/dL    Non HDL Cholesterol 124 <130 mg/dL    Narrative    Cholesterol  Desirable:  <200 mg/dL    Triglycerides  Normal:  Less than 150 mg/dL  Borderline High:  150-199 mg/dL  High:  200-499 mg/dL  Very High:  Greater than or equal to 500 mg/dL    Direct Measure HDL  Female:  Greater than or equal to 50 mg/dL   Male:  Greater than or equal to 40 mg/dL    LDL Cholesterol  Desirable:  <100mg/dL  Above Desirable:  100-129 mg/dL   Borderline High:  130-159 mg/dL   High:  160-189 mg/dL   Very High:  >= 190 mg/dL    Non HDL Cholesterol  Desirable:  130 mg/dL  Above Desirable:  130-159 mg/dL  Borderline High:  160-189 mg/dL  High:  190-219 mg/dL  Very High:  Greater than or equal to 220 mg/dL       If you have any questions or concerns, please call the clinic at the number listed above.       Sincerely,      Hardik Rock MD

## 2023-08-04 NOTE — LETTER
August 4, 2023      Surendra ASH Wallace  [address] [address]        Dear ,    We are writing to inform you of your test results.    Urine microalbumin is mildly elevated.  We should repeat that in 6 months.    Resulted Orders   Albumin Random Urine Quantitative with Creat Ratio   Result Value Ref Range    Creatinine Urine mg/dL 232.0 mg/dL      Comment:      The reference ranges have not been established in urine creatinine. The results should be integrated into the clinical context for interpretation.    Albumin Urine mg/L 134.0 mg/L      Comment:      The reference ranges have not been established in urine albumin. The results should be integrated into the clinical context for interpretation.    Albumin Urine mg/g Cr 57.76 (H) 0.00 - 17.00 mg/g Cr      Comment:      Microalbuminuria is defined as an albumin:creatinine ratio of 17 to 299 for males and 25 to 299 for females. A ratio of albumin:creatinine of 300 or higher is indicative of overt proteinuria.  Due to biologic variability, positive results should be confirmed by a second, first-morning random or 24-hour timed urine specimen. If there is discrepancy, a third specimen is recommended. When 2 out of 3 results are in the microalbuminuria range, this is evidence for incipient nephropathy and warrants increased efforts at glucose control, blood pressure control, and institution of therapy with an angiotensin-converting-enzyme (ACE) inhibitor (if the patient can tolerate it).         If you have any questions or concerns, please call the clinic at the number listed above.       Sincerely,      KIMBERLI Tim

## 2023-08-11 ENCOUNTER — OFFICE VISIT (OUTPATIENT)
Dept: FAMILY MEDICINE | Facility: CLINIC | Age: 73
End: 2023-08-11
Payer: MEDICARE

## 2023-08-11 VITALS
OXYGEN SATURATION: 98 % | SYSTOLIC BLOOD PRESSURE: 110 MMHG | BODY MASS INDEX: 30.62 KG/M2 | HEIGHT: 68 IN | HEART RATE: 72 BPM | RESPIRATION RATE: 16 BRPM | WEIGHT: 202 LBS | DIASTOLIC BLOOD PRESSURE: 75 MMHG | TEMPERATURE: 98 F

## 2023-08-11 DIAGNOSIS — G47.33 OBSTRUCTIVE SLEEP APNEA SYNDROME: ICD-10-CM

## 2023-08-11 DIAGNOSIS — R35.1 BENIGN PROSTATIC HYPERPLASIA WITH NOCTURIA: ICD-10-CM

## 2023-08-11 DIAGNOSIS — J45.20 MILD INTERMITTENT ASTHMA WITHOUT COMPLICATION: ICD-10-CM

## 2023-08-11 DIAGNOSIS — Z00.00 ENCOUNTER FOR MEDICARE ANNUAL WELLNESS EXAM: Primary | ICD-10-CM

## 2023-08-11 DIAGNOSIS — N40.1 BENIGN PROSTATIC HYPERPLASIA WITH NOCTURIA: ICD-10-CM

## 2023-08-11 DIAGNOSIS — C61 MALIGNANT TUMOR OF PROSTATE (H): ICD-10-CM

## 2023-08-11 DIAGNOSIS — F51.04 PSYCHOPHYSIOLOGICAL INSOMNIA: ICD-10-CM

## 2023-08-11 DIAGNOSIS — D36.10 NEURILEMMOMA: ICD-10-CM

## 2023-08-11 DIAGNOSIS — I25.10 ATHEROSCLEROSIS OF NATIVE CORONARY ARTERY OF NATIVE HEART WITHOUT ANGINA PECTORIS: ICD-10-CM

## 2023-08-11 DIAGNOSIS — K21.9 GASTROESOPHAGEAL REFLUX DISEASE WITHOUT ESOPHAGITIS: ICD-10-CM

## 2023-08-11 DIAGNOSIS — I10 PRIMARY HYPERTENSION: ICD-10-CM

## 2023-08-11 DIAGNOSIS — N18.32 CHRONIC KIDNEY DISEASE, STAGE 3B (H): ICD-10-CM

## 2023-08-11 PROBLEM — F51.01 PRIMARY INSOMNIA: Status: ACTIVE | Noted: 2023-08-11

## 2023-08-11 PROBLEM — G47.01 INSOMNIA DUE TO MEDICAL CONDITION: Status: RESOLVED | Noted: 2022-03-24 | Resolved: 2023-08-11

## 2023-08-11 PROCEDURE — G0439 PPPS, SUBSEQ VISIT: HCPCS | Performed by: INTERNAL MEDICINE

## 2023-08-11 RX ORDER — FLUTICASONE PROPIONATE AND SALMETEROL XINAFOATE 230; 21 UG/1; UG/1
2 AEROSOL, METERED RESPIRATORY (INHALATION) 2 TIMES DAILY
Qty: 12 G | Refills: 11 | Status: SHIPPED | OUTPATIENT
Start: 2023-08-11 | End: 2024-02-02

## 2023-08-11 RX ORDER — ZOLPIDEM TARTRATE 5 MG/1
5 TABLET ORAL
Qty: 30 TABLET | Refills: 5 | Status: SHIPPED | OUTPATIENT
Start: 2023-08-11 | End: 2024-02-02

## 2023-08-11 RX ORDER — PANTOPRAZOLE SODIUM 40 MG/1
TABLET, DELAYED RELEASE ORAL
Qty: 90 TABLET | Refills: 3 | Status: SHIPPED | OUTPATIENT
Start: 2023-08-11 | End: 2024-08-12

## 2023-08-11 RX ORDER — ROSUVASTATIN CALCIUM 10 MG/1
10 TABLET, COATED ORAL DAILY
Qty: 90 TABLET | Refills: 3 | Status: SHIPPED | OUTPATIENT
Start: 2023-08-11 | End: 2024-08-12

## 2023-08-11 RX ORDER — GABAPENTIN 300 MG/1
CAPSULE ORAL
Qty: 90 CAPSULE | Refills: 3 | Status: SHIPPED | OUTPATIENT
Start: 2023-08-11 | End: 2024-08-12

## 2023-08-11 RX ORDER — ALBUTEROL SULFATE 90 UG/1
2 AEROSOL, METERED RESPIRATORY (INHALATION) EVERY 4 HOURS PRN
Qty: 18 G | Refills: 11 | Status: SHIPPED | OUTPATIENT
Start: 2023-08-11 | End: 2024-08-12

## 2023-08-11 RX ORDER — CLOPIDOGREL BISULFATE 75 MG/1
75 TABLET ORAL DAILY
Qty: 90 TABLET | Refills: 3 | Status: SHIPPED | OUTPATIENT
Start: 2023-08-11 | End: 2024-08-12

## 2023-08-11 RX ORDER — SILODOSIN 8 MG/1
CAPSULE ORAL
Qty: 90 CAPSULE | Refills: 3 | Status: SHIPPED | OUTPATIENT
Start: 2023-08-11 | End: 2024-08-12

## 2023-08-11 RX ORDER — LOSARTAN POTASSIUM 100 MG/1
100 TABLET ORAL DAILY
Qty: 90 TABLET | Refills: 3 | Status: SHIPPED | OUTPATIENT
Start: 2023-08-11 | End: 2024-08-12

## 2023-08-11 RX ORDER — FINASTERIDE 5 MG/1
1 TABLET, FILM COATED ORAL DAILY
Qty: 90 TABLET | Refills: 3 | Status: SHIPPED | OUTPATIENT
Start: 2023-08-11 | End: 2024-08-12

## 2023-08-11 RX ORDER — AMLODIPINE BESYLATE 10 MG/1
10 TABLET ORAL DAILY
Qty: 90 TABLET | Refills: 3 | Status: SHIPPED | OUTPATIENT
Start: 2023-08-11 | End: 2024-08-12

## 2023-08-11 RX ORDER — METOPROLOL SUCCINATE 25 MG/1
25 TABLET, EXTENDED RELEASE ORAL DAILY
Qty: 90 TABLET | Refills: 3 | Status: SHIPPED | OUTPATIENT
Start: 2023-08-11 | End: 2024-02-02 | Stop reason: ALTCHOICE

## 2023-08-11 ASSESSMENT — ENCOUNTER SYMPTOMS
PALPITATIONS: 0
NAUSEA: 0
DIARRHEA: 0
SORE THROAT: 0
NERVOUS/ANXIOUS: 0
HEARTBURN: 0
CONSTIPATION: 0
DYSURIA: 0
JOINT SWELLING: 0
HEADACHES: 0
PARESTHESIAS: 0
FEVER: 0
HEMATOCHEZIA: 0
FREQUENCY: 0
ABDOMINAL PAIN: 0
EYE PAIN: 0
MYALGIAS: 1
DIZZINESS: 0
ARTHRALGIAS: 1
COUGH: 0
HEMATURIA: 0
CHILLS: 0
SHORTNESS OF BREATH: 0

## 2023-08-11 ASSESSMENT — ASTHMA QUESTIONNAIRES: ACT_TOTALSCORE: 23

## 2023-08-11 ASSESSMENT — ACTIVITIES OF DAILY LIVING (ADL): CURRENT_FUNCTION: NO ASSISTANCE NEEDED

## 2023-08-11 NOTE — ASSESSMENT & PLAN NOTE
2009 Angiogram - LAD with 40-50% stenosis and some myocardial bridging but FFR okay.   Metoprolol ER 25 mg daily, losartan 100 daily, amlodipine 10 mg daily, rosuvastatin 10 mg daily, and Plavix  Aspirin allergy

## 2023-08-11 NOTE — ASSESSMENT & PLAN NOTE
Incidentally discovered at [facility]  Grade 5 less than 5% of specimen  Active surveillance with [facility]

## 2023-08-11 NOTE — PATIENT INSTRUCTIONS
Patient Education   Personalized Prevention Plan  You are due for the preventive services outlined below.  Your care team is available to assist you in scheduling these services.  If you have already completed any of these items, please share that information with your care team to update in your medical record.  Health Maintenance Due   Topic Date Due     Diptheria Tetanus Pertussis (DTAP/TDAP/TD) Vaccine (3 - Td or Tdap) 09/08/2021     COVID-19 Vaccine (7 - Pfizer series) 01/19/2023     ANNUAL REVIEW OF HM ORDERS  08/09/2023     Hearing Loss: Care Instructions  Overview     Hearing loss is a sudden or slow decrease in how well you hear. It can range from slight to profound. Permanent hearing loss can occur with aging. It also can happen when you are exposed long-term to loud noise. Examples include listening to loud music, riding motorcycles, or being around other loud machines.  Hearing loss can affect your work and home life. It can make you feel lonely or depressed. You may feel that you have lost your independence. But hearing aids and other devices can help you hear better and feel connected to others.  Follow-up care is a key part of your treatment and safety. Be sure to make and go to all appointments, and call your doctor if you are having problems. It's also a good idea to know your test results and keep a list of the medicines you take.  How can you care for yourself at home?  Avoid loud noises whenever possible. This helps keep your hearing from getting worse.  Always wear hearing protection around loud noises.  Wear a hearing aid as directed.  A professional can help you pick a hearing aid that will work best for you.  You can also get hearing aids over the counter for mild to moderate hearing loss.  Have hearing tests as your doctor suggests. They can show whether your hearing has changed. Your hearing aid may need to be adjusted.  Use other devices as needed. These may include:  Telephone amplifiers  "and hearing aids that can connect to a television, stereo, radio, or microphone.  Devices that use lights or vibrations. These alert you to the doorbell, a ringing telephone, or a baby monitor.  Television closed-captioning. This shows the words at the bottom of the screen. Most new TVs can do this.  TTY (text telephone). This lets you type messages back and forth on the telephone instead of talking or listening. These devices are also called TDD. When messages are typed on the keyboard, they are sent over the phone line to a receiving TTY. The message is shown on a monitor.  Use text messaging, social media, and email if it is hard for you to communicate by telephone.  Try to learn a listening technique called speechreading. It is not lipreading. You pay attention to people's gestures, expressions, posture, and tone of voice. These clues can help you understand what a person is saying. Face the person you are talking to, and have them face you. Make sure the lighting is good. You need to see the other person's face clearly.  Think about counseling if you need help to adjust to your hearing loss.  When should you call for help?  Watch closely for changes in your health, and be sure to contact your doctor if:    You think your hearing is getting worse.     You have new symptoms, such as dizziness or nausea.   Where can you learn more?  Go to https://www.[employer].net/patiented  Enter R798 in the search box to learn more about \"Hearing Loss: Care Instructions.\"  Current as of: March 1, 2023               Content Version: 13.7    8057-4376 "[employer]   Care instructions adapted under license by your healthcare professional. If you have questions about a medical condition or this instruction, always ask your healthcare professional. "[employer]" disclaims any warranty or liability for your use of this information.         "

## 2023-08-11 NOTE — PROGRESS NOTES
"SUBJECTIVE:   Surendra is a 72 year old who presents for Preventive Visit.      5/15/2023    11:39 AM   Additional Questions   Roomed by Alma CRUMP   Accompanied by Self       Are you in the first 12 months of your Medicare coverage?  No    Healthy Habits:     In general, how would you rate your overall health?  Good    Duration of exercise:  45-60 minutes    Do you usually eat at least 4 servings of fruit and vegetables a day, include whole grains    & fiber and avoid regularly eating high fat or \"junk\" foods?  Yes    Taking medications regularly:  Yes    Barriers to taking medications:  None    Medication side effects:  Not applicable and Muscle aches    Ability to successfully perform activities of daily living:  No assistance needed    Home Safety:  No safety concerns identified    Hearing Impairment:  Difficulty following a conversation in a noisy restaurant or crowded room    In the past 6 months, have you been bothered by leaking of urine?  No    In general, how would you rate your overall mental or emotional health?  Good    Additional concerns today:  No        Have you ever done Advance Care Planning? (For example, a Health Directive, POLST, or a discussion with a medical provider or your loved ones about your wishes): Yes, patient states has an Advance Care Planning document and will bring a copy to the clinic.    No hearing test done.  Pt has hearing aides  Fall risk  Fallen 2 or more times in the past year?: No  Any fall with injury in the past year?: No    Cognitive Screening   1) Repeat 3 items (Leader, Season, Table)    2) Clock draw: NORMAL  3) 3 item recall: Recalls 3 objects  Results: 3 items recalled: COGNITIVE IMPAIRMENT LESS LIKELY    Mini-CogTM Copyright MAEGAN Raya. Licensed by the author for use in [facility]; reprinted with permission (elba@.[facility]). All rights reserved.      Do you have sleep apnea, excessive snoring or daytime drowsiness? : mild untreated DEQUAN    Reviewed and updated " as needed this visit by clinical staff   Tobacco  Allergies  Meds              Reviewed and updated as needed this visit by Provider                 Social History     Tobacco Use    Smoking status: Former     Packs/day: 0.25     Years: 5.00     Pack years: 1.25     Types: Cigarettes     Start date:      Quit date:      Years since quittin.6    Smokeless tobacco: Never   Substance Use Topics    Alcohol use: Not Currently     Comment: rarely             2023    11:48 AM   Alcohol Use   Prescreen: >3 drinks/day or >7 drinks/week? Not Applicable     Do you have a current opioid prescription? No  Do you use any other controlled substances or medications that are not prescribed by a provider? Ambien          Asthma Follow-Up    Was ACT completed today?  Yes        2023    11:49 AM   ACT Total Scores   ACT TOTAL SCORE (Goal Greater than or Equal to 20) 23   In the past 12 months, how many times did you visit the emergency room for your asthma without being admitted to the hospital? 0   In the past 12 months, how many times were you hospitalized overnight because of your asthma? 0        How many days per week do you miss taking your asthma controller medication?  0  Please describe any recent triggers for your asthma: smoke and humidity  Have you had any Emergency Room Visits, Urgent Care Visits, or Hospital Admissions since your last office visit?  No    Current providers sharing in care for this patient include:   Patient Care Team:  Hardik Rock MD as PCP - General (Internal Medicine)  Hardik Rock MD as Assigned PCP  Caity Reddy, PharmD as Pharmacist (Pharmacist)    The following health maintenance items are reviewed in Epic and correct as of today:  Health Maintenance   Topic Date Due    DTAP/TDAP/TD IMMUNIZATION (3 - Td or Tdap) 2021    COVID-19 Vaccine (7 - Pfizer series) 2023    INFLUENZA VACCINE (1) 2023    ASTHMA ACTION PLAN  2023    HEMOGLOBIN   11/30/2023    MICROALBUMIN  02/04/2024    ASTHMA CONTROL TEST  02/11/2024    BMP  08/04/2024    LIPID  08/04/2024    MEDICARE ANNUAL WELLNESS VISIT  08/11/2024    ANNUAL REVIEW OF HM ORDERS  08/11/2024    FALL RISK ASSESSMENT  08/11/2024    COLORECTAL CANCER SCREENING  07/12/2026    ADVANCE CARE PLANNING  08/11/2028    PARATHYROID  Completed    PHOSPHORUS  Completed    HEPATITIS C SCREENING  Completed    PHQ-2 (once per calendar year)  Completed    Pneumococcal Vaccine: 65+ Years  Completed    URINALYSIS  Completed    ALK PHOS  Completed    ZOSTER IMMUNIZATION  Completed    AORTIC ANEURYSM SCREENING (SYSTEM ASSIGNED)  Completed    IPV IMMUNIZATION  Aged Out    MENINGITIS IMMUNIZATION  Aged Out     Labs reviewed in EPIC          Review of Systems   Constitutional:  Negative for chills and fever.   HENT:  Positive for congestion and hearing loss. Negative for ear pain and sore throat.    Eyes:  Negative for pain and visual disturbance.   Respiratory:  Negative for cough and shortness of breath.    Cardiovascular:  Negative for chest pain, palpitations and peripheral edema.   Gastrointestinal:  Negative for abdominal pain, constipation, diarrhea, heartburn, hematochezia and nausea.   Genitourinary:  Positive for impotence. Negative for dysuria, frequency, genital sores, hematuria and urgency.   Musculoskeletal:  Positive for arthralgias and myalgias. Negative for joint swelling.   Skin:  Negative for rash.   Neurological:  Negative for dizziness, headaches and paresthesias.   Psychiatric/Behavioral:  Negative for mood changes. The patient is not nervous/anxious.      Patient is generally happy with his health.  He is followed by [facility] for his prostate cancer which was found incidentally at [facility].  No angina.  Breathing is doing well.  Chronic Ambien for insomnia.  Was intolerant of CPAP for mild sleep apnea.  Gets some generalized achiness.  He notes that he is much better since switching to rosuvastatin from  "atorvastatin.    OBJECTIVE:   /75 (BP Location: Right arm, Patient Position: Sitting, Cuff Size: Adult Large)   Pulse 72   Temp 98  F (36.7  C) (Tympanic)   Resp 16   Ht 1.727 m (5' 8\")   Wt 91.6 kg (202 lb)   SpO2 98%   BMI 30.71 kg/m   Estimated body mass index is 30.71 kg/m  as calculated from the following:    Height as of this encounter: 1.727 m (5' 8\").    Weight as of this encounter: 91.6 kg (202 lb).  Physical Exam  Healthy-appearing older man in no distress  Eyes appear normal  HEENT exam unremarkable.  Mallampati 1  Neck supple no thyromegaly  Lungs clear  Cardiac exam regular no murmur or edema.  Normal carotid and posterior tibial pulsations  Abdomen soft nontender no mass organomegaly.  Golf ball sized umbilical hernia  No hot joints  Skin warm and dry  Alert, oriented, speech fluent, memory good, cranial nerves normal, strength and gait normal  Normal mood and      Recent Results (from the past 720 hour(s))   BASIC METABOLIC PANEL    Collection Time: 08/04/23 10:35 AM   Result Value Ref Range    Sodium 141 136 - 145 mmol/L    Potassium 4.5 3.4 - 5.3 mmol/L    Chloride 108 (H) 98 - 107 mmol/L    Carbon Dioxide (CO2) 23 22 - 29 mmol/L    Anion Gap 10 7 - 15 mmol/L    Urea Nitrogen 15.3 8.0 - 23.0 mg/dL    Creatinine 1.73 (H) 0.67 - 1.17 mg/dL    Calcium 9.2 8.8 - 10.2 mg/dL    Glucose 93 70 - 99 mg/dL    GFR Estimate 41 (L) >60 mL/min/1.73m2   Lipid panel reflex to direct LDL Non-fasting    Collection Time: 08/04/23 10:35 AM   Result Value Ref Range    Cholesterol 161 <200 mg/dL    Triglycerides 172 (H) <150 mg/dL    Direct Measure HDL 37 (L) >=40 mg/dL    LDL Cholesterol Calculated 90 <=100 mg/dL    Non HDL Cholesterol 124 <130 mg/dL   Albumin Random Urine Quantitative with Creat Ratio    Collection Time: 08/04/23 10:37 AM   Result Value Ref Range    Creatinine Urine mg/dL 232.0 mg/dL    Albumin Urine mg/L 134.0 mg/L    Albumin Urine mg/g Cr 57.76 (H) 0.00 - 17.00 mg/g Cr         ASSESSMENT " / PLAN:     Problem List Items Addressed This Visit          Respiratory    Mild intermittent asthma    Relevant Medications    albuterol (PROAIR HFA/PROVENTIL HFA/VENTOLIN HFA) 108 (90 Base) MCG/ACT inhaler    fluticasone-salmeterol (ADVAIR HFA) 230-21 MCG/ACT inhaler    Obstructive sleep apnea syndrome     On 9/11/2007 AHI 8.3, prolonged sleep latency, CPAP titration to 7.  Did not tolerate CPAP             Digestive    GERD (gastroesophageal reflux disease)     Asymptomatic on pantoprazole         Relevant Medications    pantoprazole (PROTONIX) 40 MG EC tablet       Circulatory    Atherosclerotic heart disease of native coronary artery without angina pectoris     2009 Angiogram - LAD with 40-50% stenosis and some myocardial bridging but FFR okay.   Metoprolol ER 25 mg daily, losartan 100 daily, amlodipine 10 mg daily, rosuvastatin 10 mg daily, and Plavix  Aspirin allergy         Relevant Medications    amLODIPine (NORVASC) 10 MG tablet    clopidogrel (PLAVIX) 75 MG tablet    losartan (COZAAR) 100 MG tablet    metoprolol succinate ER (TOPROL XL) 25 MG 24 hr tablet    rosuvastatin (CRESTOR) 10 MG tablet    Primary hypertension    Relevant Medications    losartan (COZAAR) 100 MG tablet       Urinary    Malignant tumor of prostate (H)     Incidentally discovered at [facility]  Grade 5 less than 5% of specimen  Active surveillance with [place] [facility]         Relevant Medications    finasteride (PROSCAR) 5 MG tablet    Silodosin (RAPAFLO) 8 MG CAPS capsule    Chronic kidney disease, stage 3b (H)     eGFR stable  Like her albuminuria 57  On Losartan 100 mg  BP controlled            Other    Benign prostatic hyperplasia with nocturia     Improved with finasteride and silodosin  Nocturia and voids about 5 times per day         Relevant Medications    finasteride (PROSCAR) 5 MG tablet    Silodosin (RAPAFLO) 8 MG CAPS capsule    Neurilemmoma    Relevant Medications    gabapentin (NEURONTIN) 300 MG capsule     Other Visit  "Diagnoses       Encounter for Medicare annual wellness exam    -  Primary    Primary insomnia        Relevant Medications    zolpidem (AMBIEN) 5 MG tablet    Other Relevant Orders    PRIMARY CARE FOLLOW-UP SCHEDULING                  COUNSELING:  Reviewed preventive health counseling, as reflected in patient instructions  Special attention given to:       Immunizations  Declined: TDAP due to Other at pharmacy          BMI:   Estimated body mass index is 30.71 kg/m  as calculated from the following:    Height as of this encounter: 1.727 m (5' 8\").    Weight as of this encounter: 91.6 kg (202 lb).         He reports that he quit smoking about 51 years ago. He started smoking about 57 years ago. He has a 1.25 pack-year smoking history. He has never used smokeless tobacco.      Appropriate preventive services were discussed with this patient, including applicable screening as appropriate for cardiovascular disease, diabetes, osteopenia/osteoporosis, and glaucoma.  As appropriate for age/gender, discussed screening for colorectal cancer, prostate cancer, breast cancer, and cervical cancer. Checklist reviewing preventive services available has been given to the patient.    Reviewed patients plan of care and provided an AVS. The Basic Care Plan (routine screening as documented in Health Maintenance) for Surendra meets the Care Plan requirement. This Care Plan has been established and reviewed with the Patient.          Hardik Rock MD  [facility]      "

## 2023-08-24 DIAGNOSIS — J45.20 MILD INTERMITTENT ASTHMA WITHOUT COMPLICATION: ICD-10-CM

## 2023-08-24 RX ORDER — FLUTICASONE PROPIONATE AND SALMETEROL 50; 250 UG/1; UG/1
1 POWDER RESPIRATORY (INHALATION) 2 TIMES DAILY
Qty: 180 EACH | Refills: 3 | Status: SHIPPED | OUTPATIENT
Start: 2023-08-24 | End: 2024-08-14

## 2023-08-24 NOTE — TELEPHONE ENCOUNTER
"Routing refill request to provider for review/approval because:  Drug not active on patient's medication list  Pt reported     Last Written Prescription Date:  NA  Last Fill Quantity: NA,  # refills: NA   Last office visit provider:  8/11/23    Requested Prescriptions   Pending Prescriptions Disp Refills    ADVAIR DISKUS 250-50 MCG/ACT inhaler [Pharmacy Med Name: ADVAIR DISKUS 250-50MCG/ACT AEPB]  3     Sig: INHALE ONE PUFF BY MOUTH TWICE A DAY       Inhaled Steroids Protocol Passed - 8/24/2023 11:28 AM        Passed - Patient is age 12 or older        Passed - Asthma control assessment score within normal limits in last 6 months     Please review ACT score.           Passed - Medication is active on med list        Passed - Recent (6 mo) or future (30 days) visit within the authorizing provider's specialty     Patient had office visit in the last 6 months or has a visit in the next 30 days with authorizing provider or within the authorizing provider's specialty.  See \"Patient Info\" tab in inbasket, or \"Choose Columns\" in Meds & Orders section of the refill encounter.           Long-Acting Beta Agonist Inhalers Protocol  Passed - 8/24/2023 11:28 AM        Passed - Patient is age 12 or older        Passed - Asthma control assessment score within normal limits in last 6 months     Please review ACT score.           Passed - Medication is active on med list        Passed - Recent (6 mo) or future (30 days) visit within the authorizing provider's specialty     Patient had office visit in the last 6 months or has a visit in the next 30 days with authorizing provider or within the authorizing provider's specialty.  See \"Patient Info\" tab in inbasket, or \"Choose Columns\" in Meds & Orders section of the refill encounter.                 Mariam Saleh RN 08/24/23 2:37 PM  "

## 2023-09-05 ENCOUNTER — MEDICAL CORRESPONDENCE (OUTPATIENT)
Dept: HEALTH INFORMATION MANAGEMENT | Facility: CLINIC | Age: 73
End: 2023-09-05
Payer: MEDICARE

## 2023-09-07 ENCOUNTER — TRANSFERRED RECORDS (OUTPATIENT)
Dept: HEALTH INFORMATION MANAGEMENT | Facility: CLINIC | Age: 73
End: 2023-09-07
Payer: MEDICARE

## 2024-02-01 DIAGNOSIS — F51.04 PSYCHOPHYSIOLOGICAL INSOMNIA: ICD-10-CM

## 2024-02-02 ENCOUNTER — OFFICE VISIT (OUTPATIENT)
Dept: FAMILY MEDICINE | Facility: CLINIC | Age: 74
End: 2024-02-02
Payer: MEDICARE

## 2024-02-02 VITALS
TEMPERATURE: 97.4 F | WEIGHT: 204.9 LBS | OXYGEN SATURATION: 98 % | HEIGHT: 68 IN | SYSTOLIC BLOOD PRESSURE: 112 MMHG | RESPIRATION RATE: 20 BRPM | BODY MASS INDEX: 31.05 KG/M2 | HEART RATE: 72 BPM | DIASTOLIC BLOOD PRESSURE: 70 MMHG

## 2024-02-02 DIAGNOSIS — F51.04 PSYCHOPHYSIOLOGICAL INSOMNIA: ICD-10-CM

## 2024-02-02 PROCEDURE — 99213 OFFICE O/P EST LOW 20 MIN: CPT | Performed by: PHYSICIAN ASSISTANT

## 2024-02-02 RX ORDER — ZOLPIDEM TARTRATE 5 MG/1
5 TABLET ORAL
Qty: 30 TABLET | Refills: 0 | Status: SHIPPED | OUTPATIENT
Start: 2024-02-02 | End: 2024-02-02

## 2024-02-02 RX ORDER — ZOLPIDEM TARTRATE 5 MG/1
5 TABLET ORAL
Qty: 30 TABLET | Refills: 5 | Status: SHIPPED | OUTPATIENT
Start: 2024-02-02 | End: 2024-03-03

## 2024-02-02 ASSESSMENT — ASTHMA QUESTIONNAIRES
QUESTION_4 LAST FOUR WEEKS HOW OFTEN HAVE YOU USED YOUR RESCUE INHALER OR NEBULIZER MEDICATION (SUCH AS ALBUTEROL): NOT AT ALL
QUESTION_2 LAST FOUR WEEKS HOW OFTEN HAVE YOU HAD SHORTNESS OF BREATH: NOT AT ALL
ACT_TOTALSCORE: 25
QUESTION_3 LAST FOUR WEEKS HOW OFTEN DID YOUR ASTHMA SYMPTOMS (WHEEZING, COUGHING, SHORTNESS OF BREATH, CHEST TIGHTNESS OR PAIN) WAKE YOU UP AT NIGHT OR EARLIER THAN USUAL IN THE MORNING: NOT AT ALL
QUESTION_1 LAST FOUR WEEKS HOW MUCH OF THE TIME DID YOUR ASTHMA KEEP YOU FROM GETTING AS MUCH DONE AT WORK, SCHOOL OR AT HOME: NONE OF THE TIME
ACT_TOTALSCORE: 25
QUESTION_5 LAST FOUR WEEKS HOW WOULD YOU RATE YOUR ASTHMA CONTROL: COMPLETELY CONTROLLED

## 2024-02-02 NOTE — PROGRESS NOTES
"  Assessment & Plan     (F51.04) Psychophysiological insomnia  Comment: Medication renewed  Plan: zolpidem (AMBIEN) 5 MG tablet                    BMI  Estimated body mass index is 31.15 kg/m  as calculated from the following:    Height as of this encounter: 1.727 m (5' 8\").    Weight as of this encounter: 92.9 kg (204 lb 14.4 oz).             Subjective   Surendra is a 73 year old, presenting for the following health issues:  Recheck Medication (Ambien refill )        2/2/2024    12:22 PM   Additional Questions   Roomed by MAGALYS Sierra     Patient presents to clinic to refill his Ambien PDMP checked  Working well he is been for a number of years he is now at 5 mg versus 10 mg at still working well he does take it every night does not sleep if he does not take it             3/17/2023    10:39 AM 8/11/2023    11:49 AM 2/2/2024    12:27 PM   ACT Total Scores   ACT TOTAL SCORE (Goal Greater than or Equal to 20) 25 23 25   In the past 12 months, how many times did you visit the emergency room for your asthma without being admitted to the hospital? 0 0 0   In the past 12 months, how many times were you hospitalized overnight because of your asthma? 0 0 0                      Objective    /70 (BP Location: Right arm, Patient Position: Sitting, Cuff Size: Adult Regular)   Pulse 72   Temp 97.4  F (36.3  C) (Tympanic)   Resp 20   Ht 1.727 m (5' 8\")   Wt 92.9 kg (204 lb 14.4 oz)   SpO2 98%   BMI 31.15 kg/m    Body mass index is 31.15 kg/m .  Physical Exam lungs clear ventilated cardiovascular rate and rhythm  Mood affect normal  Dressed and groomed appropriately              Signed Electronically by: KIMBERLI Donovan    "

## 2024-03-01 DIAGNOSIS — F51.04 PSYCHOPHYSIOLOGICAL INSOMNIA: ICD-10-CM

## 2024-03-03 RX ORDER — ZOLPIDEM TARTRATE 5 MG/1
5 TABLET ORAL
Qty: 30 TABLET | Refills: 5 | Status: SHIPPED | OUTPATIENT
Start: 2024-03-03 | End: 2024-08-12

## 2024-03-28 ENCOUNTER — TRANSFERRED RECORDS (OUTPATIENT)
Dept: HEALTH INFORMATION MANAGEMENT | Facility: CLINIC | Age: 74
End: 2024-03-28
Payer: MEDICARE

## 2024-05-09 ENCOUNTER — ANCILLARY PROCEDURE (OUTPATIENT)
Dept: GENERAL RADIOLOGY | Facility: CLINIC | Age: 74
End: 2024-05-09
Payer: MEDICARE

## 2024-05-09 ENCOUNTER — OFFICE VISIT (OUTPATIENT)
Dept: FAMILY MEDICINE | Facility: CLINIC | Age: 74
End: 2024-05-09
Payer: MEDICARE

## 2024-05-09 VITALS
TEMPERATURE: 97.8 F | DIASTOLIC BLOOD PRESSURE: 77 MMHG | BODY MASS INDEX: 30.9 KG/M2 | RESPIRATION RATE: 20 BRPM | WEIGHT: 203.9 LBS | HEIGHT: 68 IN | SYSTOLIC BLOOD PRESSURE: 116 MMHG | OXYGEN SATURATION: 95 % | HEART RATE: 73 BPM

## 2024-05-09 DIAGNOSIS — M25.561 CHRONIC PAIN OF RIGHT KNEE: ICD-10-CM

## 2024-05-09 DIAGNOSIS — G89.29 CHRONIC PAIN OF RIGHT KNEE: ICD-10-CM

## 2024-05-09 DIAGNOSIS — M25.561 CHRONIC PAIN OF RIGHT KNEE: Primary | ICD-10-CM

## 2024-05-09 DIAGNOSIS — G89.29 CHRONIC PAIN OF RIGHT KNEE: Primary | ICD-10-CM

## 2024-05-09 PROCEDURE — 99213 OFFICE O/P EST LOW 20 MIN: CPT

## 2024-05-09 PROCEDURE — 73562 X-RAY EXAM OF KNEE 3: CPT | Mod: TC | Performed by: RADIOLOGY

## 2024-05-09 RX ORDER — METOPROLOL SUCCINATE 25 MG/1
25 TABLET, EXTENDED RELEASE ORAL DAILY
COMMUNITY
Start: 2024-02-13 | End: 2024-08-12

## 2024-05-09 RX ORDER — PREDNISONE 20 MG/1
20 TABLET ORAL DAILY
Qty: 5 TABLET | Refills: 0 | Status: SHIPPED | OUTPATIENT
Start: 2024-05-09 | End: 2024-05-14

## 2024-05-09 RX ORDER — FLUOROURACIL 50 MG/G
CREAM TOPICAL DAILY
COMMUNITY
Start: 2024-04-10

## 2024-05-09 NOTE — PROGRESS NOTES
"  Assessment & Plan     Chronic pain of right knee  Patient in clinic for worsening or chronic knee pain on right knee.  There is some mild swelling laterally and distal to the patella.  On exam positive lateral Fanny, Anterior posterior drawer and medial Fanny negative.  Patient does have some point tenderness laterally.  Patient has previously done physical therapy and did not find this to be helpful.  Will do an x-ray of the knee today and discussed changing plan of care depending on results.  We also fitted him for a knee brace and discussed resting, ice compression and elevation of knee.  If x-ray normal but still continuing to experience symptoms discussed referral to Ortho.  Patient is taking Tylenol for pain.  Discussed short burst of prednisone to help with inflammation and patient feels that this would be helpful.  Patient to return to clinic if not improving or to contact clinic if he would like referral to Ortho.  - XR Knee Right 3 Views; Future  - predniSONE (DELTASONE) 20 MG tablet; Take 1 tablet (20 mg) by mouth daily for 5 days  - Ankle/Knee Bracing Supplies Order Knee Sleeve/Brace; Right; Open            BMI  Estimated body mass index is 31 kg/m  as calculated from the following:    Height as of this encounter: 1.727 m (5' 8\").    Weight as of this encounter: 92.5 kg (203 lb 14.4 oz).             Niesha Agosto is a 73 year old, presenting for the following health issues:  Knee Pain (Right knee pain )        5/9/2024    11:55 AM   Additional Questions   Roomed by CATRACHITA Garza     Approx 1 year ago felt snap/crack in knee. Now has swwelling lateral knee and distal to patella. Pain exacerbated by walking. He walks 1.5miles/day.     History of Present Illness       Reason for visit:  Knee    He eats 2-3 servings of fruits and vegetables daily.He consumes 0 sweetened beverage(s) daily.He exercises with enough effort to increase his heart rate 30 to 60 minutes per day.  He exercises with " "enough effort to increase his heart rate 5 days per week.   He is taking medications regularly.                     Objective    /77 (BP Location: Right arm, Patient Position: Sitting, Cuff Size: Adult Large)   Pulse 73   Temp 97.8  F (36.6  C) (Oral)   Resp 20   Ht 1.727 m (5' 8\")   Wt 92.5 kg (203 lb 14.4 oz)   SpO2 95%   BMI 31.00 kg/m    Body mass index is 31 kg/m .  Physical Exam  HENT:      Head: Normocephalic.      Nose: Nose normal.   Eyes:      Extraocular Movements: Extraocular movements intact.      Conjunctiva/sclera: Conjunctivae normal.   Pulmonary:      Effort: Pulmonary effort is normal.   Musculoskeletal:      Cervical back: Normal range of motion.      Right knee: Swelling present. No deformity, erythema, bony tenderness or crepitus. Normal range of motion. Tenderness present over the LCL. Normal alignment and normal patellar mobility.      Instability Tests: Anterior drawer test negative. Posterior drawer test negative. Anterior Lachman test negative. Lateral Fanny test positive. Medial Fanny test negative.      Left knee: Normal.      Right lower leg: No edema.      Left lower leg: No edema.   Skin:     General: Skin is warm.      Capillary Refill: Capillary refill takes less than 2 seconds.   Neurological:      Mental Status: He is alert and oriented to person, place, and time.   Psychiatric:         Behavior: Behavior normal.         Thought Content: Thought content normal.                    Signed Electronically by: LUCIANO Jurado CNP    "

## 2024-05-10 ENCOUNTER — TELEPHONE (OUTPATIENT)
Dept: FAMILY MEDICINE | Facility: CLINIC | Age: 74
End: 2024-05-10
Payer: MEDICARE

## 2024-05-10 NOTE — TELEPHONE ENCOUNTER
Patient called to report xray results, there was some mild medial compartment narrowing but otherwise normal x-ray.  Advised patient to continue with plan of care including use of knee brace.  Patient to notify clinic if not improving for further imaging or referral to orthopedics    LUCIANO Jurado CNP on 5/10/2024 at 10:53 AM

## 2024-05-23 ENCOUNTER — TELEPHONE (OUTPATIENT)
Dept: PHARMACY | Facility: OTHER | Age: 74
End: 2024-05-23
Payer: MEDICARE

## 2024-05-23 NOTE — TELEPHONE ENCOUNTER
MTM Recruitment: [facility] insurance     Referral outreach attempt #1 on May 23, 2024      Outcome: left voicemail- Call back number [phone]    Toya GeorgeD  Medication Therapy Management Pharmacist  [facility]       - - -

## 2024-05-30 ENCOUNTER — TELEPHONE (OUTPATIENT)
Dept: PHARMACY | Facility: OTHER | Age: 74
End: 2024-05-30
Payer: MEDICARE

## 2024-05-30 NOTE — TELEPHONE ENCOUNTER
MTM Recruitment: [facility]     Referral outreach attempt #2 on May 30, 2024      Outcome: left voicemail- Call back number [phone]    Toya McneilD, BCPS  Pediatric Medication Therapy Management Pharmacist-Solid Organ Transplant

## 2024-07-10 DIAGNOSIS — Z96.649 HISTORY OF HIP REPLACEMENT: ICD-10-CM

## 2024-07-11 RX ORDER — AMOXICILLIN 500 MG/1
CAPSULE ORAL
Qty: 4 CAPSULE | Refills: 3 | Status: SHIPPED | OUTPATIENT
Start: 2024-07-11

## 2024-08-05 ENCOUNTER — DOCUMENTATION ONLY (OUTPATIENT)
Dept: FAMILY MEDICINE | Facility: CLINIC | Age: 74
End: 2024-08-05

## 2024-08-05 ENCOUNTER — LAB (OUTPATIENT)
Dept: LAB | Facility: CLINIC | Age: 74
End: 2024-08-05
Payer: MEDICARE

## 2024-08-05 DIAGNOSIS — C61 MALIGNANT TUMOR OF PROSTATE (H): Primary | ICD-10-CM

## 2024-08-05 DIAGNOSIS — I25.10 ATHEROSCLEROTIC HEART DISEASE OF NATIVE CORONARY ARTERY WITHOUT ANGINA PECTORIS: ICD-10-CM

## 2024-08-05 DIAGNOSIS — N18.32 CHRONIC KIDNEY DISEASE, STAGE 3B (H): Primary | ICD-10-CM

## 2024-08-05 DIAGNOSIS — C61 MALIGNANT TUMOR OF PROSTATE (H): ICD-10-CM

## 2024-08-05 DIAGNOSIS — D64.9 ANEMIA, UNSPECIFIED TYPE: ICD-10-CM

## 2024-08-05 DIAGNOSIS — Z13.220 SCREENING FOR HYPERLIPIDEMIA: ICD-10-CM

## 2024-08-05 LAB
ANION GAP SERPL CALCULATED.3IONS-SCNC: 10 MMOL/L (ref 7–15)
BUN SERPL-MCNC: 21.7 MG/DL (ref 8–23)
CALCIUM SERPL-MCNC: 8.9 MG/DL (ref 8.8–10.4)
CHLORIDE SERPL-SCNC: 108 MMOL/L (ref 98–107)
CHOLEST SERPL-MCNC: 154 MG/DL
CREAT SERPL-MCNC: 1.63 MG/DL (ref 0.67–1.17)
CREAT UR-MCNC: 201 MG/DL
EGFRCR SERPLBLD CKD-EPI 2021: 44 ML/MIN/1.73M2
ERYTHROCYTE [DISTWIDTH] IN BLOOD BY AUTOMATED COUNT: 13.3 % (ref 10–15)
FASTING STATUS PATIENT QL REPORTED: ABNORMAL
FASTING STATUS PATIENT QL REPORTED: ABNORMAL
FERRITIN SERPL-MCNC: 103 NG/ML (ref 31–409)
GLUCOSE SERPL-MCNC: 93 MG/DL (ref 70–99)
HCO3 SERPL-SCNC: 23 MMOL/L (ref 22–29)
HCT VFR BLD AUTO: 38.4 % (ref 40–53)
HDLC SERPL-MCNC: 35 MG/DL
HGB BLD-MCNC: 12.7 G/DL (ref 13.3–17.7)
HGB BLD-MCNC: 12.8 G/DL (ref 13.3–17.7)
LDLC SERPL CALC-MCNC: 87 MG/DL
MCH RBC QN AUTO: 32.8 PG (ref 26.5–33)
MCHC RBC AUTO-ENTMCNC: 33.3 G/DL (ref 31.5–36.5)
MCV RBC AUTO: 99 FL (ref 78–100)
MICROALBUMIN UR-MCNC: 18.7 MG/L
MICROALBUMIN/CREAT UR: 9.3 MG/G CR (ref 0–17)
NONHDLC SERPL-MCNC: 119 MG/DL
PLATELET # BLD AUTO: 296 10E3/UL (ref 150–450)
POTASSIUM SERPL-SCNC: 4.2 MMOL/L (ref 3.4–5.3)
PSA SERPL DL<=0.01 NG/ML-MCNC: 1.07 NG/ML (ref 0–6.5)
RBC # BLD AUTO: 3.9 10E6/UL (ref 4.4–5.9)
SODIUM SERPL-SCNC: 141 MMOL/L (ref 135–145)
TRIGL SERPL-MCNC: 160 MG/DL
VIT B12 SERPL-MCNC: 540 PG/ML (ref 232–1245)
WBC # BLD AUTO: 4.9 10E3/UL (ref 4–11)

## 2024-08-05 PROCEDURE — 85027 COMPLETE CBC AUTOMATED: CPT

## 2024-08-05 PROCEDURE — 80048 BASIC METABOLIC PNL TOTAL CA: CPT

## 2024-08-05 PROCEDURE — 82728 ASSAY OF FERRITIN: CPT

## 2024-08-05 PROCEDURE — 84153 ASSAY OF PSA TOTAL: CPT

## 2024-08-05 PROCEDURE — 82607 VITAMIN B-12: CPT

## 2024-08-05 PROCEDURE — 80061 LIPID PANEL: CPT

## 2024-08-05 PROCEDURE — 85045 AUTOMATED RETICULOCYTE COUNT: CPT

## 2024-08-05 PROCEDURE — 82570 ASSAY OF URINE CREATININE: CPT

## 2024-08-05 PROCEDURE — 82043 UR ALBUMIN QUANTITATIVE: CPT

## 2024-08-05 PROCEDURE — 36415 COLL VENOUS BLD VENIPUNCTURE: CPT

## 2024-08-05 NOTE — PROGRESS NOTES
Surendra was here for lab this morning 8/5/24. Pt requesting a PSA. If needed please place add-on order.     Thank you  Binta

## 2024-08-05 NOTE — LETTER
August 6, 2024      Surendra Wallace  [address] [address]        Dear ,    We are writing to inform you of your test results.    Notes are significant for elevated creatinine and low hemoglobin due to chronic kidney disease.  Results are stable overall.  No iron or B12 deficiency     Resulted Orders   Hemoglobin   Result Value Ref Range    Hemoglobin 12.7 (L) 13.3 - 17.7 g/dL   Albumin Random Urine Quantitative with Creat Ratio   Result Value Ref Range    Creatinine Urine mg/dL 201.0 mg/dL      Comment:      The reference ranges have not been established in urine creatinine. The results should be integrated into the clinical context for interpretation.    Albumin Urine mg/L 18.7 mg/L      Comment:      The reference ranges have not been established in urine albumin. The results should be integrated into the clinical context for interpretation.    Albumin Urine mg/g Cr 9.30 0.00 - 17.00 mg/g Cr      Comment:      Microalbuminuria is defined as an albumin:creatinine ratio of 17 to 299 for males and 25 to 299 for females. A ratio of albumin:creatinine of 300 or higher is indicative of overt proteinuria.  Due to biologic variability, positive results should be confirmed by a second, first-morning random or 24-hour timed urine specimen. If there is discrepancy, a third specimen is recommended. When 2 out of 3 results are in the microalbuminuria range, this is evidence for incipient nephropathy and warrants increased efforts at glucose control, blood pressure control, and institution of therapy with an angiotensin-converting-enzyme (ACE) inhibitor (if the patient can tolerate it).     BASIC METABOLIC PANEL   Result Value Ref Range    Sodium 141 135 - 145 mmol/L    Potassium 4.2 3.4 - 5.3 mmol/L    Chloride 108 (H) 98 - 107 mmol/L    Carbon Dioxide (CO2) 23 22 - 29 mmol/L    Anion Gap 10 7 - 15 mmol/L    Urea Nitrogen 21.7 8.0 - 23.0 mg/dL    Creatinine 1.63 (H) 0.67 - 1.17 mg/dL    GFR Estimate  44 (L) >60 mL/min/1.73m2      Comment:      eGFR calculated using 2021 CKD-EPI equation.    Calcium 8.9 8.8 - 10.4 mg/dL      Comment:      Reference intervals for this test were updated on 7/16/2024 to reflect our healthy population more accurately. There may be differences in the flagging of prior results with similar values performed with this method. Those prior results can be interpreted in the context of the updated reference intervals.    Glucose 93 70 - 99 mg/dL    Patient Fasting > 8hrs? Unknown    Lipid panel reflex to direct LDL Non-fasting   Result Value Ref Range    Cholesterol 154 <200 mg/dL    Triglycerides 160 (H) <150 mg/dL    Direct Measure HDL 35 (L) >=40 mg/dL    LDL Cholesterol Calculated 87 <=100 mg/dL    Non HDL Cholesterol 119 <130 mg/dL    Patient Fasting > 8hrs? Unknown     Narrative    Cholesterol  Desirable:  <200 mg/dL    Triglycerides  Normal:  Less than 150 mg/dL  Borderline High:  150-199 mg/dL  High:  200-499 mg/dL  Very High:  Greater than or equal to 500 mg/dL    Direct Measure HDL  Female:  Greater than or equal to 50 mg/dL   Male:  Greater than or equal to 40 mg/dL    LDL Cholesterol  Desirable:  <100mg/dL  Above Desirable:  100-129 mg/dL   Borderline High:  130-159 mg/dL   High:  160-189 mg/dL   Very High:  >= 190 mg/dL    Non HDL Cholesterol  Desirable:  130 mg/dL  Above Desirable:  130-159 mg/dL  Borderline High:  160-189 mg/dL  High:  190-219 mg/dL  Very High:  Greater than or equal to 220 mg/dL   PSA, tumor marker   Result Value Ref Range    PSA Tumor Marker 1.07 0.00 - 6.50 ng/mL    Narrative    This result is obtained using the Roche Elecsys total PSA method on the peter [id number] immunoassay analyzer. Results obtained with different assay methods or kits cannot be used interchangeably.   Ferritin   Result Value Ref Range    Ferritin 103 31 - 409 ng/mL   Vitamin B12   Result Value Ref Range    Vitamin B12 540 232 - 1,245 pg/mL   CBC with platelets   Result Value Ref Range     WBC Count 4.9 4.0 - 11.0 10e3/uL    RBC Count 3.90 (L) 4.40 - 5.90 10e6/uL    Hemoglobin 12.8 (L) 13.3 - 17.7 g/dL    Hematocrit 38.4 (L) 40.0 - 53.0 %    MCV 99 78 - 100 fL    MCH 32.8 26.5 - 33.0 pg    MCHC 33.3 31.5 - 36.5 g/dL    RDW 13.3 10.0 - 15.0 %    Platelet Count 296 150 - 450 10e3/uL       If you have any questions or concerns, please call the clinic at the number listed above.       Sincerely,      Hardik Rock MD

## 2024-08-06 LAB
RETICS # AUTO: 0.05 10E6/UL (ref 0.03–0.1)
RETICS/RBC NFR AUTO: 1.4 % (ref 0.5–2)

## 2024-08-12 ENCOUNTER — OFFICE VISIT (OUTPATIENT)
Dept: FAMILY MEDICINE | Facility: CLINIC | Age: 74
End: 2024-08-12
Payer: MEDICARE

## 2024-08-12 VITALS
HEIGHT: 68 IN | TEMPERATURE: 96.4 F | RESPIRATION RATE: 20 BRPM | WEIGHT: 198 LBS | OXYGEN SATURATION: 98 % | SYSTOLIC BLOOD PRESSURE: 114 MMHG | DIASTOLIC BLOOD PRESSURE: 77 MMHG | HEART RATE: 72 BPM | BODY MASS INDEX: 30.01 KG/M2

## 2024-08-12 DIAGNOSIS — K21.9 GASTROESOPHAGEAL REFLUX DISEASE WITHOUT ESOPHAGITIS: ICD-10-CM

## 2024-08-12 DIAGNOSIS — F51.04 PSYCHOPHYSIOLOGICAL INSOMNIA: ICD-10-CM

## 2024-08-12 DIAGNOSIS — D36.10 NEURILEMMOMA: ICD-10-CM

## 2024-08-12 DIAGNOSIS — L30.9 ECZEMA, UNSPECIFIED TYPE: ICD-10-CM

## 2024-08-12 DIAGNOSIS — Z00.00 ENCOUNTER FOR MEDICARE ANNUAL WELLNESS EXAM: Primary | ICD-10-CM

## 2024-08-12 DIAGNOSIS — Z23 NEED FOR TDAP VACCINATION: ICD-10-CM

## 2024-08-12 DIAGNOSIS — Z79.899 MEDICATION MANAGEMENT: ICD-10-CM

## 2024-08-12 DIAGNOSIS — R35.1 BENIGN PROSTATIC HYPERPLASIA WITH NOCTURIA: ICD-10-CM

## 2024-08-12 DIAGNOSIS — G89.29 CHRONIC PAIN OF RIGHT KNEE: ICD-10-CM

## 2024-08-12 DIAGNOSIS — C61 MALIGNANT TUMOR OF PROSTATE (H): ICD-10-CM

## 2024-08-12 DIAGNOSIS — N18.32 CHRONIC KIDNEY DISEASE, STAGE 3B (H): ICD-10-CM

## 2024-08-12 DIAGNOSIS — J45.20 MILD INTERMITTENT ASTHMA WITHOUT COMPLICATION: ICD-10-CM

## 2024-08-12 DIAGNOSIS — I25.10 ATHEROSCLEROSIS OF NATIVE CORONARY ARTERY OF NATIVE HEART WITHOUT ANGINA PECTORIS: ICD-10-CM

## 2024-08-12 DIAGNOSIS — G47.33 OBSTRUCTIVE SLEEP APNEA SYNDROME: ICD-10-CM

## 2024-08-12 DIAGNOSIS — M25.561 CHRONIC PAIN OF RIGHT KNEE: ICD-10-CM

## 2024-08-12 DIAGNOSIS — I10 PRIMARY HYPERTENSION: ICD-10-CM

## 2024-08-12 DIAGNOSIS — N40.1 BENIGN PROSTATIC HYPERPLASIA WITH NOCTURIA: ICD-10-CM

## 2024-08-12 PROBLEM — J47.9 BRONCHIECTASIS (H): Status: RESOLVED | Noted: 2022-03-24 | Resolved: 2024-08-12

## 2024-08-12 PROBLEM — E78.5 HYPERLIPIDEMIA: Status: RESOLVED | Noted: 2022-03-24 | Resolved: 2024-08-12

## 2024-08-12 PROCEDURE — G0439 PPPS, SUBSEQ VISIT: HCPCS | Performed by: INTERNAL MEDICINE

## 2024-08-12 PROCEDURE — 99214 OFFICE O/P EST MOD 30 MIN: CPT | Mod: 25 | Performed by: INTERNAL MEDICINE

## 2024-08-12 RX ORDER — LOSARTAN POTASSIUM 100 MG/1
100 TABLET ORAL DAILY
Qty: 90 TABLET | Refills: 3 | Status: SHIPPED | OUTPATIENT
Start: 2024-08-12

## 2024-08-12 RX ORDER — ROSUVASTATIN CALCIUM 20 MG/1
20 TABLET, COATED ORAL DAILY
Qty: 90 TABLET | Refills: 3 | Status: SHIPPED | OUTPATIENT
Start: 2024-08-12

## 2024-08-12 RX ORDER — SILODOSIN 8 MG/1
CAPSULE ORAL
Qty: 90 CAPSULE | Refills: 3 | Status: SHIPPED | OUTPATIENT
Start: 2024-08-12

## 2024-08-12 RX ORDER — FINASTERIDE 5 MG/1
1 TABLET, FILM COATED ORAL DAILY
Qty: 90 TABLET | Refills: 3 | Status: SHIPPED | OUTPATIENT
Start: 2024-08-12

## 2024-08-12 RX ORDER — METOPROLOL SUCCINATE 25 MG/1
25 TABLET, EXTENDED RELEASE ORAL DAILY
Qty: 90 TABLET | Refills: 3 | Status: SHIPPED | OUTPATIENT
Start: 2024-08-12

## 2024-08-12 RX ORDER — AMLODIPINE BESYLATE 10 MG/1
10 TABLET ORAL DAILY
Qty: 90 TABLET | Refills: 3 | Status: SHIPPED | OUTPATIENT
Start: 2024-08-12

## 2024-08-12 RX ORDER — ALBUTEROL SULFATE 90 UG/1
2 AEROSOL, METERED RESPIRATORY (INHALATION) EVERY 4 HOURS PRN
Qty: 18 G | Refills: 11 | Status: SHIPPED | OUTPATIENT
Start: 2024-08-12

## 2024-08-12 RX ORDER — CLOBETASOL PROPIONATE 0.5 MG/G
OINTMENT TOPICAL DAILY PRN
Qty: 60 G | Refills: 0 | Status: SHIPPED | OUTPATIENT
Start: 2024-08-12

## 2024-08-12 RX ORDER — PANTOPRAZOLE SODIUM 40 MG/1
TABLET, DELAYED RELEASE ORAL
Qty: 90 TABLET | Refills: 3 | Status: SHIPPED | OUTPATIENT
Start: 2024-08-12

## 2024-08-12 RX ORDER — CLOPIDOGREL BISULFATE 75 MG/1
75 TABLET ORAL DAILY
Qty: 90 TABLET | Refills: 3 | Status: SHIPPED | OUTPATIENT
Start: 2024-08-12

## 2024-08-12 RX ORDER — GABAPENTIN 300 MG/1
CAPSULE ORAL
Qty: 90 CAPSULE | Refills: 3 | Status: SHIPPED | OUTPATIENT
Start: 2024-08-12

## 2024-08-12 RX ORDER — ZOLPIDEM TARTRATE 5 MG/1
5 TABLET ORAL
Qty: 30 TABLET | Refills: 5 | Status: SHIPPED | OUTPATIENT
Start: 2024-08-12

## 2024-08-12 SDOH — HEALTH STABILITY: PHYSICAL HEALTH: ON AVERAGE, HOW MANY DAYS PER WEEK DO YOU ENGAGE IN MODERATE TO STRENUOUS EXERCISE (LIKE A BRISK WALK)?: 4 DAYS

## 2024-08-12 SDOH — HEALTH STABILITY: PHYSICAL HEALTH: ON AVERAGE, HOW MANY MINUTES DO YOU ENGAGE IN EXERCISE AT THIS LEVEL?: 50 MIN

## 2024-08-12 ASSESSMENT — ASTHMA QUESTIONNAIRES
QUESTION_2 LAST FOUR WEEKS HOW OFTEN HAVE YOU HAD SHORTNESS OF BREATH: NOT AT ALL
QUESTION_3 LAST FOUR WEEKS HOW OFTEN DID YOUR ASTHMA SYMPTOMS (WHEEZING, COUGHING, SHORTNESS OF BREATH, CHEST TIGHTNESS OR PAIN) WAKE YOU UP AT NIGHT OR EARLIER THAN USUAL IN THE MORNING: NOT AT ALL
QUESTION_4 LAST FOUR WEEKS HOW OFTEN HAVE YOU USED YOUR RESCUE INHALER OR NEBULIZER MEDICATION (SUCH AS ALBUTEROL): ONCE A WEEK OR LESS
ACT_TOTALSCORE: 23
QUESTION_1 LAST FOUR WEEKS HOW MUCH OF THE TIME DID YOUR ASTHMA KEEP YOU FROM GETTING AS MUCH DONE AT WORK, SCHOOL OR AT HOME: NONE OF THE TIME
ACT_TOTALSCORE: 23
QUESTION_5 LAST FOUR WEEKS HOW WOULD YOU RATE YOUR ASTHMA CONTROL: WELL CONTROLLED

## 2024-08-12 ASSESSMENT — SOCIAL DETERMINANTS OF HEALTH (SDOH): HOW OFTEN DO YOU GET TOGETHER WITH FRIENDS OR RELATIVES?: TWICE A WEEK

## 2024-08-12 NOTE — ASSESSMENT & PLAN NOTE
Incidentally discovered at [facility]  Grade 5 less than 5% of specimen  Active surveillance with [place] [facility]  5/24 PSA 1.07

## 2024-08-12 NOTE — PROGRESS NOTES
Preventive Care Visit  [facility]  Hardik Rock MD, Internal Medicine  Aug 12, 2024      Assessment & Plan   Problem List Items Addressed This Visit       Mild intermittent asthma     Uses albuterol about once every 3 weeks         Relevant Medications    albuterol (PROAIR HFA/PROVENTIL HFA/VENTOLIN HFA) 108 (90 Base) MCG/ACT inhaler    Benign prostatic hyperplasia with nocturia     Improved with finasteride and silodosin           Relevant Medications    Silodosin (RAPAFLO) 8 MG CAPS capsule    finasteride (PROSCAR) 5 MG tablet    Atherosclerotic heart disease of native coronary artery without angina pectoris     2009 Angiogram - LAD with 40-50% stenosis and some myocardial bridging but FFR okay.   Metoprolol ER 25 mg daily, losartan 100 daily, amlodipine 10 mg daily, rosuvastatin 10 mg daily, and Plavix  Aspirin allergy         Relevant Medications    rosuvastatin (CRESTOR) 20 MG tablet    metoprolol succinate ER (TOPROL XL) 25 MG 24 hr tablet    losartan (COZAAR) 100 MG tablet    clopidogrel (PLAVIX) 75 MG tablet    amLODIPine (NORVASC) 10 MG tablet    Other Relevant Orders    Lipid panel reflex to direct LDL Fasting    Eczema    Relevant Medications    albuterol (PROAIR HFA/PROVENTIL HFA/VENTOLIN HFA) 108 (90 Base) MCG/ACT inhaler    clobetasol (TEMOVATE) 0.05 % external ointment    GERD (gastroesophageal reflux disease)     Asymptomatic on pantoprazole         Relevant Medications    pantoprazole (PROTONIX) 40 MG EC tablet    Primary hypertension     Controlled with losartan 100, amlodipine 10, Toprol ER 25 mg         Relevant Medications    metoprolol succinate ER (TOPROL XL) 25 MG 24 hr tablet    losartan (COZAAR) 100 MG tablet    amLODIPine (NORVASC) 10 MG tablet    Malignant tumor of prostate (H)     Incidentally discovered at TURP  Grade 5 less than 5% of specimen  Active surveillance with [facility]  5/24 PSA 1.07         Relevant Medications    Silodosin (RAPAFLO) 8 MG  "CAPS capsule    finasteride (PROSCAR) 5 MG tablet    Neurilemmoma    Relevant Medications    gabapentin (NEURONTIN) 300 MG capsule    Obstructive sleep apnea syndrome     On 9/11/2007 AHI 8.3, prolonged sleep latency, CPAP titration to 7.  Did not tolerate CPAP            Chronic kidney disease, stage 3b (H)     eGFR stable  ELIAN normal 8/5/2024  Losartan 100 mg  BP controlled         Relevant Orders    Med Therapy Management Referral    Psychophysiological insomnia     Chronic use of zolpidem  Reviewed the risks and benefits of zolpidem and patient wishes to continue  Offered CBT-I         Relevant Medications    zolpidem (AMBIEN) 5 MG tablet    Chronic pain of right knee     X-ray 5/29/2024- medial compartment OA         Relevant Orders    Orthopedic  Referral     Other Visit Diagnoses       Encounter for Medicare annual wellness exam    -  Primary    Medication management        Relevant Orders    Med Therapy Management Referral    Need for Tdap vaccination                        BMI  Estimated body mass index is 30.33 kg/m  as calculated from the following:    Height as of this encounter: 1.721 m (5' 7.75\").    Weight as of this encounter: 89.8 kg (198 lb).       Counseling  Appropriate preventive services were addressed with this patient via screening, questionnaire, or discussion as appropriate for fall prevention, nutrition, physical activity, Tobacco-use cessation, weight loss and cognition.  Checklist reviewing preventive services available has been given to the patient.  Reviewed patient's diet, addressing concerns and/or questions.   Updated plan of care.  Patient reported difficulty with activities of daily living were addressed today.The patient was provided with written information regarding signs of hearing loss.     Follow-up for wellness visit in 1 year or as needed      Niesha Agosto is a 73 year old, presenting for the following:  Wellness Visit (Pt here for an AWV and Follow-up on " "recent labwork), Asthma, Knee Pain (Pt c/o Right knee pain on and off x year and a half), Derm Problem (Pt also c/o \"spot\" on left shoulder), and Nail Problem (Pt alsos c/o nail problem on Right great toe with some \"discharge\" x 2 weeks)        8/12/2024     3:59 PM   Additional Questions   Roomed by Jesus DOWELL           Health Care Directive  Patient does not have a Health Care Directive or Living Will: Patient states has Advance Directive and will bring in a copy to clinic.    No hearing test performed.  Pt has hearing aids          HPI  Patient here for wellness visit.  Generally his health is good.  He continues to be bothered by right knee pain it is improved but still bothers him at times.  No angina.  Urine flow is good.  Sleeping well with zolpidem.  No snoring, sleep behavior, or cognitive issues.    Hypertension Follow-up    Do you check your blood pressure regularly outside of the clinic? Yes   Are you following a low salt diet? Yes  Are your blood pressures ever more than 140 on the top number (systolic) OR more   than 90 on the bottom number (diastolic), for example 140/90? No    Asthma Follow-Up    Was ACT completed today?  Yes        8/12/2024     3:44 PM   ACT Total Scores   ACT TOTAL SCORE (Goal Greater than or Equal to 20) 23   In the past 12 months, how many times did you visit the emergency room for your asthma without being admitted to the hospital? 0   In the past 12 months, how many times were you hospitalized overnight because of your asthma? 0        How many days per week do you miss taking your asthma controller medication?  0  Please describe any recent triggers for your asthma: humidity  Have you had any Emergency Room Visits, Urgent Care Visits, or Hospital Admissions since your last office visit?  No        8/12/2024   General Health   How would you rate your overall physical health? Good   Feel stress (tense, anxious, or unable to sleep) Not at all            8/12/2024   Nutrition "   Diet: Low salt    I don't know       Multiple values from one day are sorted in reverse-chronological order         8/12/2024   Exercise   Days per week of moderate/strenous exercise 4 days   Average minutes spent exercising at this level 50 min            8/12/2024   Social Factors   Frequency of gathering with friends or relatives Twice a week   Worry food won't last until get money to buy more No   Food not last or not have enough money for food? No   Do you have housing? (Housing is defined as stable permanent housing and does not include staying ouside in a car, in a tent, in an abandoned building, in an overnight shelter, or couch-surfing.) Yes   Are you worried about losing your housing? No   Lack of transportation? No   Unable to get utilities (heat,electricity)? No            8/12/2024   Fall Risk   Fallen 2 or more times in the past year? Yes    No   Trouble with walking or balance? No    Yes       Multiple values from one day are sorted in reverse-chronological order             8/12/2024   Activities of Daily Living- Home Safety   Needs help with the following daily activites Housework   Safety concerns in the home None of the above            8/12/2024   Dental   Dentist two times every year? Yes            8/12/2024   Hearing Screening   Hearing concerns? (!) I NEED TO ASK PEOPLE TO SPEAK UP OR REPEAT THEMSELVES.    (!) IT'S HARD TO FOLLOW A CONVERSATION IN A NOISY RESTAURANT OR CROWDED ROOM.       Multiple values from one day are sorted in reverse-chronological order         8/12/2024   Driving Risk Screening   Patient/family members have concerns about driving No            8/12/2024   General Alertness/Fatigue Screening   Have you been more tired than usual lately? No            8/12/2024   Urinary Incontinence Screening   Bothered by leaking urine in past 6 months No            8/12/2024   TB Screening   Were you born outside of the [place]? No            Today's PHQ-2 Score:       8/12/2024     3:42  PM   PHQ-2 (  [employer])   Q1: Little interest or pleasure in doing things 0   Q2: Feeling down, depressed or hopeless 0   PHQ-2 Score 0   Q1: Little interest or pleasure in doing things Not at all   Q2: Feeling down, depressed or hopeless Not at all   PHQ-2 Score 0           2024   Substance Use   Alcohol more than 3/day or more than 7/wk No   Do you have a current opioid prescription? No   How severe/bad is pain from 1 to 10? 4/10   Do you use any other substances recreationally? No        Social History     Tobacco Use    Smoking status: Former     Current packs/day: 0.00     Average packs/day: 0.3 packs/day for 6.0 years (1.5 ttl pk-yrs)     Types: Cigarettes     Start date:      Quit date:      Years since quittin.6     Passive exposure: Past    Smokeless tobacco: Never   Vaping Use    Vaping status: Never Used   Substance Use Topics    Alcohol use: Not Currently     Comment: rarely    Drug use: Never       ASCVD Risk   The 10-year ASCVD risk score (Shaheen GELLER, et al., 2019) is: 24.5%    Values used to calculate the score:      Age: 73 years      Sex: Male      Is Non- : No      Diabetic: No      Tobacco smoker: No      Systolic Blood Pressure: 114 mmHg      Is BP treated: Yes      HDL Cholesterol: 35 mg/dL      Total Cholesterol: 154 mg/dL              Reviewed and updated as needed this visit by Provider                    Past Medical History:   Diagnosis Date    Hearing aid worn      Past Surgical History:   Procedure Laterality Date    ANGIOGRAM      CHOLECYSTECTOMY      COLONOSCOPY W/ POLYPECTOMY N/A 2021    3 TA    COLONOSCOPY W/ POLYPECTOMY  2016    3 TA    COLONOSCOPY W/ POLYPECTOMY  2010    TA    LASER OF PROSTATE W/ GREEN LIGHT PVP  2009    REVISION OF TOTAL HIP FEMORAL Right 2018    Right hip revision total hip arthroplasty and extensive irrigation and debridement of the right thigh including muscle, fascia, bone and  exchange of the femoral head and polyethylene acetabular liner.    TOTAL HIP ARTHROPLASTY Right 04/16/2018    TURP  2011    US PROSTATE TUNA  2007    VASECTOMY       Current providers sharing in care for this patient include:  Patient Care Team:  Hardik Rock MD as PCP - General (Internal Medicine)  Caity Reddy, PharmD as Pharmacist (Pharmacist)  No Ref-Primary, Physician  Hardik Rock MD as Assigned PCP    The following health maintenance items are reviewed in Epic and correct as of today:  Health Maintenance   Topic Date Due    DTAP/TDAP/TD IMMUNIZATION (3 - Td or Tdap) 09/08/2021    ASTHMA ACTION PLAN  09/12/2023    Medicare Annual MTM Pharmacist Visit (once per calendar year)  Never done    COVID-19 Vaccine (8 - 2023-24 season) 02/05/2024    INFLUENZA VACCINE (1) 09/01/2024    MICROALBUMIN  02/05/2025    ASTHMA CONTROL TEST  02/12/2025    BMP  08/05/2025    LIPID  08/05/2025    HEMOGLOBIN  08/05/2025    MEDICARE ANNUAL WELLNESS VISIT  08/12/2025    ANNUAL REVIEW OF HM ORDERS  08/12/2025    FALL RISK ASSESSMENT  08/12/2025    COLORECTAL CANCER SCREENING  07/12/2026    GLUCOSE  08/05/2027    ADVANCE CARE PLANNING  08/12/2029    PARATHYROID  Completed    PHOSPHORUS  Completed    HEPATITIS C SCREENING  Completed    PHQ-2 (once per calendar year)  Completed    Pneumococcal Vaccine: 65+ Years  Completed    URINALYSIS  Completed    ALK PHOS  Completed    ZOSTER IMMUNIZATION  Completed    RSV VACCINE (Pregnancy & 60+)  Completed    AORTIC ANEURYSM SCREENING (SYSTEM ASSIGNED)  Completed    IPV IMMUNIZATION  Aged Out    HPV IMMUNIZATION  Aged Out    MENINGITIS IMMUNIZATION  Aged Out    RSV MONOCLONAL ANTIBODY  Aged Out         Review of Systems  Constitutional, neuro, ENT, endocrine, pulmonary, cardiac, gastrointestinal, genitourinary, musculoskeletal, integument and psychiatric systems are negative, except as otherwise noted.     Objective    Exam  /77 (BP Location: Right arm, Patient Position:  "Sitting, Cuff Size: Adult Large)   Pulse 72   Temp (!) 96.4  F (35.8  C) (Tympanic)   Resp 20   Ht 1.721 m (5' 7.75\")   Wt 89.8 kg (198 lb)   SpO2 98%   BMI 30.33 kg/m     Estimated body mass index is 30.33 kg/m  as calculated from the following:    Height as of this encounter: 1.721 m (5' 7.75\").    Weight as of this encounter: 89.8 kg (198 lb).    Physical Exam  Healthy-appearing overweight man in no distress  HEENT exam unremarkable  Eyes normal  Neck supple no adenopathy or thyromegaly  Lungs clear  Cardiac exam regular, no murmur, no edema, normal carotid and posterior tibial pulsations  Abdomen soft, nondistended, nontender  Right knee tender at the joint line has a small effusion, no warmth or erythema.  Normal range of motion.  Normal strength, sensation, and pulses in the right lower leg.  Patient is alert and oriented with fluent speech and intact memory, cranial nerves normal, strength and gait normal  Normal mood and affect          8/12/2024   Mini Cog   Clock Draw Score 2 Normal   3 Item Recall 3 objects recalled   Mini Cog Total Score 5                 Signed Electronically by: Hardik Rock MD    "

## 2024-08-12 NOTE — PATIENT INSTRUCTIONS
Patient Education   Preventive Care Advice   This is general advice given by our system to help you stay healthy. However, your care team may have specific advice just for you. Please talk to your care team about your preventive care needs.  Nutrition  Eat 5 or more servings of fruits and vegetables each day.  Try wheat bread, brown rice and whole grain pasta (instead of white bread, rice, and pasta).  Get enough calcium and vitamin D. Check the label on foods and aim for 100% of the RDA (recommended daily allowance).  Lifestyle  Exercise at least 150 minutes each week  (30 minutes a day, 5 days a week).  Do muscle strengthening activities 2 days a week. These help control your weight and prevent disease.  No smoking.  Wear sunscreen to prevent skin cancer.  Have a dental exam and cleaning every 6 months.  Yearly exams  See your health care team every year to talk about:  Any changes in your health.  Any medicines your care team has prescribed.  Preventive care, family planning, and ways to prevent chronic diseases.  Shots (vaccines)   HPV shots (up to age 26), if you've never had them before.  Hepatitis B shots (up to age 59), if you've never had them before.  COVID-19 shot: Get this shot when it's due.  Flu shot: Get a flu shot every year.  Tetanus shot: Get a tetanus shot every 10 years.  Pneumococcal, hepatitis A, and RSV shots: Ask your care team if you need these based on your risk.  Shingles shot (for age 50 and up)  General health tests  Diabetes screening:  Starting at age 35, Get screened for diabetes at least every 3 years.  If you are younger than age 35, ask your care team if you should be screened for diabetes.  Cholesterol test: At age 39, start having a cholesterol test every 5 years, or more often if advised.  Bone density scan (DEXA): At age 50, ask your care team if you should have this scan for osteoporosis (brittle bones).  Hepatitis C: Get tested at least once in your life.  STIs (sexually  transmitted infections)  Before age 24: Ask your care team if you should be screened for STIs.  After age 24: Get screened for STIs if you're at risk. You are at risk for STIs (including HIV) if:  You are sexually active with more than one person.  You don't use condoms every time.  You or a partner was diagnosed with a sexually transmitted infection.  If you are at risk for HIV, ask about PrEP medicine to prevent HIV.  Get tested for HIV at least once in your life, whether you are at risk for HIV or not.  Cancer screening tests  Cervical cancer screening: If you have a cervix, begin getting regular cervical cancer screening tests starting at age 21.  Breast cancer scan (mammogram): If you've ever had breasts, begin having regular mammograms starting at age 40. This is a scan to check for breast cancer.  Colon cancer screening: It is important to start screening for colon cancer at age 45.  Have a colonoscopy test every 10 years (or more often if you're at risk) Or, ask your provider about stool tests like a FIT test every year or Cologuard test every 3 years.  To learn more about your testing options, visit:   .  For help making a decision, visit:   https://bit.ly/ij37868.  Prostate cancer screening test: If you have a prostate, ask your care team if a prostate cancer screening test (PSA) at age 55 is right for you.  Lung cancer screening: If you are a current or former smoker ages 50 to 80, ask your care team if ongoing lung cancer screenings are right for you.  For informational purposes only. Not to replace the advice of your health care provider. Copyright   2023 [facility]. All rights reserved. Clinically reviewed by the [facility]. [employer] [id number] - REV 01/24.  Learning About Activities of Daily Living  What are activities of daily living?     Activities of daily living (ADLs) are the basic self-care tasks you do every day. These include eating, bathing, dressing,  and moving around.  As you age, and if you have health problems, you may find that it's harder to do some of these tasks. If so, your doctor can suggest ideas that may help.  To measure what kind of help you may need, your doctor will ask how well you are able to do ADLs. Let your doctor know if there are any tasks that you are having trouble doing. This is an important first step to getting help. And when you have the help you need, you can stay as independent as possible.  How will a doctor assess your ADLs?  Asking about ADLs is part of a routine health checkup your doctor will likely do as you age. Your health check might be done in a doctor's office, in your home, or at a hospital. The goal is to find out if you are having any problems that could make it hard to care for yourself or that make it unsafe for you to be on your own.  To measure your ADLs, your doctor will ask how hard it is for you to do routine tasks. Your doctor may also want to know if you have changed the way you do a task because of a health problem. Your doctor may watch how you:  Walk back and forth.  Keep your balance while you stand or walk.  Move from sitting to standing or from a bed to a chair.  Button or unbutton a shirt or sweater.  Remove and put on your shoes.  It's common to feel a little worried or anxious if you find you can't do all the things you used to be able to do. Talking with your doctor about ADLs is a way to make sure you're as safe as possible and able to care for yourself as well as you can. You may want to bring a caregiver, friend, or family member to your checkup. They can help you talk to your doctor.  Follow-up care is a key part of your treatment and safety. Be sure to make and go to all appointments, and call your doctor if you are having problems. It's also a good idea to know your test results and keep a list of the medicines you take.  Current as of: October 24, 2023  Content Version: 14.1    1595-0173  Healthwise, [employer].   Care instructions adapted under license by your healthcare professional. If you have questions about a medical condition or this instruction, always ask your healthcare professional. [employer] disclaims any warranty or liability for your use of this information.    Preventing Falls: Care Instructions  Injuries and health problems such as trouble walking or poor eyesight can increase your risk of falling. So can some medicines. But there are things you can do to help prevent falls. You can exercise to get stronger. You can also arrange your home to make it safer.    Talk to your doctor about the medicines you take. Ask if any of them increase the risk of falls and whether they can be changed or stopped.   Try to exercise regularly. It can help improve your strength and balance. This can help lower your risk of falling.     Practice fall safety and prevention.    Wear low-heeled shoes that fit well and give your feet good support. Talk to your doctor if you have foot problems that make this hard.  Carry a cellphone or wear a medical alert device that you can use to call for help.  Use stepladders instead of chairs to reach high objects. Don't climb if you're at risk for falls. Ask for help, if needed.  Wear the correct eyeglasses, if you need them.    Make your home safer.    Remove rugs, cords, clutter, and furniture from walkways.  Keep your house well lit. Use night-lights in hallways and bathrooms.  Install and use sturdy handrails on stairways.  Wear nonskid footwear, even inside. Don't walk barefoot or in socks without shoes.    Be safe outside.    Use handrails, curb cuts, and ramps whenever possible.  Keep your hands free by using a shoulder bag or backpack.  Try to walk in well-lit areas. Watch out for uneven ground, changes in pavement, and debris.  Be careful in the winter. Walk on the grass or gravel when sidewalks are slippery. Use de-icer on steps and walkways.  "Add non-slip devices to shoes.    Put grab bars and nonskid mats in your shower or tub and near the toilet. Try to use a shower chair or bath bench when bathing.   Get into a tub or shower by putting in your weaker leg first. Get out with your strong side first. Have a phone or medical alert device in the bathroom with you.   Where can you learn more?  Go to https://www.TribeHR.Showcase-TV/patiented  Enter G117 in the search box to learn more about \"Preventing Falls: Care Instructions.\"  Current as of: July 17, 2023               Content Version: 14.0    5788-4268 [employer].   Care instructions adapted under license by your healthcare professional. If you have questions about a medical condition or this instruction, always ask your healthcare professional. [employer] disclaims any warranty or liability for your use of this information.      Hearing Loss: Care Instructions  Overview     Hearing loss is a sudden or slow decrease in how well you hear. It can range from slight to profound. Permanent hearing loss can occur with aging. It also can happen when you are exposed long-term to loud noise. Examples include listening to loud music, riding motorcycles, or being around other loud machines.  Hearing loss can affect your work and home life. It can make you feel lonely or depressed. You may feel that you have lost your independence. But hearing aids and other devices can help you hear better and feel connected to others.  Follow-up care is a key part of your treatment and safety. Be sure to make and go to all appointments, and call your doctor if you are having problems. It's also a good idea to know your test results and keep a list of the medicines you take.  How can you care for yourself at home?  Avoid loud noises whenever possible. This helps keep your hearing from getting worse.  Always wear hearing protection around loud noises.  Wear a hearing aid as directed.  A professional can help " "you pick a hearing aid that will work best for you.  You can also get hearing aids over the counter for mild to moderate hearing loss.  Have hearing tests as your doctor suggests. They can show whether your hearing has changed. Your hearing aid may need to be adjusted.  Use other devices as needed. These may include:  Telephone amplifiers and hearing aids that can connect to a television, stereo, radio, or microphone.  Devices that use lights or vibrations. These alert you to the doorbell, a ringing telephone, or a baby monitor.  Television closed-captioning. This shows the words at the bottom of the screen. Most new TVs can do this.  TTY (text telephone). This lets you type messages back and forth on the telephone instead of talking or listening. These devices are also called TDD. When messages are typed on the keyboard, they are sent over the phone line to a receiving TTY. The message is shown on a monitor.  Use text messaging, social media, and email if it is hard for you to communicate by telephone.  Try to learn a listening technique called speechreading. It is not lipreading. You pay attention to people's gestures, expressions, posture, and tone of voice. These clues can help you understand what a person is saying. Face the person you are talking to, and have them face you. Make sure the lighting is good. You need to see the other person's face clearly.  Think about counseling if you need help to adjust to your hearing loss.  When should you call for help?  Watch closely for changes in your health, and be sure to contact your doctor if:    You think your hearing is getting worse.     You have new symptoms, such as dizziness or nausea.   Where can you learn more?  Go to https://www.TribeHR.net/patiented  Enter R798 in the search box to learn more about \"Hearing Loss: Care Instructions.\"  Current as of: September 27, 2023               Content Version: 14.0    7981-5422 [employer]wise[employer].   Care " instructions adapted under license by your healthcare professional. If you have questions about a medical condition or this instruction, always ask your healthcare professional. Health[employer], [employer] disclaims any warranty or liability for your use of this information.

## 2024-08-12 NOTE — ASSESSMENT & PLAN NOTE
Chronic use of zolpidem  Reviewed the risks and benefits of zolpidem and patient wishes to continue  Offered CBT-I

## 2024-08-14 ENCOUNTER — TELEPHONE (OUTPATIENT)
Dept: FAMILY MEDICINE | Facility: CLINIC | Age: 74
End: 2024-08-14
Payer: MEDICARE

## 2024-08-14 NOTE — TELEPHONE ENCOUNTER
[facility] referral from: [facility] visit (referral by provider)    MTM referral outreach attempt #2 on August 14, 2024 at 12:12 PM      Outcome: Patient is not interested at this time because he's really happy with his current meds.     Use vbc for the carrier/Plan on the flowsheet          See Julito HINSON   [phone]

## 2024-08-22 ENCOUNTER — MEDICAL CORRESPONDENCE (OUTPATIENT)
Dept: HEALTH INFORMATION MANAGEMENT | Facility: CLINIC | Age: 74
End: 2024-08-22
Payer: MEDICARE

## 2024-08-26 ENCOUNTER — TRANSFERRED RECORDS (OUTPATIENT)
Dept: HEALTH INFORMATION MANAGEMENT | Facility: CLINIC | Age: 74
End: 2024-08-26

## 2024-08-26 ENCOUNTER — ANCILLARY PROCEDURE (OUTPATIENT)
Dept: GENERAL RADIOLOGY | Facility: CLINIC | Age: 74
End: 2024-08-26
Attending: STUDENT IN AN ORGANIZED HEALTH CARE EDUCATION/TRAINING PROGRAM
Payer: COMMERCIAL

## 2024-08-26 DIAGNOSIS — M25.561 RIGHT KNEE PAIN: ICD-10-CM

## 2024-09-12 ENCOUNTER — TRANSFERRED RECORDS (OUTPATIENT)
Dept: HEALTH INFORMATION MANAGEMENT | Facility: CLINIC | Age: 74
End: 2024-09-12
Payer: MEDICARE

## 2024-11-18 DIAGNOSIS — I25.10 CORONARY ARTERY DISEASE INVOLVING NATIVE HEART, UNSPECIFIED VESSEL OR LESION TYPE, UNSPECIFIED WHETHER ANGINA PRESENT: ICD-10-CM

## 2024-11-18 RX ORDER — NITROGLYCERIN 0.4 MG/1
TABLET SUBLINGUAL
Qty: 25 TABLET | Refills: 3 | Status: SHIPPED | OUTPATIENT
Start: 2024-11-18

## 2024-12-18 ENCOUNTER — TRANSFERRED RECORDS (OUTPATIENT)
Dept: HEALTH INFORMATION MANAGEMENT | Facility: CLINIC | Age: 74
End: 2024-12-18
Payer: MEDICARE

## 2025-01-28 DIAGNOSIS — F51.04 PSYCHOPHYSIOLOGICAL INSOMNIA: ICD-10-CM

## 2025-01-28 RX ORDER — ZOLPIDEM TARTRATE 5 MG/1
TABLET ORAL
Qty: 30 TABLET | Refills: 5 | Status: SHIPPED | OUTPATIENT
Start: 2025-01-28

## 2025-02-06 DIAGNOSIS — J45.20 MILD INTERMITTENT ASTHMA WITHOUT COMPLICATION: ICD-10-CM

## 2025-02-06 RX ORDER — FLUTICASONE PROPIONATE AND SALMETEROL 250; 50 UG/1; UG/1
1 POWDER RESPIRATORY (INHALATION) 2 TIMES DAILY
Qty: 180 EACH | Refills: 3 | Status: SHIPPED | OUTPATIENT
Start: 2025-02-06

## 2025-02-10 DIAGNOSIS — Z96.649 HISTORY OF HIP REPLACEMENT: ICD-10-CM

## 2025-02-10 RX ORDER — AMOXICILLIN 500 MG/1
CAPSULE ORAL
Qty: 4 CAPSULE | Refills: 3 | Status: SHIPPED | OUTPATIENT
Start: 2025-02-10

## 2025-03-05 ENCOUNTER — TELEPHONE (OUTPATIENT)
Dept: FAMILY MEDICINE | Facility: CLINIC | Age: 75
End: 2025-03-05

## 2025-03-05 ENCOUNTER — OFFICE VISIT (OUTPATIENT)
Dept: FAMILY MEDICINE | Facility: CLINIC | Age: 75
End: 2025-03-05
Payer: MEDICARE

## 2025-03-05 VITALS
TEMPERATURE: 98.5 F | BODY MASS INDEX: 31.02 KG/M2 | DIASTOLIC BLOOD PRESSURE: 78 MMHG | HEIGHT: 68 IN | WEIGHT: 204.7 LBS | HEART RATE: 89 BPM | SYSTOLIC BLOOD PRESSURE: 129 MMHG | OXYGEN SATURATION: 97 % | RESPIRATION RATE: 16 BRPM

## 2025-03-05 DIAGNOSIS — J18.9 ATYPICAL PNEUMONIA: ICD-10-CM

## 2025-03-05 DIAGNOSIS — J45.21 MILD INTERMITTENT ASTHMA WITH ACUTE EXACERBATION: Primary | ICD-10-CM

## 2025-03-05 PROCEDURE — 99214 OFFICE O/P EST MOD 30 MIN: CPT | Performed by: PHYSICIAN ASSISTANT

## 2025-03-05 PROCEDURE — 3074F SYST BP LT 130 MM HG: CPT | Performed by: PHYSICIAN ASSISTANT

## 2025-03-05 PROCEDURE — 3078F DIAST BP <80 MM HG: CPT | Performed by: PHYSICIAN ASSISTANT

## 2025-03-05 RX ORDER — PREDNISONE 20 MG/1
20 TABLET ORAL DAILY
Qty: 7 TABLET | Refills: 0 | Status: SHIPPED | OUTPATIENT
Start: 2025-03-05

## 2025-03-05 RX ORDER — DOXYCYCLINE HYCLATE 100 MG
100 TABLET ORAL 2 TIMES DAILY
Qty: 20 TABLET | Refills: 0 | Status: SHIPPED | OUTPATIENT
Start: 2025-03-05

## 2025-03-05 ASSESSMENT — ASTHMA QUESTIONNAIRES
QUESTION_3 LAST FOUR WEEKS HOW OFTEN DID YOUR ASTHMA SYMPTOMS (WHEEZING, COUGHING, SHORTNESS OF BREATH, CHEST TIGHTNESS OR PAIN) WAKE YOU UP AT NIGHT OR EARLIER THAN USUAL IN THE MORNING: ONCE A WEEK
ACT_TOTALSCORE: 16
ACT_TOTALSCORE: 16
QUESTION_4 LAST FOUR WEEKS HOW OFTEN HAVE YOU USED YOUR RESCUE INHALER OR NEBULIZER MEDICATION (SUCH AS ALBUTEROL): TWO OR THREE TIMES PER WEEK
QUESTION_5 LAST FOUR WEEKS HOW WOULD YOU RATE YOUR ASTHMA CONTROL: WELL CONTROLLED
QUESTION_2 LAST FOUR WEEKS HOW OFTEN HAVE YOU HAD SHORTNESS OF BREATH: ONCE OR TWICE A WEEK
QUESTION_1 LAST FOUR WEEKS HOW MUCH OF THE TIME DID YOUR ASTHMA KEEP YOU FROM GETTING AS MUCH DONE AT WORK, SCHOOL OR AT HOME: MOST OF THE TIME

## 2025-03-05 NOTE — TELEPHONE ENCOUNTER
I confirmed with the patient, he has a photosensitivity reaction to the doxycycline in the summer.  He has tolerated other times of the year without difficulty.  He states this will be fine as he is not planning to travel to any tropical locale in the near future.  It is okay for pharmacy to fill.    KAH

## 2025-03-05 NOTE — TELEPHONE ENCOUNTER
Pharmacy Question - PT Allergy  Prescription for doxycycline was submitted today by Wayne Bhardwaj, but patient's allergy list states they are severely allergic. Pharmacy holding medication until clarified with provider. Please call pharmacy and advise or resubmit new prescription with notes.     Medication:     doxycycline hyclate (VIBRA-TABS) 100 MG tablet   Pharmacy: [phone]  [facility] [address] [place] [address]

## 2025-03-05 NOTE — PROGRESS NOTES
"  Assessment & Plan     (J45.21) Mild intermittent asthma with acute exacerbation  (primary encounter diagnosis)  Comment: Flare  Plan: predniSONE (DELTASONE) 20 MG tablet        Burst of prednisone    (J18.9) Atypical pneumonia  Comment: Will cover  Plan: doxycycline hyclate (VIBRA-TABS) 100 MG tablet        Twice daily 10 days fluids rest he should slowly improve not acutely worsen of the recheck          BMI  Estimated body mass index is 31.35 kg/m  as calculated from the following:    Height as of this encounter: 1.721 m (5' 7.75\").    Weight as of this encounter: 92.9 kg (204 lb 11.2 oz).             Subjective   Surendra is a 74 year old, presenting for the following health issues:  URI (Respiratory symptoms )        3/5/2025     3:47 PM   Additional Questions   Roomed by CATRACHITA Garza     74-year-old (clinic with complaint of a weeks history of a cough and nasal congestion  Is a low-grade temperature  He was in the process of moving from one [facility] to [facility] in [facility]  He thinks he got overheated and then got chilled  Worse with activity today  Albuterol has been moderately helpful  He has history of mild intermittent asthma  Cough is not productive  Some rhinorrhea  No sick contacts  History of photosensitivity with doxycycline but can take it he also can take prednisone    History of Present Illness       Reason for visit:  Cough fever  Symptom onset:  3-7 days ago  Symptoms include:  Cough fever  Symptom intensity:  Moderate  Symptom progression:  Worsening  Had these symptoms before:  Yes  Has tried/received treatment for these symptoms:  Yes  Previous treatment was successful:  Yes  Prior treatment description:  Predisone amoxicillin  What makes it worse:  Shoveing snow  What makes it better:  No   He is taking medications regularly.                      Objective    /78   Pulse 89   Temp 98.5  F (36.9  C) (Oral)   Resp 16   Ht 1.721 m (5' 7.75\")   Wt 92.9 kg (204 lb 11.2 oz)   " SpO2 97%   BMI 31.35 kg/m    Body mass index is 31.35 kg/m .  Physical Exam  Constitutional:       Appearance: Normal appearance.   HENT:      Head: Normocephalic and atraumatic.      Right Ear: Tympanic membrane and ear canal normal.      Left Ear: Tympanic membrane and ear canal normal.      Nose: Nose normal. No rhinorrhea.      Mouth/Throat:      Mouth: Mucous membranes are moist.      Pharynx: No posterior oropharyngeal erythema.   Eyes:      Conjunctiva/sclera: Conjunctivae normal.   Cardiovascular:      Rate and Rhythm: Normal rate and regular rhythm.      Heart sounds: Normal heart sounds.   Pulmonary:      Breath sounds: Rhonchi present. No wheezing or rales.   Musculoskeletal:      Cervical back: Normal range of motion and neck supple.   Neurological:      Mental Status: He is alert.                    Signed Electronically by: KIMBERLI Donovan

## 2025-03-05 NOTE — TELEPHONE ENCOUNTER
Spoke with pharmacist and gave response per provider.   She expressed understanding and will fill rx.

## 2025-03-25 ENCOUNTER — TRANSFERRED RECORDS (OUTPATIENT)
Dept: HEALTH INFORMATION MANAGEMENT | Facility: CLINIC | Age: 75
End: 2025-03-25
Payer: MEDICARE

## 2025-04-22 ENCOUNTER — TELEPHONE (OUTPATIENT)
Dept: FAMILY MEDICINE | Facility: CLINIC | Age: 75
End: 2025-04-22
Payer: MEDICARE

## 2025-04-22 NOTE — TELEPHONE ENCOUNTER
General Call    Contacts       Contact Date/Time Type Contact Phone/Fax    04/22/2025 10:06 AM CDT Phone (Incoming) Surendra Wallace (Self) [phone] (H)        Reason for Call:  Vaccine Status    Questions or concerns:  Pt calling to see when his last Tetanus shot was. Writer relayed last one was in 2011, after checking with an MA.

## 2025-06-10 ENCOUNTER — TELEPHONE (OUTPATIENT)
Dept: FAMILY MEDICINE | Facility: CLINIC | Age: 75
End: 2025-06-10
Payer: MEDICARE

## 2025-06-10 DIAGNOSIS — I25.10 ATHEROSCLEROSIS OF NATIVE CORONARY ARTERY OF NATIVE HEART WITHOUT ANGINA PECTORIS: ICD-10-CM

## 2025-06-10 DIAGNOSIS — D64.9 ANEMIA, UNSPECIFIED TYPE: Primary | ICD-10-CM

## 2025-06-10 DIAGNOSIS — C61 MALIGNANT TUMOR OF PROSTATE (H): ICD-10-CM

## 2025-06-10 NOTE — TELEPHONE ENCOUNTER
Order/Referral Request    Who is requesting: patient     Orders being requested: annual lab work including a PSA     Reason service is needed/diagnosis: upcoming AWV     When are orders needed by: 08/04/2025     Has this been discussed with Provider: Yes    Does patient have a preference on a Group/Provider/Facility? [facility]    Does patient have an appointment scheduled?: Yes: 08/04/2025 for labs     Where to send orders: Place orders within Epic    Okay to leave a detailed message?: Yes at     Home number on file [phone] (home)

## 2025-07-01 DIAGNOSIS — C61 MALIGNANT NEOPLASM OF PROSTATE (H): Primary | ICD-10-CM

## 2025-07-09 DIAGNOSIS — F51.04 PSYCHOPHYSIOLOGICAL INSOMNIA: ICD-10-CM

## 2025-07-10 RX ORDER — ZOLPIDEM TARTRATE 5 MG/1
TABLET ORAL
Qty: 30 TABLET | Refills: 1 | Status: SHIPPED | OUTPATIENT
Start: 2025-07-10

## 2025-08-04 ENCOUNTER — LAB (OUTPATIENT)
Dept: LAB | Facility: CLINIC | Age: 75
End: 2025-08-04
Payer: MEDICARE

## 2025-08-04 ENCOUNTER — RESULTS FOLLOW-UP (OUTPATIENT)
Dept: FAMILY MEDICINE | Facility: CLINIC | Age: 75
End: 2025-08-04

## 2025-08-04 DIAGNOSIS — D36.10 NEURILEMMOMA: ICD-10-CM

## 2025-08-04 DIAGNOSIS — D64.9 ANEMIA, UNSPECIFIED TYPE: ICD-10-CM

## 2025-08-04 DIAGNOSIS — C61 MALIGNANT TUMOR OF PROSTATE (H): ICD-10-CM

## 2025-08-04 DIAGNOSIS — I25.10 ATHEROSCLEROSIS OF NATIVE CORONARY ARTERY OF NATIVE HEART WITHOUT ANGINA PECTORIS: ICD-10-CM

## 2025-08-04 DIAGNOSIS — N18.32 CHRONIC KIDNEY DISEASE, STAGE 3B (H): ICD-10-CM

## 2025-08-04 DIAGNOSIS — C61 MALIGNANT NEOPLASM OF PROSTATE (H): ICD-10-CM

## 2025-08-04 LAB
ANION GAP SERPL CALCULATED.3IONS-SCNC: 9 MMOL/L (ref 7–15)
BUN SERPL-MCNC: 18.7 MG/DL (ref 8–23)
CALCIUM SERPL-MCNC: 9.2 MG/DL (ref 8.8–10.4)
CHLORIDE SERPL-SCNC: 110 MMOL/L (ref 98–107)
CHOLEST SERPL-MCNC: 133 MG/DL
CREAT SERPL-MCNC: 1.72 MG/DL (ref 0.67–1.17)
CREAT UR-MCNC: 132 MG/DL
EGFRCR SERPLBLD CKD-EPI 2021: 41 ML/MIN/1.73M2
ERYTHROCYTE [DISTWIDTH] IN BLOOD BY AUTOMATED COUNT: 12.9 % (ref 10–15)
FASTING STATUS PATIENT QL REPORTED: ABNORMAL
FASTING STATUS PATIENT QL REPORTED: NORMAL
FERRITIN SERPL-MCNC: 117 NG/ML (ref 31–409)
GLUCOSE SERPL-MCNC: 95 MG/DL (ref 70–99)
HCO3 SERPL-SCNC: 22 MMOL/L (ref 22–29)
HCT VFR BLD AUTO: 36.5 % (ref 40–53)
HDLC SERPL-MCNC: 40 MG/DL
HGB BLD-MCNC: 12.3 G/DL (ref 13.3–17.7)
LDLC SERPL CALC-MCNC: 67 MG/DL
MCH RBC QN AUTO: 33.2 PG (ref 26.5–33)
MCHC RBC AUTO-ENTMCNC: 33.7 G/DL (ref 31.5–36.5)
MCV RBC AUTO: 99 FL (ref 78–100)
MICROALBUMIN UR-MCNC: 21.4 MG/L
MICROALBUMIN/CREAT UR: 16.21 MG/G CR (ref 0–17)
NONHDLC SERPL-MCNC: 93 MG/DL
PLATELET # BLD AUTO: 240 10E3/UL (ref 150–450)
POTASSIUM SERPL-SCNC: 4.5 MMOL/L (ref 3.4–5.3)
PSA SERPL DL<=0.01 NG/ML-MCNC: 2.06 NG/ML (ref 0–6.5)
RBC # BLD AUTO: 3.7 10E6/UL (ref 4.4–5.9)
SODIUM SERPL-SCNC: 141 MMOL/L (ref 135–145)
TRIGL SERPL-MCNC: 130 MG/DL
WBC # BLD AUTO: 4.2 10E3/UL (ref 4–11)

## 2025-08-04 PROCEDURE — 85027 COMPLETE CBC AUTOMATED: CPT

## 2025-08-04 PROCEDURE — 82465 ASSAY BLD/SERUM CHOLESTEROL: CPT | Performed by: INTERNAL MEDICINE

## 2025-08-04 PROCEDURE — 36415 COLL VENOUS BLD VENIPUNCTURE: CPT

## 2025-08-04 PROCEDURE — 82728 ASSAY OF FERRITIN: CPT | Performed by: INTERNAL MEDICINE

## 2025-08-04 PROCEDURE — 82947 ASSAY GLUCOSE BLOOD QUANT: CPT | Performed by: INTERNAL MEDICINE

## 2025-08-04 PROCEDURE — 82570 ASSAY OF URINE CREATININE: CPT | Performed by: PHYSICIAN ASSISTANT

## 2025-08-04 PROCEDURE — 84153 ASSAY OF PSA TOTAL: CPT | Performed by: INTERNAL MEDICINE

## 2025-08-04 RX ORDER — GABAPENTIN 300 MG/1
300 CAPSULE ORAL DAILY
Qty: 90 CAPSULE | Refills: 3 | Status: SHIPPED | OUTPATIENT
Start: 2025-08-04

## 2025-08-09 DIAGNOSIS — I10 PRIMARY HYPERTENSION: ICD-10-CM

## 2025-08-09 DIAGNOSIS — I25.10 ATHEROSCLEROSIS OF NATIVE CORONARY ARTERY OF NATIVE HEART WITHOUT ANGINA PECTORIS: ICD-10-CM

## 2025-08-11 RX ORDER — AMLODIPINE BESYLATE 10 MG/1
10 TABLET ORAL DAILY
Qty: 90 TABLET | Refills: 0 | Status: SHIPPED | OUTPATIENT
Start: 2025-08-11

## 2025-08-11 RX ORDER — LOSARTAN POTASSIUM 100 MG/1
100 TABLET ORAL DAILY
Qty: 90 TABLET | Refills: 3 | Status: SHIPPED | OUTPATIENT
Start: 2025-08-11

## 2025-08-19 ENCOUNTER — OFFICE VISIT (OUTPATIENT)
Dept: FAMILY MEDICINE | Facility: CLINIC | Age: 75
End: 2025-08-19
Attending: INTERNAL MEDICINE
Payer: MEDICARE

## 2025-08-19 VITALS
WEIGHT: 192 LBS | TEMPERATURE: 96.7 F | OXYGEN SATURATION: 99 % | BODY MASS INDEX: 29.1 KG/M2 | SYSTOLIC BLOOD PRESSURE: 121 MMHG | HEART RATE: 60 BPM | DIASTOLIC BLOOD PRESSURE: 80 MMHG | HEIGHT: 68 IN | RESPIRATION RATE: 16 BRPM

## 2025-08-19 DIAGNOSIS — Z00.00 ENCOUNTER FOR MEDICARE ANNUAL WELLNESS EXAM: Primary | ICD-10-CM

## 2025-08-19 DIAGNOSIS — K21.9 GASTROESOPHAGEAL REFLUX DISEASE WITHOUT ESOPHAGITIS: ICD-10-CM

## 2025-08-19 DIAGNOSIS — N18.32 CHRONIC KIDNEY DISEASE, STAGE 3B (H): ICD-10-CM

## 2025-08-19 DIAGNOSIS — I10 PRIMARY HYPERTENSION: ICD-10-CM

## 2025-08-19 DIAGNOSIS — I25.10 ATHEROSCLEROSIS OF NATIVE CORONARY ARTERY OF NATIVE HEART WITHOUT ANGINA PECTORIS: ICD-10-CM

## 2025-08-19 DIAGNOSIS — D63.1 ANEMIA OF CHRONIC RENAL FAILURE, STAGE 3B (H): ICD-10-CM

## 2025-08-19 DIAGNOSIS — J45.20 MILD INTERMITTENT ASTHMA WITHOUT COMPLICATION: ICD-10-CM

## 2025-08-19 DIAGNOSIS — F51.04 PSYCHOPHYSIOLOGICAL INSOMNIA: ICD-10-CM

## 2025-08-19 DIAGNOSIS — N18.32 ANEMIA OF CHRONIC RENAL FAILURE, STAGE 3B (H): ICD-10-CM

## 2025-08-19 DIAGNOSIS — G47.33 OBSTRUCTIVE SLEEP APNEA SYNDROME: ICD-10-CM

## 2025-08-19 DIAGNOSIS — N40.1 BENIGN PROSTATIC HYPERPLASIA WITH NOCTURIA: ICD-10-CM

## 2025-08-19 DIAGNOSIS — C61 MALIGNANT TUMOR OF PROSTATE (H): ICD-10-CM

## 2025-08-19 DIAGNOSIS — R35.1 BENIGN PROSTATIC HYPERPLASIA WITH NOCTURIA: ICD-10-CM

## 2025-08-19 DIAGNOSIS — D36.10 NEURILEMMOMA: ICD-10-CM

## 2025-08-19 PROBLEM — N18.9 ANEMIA OF CHRONIC RENAL FAILURE: Status: ACTIVE | Noted: 2025-08-19

## 2025-08-19 RX ORDER — CLOPIDOGREL BISULFATE 75 MG/1
75 TABLET ORAL DAILY
Qty: 90 TABLET | Refills: 3 | Status: SHIPPED | OUTPATIENT
Start: 2025-08-19

## 2025-08-19 RX ORDER — GABAPENTIN 300 MG/1
300 CAPSULE ORAL DAILY
Qty: 90 CAPSULE | Refills: 3 | Status: SHIPPED | OUTPATIENT
Start: 2025-08-19

## 2025-08-19 RX ORDER — PANTOPRAZOLE SODIUM 40 MG/1
TABLET, DELAYED RELEASE ORAL
Qty: 90 TABLET | Refills: 3 | Status: SHIPPED | OUTPATIENT
Start: 2025-08-19

## 2025-08-19 RX ORDER — ZOLPIDEM TARTRATE 5 MG/1
5 TABLET ORAL
Qty: 30 TABLET | Refills: 5 | Status: SHIPPED | OUTPATIENT
Start: 2025-08-19

## 2025-08-19 RX ORDER — SILODOSIN 8 MG/1
CAPSULE ORAL
Qty: 90 CAPSULE | Refills: 3 | Status: SHIPPED | OUTPATIENT
Start: 2025-08-19

## 2025-08-19 RX ORDER — ALBUTEROL SULFATE 90 UG/1
2 INHALANT RESPIRATORY (INHALATION) EVERY 4 HOURS PRN
Qty: 18 G | Refills: 11 | Status: SHIPPED | OUTPATIENT
Start: 2025-08-19

## 2025-08-19 RX ORDER — AMLODIPINE BESYLATE 10 MG/1
10 TABLET ORAL DAILY
Qty: 90 TABLET | Refills: 3 | Status: SHIPPED | OUTPATIENT
Start: 2025-08-19

## 2025-08-19 RX ORDER — ROSUVASTATIN CALCIUM 20 MG/1
20 TABLET, COATED ORAL DAILY
Qty: 90 TABLET | Refills: 3 | Status: SHIPPED | OUTPATIENT
Start: 2025-08-19

## 2025-08-19 RX ORDER — FLUTICASONE PROPIONATE AND SALMETEROL 250; 50 UG/1; UG/1
1 POWDER RESPIRATORY (INHALATION) 2 TIMES DAILY
Qty: 180 EACH | Refills: 3 | Status: SHIPPED | OUTPATIENT
Start: 2025-08-19

## 2025-08-19 RX ORDER — FINASTERIDE 5 MG/1
1 TABLET, FILM COATED ORAL DAILY
Qty: 90 TABLET | Refills: 3 | Status: SHIPPED | OUTPATIENT
Start: 2025-08-19

## 2025-08-19 SDOH — HEALTH STABILITY: PHYSICAL HEALTH: ON AVERAGE, HOW MANY DAYS PER WEEK DO YOU ENGAGE IN MODERATE TO STRENUOUS EXERCISE (LIKE A BRISK WALK)?: 6 DAYS

## 2025-08-19 SDOH — HEALTH STABILITY: PHYSICAL HEALTH: ON AVERAGE, HOW MANY MINUTES DO YOU ENGAGE IN EXERCISE AT THIS LEVEL?: 60 MIN

## 2025-08-19 ASSESSMENT — ASTHMA QUESTIONNAIRES
QUESTION_5 LAST FOUR WEEKS HOW WOULD YOU RATE YOUR ASTHMA CONTROL: WELL CONTROLLED
QUESTION_1 LAST FOUR WEEKS HOW MUCH OF THE TIME DID YOUR ASTHMA KEEP YOU FROM GETTING AS MUCH DONE AT WORK, SCHOOL OR AT HOME: A LITTLE OF THE TIME
QUESTION_2 LAST FOUR WEEKS HOW OFTEN HAVE YOU HAD SHORTNESS OF BREATH: ONCE OR TWICE A WEEK
ACT_TOTALSCORE: 19
QUESTION_4 LAST FOUR WEEKS HOW OFTEN HAVE YOU USED YOUR RESCUE INHALER OR NEBULIZER MEDICATION (SUCH AS ALBUTEROL): TWO OR THREE TIMES PER WEEK
QUESTION_3 LAST FOUR WEEKS HOW OFTEN DID YOUR ASTHMA SYMPTOMS (WHEEZING, COUGHING, SHORTNESS OF BREATH, CHEST TIGHTNESS OR PAIN) WAKE YOU UP AT NIGHT OR EARLIER THAN USUAL IN THE MORNING: ONCE OR TWICE

## 2025-08-19 ASSESSMENT — SOCIAL DETERMINANTS OF HEALTH (SDOH): HOW OFTEN DO YOU GET TOGETHER WITH FRIENDS OR RELATIVES?: THREE TIMES A WEEK

## 2025-08-25 ENCOUNTER — RESULTS FOLLOW-UP (OUTPATIENT)
Dept: FAMILY MEDICINE | Facility: CLINIC | Age: 75
End: 2025-08-25
Payer: MEDICARE

## 2025-08-25 ENCOUNTER — HOSPITAL ENCOUNTER (OUTPATIENT)
Dept: ULTRASOUND IMAGING | Facility: CLINIC | Age: 75
Discharge: HOME | End: 2025-08-25
Attending: INTERNAL MEDICINE | Admitting: INTERNAL MEDICINE
Payer: MEDICARE

## 2025-08-25 DIAGNOSIS — N18.32 CHRONIC KIDNEY DISEASE, STAGE 3B (H): ICD-10-CM

## 2025-08-25 PROCEDURE — 76770 US EXAM ABDO BACK WALL COMP: CPT
